# Patient Record
Sex: FEMALE | Race: WHITE | NOT HISPANIC OR LATINO | Employment: OTHER | URBAN - METROPOLITAN AREA
[De-identification: names, ages, dates, MRNs, and addresses within clinical notes are randomized per-mention and may not be internally consistent; named-entity substitution may affect disease eponyms.]

---

## 2017-01-07 ENCOUNTER — HOSPITAL ENCOUNTER (OUTPATIENT)
Dept: PREADMISSION TESTING | Facility: HOSPITAL | Age: 72
Discharge: HOME | End: 2017-01-07
Attending: SURGERY | Admitting: SURGERY

## 2017-01-07 LAB
ALBUMIN SERPL-MCNC: 3.9 G/DL (ref 3.5–4.8)
ALBUMIN/GLOB SERPL: 1.1 {RATIO} (ref 1–1.7)
ALP SERPL-CCNC: 69 IU/L (ref 32–91)
ALT SERPL-CCNC: 54 IU/L (ref 14–54)
ANION GAP SERPL CALC-SCNC: 14.3 MMOL/L (ref 10–20)
AST SERPL-CCNC: 41 IU/L (ref 15–41)
BASOPHILS # BLD AUTO: 0 10*3/UL (ref 0–0.2)
BASOPHILS NFR BLD AUTO: 1 % (ref 0–2)
BILIRUB SERPL-MCNC: 0.6 MG/DL (ref 0.3–1.2)
BUN SERPL-MCNC: 8 MG/DL (ref 8–20)
BUN/CREAT SERPL: 11.4 (ref 5.4–26.2)
CALCIUM SERPL-MCNC: 10.4 MG/DL (ref 8.9–10.3)
CHLORIDE SERPL-SCNC: 102 MMOL/L (ref 101–111)
CONV CO2: 27 MMOL/L (ref 22–32)
CONV TOTAL PROTEIN: 7.4 G/DL (ref 6.1–7.9)
CREAT UR-MCNC: 0.7 MG/DL (ref 0.4–1)
DIFFERENTIAL METHOD BLD: (no result)
EOSINOPHIL # BLD AUTO: 0.1 10*3/UL (ref 0–0.3)
EOSINOPHIL # BLD AUTO: 2 % (ref 0–3)
ERYTHROCYTE [DISTWIDTH] IN BLOOD BY AUTOMATED COUNT: 13.8 % (ref 11.5–14.5)
GLOBULIN UR ELPH-MCNC: 3.5 G/DL (ref 2.5–3.8)
GLUCOSE SERPL-MCNC: 138 MG/DL (ref 65–99)
HCT VFR BLD AUTO: 49.6 % (ref 35–49)
HGB BLD-MCNC: 16.6 G/DL (ref 12–15)
LYMPHOCYTES # BLD AUTO: 1.8 10*3/UL (ref 0.8–4.8)
LYMPHOCYTES NFR BLD AUTO: 25 % (ref 18–42)
MCH RBC QN AUTO: 31.1 PG (ref 26–32)
MCHC RBC AUTO-ENTMCNC: 33.4 G/DL (ref 32–36)
MCV RBC AUTO: 93.1 FL (ref 80–94)
MONOCYTES # BLD AUTO: 0.6 10*3/UL (ref 0.1–1.3)
MONOCYTES NFR BLD AUTO: 9 % (ref 2–11)
NEUTROPHILS # BLD AUTO: 4.5 10*3/UL (ref 2.3–8.6)
NEUTROPHILS NFR BLD AUTO: 63 % (ref 50–75)
NRBC BLD AUTO-RTO: 0 /100{WBCS}
NRBC/RBC NFR BLD MANUAL: 0 10*3/UL
PLATELET # BLD AUTO: 187 10*3/UL (ref 150–450)
PMV BLD AUTO: 9.5 FL (ref 7.4–10.4)
POTASSIUM SERPL-SCNC: 4.3 MMOL/L (ref 3.6–5.1)
RBC # BLD AUTO: 5.33 10*6/UL (ref 4–5.4)
SODIUM SERPL-SCNC: 139 MMOL/L (ref 136–144)
WBC # BLD AUTO: 7 10*3/UL (ref 4.5–11.5)

## 2018-07-09 ENCOUNTER — HOSPITAL ENCOUNTER (OUTPATIENT)
Dept: FAMILY MEDICINE CLINIC | Facility: CLINIC | Age: 73
Setting detail: SPECIMEN
Discharge: HOME | End: 2018-07-09
Attending: FAMILY MEDICINE | Admitting: FAMILY MEDICINE

## 2018-07-09 LAB
ALBUMIN SERPL-MCNC: 4.1 G/DL (ref 3.5–4.8)
ALBUMIN/GLOB SERPL: 1.3 {RATIO} (ref 1–1.7)
ALP SERPL-CCNC: 62 IU/L (ref 32–91)
ALT SERPL-CCNC: 64 IU/L (ref 14–54)
ANION GAP SERPL CALC-SCNC: 12.8 MMOL/L (ref 10–20)
AST SERPL-CCNC: 51 IU/L (ref 15–41)
BASOPHILS # BLD AUTO: 0.1 10*3/UL (ref 0–0.2)
BASOPHILS NFR BLD AUTO: 1 % (ref 0–2)
BILIRUB SERPL-MCNC: 0.6 MG/DL (ref 0.3–1.2)
BILIRUB UR QL STRIP: NEGATIVE MG/DL
BUN SERPL-MCNC: 7 MG/DL (ref 8–20)
BUN/CREAT SERPL: 10 (ref 5.4–26.2)
CALCIUM SERPL-MCNC: 9.9 MG/DL (ref 8.9–10.3)
CASTS URNS QL MICRO: ABNORMAL /[LPF]
CHLORIDE SERPL-SCNC: 105 MMOL/L (ref 101–111)
CHOLEST SERPL-MCNC: 168 MG/DL
CHOLEST/HDLC SERPL: 3.5 {RATIO}
COLOR UR: YELLOW
CONV BACTERIA IN URINE MICRO: NEGATIVE
CONV CLARITY OF URINE: CLEAR
CONV CO2: 25 MMOL/L (ref 22–32)
CONV HYALINE CASTS IN URINE MICRO: 0 /[LPF] (ref 0–5)
CONV LDL CHOLESTEROL DIRECT: 94 MG/DL (ref 0–100)
CONV PROTEIN IN URINE BY AUTOMATED TEST STRIP: NEGATIVE MG/DL
CONV SMALL ROUND CELLS: ABNORMAL /[HPF]
CONV TOTAL PROTEIN: 7.3 G/DL (ref 6.1–7.9)
CONV UROBILINOGEN IN URINE BY AUTOMATED TEST STRIP: 0.2 MG/DL
CREAT UR-MCNC: 0.7 MG/DL (ref 0.4–1)
CULTURE INDICATED?: ABNORMAL
DIFFERENTIAL METHOD BLD: (no result)
EOSINOPHIL # BLD AUTO: 0.2 10*3/UL (ref 0–0.3)
EOSINOPHIL # BLD AUTO: 2 % (ref 0–3)
ERYTHROCYTE [DISTWIDTH] IN BLOOD BY AUTOMATED COUNT: 13.8 % (ref 11.5–14.5)
ERYTHROCYTE [SEDIMENTATION RATE] IN BLOOD BY WESTERGREN METHOD: 27 MM/HR (ref 0–30)
GLOBULIN UR ELPH-MCNC: 3.2 G/DL (ref 2.5–3.8)
GLUCOSE SERPL-MCNC: 85 MG/DL (ref 65–99)
GLUCOSE UR QL: NEGATIVE MG/DL
HCT VFR BLD AUTO: 43.6 % (ref 35–49)
HDLC SERPL-MCNC: 48 MG/DL
HGB BLD-MCNC: 14.9 G/DL (ref 12–15)
HGB UR QL STRIP: NEGATIVE
KETONES UR QL STRIP: NEGATIVE MG/DL
LDLC/HDLC SERPL: 2 {RATIO}
LEUKOCYTE ESTERASE UR QL STRIP: ABNORMAL
LIPID INTERPRETATION: ABNORMAL
LYMPHOCYTES # BLD AUTO: 2.2 10*3/UL (ref 0.8–4.8)
LYMPHOCYTES NFR BLD AUTO: 22 % (ref 18–42)
MCH RBC QN AUTO: 32.2 PG (ref 26–32)
MCHC RBC AUTO-ENTMCNC: 34.2 G/DL (ref 32–36)
MCV RBC AUTO: 94.1 FL (ref 80–94)
MONOCYTES # BLD AUTO: 1.1 10*3/UL (ref 0.1–1.3)
MONOCYTES NFR BLD AUTO: 10 % (ref 2–11)
NEUTROPHILS # BLD AUTO: 6.9 10*3/UL (ref 2.3–8.6)
NEUTROPHILS NFR BLD AUTO: 65 % (ref 50–75)
NITRITE UR QL STRIP: NEGATIVE
NRBC BLD AUTO-RTO: 0 /100{WBCS}
NRBC/RBC NFR BLD MANUAL: 0 10*3/UL
PH UR STRIP.AUTO: 7 [PH] (ref 4.5–8)
PLATELET # BLD AUTO: 223 10*3/UL (ref 150–450)
PMV BLD AUTO: 9.2 FL (ref 7.4–10.4)
POTASSIUM SERPL-SCNC: 3.8 MMOL/L (ref 3.6–5.1)
RBC # BLD AUTO: 4.63 10*6/UL (ref 4–5.4)
RBC #/AREA URNS HPF: 2 /[HPF] (ref 0–3)
SODIUM SERPL-SCNC: 139 MMOL/L (ref 136–144)
SP GR UR: 1 (ref 1–1.03)
SPERM URNS QL MICRO: ABNORMAL /[HPF]
SQUAMOUS SPT QL MICRO: 1 /[HPF] (ref 0–5)
TRIGL SERPL-MCNC: 144 MG/DL
UNIDENT CRYS URNS QL MICRO: ABNORMAL /[HPF]
VLDLC SERPL CALC-MCNC: 25.4 MG/DL
WBC # BLD AUTO: 10.4 10*3/UL (ref 4.5–11.5)
WBC #/AREA URNS HPF: 2 /[HPF] (ref 0–5)
YEAST SPEC QL WET PREP: ABNORMAL /[HPF]

## 2018-07-11 LAB
ANA SER QL IA: ABNORMAL

## 2018-07-15 LAB
ENA RNP AB SER-ACNC: <1 AI
ENA SCL70 AB SER QL IA: <1 AI
ENA SM AB SER-ACNC: <1 AI
ENA SS-B AB SER-ACNC: <1 AI
RIBOSOMAL P AB SER-ACNC: <1 AI
RIBOSOMAL P AB SER-ACNC: <1 AI
SJOGREN'S ANTI-SS-A: <1 AI

## 2018-10-08 ENCOUNTER — HOSPITAL ENCOUNTER (OUTPATIENT)
Dept: GENERAL RADIOLOGY | Facility: HOSPITAL | Age: 73
Discharge: HOME | End: 2018-10-08
Attending: SURGERY | Admitting: SURGERY

## 2019-06-27 ENCOUNTER — TELEPHONE (OUTPATIENT)
Dept: ONCOLOGY | Facility: HOSPITAL | Age: 74
End: 2019-06-27

## 2019-06-27 NOTE — TELEPHONE ENCOUNTER
Case Management/ Note    Patient Name: Esperanza Smith  YOB: 1945  MRN #: [id number]    OSW received a phone message from patient regarding where to obtain bras for her prosthetics. OSW called patient back and gave her information for [facility] and [facility].     Electronically signed by:   Jazmine Lorenzo LCSW, OSW-C  06/27/19, 1:41 PM

## 2019-08-19 ENCOUNTER — TELEPHONE (OUTPATIENT)
Dept: FAMILY MEDICINE CLINIC | Facility: CLINIC | Age: 74
End: 2019-08-19

## 2019-08-19 NOTE — TELEPHONE ENCOUNTER
Gave message to patient at 3:41pm.  She does not want rx called in for Midodrine because she only has low blood pressure when she passes out.  I was told to schedule her and I told her PMJ will see her Thursday at 4:30PM.    Ame - Please put on PMJ's schedule for this Thursday 8/22 at 4:30pm.

## 2019-08-19 NOTE — TELEPHONE ENCOUNTER
Patient is wanting to be seen ASAP.  She had another episode over the weekend where she passed out, her bp dropped to 84/54 and she had diarrhea.  When can you see her?

## 2019-08-19 NOTE — TELEPHONE ENCOUNTER
Push fluids and some salt.  Midodrine 2.5mg one tid    #90  See Esperanza sometime in the next week or so.  I could work in late or early just let me know if I need to come in earlier.

## 2019-08-20 NOTE — TELEPHONE ENCOUNTER
Maria Elena - can you look at PMJ's schedule and tell me where to schedule this patient?  There are no 4pm or 4:30pm slots open this week or next week.  PMJ said he wanted to see her this week or next week.  Please let me know where to put patient so I can call her back and tell her we changed the appointment.

## 2019-08-20 NOTE — TELEPHONE ENCOUNTER
Gave message to patient at 2:13pm.    Christa - can you please put patient on PMJ's schedule for 08/23/2019 at 9:15am?  Thanks!

## 2019-08-23 ENCOUNTER — OFFICE VISIT (OUTPATIENT)
Dept: FAMILY MEDICINE CLINIC | Facility: CLINIC | Age: 74
End: 2019-08-23

## 2019-08-23 VITALS
TEMPERATURE: 98 F | OXYGEN SATURATION: 94 % | BODY MASS INDEX: 32.47 KG/M2 | WEIGHT: 202 LBS | HEART RATE: 85 BPM | DIASTOLIC BLOOD PRESSURE: 94 MMHG | SYSTOLIC BLOOD PRESSURE: 132 MMHG | HEIGHT: 66 IN | RESPIRATION RATE: 16 BRPM

## 2019-08-23 DIAGNOSIS — K63.2 ENTEROCUTANEOUS FISTULA: ICD-10-CM

## 2019-08-23 DIAGNOSIS — R55 VASOVAGAL ATTACK: Primary | ICD-10-CM

## 2019-08-23 DIAGNOSIS — R10.84 GENERALIZED ABDOMINAL PAIN: ICD-10-CM

## 2019-08-23 PROCEDURE — 99213 OFFICE O/P EST LOW 20 MIN: CPT | Performed by: FAMILY MEDICINE

## 2019-08-23 RX ORDER — ASPIRIN 325 MG
1 TABLET ORAL DAILY
COMMUNITY
Start: 2016-02-19

## 2019-08-23 RX ORDER — BLOOD-GLUCOSE METER
KIT MISCELLANEOUS
COMMUNITY
Start: 2014-07-03 | End: 2020-01-30 | Stop reason: SDUPTHER

## 2019-08-23 RX ORDER — L.ACID,FERM,PLA,RHA/B.BIF,LONG 126 MG
TABLET, DELAYED AND EXTENDED RELEASE ORAL
COMMUNITY
Start: 2016-02-19 | End: 2019-11-20

## 2019-08-23 RX ORDER — TRIAMCINOLONE ACETONIDE 0.25 MG/ML
LOTION TOPICAL
COMMUNITY
Start: 2019-03-08 | End: 2019-11-20

## 2019-08-23 RX ORDER — CHOLECALCIFEROL (VITAMIN D3) 125 MCG
1 CAPSULE ORAL DAILY
COMMUNITY
Start: 2016-02-19

## 2019-08-23 RX ORDER — ZINC GLUCONATE 50 MG
TABLET ORAL
COMMUNITY
Start: 2016-02-19 | End: 2019-11-20

## 2019-08-23 NOTE — ASSESSMENT & PLAN NOTE
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  We are hoping that this has finally sealed over but time will tell.

## 2019-08-23 NOTE — ASSESSMENT & PLAN NOTE
Patient has recurrent abdominal pain and constipation at times.  She carries a diagnosis of IBS and its most likely IBS-C that is causing her pain.  She is going to continue her vegetable drink and add in Benefiber to it.  Hopefully with a little extra fiber she will have relief from the recurrent impactions that she tends to get right now.

## 2019-08-23 NOTE — ASSESSMENT & PLAN NOTE
Patient had any episode of vasovagal syncope most likely related to the impacted stool.  Once she obtained relief the and she had diarrhea.  That triggered the vasovagal episode and she had diaphoresis and fainting.  Fortunately was over in a few minutes and she is now back to normal.

## 2019-08-23 NOTE — PROGRESS NOTES
Rooming Tab(CC,VS,Pt Hx,Fall Screen)  Chief Complaint   Patient presents with   • Loss of Consciousness       Subjective    Patient is a 73-year-old female who has had a fistula for many years in her abdominal area.  Recently that fistula finally stopped flowing.  She has had intermittent constipation also for several years and has learned to take certain products to help relieve the constipation including a little wine at night if she is impacted.  That would usually give her relief and she has done well.  The other day she went into the bathroom to have a bowel movement and had a very hard painful bowel movement followed by some diarrhea.  She started sweating and she could tell immediately that she was going to have a syncopal spell.  She took her glasses off and immediately got to the floor so that she would not fall.  She did actually become unconscious for several minutes and when she awoke then her  helped her up to the shower.  She is here today just to discuss this situation and to rule out any other pathology.              I have reviewed and updated her medications, medical history and problem list during today's office visit.     Patient Care Team:  Dom Estrada MD as PCP - General  [facility] - [facility] Attributed    Problem List Tab  Medications Tab  Synopsis Tab  Chart Review Tab  Care Everywhere Tab  Immunizations Tab  Patient History Tab    Social History     Tobacco Use   • Smoking status: Never Smoker   • Smokeless tobacco: Never Used   Substance Use Topics   • Alcohol use: Yes     Alcohol/week: 0.6 oz     Types: 1 Glasses of wine per week     Comment: occ       Review of Systems   Constitutional: Negative.  Negative for appetite change, diaphoresis, fatigue, fever and unexpected weight loss.   HENT: Negative for congestion, sinus pressure and sore throat.    Eyes: Negative for blurred vision, double vision and itching.   Respiratory: Negative for cough and shortness  "of breath.    Cardiovascular: Negative for chest pain and palpitations.   Genitourinary: Negative for difficulty urinating, frequency and urinary incontinence.   Musculoskeletal: Negative for arthralgias, back pain, joint swelling, myalgias and neck pain.   Skin: Negative for dry skin and rash.   Allergic/Immunologic: Negative for environmental allergies.   Neurological: Negative for dizziness, tremors, syncope, headache and memory problem.   Hematological: Does not bruise/bleed easily.   Psychiatric/Behavioral: Negative for depressed mood. The patient is not nervous/anxious.    All other systems reviewed and are negative.      Objective     Rooming Tab(CC,VS,Pt Hx,Fall Screen)  /94 (BP Location: Left arm, Cuff Size: Large Adult)   Pulse 85   Temp 98 °F (36.7 °C) (Oral)   Resp 16   Ht 167.6 cm (66\")   Wt 91.6 kg (202 lb)   SpO2 94%   BMI 32.60 kg/m²     Body mass index is 32.6 kg/m².    Physical Exam   Constitutional: She is oriented to person, place, and time. She appears well-developed. No distress.   HENT:   Head: Normocephalic.   Left Ear: External ear normal.   Nose: Nose normal.   Mouth/Throat: Oropharynx is clear and moist.   Eyes: Conjunctivae and lids are normal. Right pupil is round. Left pupil is round. Pupils are equal.   Neck: Normal range of motion. Neck supple.   Cardiovascular: Normal rate, regular rhythm, normal heart sounds and intact distal pulses.   Pulmonary/Chest: Effort normal and breath sounds normal.   Abdominal: Soft. Bowel sounds are normal.   Musculoskeletal: Normal range of motion.   Neurological: She is alert and oriented to person, place, and time.   Skin: Skin is warm and dry.   Psychiatric: She has a normal mood and affect. Her speech is normal and behavior is normal. Judgment and thought content normal. She is attentive.        Statin Choice Calculator  Data Reviewed:                   Assessment/Plan   Order Review Tab  Health Maintenance Tab  Patient Plan/Order " Tab  Diagnoses and all orders for this visit:    1. Vasovagal attack (Primary)  Assessment & Plan:  Patient had any episode of vasovagal syncope most likely related to the impacted stool.  Once she obtained relief the and she had diarrhea.  That triggered the vasovagal episode and she had diaphoresis and fainting.  Fortunately was over in a few minutes and she is now back to normal.      2. Generalized abdominal pain  Assessment & Plan:  Patient has recurrent abdominal pain and constipation at times.  She carries a diagnosis of IBS and its most likely IBS-C that is causing her pain.  She is going to continue her vegetable drink and add in Benefiber to it.  Hopefully with a little extra fiber she will have relief from the recurrent impactions that she tends to get right now.      3. 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  We are hoping that this has finally sealed over but time will tell.        Wrapup Tab  Return in about 1 year (around 8/23/2020) for Annual physical.

## 2019-11-20 ENCOUNTER — LAB (OUTPATIENT)
Dept: FAMILY MEDICINE CLINIC | Facility: CLINIC | Age: 74
End: 2019-11-20

## 2019-11-20 ENCOUNTER — OFFICE VISIT (OUTPATIENT)
Dept: FAMILY MEDICINE CLINIC | Facility: CLINIC | Age: 74
End: 2019-11-20

## 2019-11-20 DIAGNOSIS — K63.2 ENTEROCUTANEOUS FISTULA: ICD-10-CM

## 2019-11-20 DIAGNOSIS — R79.9 ABNORMAL FINDING OF BLOOD CHEMISTRY, UNSPECIFIED: ICD-10-CM

## 2019-11-20 DIAGNOSIS — E78.2 MIXED HYPERLIPIDEMIA: ICD-10-CM

## 2019-11-20 DIAGNOSIS — E55.9 VITAMIN D DEFICIENCY: ICD-10-CM

## 2019-11-20 DIAGNOSIS — I10 ESSENTIAL HYPERTENSION: ICD-10-CM

## 2019-11-20 DIAGNOSIS — Z00.00 MEDICARE ANNUAL WELLNESS VISIT, SUBSEQUENT: Primary | ICD-10-CM

## 2019-11-20 DIAGNOSIS — E66.3 OVERWEIGHT: ICD-10-CM

## 2019-11-20 DIAGNOSIS — C50.912 MALIGNANT NEOPLASM OF LEFT FEMALE BREAST, UNSPECIFIED ESTROGEN RECEPTOR STATUS, UNSPECIFIED SITE OF BREAST (HCC): ICD-10-CM

## 2019-11-20 DIAGNOSIS — Z12.39 SCREENING FOR BREAST CANCER: ICD-10-CM

## 2019-11-20 LAB
25(OH)D3 SERPL-MCNC: 56.5 NG/ML (ref 30–100)
ALBUMIN SERPL-MCNC: 4.5 G/DL (ref 3.5–5.2)
ALBUMIN/GLOB SERPL: 1.2 G/DL
ALP SERPL-CCNC: 85 U/L (ref 39–117)
ALT SERPL W P-5'-P-CCNC: 48 U/L (ref 1–33)
ANION GAP SERPL CALCULATED.3IONS-SCNC: 14.4 MMOL/L (ref 5–15)
AST SERPL-CCNC: 43 U/L (ref 1–32)
BACTERIA UR QL AUTO: ABNORMAL /HPF
BASOPHILS # BLD AUTO: 0.04 10*3/MM3 (ref 0–0.2)
BASOPHILS NFR BLD AUTO: 0.5 % (ref 0–1.5)
BILIRUB SERPL-MCNC: 0.7 MG/DL (ref 0.2–1.2)
BILIRUB UR QL STRIP: NEGATIVE
BUN BLD-MCNC: 9 MG/DL (ref 8–23)
BUN/CREAT SERPL: 14.8 (ref 7–25)
CALCIUM SPEC-SCNC: 10.5 MG/DL (ref 8.6–10.5)
CHLORIDE SERPL-SCNC: 100 MMOL/L (ref 98–107)
CHOLEST SERPL-MCNC: 199 MG/DL (ref 0–200)
CLARITY UR: CLEAR
CO2 SERPL-SCNC: 24.6 MMOL/L (ref 22–29)
COLOR UR: YELLOW
CREAT BLD-MCNC: 0.61 MG/DL (ref 0.57–1)
DEPRECATED RDW RBC AUTO: 41.1 FL (ref 37–54)
EOSINOPHIL # BLD AUTO: 0.13 10*3/MM3 (ref 0–0.4)
EOSINOPHIL NFR BLD AUTO: 1.5 % (ref 0.3–6.2)
ERYTHROCYTE [DISTWIDTH] IN BLOOD BY AUTOMATED COUNT: 12.1 % (ref 12.3–15.4)
GFR SERPL CREATININE-BSD FRML MDRD: 96 ML/MIN/1.73
GLOBULIN UR ELPH-MCNC: 3.9 GM/DL
GLUCOSE BLD-MCNC: 100 MG/DL (ref 65–99)
GLUCOSE UR STRIP-MCNC: NEGATIVE MG/DL
HBA1C MFR BLD: 5.6 % (ref 3.5–5.6)
HCT VFR BLD AUTO: 47.7 % (ref 34–46.6)
HDLC SERPL-MCNC: 56 MG/DL (ref 40–60)
HGB BLD-MCNC: 16.5 G/DL (ref 12–15.9)
HGB UR QL STRIP.AUTO: NEGATIVE
HYALINE CASTS UR QL AUTO: ABNORMAL /LPF
IMM GRANULOCYTES # BLD AUTO: 0.04 10*3/MM3 (ref 0–0.05)
IMM GRANULOCYTES NFR BLD AUTO: 0.5 % (ref 0–0.5)
KETONES UR QL STRIP: NEGATIVE
LDLC SERPL CALC-MCNC: 122 MG/DL (ref 0–100)
LDLC/HDLC SERPL: 2.18 {RATIO}
LEUKOCYTE ESTERASE UR QL STRIP.AUTO: ABNORMAL
LYMPHOCYTES # BLD AUTO: 1.83 10*3/MM3 (ref 0.7–3.1)
LYMPHOCYTES NFR BLD AUTO: 21.4 % (ref 19.6–45.3)
MCH RBC QN AUTO: 32.2 PG (ref 26.6–33)
MCHC RBC AUTO-ENTMCNC: 34.6 G/DL (ref 31.5–35.7)
MCV RBC AUTO: 93 FL (ref 79–97)
MONOCYTES # BLD AUTO: 0.74 10*3/MM3 (ref 0.1–0.9)
MONOCYTES NFR BLD AUTO: 8.7 % (ref 5–12)
NEUTROPHILS # BLD AUTO: 5.76 10*3/MM3 (ref 1.7–7)
NEUTROPHILS NFR BLD AUTO: 67.4 % (ref 42.7–76)
NITRITE UR QL STRIP: NEGATIVE
NRBC BLD AUTO-RTO: 0 /100 WBC (ref 0–0.2)
PH UR STRIP.AUTO: 6.5 [PH] (ref 5–8)
PLATELET # BLD AUTO: 226 10*3/MM3 (ref 140–450)
PMV BLD AUTO: 11.5 FL (ref 6–12)
POTASSIUM BLD-SCNC: 4.4 MMOL/L (ref 3.5–5.2)
PROT SERPL-MCNC: 8.4 G/DL (ref 6–8.5)
PROT UR QL STRIP: NEGATIVE
RBC # BLD AUTO: 5.13 10*6/MM3 (ref 3.77–5.28)
RBC # UR: ABNORMAL /HPF
REF LAB TEST METHOD: ABNORMAL
SODIUM BLD-SCNC: 139 MMOL/L (ref 136–145)
SP GR UR STRIP: 1.01 (ref 1–1.03)
SQUAMOUS #/AREA URNS HPF: ABNORMAL /HPF
T4 FREE SERPL-MCNC: 0.95 NG/DL (ref 0.93–1.7)
TRIGL SERPL-MCNC: 104 MG/DL (ref 0–150)
TSH SERPL DL<=0.05 MIU/L-ACNC: 2.9 UIU/ML (ref 0.27–4.2)
UROBILINOGEN UR QL STRIP: ABNORMAL
VIT B12 BLD-MCNC: 532 PG/ML (ref 211–946)
VLDLC SERPL-MCNC: 20.8 MG/DL (ref 5–40)
WBC NRBC COR # BLD: 8.54 10*3/MM3 (ref 3.4–10.8)
WBC UR QL AUTO: ABNORMAL /HPF

## 2019-11-20 PROCEDURE — G0439 PPPS, SUBSEQ VISIT: HCPCS | Performed by: FAMILY MEDICINE

## 2019-11-20 PROCEDURE — 82306 VITAMIN D 25 HYDROXY: CPT | Performed by: FAMILY MEDICINE

## 2019-11-20 PROCEDURE — 81001 URINALYSIS AUTO W/SCOPE: CPT

## 2019-11-20 PROCEDURE — 99214 OFFICE O/P EST MOD 30 MIN: CPT | Performed by: FAMILY MEDICINE

## 2019-11-20 PROCEDURE — 85025 COMPLETE CBC W/AUTO DIFF WBC: CPT | Performed by: FAMILY MEDICINE

## 2019-11-20 PROCEDURE — 83036 HEMOGLOBIN GLYCOSYLATED A1C: CPT | Performed by: FAMILY MEDICINE

## 2019-11-20 PROCEDURE — 80053 COMPREHEN METABOLIC PANEL: CPT | Performed by: FAMILY MEDICINE

## 2019-11-20 PROCEDURE — 84443 ASSAY THYROID STIM HORMONE: CPT | Performed by: FAMILY MEDICINE

## 2019-11-20 PROCEDURE — 84439 ASSAY OF FREE THYROXINE: CPT | Performed by: FAMILY MEDICINE

## 2019-11-20 PROCEDURE — 82607 VITAMIN B-12: CPT | Performed by: FAMILY MEDICINE

## 2019-11-20 PROCEDURE — 80061 LIPID PANEL: CPT | Performed by: FAMILY MEDICINE

## 2019-11-20 RX ORDER — NYSTATIN 100000 U/G
CREAM TOPICAL 2 TIMES DAILY
COMMUNITY
End: 2019-11-20 | Stop reason: SDUPTHER

## 2019-11-20 RX ORDER — NYSTATIN 100000 U/G
CREAM TOPICAL 2 TIMES DAILY
Qty: 60 G | Refills: 3 | Status: SHIPPED | OUTPATIENT
Start: 2019-11-20 | End: 2021-06-23

## 2019-12-11 ENCOUNTER — APPOINTMENT (OUTPATIENT)
Dept: MAMMOGRAPHY | Facility: HOSPITAL | Age: 74
End: 2019-12-11

## 2019-12-16 VITALS
SYSTOLIC BLOOD PRESSURE: 144 MMHG | DIASTOLIC BLOOD PRESSURE: 92 MMHG | OXYGEN SATURATION: 94 % | HEIGHT: 66 IN | TEMPERATURE: 98.4 F | WEIGHT: 195 LBS | RESPIRATION RATE: 16 BRPM | HEART RATE: 96 BPM | BODY MASS INDEX: 31.34 KG/M2

## 2019-12-16 NOTE — ASSESSMENT & PLAN NOTE
Lipid abnormalities are improving with lifestyle modifications.  Nutritional counseling was provided.  Lipids will be reassessed in 6 months.    Patient's lipid panel was actually very good.  Her LDL was about 122 but her total cholesterol triglycerides and HDL are all excellent.  Low-carb diet, apple cider vinegar, omega-3 fatty acids, and exercise will all be used to continue current therapy.  Overall she is doing fairly well in this area.

## 2019-12-16 NOTE — ASSESSMENT & PLAN NOTE
Obesity is unchanged.  Discussed the patient's BMI.  The BMI is above average; BMI management plan is completed.  General weight loss/lifestyle modification strategies discussed (elicit support from others; identify saboteurs; non-food rewards, etc).  Behavioral treatment: commercial programs (Weight watchers).  Diet interventions: low calorie (1000 kCal/d) deficit diet.  Informal exercise measures discussed, e.g. taking stairs instead of elevator.  Regular aerobic exercise program discussed.  Pharmacotherapy as ordered.

## 2019-12-16 NOTE — ASSESSMENT & PLAN NOTE
Patient had years and years of recurrent enterocutaneous fistulas with multiple surgeries in the past.  A small tiny fistula has persisted for years and years in her mid abdominal area.  This is been treated conservatively by just trying to improve nutrition and finally it starting to close and stop.  It does have a tendency to recur but the periods of remission seem to be longer.

## 2019-12-16 NOTE — ASSESSMENT & PLAN NOTE
Hypertension is unchanged.  Continue current treatment regimen.  Dietary sodium restriction.  Weight loss.  Regular aerobic exercise.  Blood pressure will be reassessed in 3 months.    Patient appears to have office or whitecoat hypertension.  Her blood pressure was rather elevated when first taken in the office but came down after she sat for a while.  I need blood pressure readings from her home.  She needs to check it several times a week and send those to me.  She is not really on any medication for it.  Apple cider vinegar and fatty acids is all she is really taking that might affect her pressure.

## 2020-01-08 ENCOUNTER — HOSPITAL ENCOUNTER (OUTPATIENT)
Dept: MAMMOGRAPHY | Facility: HOSPITAL | Age: 75
Discharge: HOME | End: 2020-01-08
Admitting: FAMILY MEDICINE

## 2020-01-08 PROCEDURE — 77067 SCR MAMMO BI INCL CAD: CPT

## 2020-01-08 PROCEDURE — 77063 BREAST TOMOSYNTHESIS BI: CPT

## 2020-01-13 DIAGNOSIS — R92.8 ABNORMAL MAMMOGRAM: Primary | ICD-10-CM

## 2020-01-16 ENCOUNTER — HOSPITAL ENCOUNTER (OUTPATIENT)
Dept: MAMMOGRAPHY | Facility: HOSPITAL | Age: 75
Discharge: HOME | End: 2020-01-16
Admitting: FAMILY MEDICINE

## 2020-01-16 DIAGNOSIS — R92.8 ABNORMAL MAMMOGRAM: ICD-10-CM

## 2020-01-16 PROCEDURE — G0279 TOMOSYNTHESIS, MAMMO: HCPCS

## 2020-01-16 PROCEDURE — 77065 DX MAMMO INCL CAD UNI: CPT

## 2020-01-17 DIAGNOSIS — R92.1 BREAST CALCIFICATION, RIGHT: Primary | ICD-10-CM

## 2020-01-22 ENCOUNTER — HOSPITAL ENCOUNTER (OUTPATIENT)
Dept: MAMMOGRAPHY | Facility: HOSPITAL | Age: 75
Discharge: HOME | End: 2020-01-22
Admitting: FAMILY MEDICINE

## 2020-01-22 DIAGNOSIS — R92.1 BREAST CALCIFICATION, RIGHT: ICD-10-CM

## 2020-01-22 PROCEDURE — 88305 TISSUE EXAM BY PATHOLOGIST: CPT | Performed by: FAMILY MEDICINE

## 2020-01-22 PROCEDURE — 88360 TUMOR IMMUNOHISTOCHEM/MANUAL: CPT | Performed by: FAMILY MEDICINE

## 2020-01-22 PROCEDURE — 25010000003 LIDOCAINE 1 % SOLUTION: Performed by: FAMILY MEDICINE

## 2020-01-22 PROCEDURE — A4648 IMPLANTABLE TISSUE MARKER: HCPCS

## 2020-01-22 RX ORDER — LIDOCAINE HYDROCHLORIDE AND EPINEPHRINE 10; 10 MG/ML; UG/ML
8 INJECTION, SOLUTION INFILTRATION; PERINEURAL ONCE
Status: COMPLETED | OUTPATIENT
Start: 2020-01-22 | End: 2020-01-22

## 2020-01-22 RX ORDER — LIDOCAINE HYDROCHLORIDE 10 MG/ML
3 INJECTION, SOLUTION INFILTRATION; PERINEURAL ONCE
Status: COMPLETED | OUTPATIENT
Start: 2020-01-22 | End: 2020-01-22

## 2020-01-22 RX ADMIN — LIDOCAINE HYDROCHLORIDE,EPINEPHRINE BITARTRATE 20 ML: 10; .01 INJECTION, SOLUTION INFILTRATION; PERINEURAL at 10:39

## 2020-01-22 RX ADMIN — LIDOCAINE HYDROCHLORIDE 3 ML: 10 INJECTION, SOLUTION INFILTRATION; PERINEURAL at 10:38

## 2020-01-23 LAB
LAB AP CASE REPORT: NORMAL
LAB AP DIAGNOSIS COMMENT: NORMAL
PATH REPORT.FINAL DX SPEC: NORMAL
PATH REPORT.GROSS SPEC: NORMAL

## 2020-01-24 ENCOUNTER — TELEPHONE (OUTPATIENT)
Dept: FAMILY MEDICINE CLINIC | Facility: CLINIC | Age: 75
End: 2020-01-24

## 2020-01-25 DIAGNOSIS — C50.511 MALIGNANT NEOPLASM OF LOWER-OUTER QUADRANT OF RIGHT FEMALE BREAST, UNSPECIFIED ESTROGEN RECEPTOR STATUS (HCC): Primary | ICD-10-CM

## 2020-01-25 NOTE — PROGRESS NOTES
Make sure Esperanza has been referred to a surgeon for her breast cancer.  The fine-needle aspirate shows intraductal cancer but no invasive disease.  She should do well.   Have her see the oncology group possibly Dr. Arellano and if she needs a surgeon Dr. Mario Quezada.   If she already has a oncologist picked out or a surgeon picked out that's fine go with whoever she wants.

## 2020-01-25 NOTE — TELEPHONE ENCOUNTER
I called patient Saturday.  I left a message on her voicemail explaining that the biopsy came back with cancer and that we were going to set her up to see Dr. Arellano and Dr. Quezada unless she had other preferences.  I left her my phone number to call me back also.

## 2020-01-27 ENCOUNTER — TELEPHONE (OUTPATIENT)
Dept: ONCOLOGY | Facility: CLINIC | Age: 75
End: 2020-01-27

## 2020-01-27 NOTE — TELEPHONE ENCOUNTER
Patient calling to cancel appointment. Said did not want to re-schedule at this time until after a procedure she is having this week to determine whether or not she has cancer.

## 2020-01-28 ENCOUNTER — TELEPHONE (OUTPATIENT)
Dept: FAMILY MEDICINE CLINIC | Facility: CLINIC | Age: 75
End: 2020-01-28

## 2020-01-30 ENCOUNTER — PREP FOR SURGERY (OUTPATIENT)
Dept: OTHER | Facility: HOSPITAL | Age: 75
End: 2020-01-30

## 2020-01-30 ENCOUNTER — OFFICE VISIT (OUTPATIENT)
Dept: SURGERY | Facility: CLINIC | Age: 75
End: 2020-01-30

## 2020-01-30 VITALS
DIASTOLIC BLOOD PRESSURE: 103 MMHG | BODY MASS INDEX: 32.02 KG/M2 | HEART RATE: 77 BPM | HEIGHT: 66 IN | TEMPERATURE: 97.2 F | WEIGHT: 199.2 LBS | SYSTOLIC BLOOD PRESSURE: 171 MMHG | OXYGEN SATURATION: 94 %

## 2020-01-30 DIAGNOSIS — D05.11 BREAST NEOPLASM, TIS (DCIS), RIGHT: Primary | ICD-10-CM

## 2020-01-30 DIAGNOSIS — C50.919 BREAST CANCER (HCC): Primary | ICD-10-CM

## 2020-01-30 PROCEDURE — 99213 OFFICE O/P EST LOW 20 MIN: CPT | Performed by: SURGERY

## 2020-01-30 RX ORDER — CLINDAMYCIN PHOSPHATE 900 MG/50ML
900 INJECTION, SOLUTION INTRAVENOUS ONCE
Status: CANCELLED | OUTPATIENT
Start: 2020-01-30 | End: 2020-01-30

## 2020-01-30 RX ORDER — ASCORBIC ACID 250 MG
1 TABLET,CHEWABLE ORAL DAILY
COMMUNITY
Start: 2019-09-01

## 2020-01-30 RX ORDER — SODIUM CHLORIDE 9 MG/ML
100 INJECTION, SOLUTION INTRAVENOUS CONTINUOUS
Status: CANCELLED | OUTPATIENT
Start: 2020-01-30

## 2020-01-30 NOTE — PROGRESS NOTES
Subjective   Esperanza Smith is a 74 y.o. female.     History of present illness  Esperanza is a pleasant 74-year-old well-known to us from previous mastectomy on the left side 3 years ago for invasive ductal cancer.  On her most recent mammogram she was found to have an area of abnormal microcalcifications and underwent a stereotactic biopsy of the right breast.  Has DCIS of the right breast.    In the office today we have seen and examined her , we have discussed options to include wire localized excisional biopsy with radiation postop, she does not want any radiation and wants a completion mastectomy on the right side to match the left.  Her tumor is ER positive [place] negative.  She has  never had genetic testing.  She did not see oncology before at her own request.    Past Medical History:   Diagnosis Date   • Breast cancer (CMS/HCC)    • Hyperlipidemia    • Hypertension        Past Surgical History:   Procedure Laterality Date   • BREAST BIOPSY     • BREAST SURGERY Left 02/2012    x2    • CHOLECYSTECTOMY  1996   • COLON RESECTION SMALL BOWEL  05/25/2014   • HYSTERECTOMY     • MASTECTOMY     • OOPHORECTOMY  1965       Outpatient Encounter Medications as of 1/30/2020   Medication Sig Dispense Refill   • APPLE CIDER VINEGAR PO Take  by mouth.     • Ascorbic Acid (VITAMIN C) 250 MG chewable tablet      • B Complex-Biotin-FA (SUPER B-COMPLEX) tablet SUPER B-COMPLEX TABS     • Cholecalciferol (VITAMIN D3) 2000 units tablet VITAMIN D3 2000 UNIT TABS     • Inositol 324 MG tablet INOSITOL TABS     • Multiple Vitamins-Minerals (MULTIVITAMIN GUMMIES ADULT) chewable tablet MULTIVITAMIN GUMMIES ADULT CHEW     • Omega-3 Fatty Acids (OMEGA-3 FISH OIL PO) Take  by mouth.     • TURMERIC CURCUMIN PO Take  by mouth.     • nystatin (MYCOSTATIN) 863492 UNIT/GM cream Apply  topically to the appropriate area as directed 2 (Two) Times a Day for 30 days. 60 g 3   • [DISCONTINUED] Blood Glucose Monitoring Suppl (FREESTYLE FREEDOM) kit  "FREESharetivityYLE FREEDOM KIT     • [DISCONTINUED] Syringe/Needle, Disp, (BD ECLIPSE SYRINGE) 25G X 1\" 3 ML misc BD ECLIPSE SYRINGE 25G X 1\" 3 ML       No facility-administered encounter medications on file as of 1/30/2020.        Allergies   Allergen Reactions   • Diphenhydramine Rash   • Morphine Mental Status Change   • Penicillins Other (See Comments)     Told not to take any more   • Prednisone GI Intolerance   • Latex Rash       Family History   Problem Relation Age of Onset   • Breast cancer Sister    • Breast cancer Maternal Grandmother        Social History     Socioeconomic History   • Marital status:      Spouse name: Not on file   • Number of children: Not on file   • Years of education: Not on file   • Highest education level: Not on file   Tobacco Use   • Smoking status: Never Smoker   • Smokeless tobacco: Never Used   Substance and Sexual Activity   • Alcohol use: Yes     Alcohol/week: 1.0 standard drinks     Types: 1 Glasses of wine per week     Comment: occ   • Drug use: No       The following portions of the patient's history were reviewed and updated as appropriate: allergies, current medications, past family history, past medical history, past social history, past surgical history and problem list.    Objective       Assessment/Plan   There are no diagnoses linked to this encounter.    Systems are reviewed and unremarkable with exception of the chief complaint.  Was pregnant 1 time and miscarried.  She was never on hormone replacement therapy.    Physical exam shows a pleasant 4-year-old female.  HEENT is negative.  Heart is regular.  Lungs are clear.  Abdomen is soft nontender without mass.  Extremities show equal range of motion in the upper and lower extremities with symmetrical strength and usage.  Neuro shows no obvious focal deficit.    Breast exam done in the sitting or lying position shows a right breast with some bruising nipple is everted.  There is no axillary supra or infraclavicular " lymphadenopathy.  She does have a palpable mass which I think is likely hematoma from the biopsy.  This is located at 8 o'clock position of the right breast.  Left chest wall is healed nicely.    This point we will proceed with a right simple mastectomy, no sentinel lymph node biopsy as this is a DCIS.  There were no high-grade features on the biopsy.           Mario Quezada, DO  1/30/2020  2:36 PM

## 2020-01-30 NOTE — H&P
Subjective   Esperanza Smith is a 74 y.o. female.     History of present illness  Esperanza is a pleasant 74-year-old well-known to us from previous mastectomy on the left side 3 years ago for invasive ductal cancer.  On her most recent mammogram she was found to have an area of abnormal microcalcifications and underwent a stereotactic biopsy of the right breast.  Has DCIS of the right breast.    In the office today we have seen and examined her , we have discussed options to include wire localized excisional biopsy with radiation postop, she does not want any radiation and wants a completion mastectomy on the right side to match the left.  Her tumor is ER positive [place] negative.  She has  never had genetic testing.  She did not see oncology before at her own request.    Past Medical History:   Diagnosis Date   • Breast cancer (CMS/HCC)    • Hyperlipidemia    • Hypertension        Past Surgical History:   Procedure Laterality Date   • BREAST BIOPSY     • BREAST SURGERY Left 02/2012    x2    • CHOLECYSTECTOMY  1996   • COLON RESECTION SMALL BOWEL  05/25/2014   • HYSTERECTOMY     • MASTECTOMY     • OOPHORECTOMY  1965       Outpatient Encounter Medications as of 1/30/2020   Medication Sig Dispense Refill   • APPLE CIDER VINEGAR PO Take  by mouth.     • Ascorbic Acid (VITAMIN C) 250 MG chewable tablet      • B Complex-Biotin-FA (SUPER B-COMPLEX) tablet SUPER B-COMPLEX TABS     • Cholecalciferol (VITAMIN D3) 2000 units tablet VITAMIN D3 2000 UNIT TABS     • Inositol 324 MG tablet INOSITOL TABS     • Multiple Vitamins-Minerals (MULTIVITAMIN GUMMIES ADULT) chewable tablet MULTIVITAMIN GUMMIES ADULT CHEW     • nystatin (MYCOSTATIN) 177702 UNIT/GM cream Apply  topically to the appropriate area as directed 2 (Two) Times a Day for 30 days. 60 g 3   • Omega-3 Fatty Acids (OMEGA-3 FISH OIL PO) Take  by mouth.     • TURMERIC CURCUMIN PO Take  by mouth.     • [DISCONTINUED] Blood Glucose Monitoring Suppl (FREESTYLE FREEDOM) kit  "FREEWimbaYLE FREEDOM KIT     • [DISCONTINUED] Syringe/Needle, Disp, (BD ECLIPSE SYRINGE) 25G X 1\" 3 ML misc BD ECLIPSE SYRINGE 25G X 1\" 3 ML       No facility-administered encounter medications on file as of 1/30/2020.        Allergies   Allergen Reactions   • Diphenhydramine Rash   • Morphine Mental Status Change   • Penicillins Other (See Comments)     Told not to take any more   • Prednisone GI Intolerance   • Latex Rash       Family History   Problem Relation Age of Onset   • Breast cancer Sister    • Breast cancer Maternal Grandmother        Social History     Socioeconomic History   • Marital status:      Spouse name: Not on file   • Number of children: Not on file   • Years of education: Not on file   • Highest education level: Not on file   Tobacco Use   • Smoking status: Never Smoker   • Smokeless tobacco: Never Used   Substance and Sexual Activity   • Alcohol use: Yes     Alcohol/week: 1.0 standard drinks     Types: 1 Glasses of wine per week     Comment: occ   • Drug use: No       The following portions of the patient's history were reviewed and updated as appropriate: allergies, current medications, past family history, past medical history, past social history, past surgical history and problem list.    Objective       Assessment/Plan   There are no diagnoses linked to this encounter.    Systems are reviewed and unremarkable with exception of the chief complaint.  Was pregnant 1 time and miscarried.  She was never on hormone replacement therapy.    Physical exam shows a pleasant 4-year-old female.  HEENT is negative.  Heart is regular.  Lungs are clear.  Abdomen is soft nontender without mass.  Extremities show equal range of motion in the upper and lower extremities with symmetrical strength and usage.  Neuro shows no obvious focal deficit.    Breast exam done in the sitting or lying position shows a right breast with some bruising nipple is everted.  There is no axillary supra or infraclavicular " lymphadenopathy.  She does have a palpable mass which I think is likely hematoma from the biopsy.  This is located at 8 o'clock position of the right breast.  Left chest wall is healed nicely.    This point we will proceed with a right simple mastectomy, no sentinel lymph node biopsy as this is a DCIS.  There were no high-grade features on the biopsy.           Mario Quezada, DO  1/30/2020  2:45 PM

## 2020-02-18 ENCOUNTER — APPOINTMENT (OUTPATIENT)
Dept: PREADMISSION TESTING | Facility: HOSPITAL | Age: 75
End: 2020-02-18

## 2020-02-18 VITALS
SYSTOLIC BLOOD PRESSURE: 155 MMHG | BODY MASS INDEX: 32.43 KG/M2 | WEIGHT: 201.8 LBS | DIASTOLIC BLOOD PRESSURE: 97 MMHG | HEART RATE: 91 BPM | HEIGHT: 66 IN | RESPIRATION RATE: 14 BRPM | OXYGEN SATURATION: 94 %

## 2020-02-18 DIAGNOSIS — C50.919 BREAST CANCER (HCC): ICD-10-CM

## 2020-02-18 LAB
ALBUMIN SERPL-MCNC: 4.2 G/DL (ref 3.5–5.2)
ALBUMIN/GLOB SERPL: 1.3 G/DL
ALP SERPL-CCNC: 75 U/L (ref 39–117)
ALT SERPL W P-5'-P-CCNC: 55 U/L (ref 1–33)
ANION GAP SERPL CALCULATED.3IONS-SCNC: 11 MMOL/L (ref 5–15)
APTT PPP: 24.9 SECONDS (ref 24–31)
AST SERPL-CCNC: 40 U/L (ref 1–32)
BASOPHILS # BLD AUTO: 0 10*3/MM3 (ref 0–0.2)
BASOPHILS NFR BLD AUTO: 0.6 % (ref 0–1.5)
BILIRUB SERPL-MCNC: 0.6 MG/DL (ref 0.2–1.2)
BUN BLD-MCNC: 9 MG/DL (ref 8–23)
BUN/CREAT SERPL: 13.6 (ref 7–25)
CALCIUM SPEC-SCNC: 10.1 MG/DL (ref 8.6–10.5)
CHLORIDE SERPL-SCNC: 100 MMOL/L (ref 98–107)
CO2 SERPL-SCNC: 27 MMOL/L (ref 22–29)
CREAT BLD-MCNC: 0.66 MG/DL (ref 0.57–1)
DEPRECATED RDW RBC AUTO: 44.6 FL (ref 37–54)
EOSINOPHIL # BLD AUTO: 0.1 10*3/MM3 (ref 0–0.4)
EOSINOPHIL NFR BLD AUTO: 1.4 % (ref 0.3–6.2)
ERYTHROCYTE [DISTWIDTH] IN BLOOD BY AUTOMATED COUNT: 13.6 % (ref 12.3–15.4)
GFR SERPL CREATININE-BSD FRML MDRD: 88 ML/MIN/1.73
GLOBULIN UR ELPH-MCNC: 3.3 GM/DL
GLUCOSE BLD-MCNC: 129 MG/DL (ref 65–99)
HCT VFR BLD AUTO: 48.1 % (ref 34–46.6)
HGB BLD-MCNC: 16 G/DL (ref 12–15.9)
INR PPP: 1.03 (ref 0.9–1.1)
LYMPHOCYTES # BLD AUTO: 1.8 10*3/MM3 (ref 0.7–3.1)
LYMPHOCYTES NFR BLD AUTO: 23.5 % (ref 19.6–45.3)
MCH RBC QN AUTO: 31.2 PG (ref 26.6–33)
MCHC RBC AUTO-ENTMCNC: 33.2 G/DL (ref 31.5–35.7)
MCV RBC AUTO: 93.9 FL (ref 79–97)
MONOCYTES # BLD AUTO: 0.7 10*3/MM3 (ref 0.1–0.9)
MONOCYTES NFR BLD AUTO: 9.2 % (ref 5–12)
NEUTROPHILS # BLD AUTO: 5.1 10*3/MM3 (ref 1.7–7)
NEUTROPHILS NFR BLD AUTO: 65.3 % (ref 42.7–76)
NRBC BLD AUTO-RTO: 0.1 /100 WBC (ref 0–0.2)
PLATELET # BLD AUTO: 209 10*3/MM3 (ref 140–450)
PMV BLD AUTO: 8.1 FL (ref 6–12)
POTASSIUM BLD-SCNC: 4.5 MMOL/L (ref 3.5–5.2)
PROT SERPL-MCNC: 7.5 G/DL (ref 6–8.5)
PROTHROMBIN TIME: 10.7 SECONDS (ref 9.6–11.7)
RBC # BLD AUTO: 5.12 10*6/MM3 (ref 3.77–5.28)
SODIUM BLD-SCNC: 138 MMOL/L (ref 136–145)
WBC NRBC COR # BLD: 7.8 10*3/MM3 (ref 3.4–10.8)

## 2020-02-18 PROCEDURE — 85025 COMPLETE CBC W/AUTO DIFF WBC: CPT | Performed by: SURGERY

## 2020-02-18 PROCEDURE — 85610 PROTHROMBIN TIME: CPT | Performed by: SURGERY

## 2020-02-18 PROCEDURE — 93005 ELECTROCARDIOGRAM TRACING: CPT

## 2020-02-18 PROCEDURE — 80053 COMPREHEN METABOLIC PANEL: CPT | Performed by: SURGERY

## 2020-02-18 PROCEDURE — 36415 COLL VENOUS BLD VENIPUNCTURE: CPT

## 2020-02-18 PROCEDURE — 85730 THROMBOPLASTIN TIME PARTIAL: CPT | Performed by: SURGERY

## 2020-02-18 RX ORDER — FLUTICASONE PROPIONATE 50 MCG
2 SPRAY, SUSPENSION (ML) NASAL DAILY
COMMUNITY

## 2020-02-20 PROCEDURE — 93010 ELECTROCARDIOGRAM REPORT: CPT | Performed by: INTERNAL MEDICINE

## 2020-02-25 ENCOUNTER — ANESTHESIA EVENT (OUTPATIENT)
Dept: PERIOP | Facility: HOSPITAL | Age: 75
End: 2020-02-25

## 2020-02-26 ENCOUNTER — ANESTHESIA (OUTPATIENT)
Dept: PERIOP | Facility: HOSPITAL | Age: 75
End: 2020-02-26

## 2020-02-26 ENCOUNTER — HOSPITAL ENCOUNTER (OUTPATIENT)
Facility: HOSPITAL | Age: 75
Discharge: HOME | End: 2020-02-27
Attending: SURGERY | Admitting: SURGERY

## 2020-02-26 DIAGNOSIS — C50.919 BREAST CANCER (HCC): ICD-10-CM

## 2020-02-26 PROCEDURE — G0378 HOSPITAL OBSERVATION PER HR: HCPCS

## 2020-02-26 PROCEDURE — 88307 TISSUE EXAM BY PATHOLOGIST: CPT | Performed by: SURGERY

## 2020-02-26 PROCEDURE — 25010000002 DEXAMETHASONE PER 1 MG: Performed by: ANESTHESIOLOGIST ASSISTANT

## 2020-02-26 PROCEDURE — 25010000002 HYDROMORPHONE PER 4 MG: Performed by: ANESTHESIOLOGIST ASSISTANT

## 2020-02-26 PROCEDURE — 94799 UNLISTED PULMONARY SVC/PX: CPT

## 2020-02-26 PROCEDURE — 25010000002 FENTANYL CITRATE (PF) 100 MCG/2ML SOLUTION: Performed by: ANESTHESIOLOGIST ASSISTANT

## 2020-02-26 PROCEDURE — 25010000002 ONDANSETRON PER 1 MG: Performed by: ANESTHESIOLOGIST ASSISTANT

## 2020-02-26 PROCEDURE — 25010000002 PROPOFOL 200 MG/20ML EMULSION: Performed by: ANESTHESIOLOGIST ASSISTANT

## 2020-02-26 PROCEDURE — 19303 MAST SIMPLE COMPLETE: CPT | Performed by: SURGERY

## 2020-02-26 PROCEDURE — 63710000001 APREPITANT PER 5 MG: Performed by: ANESTHESIOLOGY

## 2020-02-26 PROCEDURE — 25010000002 ONDANSETRON PER 1 MG: Performed by: SURGERY

## 2020-02-26 PROCEDURE — 88305 TISSUE EXAM BY PATHOLOGIST: CPT | Performed by: SURGERY

## 2020-02-26 RX ORDER — APREPITANT 40 MG/1
CAPSULE ORAL AS NEEDED
Status: DISCONTINUED | OUTPATIENT
Start: 2020-02-26 | End: 2020-02-26 | Stop reason: SURG

## 2020-02-26 RX ORDER — HYDROMORPHONE HCL 110MG/55ML
0.5 PATIENT CONTROLLED ANALGESIA SYRINGE INTRAVENOUS
Status: DISCONTINUED | OUTPATIENT
Start: 2020-02-26 | End: 2020-02-26 | Stop reason: HOSPADM

## 2020-02-26 RX ORDER — PROMETHAZINE HYDROCHLORIDE 25 MG/1
25 TABLET ORAL ONCE AS NEEDED
Status: DISCONTINUED | OUTPATIENT
Start: 2020-02-26 | End: 2020-02-26 | Stop reason: HOSPADM

## 2020-02-26 RX ORDER — CLINDAMYCIN PHOSPHATE 900 MG/50ML
900 INJECTION, SOLUTION INTRAVENOUS ONCE
Status: COMPLETED | OUTPATIENT
Start: 2020-02-26 | End: 2020-02-26

## 2020-02-26 RX ORDER — PHENYLEPHRINE HCL IN 0.9% NACL 0.5 MG/5ML
SYRINGE (ML) INTRAVENOUS AS NEEDED
Status: DISCONTINUED | OUTPATIENT
Start: 2020-02-26 | End: 2020-02-26 | Stop reason: SURG

## 2020-02-26 RX ORDER — ONDANSETRON 2 MG/ML
INJECTION INTRAMUSCULAR; INTRAVENOUS AS NEEDED
Status: DISCONTINUED | OUTPATIENT
Start: 2020-02-26 | End: 2020-02-26 | Stop reason: SURG

## 2020-02-26 RX ORDER — KETOROLAC TROMETHAMINE 15 MG/ML
15 INJECTION, SOLUTION INTRAMUSCULAR; INTRAVENOUS EVERY 6 HOURS PRN
Status: DISCONTINUED | OUTPATIENT
Start: 2020-02-26 | End: 2020-02-27 | Stop reason: HOSPADM

## 2020-02-26 RX ORDER — NALOXONE HCL 0.4 MG/ML
0.1 VIAL (ML) INJECTION
Status: DISCONTINUED | OUTPATIENT
Start: 2020-02-26 | End: 2020-02-27 | Stop reason: HOSPADM

## 2020-02-26 RX ORDER — CLINDAMYCIN PHOSPHATE 600 MG/50ML
600 INJECTION, SOLUTION INTRAVENOUS EVERY 8 HOURS
Status: COMPLETED | OUTPATIENT
Start: 2020-02-26 | End: 2020-02-27

## 2020-02-26 RX ORDER — FENTANYL CITRATE 50 UG/ML
25 INJECTION, SOLUTION INTRAMUSCULAR; INTRAVENOUS
Status: DISCONTINUED | OUTPATIENT
Start: 2020-02-26 | End: 2020-02-26 | Stop reason: HOSPADM

## 2020-02-26 RX ORDER — ONDANSETRON 2 MG/ML
4 INJECTION INTRAMUSCULAR; INTRAVENOUS ONCE AS NEEDED
Status: DISCONTINUED | OUTPATIENT
Start: 2020-02-26 | End: 2020-02-26 | Stop reason: HOSPADM

## 2020-02-26 RX ORDER — HYDROCODONE BITARTRATE AND ACETAMINOPHEN 5; 325 MG/1; MG/1
1 TABLET ORAL EVERY 4 HOURS PRN
Status: DISCONTINUED | OUTPATIENT
Start: 2020-02-26 | End: 2020-02-27 | Stop reason: HOSPADM

## 2020-02-26 RX ORDER — ONDANSETRON 4 MG/1
4 TABLET, FILM COATED ORAL EVERY 6 HOURS PRN
Status: DISCONTINUED | OUTPATIENT
Start: 2020-02-26 | End: 2020-02-27 | Stop reason: HOSPADM

## 2020-02-26 RX ORDER — SULFAMETHOXAZOLE AND TRIMETHOPRIM 800; 160 MG/1; MG/1
1 TABLET ORAL 2 TIMES DAILY
Qty: 14 TABLET | Refills: 0 | Status: SHIPPED | OUTPATIENT
Start: 2020-02-26 | End: 2021-06-23

## 2020-02-26 RX ORDER — SODIUM CHLORIDE 0.9 % (FLUSH) 0.9 %
10 SYRINGE (ML) INJECTION AS NEEDED
Status: DISCONTINUED | OUTPATIENT
Start: 2020-02-26 | End: 2020-02-26 | Stop reason: HOSPADM

## 2020-02-26 RX ORDER — SODIUM CHLORIDE 0.9 % (FLUSH) 0.9 %
10 SYRINGE (ML) INJECTION EVERY 12 HOURS SCHEDULED
Status: DISCONTINUED | OUTPATIENT
Start: 2020-02-26 | End: 2020-02-26 | Stop reason: HOSPADM

## 2020-02-26 RX ORDER — FENTANYL CITRATE 50 UG/ML
INJECTION, SOLUTION INTRAMUSCULAR; INTRAVENOUS AS NEEDED
Status: DISCONTINUED | OUTPATIENT
Start: 2020-02-26 | End: 2020-02-26 | Stop reason: SURG

## 2020-02-26 RX ORDER — PROMETHAZINE HYDROCHLORIDE 25 MG/ML
12.5 INJECTION, SOLUTION INTRAMUSCULAR; INTRAVENOUS EVERY 6 HOURS PRN
Status: DISCONTINUED | OUTPATIENT
Start: 2020-02-26 | End: 2020-02-27 | Stop reason: HOSPADM

## 2020-02-26 RX ORDER — PROPOFOL 10 MG/ML
INJECTION, EMULSION INTRAVENOUS AS NEEDED
Status: DISCONTINUED | OUTPATIENT
Start: 2020-02-26 | End: 2020-02-26 | Stop reason: SURG

## 2020-02-26 RX ORDER — PANTOPRAZOLE SODIUM 40 MG/1
40 TABLET, DELAYED RELEASE ORAL
Status: DISCONTINUED | OUTPATIENT
Start: 2020-02-27 | End: 2020-02-27 | Stop reason: HOSPADM

## 2020-02-26 RX ORDER — HYDROMORPHONE HCL 110MG/55ML
PATIENT CONTROLLED ANALGESIA SYRINGE INTRAVENOUS AS NEEDED
Status: DISCONTINUED | OUTPATIENT
Start: 2020-02-26 | End: 2020-02-26 | Stop reason: SURG

## 2020-02-26 RX ORDER — ACETAMINOPHEN 500 MG
TABLET ORAL AS NEEDED
Status: DISCONTINUED | OUTPATIENT
Start: 2020-02-26 | End: 2020-02-26 | Stop reason: SURG

## 2020-02-26 RX ORDER — OXYCODONE HYDROCHLORIDE 5 MG/1
7.5 TABLET ORAL ONCE AS NEEDED
Status: DISCONTINUED | OUTPATIENT
Start: 2020-02-26 | End: 2020-02-26 | Stop reason: HOSPADM

## 2020-02-26 RX ORDER — PROMETHAZINE HYDROCHLORIDE 25 MG/ML
6.25 INJECTION, SOLUTION INTRAMUSCULAR; INTRAVENOUS ONCE AS NEEDED
Status: DISCONTINUED | OUTPATIENT
Start: 2020-02-26 | End: 2020-02-26 | Stop reason: HOSPADM

## 2020-02-26 RX ORDER — ACETAMINOPHEN 650 MG/1
650 SUPPOSITORY RECTAL EVERY 4 HOURS PRN
Status: DISCONTINUED | OUTPATIENT
Start: 2020-02-26 | End: 2020-02-27 | Stop reason: HOSPADM

## 2020-02-26 RX ORDER — FLUMAZENIL 0.1 MG/ML
0.2 INJECTION INTRAVENOUS AS NEEDED
Status: DISCONTINUED | OUTPATIENT
Start: 2020-02-26 | End: 2020-02-26 | Stop reason: HOSPADM

## 2020-02-26 RX ORDER — ACETAMINOPHEN 325 MG/1
650 TABLET ORAL EVERY 4 HOURS PRN
Status: DISCONTINUED | OUTPATIENT
Start: 2020-02-26 | End: 2020-02-27 | Stop reason: HOSPADM

## 2020-02-26 RX ORDER — ONDANSETRON 2 MG/ML
4 INJECTION INTRAMUSCULAR; INTRAVENOUS EVERY 6 HOURS PRN
Status: DISCONTINUED | OUTPATIENT
Start: 2020-02-26 | End: 2020-02-27 | Stop reason: HOSPADM

## 2020-02-26 RX ORDER — IPRATROPIUM BROMIDE AND ALBUTEROL SULFATE 2.5; .5 MG/3ML; MG/3ML
3 SOLUTION RESPIRATORY (INHALATION) ONCE AS NEEDED
Status: DISCONTINUED | OUTPATIENT
Start: 2020-02-26 | End: 2020-02-26 | Stop reason: HOSPADM

## 2020-02-26 RX ORDER — SODIUM CHLORIDE 9 MG/ML
100 INJECTION, SOLUTION INTRAVENOUS CONTINUOUS
Status: DISCONTINUED | OUTPATIENT
Start: 2020-02-26 | End: 2020-02-26 | Stop reason: ALTCHOICE

## 2020-02-26 RX ORDER — PROMETHAZINE HYDROCHLORIDE 25 MG/1
25 SUPPOSITORY RECTAL ONCE AS NEEDED
Status: DISCONTINUED | OUTPATIENT
Start: 2020-02-26 | End: 2020-02-26 | Stop reason: HOSPADM

## 2020-02-26 RX ORDER — MEPERIDINE HYDROCHLORIDE 25 MG/ML
12.5 INJECTION INTRAMUSCULAR; INTRAVENOUS; SUBCUTANEOUS
Status: DISCONTINUED | OUTPATIENT
Start: 2020-02-26 | End: 2020-02-26 | Stop reason: HOSPADM

## 2020-02-26 RX ORDER — OXYCODONE HCL 10 MG/1
TABLET, FILM COATED, EXTENDED RELEASE ORAL AS NEEDED
Status: DISCONTINUED | OUTPATIENT
Start: 2020-02-26 | End: 2020-02-26 | Stop reason: SURG

## 2020-02-26 RX ORDER — HYDROMORPHONE HCL 110MG/55ML
0.25 PATIENT CONTROLLED ANALGESIA SYRINGE INTRAVENOUS
Status: DISCONTINUED | OUTPATIENT
Start: 2020-02-26 | End: 2020-02-26 | Stop reason: HOSPADM

## 2020-02-26 RX ORDER — HYDROMORPHONE HCL 110MG/55ML
1 PATIENT CONTROLLED ANALGESIA SYRINGE INTRAVENOUS
Status: DISCONTINUED | OUTPATIENT
Start: 2020-02-26 | End: 2020-02-26 | Stop reason: HOSPADM

## 2020-02-26 RX ORDER — OXYCODONE HYDROCHLORIDE 5 MG/1
10 TABLET ORAL EVERY 4 HOURS PRN
Status: DISCONTINUED | OUTPATIENT
Start: 2020-02-26 | End: 2020-02-27 | Stop reason: HOSPADM

## 2020-02-26 RX ORDER — HYDROCODONE BITARTRATE AND ACETAMINOPHEN 10; 325 MG/1; MG/1
1 TABLET ORAL EVERY 6 HOURS PRN
Qty: 30 TABLET | Refills: 0 | Status: SHIPPED | OUTPATIENT
Start: 2020-02-26 | End: 2021-06-23

## 2020-02-26 RX ORDER — SODIUM CHLORIDE, SODIUM LACTATE, POTASSIUM CHLORIDE, CALCIUM CHLORIDE 600; 310; 30; 20 MG/100ML; MG/100ML; MG/100ML; MG/100ML
9 INJECTION, SOLUTION INTRAVENOUS CONTINUOUS PRN
Status: DISCONTINUED | OUTPATIENT
Start: 2020-02-26 | End: 2020-02-27 | Stop reason: HOSPADM

## 2020-02-26 RX ORDER — LABETALOL HYDROCHLORIDE 5 MG/ML
5 INJECTION, SOLUTION INTRAVENOUS
Status: DISCONTINUED | OUTPATIENT
Start: 2020-02-26 | End: 2020-02-26 | Stop reason: HOSPADM

## 2020-02-26 RX ORDER — NALOXONE HCL 0.4 MG/ML
0.4 VIAL (ML) INJECTION AS NEEDED
Status: DISCONTINUED | OUTPATIENT
Start: 2020-02-26 | End: 2020-02-26 | Stop reason: HOSPADM

## 2020-02-26 RX ORDER — DEXAMETHASONE SODIUM PHOSPHATE 4 MG/ML
INJECTION, SOLUTION INTRA-ARTICULAR; INTRALESIONAL; INTRAMUSCULAR; INTRAVENOUS; SOFT TISSUE AS NEEDED
Status: DISCONTINUED | OUTPATIENT
Start: 2020-02-26 | End: 2020-02-26 | Stop reason: SURG

## 2020-02-26 RX ORDER — FENTANYL CITRATE 50 UG/ML
50 INJECTION, SOLUTION INTRAMUSCULAR; INTRAVENOUS
Status: DISCONTINUED | OUTPATIENT
Start: 2020-02-26 | End: 2020-02-26 | Stop reason: HOSPADM

## 2020-02-26 RX ORDER — HYDRALAZINE HYDROCHLORIDE 20 MG/ML
5 INJECTION INTRAMUSCULAR; INTRAVENOUS
Status: DISCONTINUED | OUTPATIENT
Start: 2020-02-26 | End: 2020-02-26 | Stop reason: HOSPADM

## 2020-02-26 RX ORDER — LIDOCAINE HYDROCHLORIDE 10 MG/ML
INJECTION, SOLUTION EPIDURAL; INFILTRATION; INTRACAUDAL; PERINEURAL AS NEEDED
Status: DISCONTINUED | OUTPATIENT
Start: 2020-02-26 | End: 2020-02-26 | Stop reason: SURG

## 2020-02-26 RX ORDER — HYDROMORPHONE HCL 110MG/55ML
0.5 PATIENT CONTROLLED ANALGESIA SYRINGE INTRAVENOUS
Status: DISCONTINUED | OUTPATIENT
Start: 2020-02-26 | End: 2020-02-27 | Stop reason: HOSPADM

## 2020-02-26 RX ADMIN — HYDROMORPHONE HYDROCHLORIDE 1 MG: 2 INJECTION, SOLUTION INTRAMUSCULAR; INTRAVENOUS; SUBCUTANEOUS at 13:10

## 2020-02-26 RX ADMIN — ACETAMINOPHEN 1000 MG: 500 TABLET, FILM COATED ORAL at 11:04

## 2020-02-26 RX ADMIN — CLINDAMYCIN PHOSPHATE 900 MG: 900 INJECTION, SOLUTION INTRAVENOUS at 11:27

## 2020-02-26 RX ADMIN — LIDOCAINE HYDROCHLORIDE 50 MG: 10 INJECTION, SOLUTION EPIDURAL; INFILTRATION; INTRACAUDAL; PERINEURAL at 11:21

## 2020-02-26 RX ADMIN — SODIUM CHLORIDE, SODIUM LACTATE, POTASSIUM CHLORIDE, AND CALCIUM CHLORIDE 9 ML/HR: 600; 310; 30; 20 INJECTION, SOLUTION INTRAVENOUS at 10:03

## 2020-02-26 RX ADMIN — FENTANYL CITRATE 25 MCG: 50 INJECTION, SOLUTION INTRAMUSCULAR; INTRAVENOUS at 11:42

## 2020-02-26 RX ADMIN — APREPITANT 40 MG: 40 CAPSULE ORAL at 11:04

## 2020-02-26 RX ADMIN — PHENYLEPHRINE HYDROCHLORIDE 100 MCG: 10 INJECTION INTRAVENOUS at 11:21

## 2020-02-26 RX ADMIN — PHENYLEPHRINE HYDROCHLORIDE 100 MCG: 10 INJECTION INTRAVENOUS at 11:26

## 2020-02-26 RX ADMIN — OXYCODONE HYDROCHLORIDE 10 MG: 10 TABLET, FILM COATED, EXTENDED RELEASE ORAL at 11:04

## 2020-02-26 RX ADMIN — CLINDAMYCIN PHOSPHATE 600 MG: 600 INJECTION, SOLUTION INTRAVENOUS at 20:31

## 2020-02-26 RX ADMIN — FENTANYL CITRATE 50 MCG: 50 INJECTION, SOLUTION INTRAMUSCULAR; INTRAVENOUS at 11:21

## 2020-02-26 RX ADMIN — HYDROMORPHONE HYDROCHLORIDE 0.5 MG: 2 INJECTION INTRAMUSCULAR; INTRAVENOUS; SUBCUTANEOUS at 12:36

## 2020-02-26 RX ADMIN — ONDANSETRON 4 MG: 2 INJECTION INTRAMUSCULAR; INTRAVENOUS at 14:18

## 2020-02-26 RX ADMIN — PHENYLEPHRINE HYDROCHLORIDE 200 MCG: 10 INJECTION INTRAVENOUS at 12:10

## 2020-02-26 RX ADMIN — PROPOFOL 50 MG: 10 INJECTION, EMULSION INTRAVENOUS at 11:25

## 2020-02-26 RX ADMIN — ONDANSETRON 4 MG: 2 INJECTION INTRAMUSCULAR; INTRAVENOUS at 12:33

## 2020-02-26 RX ADMIN — PHENYLEPHRINE HYDROCHLORIDE 200 MCG: 10 INJECTION INTRAVENOUS at 11:51

## 2020-02-26 RX ADMIN — DEXAMETHASONE SODIUM PHOSPHATE 4 MG: 4 INJECTION, SOLUTION INTRAMUSCULAR; INTRAVENOUS at 12:33

## 2020-02-26 RX ADMIN — PROPOFOL 150 MG: 10 INJECTION, EMULSION INTRAVENOUS at 11:21

## 2020-02-26 RX ADMIN — HYDROMORPHONE HYDROCHLORIDE 0.5 MG: 2 INJECTION INTRAMUSCULAR; INTRAVENOUS; SUBCUTANEOUS at 11:44

## 2020-02-26 NOTE — ANESTHESIA POSTPROCEDURE EVALUATION
Patient: Esperanza Smith    Procedure Summary     Date:  02/26/20 Room / Location:  [facility] OR 08 / [facility] MAIN OR    Anesthesia Start:  1117 Anesthesia Stop:  1251    Procedure:  Right simple mastectomy (Right Breast) Diagnosis:       Breast cancer (CMS/HCC)      (Breast cancer (CMS/[facility]) [C50.919])    Surgeon:  Mario Quezada DO Provider:  Ambrose Emerson MD    Anesthesia Type:  general ASA Status:  3          Anesthesia Type: general    Vitals  Vitals Value Taken Time   /81 2/26/2020  1:01 PM   Temp 97.1 °F (36.2 °C) 2/26/2020 12:48 PM   Pulse 86 2/26/2020  1:04 PM   Resp 11 2/26/2020  1:01 PM   SpO2 96 % 2/26/2020  1:04 PM   Vitals shown include unvalidated device data.        Post Anesthesia Care and Evaluation    Patient location during evaluation: PACU  Patient participation: complete - patient participated  Level of consciousness: awake  Pain scale: See nurse's notes for pain score.  Pain management: adequate  Airway patency: patent  Anesthetic complications: No anesthetic complications  PONV Status: none  Cardiovascular status: acceptable  Respiratory status: acceptable  Hydration status: acceptable    Comments: Patient seen and examined postoperatively; vital signs stable; SpO2 greater than or equal to 90%; cardiopulmonary status stable; nausea/vomiting adequately controlled; pain adequately controlled; no apparent anesthesia complications; patient discharged from anesthesia care when discharge criteria were met

## 2020-02-26 NOTE — ANESTHESIA PROCEDURE NOTES
Airway  Urgency: elective    Date/Time: 2/26/2020 11:23 AM  End Time:2/26/2020 11:23 AM  Airway not difficult    General Information and Staff    Patient location during procedure: OR  Anesthesiologist: Sal Do MD  CRNA: Graciela Herrera AA    Indications and Patient Condition  Indications for airway management: CNS depression    Preoxygenated: yes  MILS maintained throughout  Mask difficulty assessment: 0 - not attempted    Final Airway Details  Final airway type: supraglottic airway      Successful airway: classic and LMA  Size 4    Cormack-Lehane Classification: grade IV - neither glottis nor epiglottis seen  Number of attempts at approach: 1  Assessment: lips, teeth, and gum same as pre-op and atraumatic intubation    Additional Comments  X1 ATRAUMATIC MOCP GAUZE BITE BLOCK

## 2020-02-26 NOTE — ANESTHESIA PREPROCEDURE EVALUATION
Anesthesia Evaluation     Patient summary reviewed and Nursing notes reviewed   history of anesthetic complications: PONV  NPO Solid Status: > 8 hours  NPO Liquid Status: > 8 hours           Airway   Dental      Pulmonary    Cardiovascular     ECG reviewed    (+) hypertension, hyperlipidemia,       Neuro/Psych  GI/Hepatic/Renal/Endo    (+) obesity,       Musculoskeletal     Abdominal    Substance History      OB/GYN          Other   arthritis,    history of cancer    ROS/Med Hx Other: Breast cancer, dermatophytosis, hematuria, intertrigo, low vit D, vasovagal attack, DDD, IBS, allergies    Echo  Comments  Normal LV systolic function EF is around 60%  Diastolic LV dysfunction  RV Function is normal  Left atrium is enlarged  Right atrium and aortic root diameter is normal  Interatrial septum appears to be intact  No intracardiac thrombus  No significant pericardial effusion  Pulmonary leaflets were not seen clearly  Mitral aortic and tricuspid leaflets structurally appears normal  No significant mitral or tricuspid insufficiency estimate RV systolic pressure  25 mm Hg  No aortic stenosis.                  Anesthesia Plan    ASA 3     general   (Patient identified; pre-operative vital signs, all relevant labs/studies, complete medical/surgical/anesthetic history, full medication list, full allergy list, and NPO status obtained/reviewed; physical assessment performed; anesthetic options, side effects, potential complications, risks, and benefits discussed; questions answered; written anesthesia consent obtained; patient cleared for procedure; anesthesia machine and equipment checked and functioning)  intravenous induction     Anesthetic plan, all risks, benefits, and alternatives have been provided, discussed and informed consent has been obtained with: patient.    Plan discussed with CRNA and CAA.

## 2020-02-27 VITALS
HEART RATE: 55 BPM | HEIGHT: 65 IN | WEIGHT: 198.63 LBS | RESPIRATION RATE: 14 BRPM | TEMPERATURE: 97.6 F | SYSTOLIC BLOOD PRESSURE: 113 MMHG | DIASTOLIC BLOOD PRESSURE: 72 MMHG | BODY MASS INDEX: 33.09 KG/M2 | OXYGEN SATURATION: 95 %

## 2020-02-27 PROCEDURE — G0378 HOSPITAL OBSERVATION PER HR: HCPCS

## 2020-02-27 PROCEDURE — A9270 NON-COVERED ITEM OR SERVICE: HCPCS | Performed by: SURGERY

## 2020-02-27 PROCEDURE — 63710000001 PANTOPRAZOLE 40 MG TABLET DELAYED-RELEASE: Performed by: SURGERY

## 2020-02-27 PROCEDURE — 63710000001 ACETAMINOPHEN 325 MG TABLET: Performed by: SURGERY

## 2020-02-27 RX ORDER — HYDROCODONE BITARTRATE AND ACETAMINOPHEN 7.5; 325 MG/1; MG/1
1 TABLET ORAL EVERY 6 HOURS PRN
Qty: 30 TABLET | Refills: 0 | Status: SHIPPED | OUTPATIENT
Start: 2020-02-27 | End: 2021-06-23

## 2020-02-27 RX ORDER — SULFAMETHOXAZOLE AND TRIMETHOPRIM 800; 160 MG/1; MG/1
1 TABLET ORAL 2 TIMES DAILY
Qty: 14 TABLET | Refills: 0 | Status: SHIPPED | OUTPATIENT
Start: 2020-02-27 | End: 2021-06-23

## 2020-02-27 RX ADMIN — PANTOPRAZOLE SODIUM 40 MG: 40 TABLET, DELAYED RELEASE ORAL at 06:00

## 2020-02-27 RX ADMIN — ACETAMINOPHEN 650 MG: 325 TABLET, FILM COATED ORAL at 06:07

## 2020-02-27 RX ADMIN — CLINDAMYCIN PHOSPHATE 600 MG: 600 INJECTION, SOLUTION INTRAVENOUS at 03:12

## 2020-02-28 ENCOUNTER — READMISSION MANAGEMENT (OUTPATIENT)
Dept: CALL CENTER | Facility: HOSPITAL | Age: 75
End: 2020-02-28

## 2020-02-28 LAB
LAB AP CASE REPORT: NORMAL
LAB AP DIAGNOSIS COMMENT: NORMAL
LAB AP SYNOPTIC CHECKLIST: NORMAL
PATH REPORT.FINAL DX SPEC: NORMAL
PATH REPORT.GROSS SPEC: NORMAL

## 2020-02-28 NOTE — OUTREACH NOTE
Prep Survey      Responses   Facility patient discharged from?  [facility]   Is patient eligible?  Yes   Discharge diagnosis  right simple mastectomy   Does the patient have one of the following disease processes/diagnoses(primary or secondary)?  General Surgery   Does the patient have Home health ordered?  No   Is there a DME ordered?  No   Comments regarding appointments  see AVS   Prep survey completed?  Yes          Court Winston RN

## 2020-03-03 ENCOUNTER — READMISSION MANAGEMENT (OUTPATIENT)
Dept: CALL CENTER | Facility: HOSPITAL | Age: 75
End: 2020-03-03

## 2020-03-03 NOTE — OUTREACH NOTE
General Surgery Week 1 Survey      Responses   [facility] patient discharged from?  [facility]   Does the patient have one of the following disease processes/diagnoses(primary or secondary)?  General Surgery   Is there a successful TCM telephone encounter documented?  No   Week 1 attempt successful?  No   Rescheduled  Revoked [Pt to be followed by [facility]]          Karen Bee LPN

## 2020-03-11 ENCOUNTER — OFFICE VISIT (OUTPATIENT)
Dept: SURGERY | Facility: CLINIC | Age: 75
End: 2020-03-11

## 2020-03-11 DIAGNOSIS — C50.911 MALIGNANT NEOPLASM OF RIGHT FEMALE BREAST, UNSPECIFIED ESTROGEN RECEPTOR STATUS, UNSPECIFIED SITE OF BREAST (HCC): Primary | ICD-10-CM

## 2020-03-11 PROCEDURE — 99024 POSTOP FOLLOW-UP VISIT: CPT | Performed by: SURGERY

## 2020-03-12 DIAGNOSIS — C50.911 MALIGNANT NEOPLASM OF RIGHT FEMALE BREAST, UNSPECIFIED ESTROGEN RECEPTOR STATUS, UNSPECIFIED SITE OF BREAST (HCC): Primary | ICD-10-CM

## 2020-03-13 ENCOUNTER — HOSPITAL ENCOUNTER (OUTPATIENT)
Dept: INTERVENTIONAL RADIOLOGY/VASCULAR | Facility: HOSPITAL | Age: 75
Discharge: HOME | End: 2020-03-13
Admitting: SURGERY

## 2020-03-13 DIAGNOSIS — C50.911 MALIGNANT NEOPLASM OF RIGHT FEMALE BREAST, UNSPECIFIED ESTROGEN RECEPTOR STATUS, UNSPECIFIED SITE OF BREAST (HCC): ICD-10-CM

## 2020-03-13 PROCEDURE — C1769 GUIDE WIRE: HCPCS

## 2020-03-13 PROCEDURE — 49405 IMAGE CATH FLUID COLXN VISC: CPT

## 2020-03-13 PROCEDURE — C1729 CATH, DRAINAGE: HCPCS

## 2020-03-13 NOTE — POST-PROCEDURE NOTE
IR POST OP NOTE    Procedure:R chest wall drain placement under US guidance.       Pre Op DX:Post mastectomy seroma.       Post Op DX:same      Anesthesia: Local      Findings: 8fr drain placed. 600 ml serosanguinous fluid aspirated. Attached to KIT bulb suction.       Complications:No immediate.       Provider Signature: Dr. Percy Maynard

## 2020-03-17 ENCOUNTER — TELEPHONE (OUTPATIENT)
Dept: SURGERY | Facility: CLINIC | Age: 75
End: 2020-03-17

## 2020-03-17 NOTE — TELEPHONE ENCOUNTER
Pt had US Percutaneous Insertion Drainage Catheter placed on 3/13/2020 and was told to call on this week w/ update. As of 9am today she states that in the last 24 hours she has had 45mL drain. Per Damien he states drain cannot come out just yet.     Pt will call w/ update tomorrow . 3/18/2020

## 2020-03-24 ENCOUNTER — OFFICE VISIT (OUTPATIENT)
Dept: SURGERY | Facility: CLINIC | Age: 75
End: 2020-03-24

## 2020-03-24 VITALS
DIASTOLIC BLOOD PRESSURE: 112 MMHG | HEIGHT: 65 IN | BODY MASS INDEX: 32.82 KG/M2 | HEART RATE: 115 BPM | WEIGHT: 197 LBS | TEMPERATURE: 97.3 F | OXYGEN SATURATION: 95 % | SYSTOLIC BLOOD PRESSURE: 179 MMHG

## 2020-03-24 DIAGNOSIS — D05.11 BREAST NEOPLASM, TIS (DCIS), RIGHT: Primary | ICD-10-CM

## 2020-03-24 PROCEDURE — 99024 POSTOP FOLLOW-UP VISIT: CPT | Performed by: SURGERY

## 2020-03-24 NOTE — PROGRESS NOTES
SUBJECTIVE:    Mrs. Smith is seen in the office today follow-up from the right mastectomy and then subsequent percutaneous drain placement by IR for postop seroma.  The drainage is down to less than an ounce a day.    OBJECTIVE:    Staples are removed and Mastisol and Steri-Strips applied.  Drain is removed.    ASSESSMENT:    At his factory postop progress    PLAN:    Recheck in the office in 1 week.

## 2020-04-02 ENCOUNTER — TELEPHONE (OUTPATIENT)
Dept: ONCOLOGY | Facility: CLINIC | Age: 75
End: 2020-04-02

## 2020-04-02 NOTE — TELEPHONE ENCOUNTER
Patient returned phone call. I e-mailed her the information she requested per her request. I left all my contact info. She refused my offer of an appt to see Dr. Arellano for A.I. Has decided not to take.

## 2020-04-02 NOTE — TELEPHONE ENCOUNTER
Attempted to call patient at request of Dr. Quezada's office. Patient requesting info on prosthesis. I was also going to offer an appt to see Dr. Arlelano for an A.I..

## 2021-06-23 ENCOUNTER — OFFICE VISIT (OUTPATIENT)
Dept: FAMILY MEDICINE CLINIC | Facility: CLINIC | Age: 76
End: 2021-06-23

## 2021-06-23 VITALS
HEART RATE: 106 BPM | WEIGHT: 206 LBS | DIASTOLIC BLOOD PRESSURE: 110 MMHG | HEIGHT: 65 IN | SYSTOLIC BLOOD PRESSURE: 176 MMHG | BODY MASS INDEX: 34.32 KG/M2 | OXYGEN SATURATION: 93 % | RESPIRATION RATE: 16 BRPM

## 2021-06-23 DIAGNOSIS — R53.83 OTHER FATIGUE: ICD-10-CM

## 2021-06-23 DIAGNOSIS — T78.40XA ALLERGY, INITIAL ENCOUNTER: ICD-10-CM

## 2021-06-23 DIAGNOSIS — E55.9 VITAMIN D DEFICIENCY: ICD-10-CM

## 2021-06-23 DIAGNOSIS — I10 ESSENTIAL HYPERTENSION: ICD-10-CM

## 2021-06-23 DIAGNOSIS — Z23 IMMUNIZATION DUE: ICD-10-CM

## 2021-06-23 DIAGNOSIS — G47.33 OBSTRUCTIVE SLEEP APNEA SYNDROME: Primary | ICD-10-CM

## 2021-06-23 DIAGNOSIS — R79.9 ABNORMAL FINDING OF BLOOD CHEMISTRY, UNSPECIFIED: ICD-10-CM

## 2021-06-23 PROBLEM — J45.909 ASTHMA DUE TO ENVIRONMENTAL ALLERGIES: Status: ACTIVE | Noted: 2021-06-23

## 2021-06-23 PROCEDURE — 99214 OFFICE O/P EST MOD 30 MIN: CPT | Performed by: FAMILY MEDICINE

## 2021-06-23 RX ORDER — AMLODIPINE BESYLATE 5 MG/1
5 TABLET ORAL DAILY
Qty: 30 TABLET | Refills: 6 | Status: SHIPPED | OUTPATIENT
Start: 2021-06-23 | End: 2021-07-23

## 2021-06-23 NOTE — PROGRESS NOTES
"Chief Complaint  Earache    Subjective          Esperanza Smith presents to [facility]  Patti, 75 year old white female, here today for neck and ear pain.  The ears started hurting in February or march ago and the neck started a couple of months ago.  She uses a ice pack at night and uses it on her neck.  She reports that when she lays on one side, the side she is laying on hurts.  She states that she will then turn over to her other side, hear a \"whoosh\" and then she will need to spit or blow her nose.  She also reports that she has had a headache almost everyday and stated that she has had them \"a long time\".  She states that she is loosing hair on her head and is fatigues.  She reports not having fevers, night sweat, or chills.  She states that she has gained weight.  She reports that she tried steroids, but that it did not help.    Earache   There is pain in both ears. This is a recurrent problem. The current episode started more than 1 month ago. The problem occurs every few hours. The problem has been gradually worsening. There has been no fever. The pain is at a severity of 6/10. Associated symptoms include headaches, neck pain and rhinorrhea. Pertinent negatives include no abdominal pain, coughing, diarrhea, ear discharge, hearing loss, rash, sore throat or vomiting.     Review of Systems   Constitutional: Positive for fatigue and unexpected weight change. Negative for chills, diaphoresis and fever.   HENT: Positive for ear pain and rhinorrhea. Negative for ear discharge, facial swelling, hearing loss, sore throat, tinnitus, trouble swallowing and voice change.    Eyes: Negative for discharge and itching.   Respiratory: Negative for cough.    Cardiovascular: Negative for chest pain and palpitations.   Gastrointestinal: Negative for abdominal pain, diarrhea and vomiting.   Genitourinary: Negative for hematuria and urgency.   Musculoskeletal: Positive for neck pain.   Skin: " "Negative for rash.   Neurological: Positive for headaches. Negative for light-headedness and numbness.       Objective   Vital Signs:   BP (!) 176/110 (BP Location: Left arm)   Pulse 106   Resp 16   Ht 165.1 cm (65\")   Wt 93.4 kg (206 lb)   SpO2 93%   BMI 34.28 kg/m²     Physical Exam  Constitutional:       Appearance: Normal appearance. She is obese.   HENT:      Head: Normocephalic and atraumatic.      Right Ear: Tympanic membrane normal.      Left Ear: Tympanic membrane normal.      Ears:      Comments: Tm dull bilaterally     Nose: Nose normal.      Mouth/Throat:      Mouth: Mucous membranes are dry.      Pharynx: Oropharynx is clear.   Eyes:      Conjunctiva/sclera: Conjunctivae normal.      Pupils: Pupils are equal, round, and reactive to light.   Cardiovascular:      Rate and Rhythm: Normal rate and regular rhythm.      Pulses: Normal pulses.      Heart sounds: Normal heart sounds.   Pulmonary:      Effort: Pulmonary effort is normal.      Breath sounds: Normal breath sounds.   Abdominal:      General: Bowel sounds are normal.      Palpations: Abdomen is soft.   Skin:     General: Skin is warm.      Capillary Refill: Capillary refill takes less than 2 seconds.   Neurological:      General: No focal deficit present.      Mental Status: She is alert and oriented to person, place, and time. Mental status is at baseline.      Deep Tendon Reflexes: Reflexes normal.   Psychiatric:         Mood and Affect: Mood normal.         Behavior: Behavior normal.         Thought Content: Thought content normal.         Judgment: Judgment normal.        Result Review :                 Assessment and Plan    Diagnoses and all orders for this visit:    1. Obstructive sleep apnea syndrome (Primary)  Assessment & Plan:  Suspicion obstructive sleep apnea.  She reports that she is fatigued and wakes herself up snoring and choking.  Referral sent for sleep study.  She is refusing to do sleep study at this time and states that " she will not wear CPAP.  She was encouraged.      Orders:  -     Comprehensive metabolic panel; Future  -     CBC w AUTO Differential; Future  -     TSH; Future  -     T4, free; Future  -     Vitamin B12; Future  -     Vitamin D 25 hydroxy; Future  -     Urinalysis With Culture If Indicated - Urine, Clean Catch; Future  -     Lipid panel; Future  -     Hemoglobin A1c; Future  -     Ambulatory Referral to Sleep Medicine  -     ACTH; Future  -     Testosterone (Free & Total), LC / MS; Future    2. Essential hypertension  Assessment & Plan:  She reports that he BP is running 150s/90s at home with no acute changes at this time.  Will start blood pressure medication- amlodipine.      Orders:  -     Comprehensive metabolic panel; Future  -     CBC w AUTO Differential; Future  -     TSH; Future  -     T4, free; Future  -     Vitamin B12; Future  -     Vitamin D 25 hydroxy; Future  -     Urinalysis With Culture If Indicated - Urine, Clean Catch; Future  -     Lipid panel; Future  -     Hemoglobin A1c; Future  -     Ambulatory Referral to Sleep Medicine  -     ACTH; Future  -     Testosterone (Free & Total), LC / MS; Future    3. Other fatigue  Assessment & Plan:  Fatigue of unknown cause at this time.  Will draw labs today.  Suspicion for obstructive sleep apnea, hyperglycemia, and hypothyroidism.     Orders:  -     Comprehensive metabolic panel; Future  -     CBC w AUTO Differential; Future  -     TSH; Future  -     T4, free; Future  -     Vitamin B12; Future  -     Vitamin D 25 hydroxy; Future  -     Urinalysis With Culture If Indicated - Urine, Clean Catch; Future  -     Lipid panel; Future  -     Hemoglobin A1c; Future  -     Ambulatory Referral to Sleep Medicine  -     ACTH; Future  -     Testosterone (Free & Total), LC / MS; Future    4. Allergy, initial encounter  Assessment & Plan:  Currently taking Flonase and doing well.  She reports that she cannot take antihistamines and said that it makes her jumpy, shaky,  dizzy, and like she is going to pass out.      Orders:  -     Comprehensive metabolic panel; Future  -     CBC w AUTO Differential; Future  -     TSH; Future  -     T4, free; Future  -     Vitamin B12; Future  -     Vitamin D 25 hydroxy; Future  -     Urinalysis With Culture If Indicated - Urine, Clean Catch; Future  -     Lipid panel; Future  -     Hemoglobin A1c; Future  -     Ambulatory Referral to Sleep Medicine  -     ACTH; Future  -     Testosterone (Free & Total), LC / MS; Future    5. Vitamin D deficiency  Assessment & Plan:  Will check levels today.    Orders:  -     Comprehensive metabolic panel; Future  -     CBC w AUTO Differential; Future  -     TSH; Future  -     T4, free; Future  -     Vitamin B12; Future  -     Vitamin D 25 hydroxy; Future  -     Urinalysis With Culture If Indicated - Urine, Clean Catch; Future  -     Lipid panel; Future  -     Hemoglobin A1c; Future  -     Ambulatory Referral to Sleep Medicine  -     ACTH; Future  -     Testosterone (Free & Total), LC / MS; Future    6. Immunization due  -     Pneumococcal Polysaccharide Vaccine 23-Valent (PPSV23) Greater Than or Equal To 1yo Subcutaneous / IM  -     Comprehensive metabolic panel; Future  -     CBC w AUTO Differential; Future  -     TSH; Future  -     T4, free; Future  -     Vitamin B12; Future  -     Vitamin D 25 hydroxy; Future  -     Urinalysis With Culture If Indicated - Urine, Clean Catch; Future  -     Lipid panel; Future  -     Hemoglobin A1c; Future  -     Ambulatory Referral to Sleep Medicine  -     ACTH; Future  -     Testosterone (Free & Total), LC / MS; Future    7. Abnormal finding of blood chemistry, unspecified   -     Hemoglobin A1c; Future    Other orders  -     amLODIPine (NORVASC) 5 MG tablet; Take 1 tablet by mouth Daily for 30 doses.  Dispense: 30 tablet; Refill: 6      Follow Up   Return in about 4 months (around 10/23/2021) for Medicare Wellness.  Patient was given instructions and counseling regarding her  condition or for health maintenance advice. Please see specific information pulled into the AVS if appropriate.

## 2021-06-23 NOTE — ASSESSMENT & PLAN NOTE
Suspicion obstructive sleep apnea.  She reports that she is fatigued and wakes herself up snoring and choking.  Referral sent for sleep study.  She is refusing to do sleep study at this time and states that she will not wear CPAP.  She was encouraged.

## 2021-06-23 NOTE — ASSESSMENT & PLAN NOTE
She reports that he BP is running 150s/90s at home with no acute changes at this time.  Will start blood pressure medication- amlodipine.

## 2021-06-23 NOTE — PATIENT INSTRUCTIONS
Sleep Apnea  Sleep apnea affects breathing during sleep. It causes breathing to stop for a short time or to become shallow. It can also increase the risk of:  · Heart attack.  · Stroke.  · Being very overweight (obese).  · Diabetes.  · Heart failure.  · Irregular heartbeat.  The goal of treatment is to help you breathe normally again.  What are the causes?  There are three kinds of sleep apnea:  · Obstructive sleep apnea. This is caused by a blocked or collapsed airway.  · Central sleep apnea. This happens when the brain does not send the right signals to the muscles that control breathing.  · Mixed sleep apnea. This is a combination of obstructive and central sleep apnea.  The most common cause of this condition is a collapsed or blocked airway. This can happen if:  · Your throat muscles are too relaxed.  · Your tongue and tonsils are too large.  · You are overweight.  · Your airway is too small.  What increases the risk?  · Being overweight.  · Smoking.  · Having a small airway.  · Being older.  · Being male.  · Drinking alcohol.  · Taking medicines to calm yourself (sedatives or tranquilizers).  · Having family members with the condition.  What are the signs or symptoms?  · Trouble staying asleep.  · Being sleepy or tired during the day.  · Getting angry a lot.  · Loud snoring.  · Headaches in the morning.  · Not being able to focus your mind (concentrate).  · Forgetting things.  · Less interest in sex.  · Mood swings.  · Personality changes.  · Feelings of sadness (depression).  · Waking up a lot during the night to pee (urinate).  · Dry mouth.  · Sore throat.  How is this diagnosed?  · Your medical history.  · A physical exam.  · A test that is done when you are sleeping (sleep study). The test is most often done in a sleep lab but may also be done at home.  How is this treated?    · Sleeping on your side.  · Using a medicine to get rid of mucus in your nose (decongestant).  · Avoiding the use of alcohol,  medicines to help you relax, or certain pain medicines (narcotics).  · Losing weight, if needed.  · Changing your diet.  · Not smoking.  · Using a machine to open your airway while you sleep, such as:  ? An oral appliance. This is a mouthpiece that shifts your lower jaw forward.  ? A CPAP device. This device blows air through a mask when you breathe out (exhale).  ? An EPAP device. This has valves that you put in each nostril.  ? A BPAP device. This device blows air through a mask when you breathe in (inhale) and breathe out.  · Having surgery if other treatments do not work.  It is important to get treatment for sleep apnea. Without treatment, it can lead to:  · High blood pressure.  · Coronary artery disease.  · In men, not being able to have an erection (impotence).  · Reduced thinking ability.  Follow these instructions at home:  Lifestyle  · Make changes that your doctor recommends.  · Eat a healthy diet.  · Lose weight if needed.  · Avoid alcohol, medicines to help you relax, and some pain medicines.  · Do not use any products that contain nicotine or tobacco, such as cigarettes, e-cigarettes, and chewing tobacco. If you need help quitting, ask your doctor.  General instructions  · Take over-the-counter and prescription medicines only as told by your doctor.  · If you were given a machine to use while you sleep, use it only as told by your doctor.  · If you are having surgery, make sure to tell your doctor you have sleep apnea. You may need to bring your device with you.  · Keep all follow-up visits as told by your doctor. This is important.  Contact a doctor if:  · The machine that you were given to use during sleep bothers you or does not seem to be working.  · You do not get better.  · You get worse.  Get help right away if:  · Your chest hurts.  · You have trouble breathing in enough air.  · You have an uncomfortable feeling in your back, arms, or stomach.  · You have trouble talking.  · One side of your  body feels weak.  · A part of your face is hanging down.  These symptoms may be an emergency. Do not wait to see if the symptoms will go away. Get medical help right away. Call your local emergency services (911 in the [place] Do not drive yourself to the hospital.  Summary  · This condition affects breathing during sleep.  · The most common cause is a collapsed or blocked airway.  · The goal of treatment is to help you breathe normally while you sleep.  This information is not intended to replace advice given to you by your health care provider. Make sure you discuss any questions you have with your health care provider.  Document Revised: 10/04/2019 Document Reviewed: 08/13/2019  [employer] Patient Education © 2021 [employer]

## 2021-06-23 NOTE — ASSESSMENT & PLAN NOTE
Currently taking Flonase and doing well.  She reports that she cannot take antihistamines and said that it makes her jumpy, shaky, dizzy, and like she is going to pass out.

## 2021-06-23 NOTE — ASSESSMENT & PLAN NOTE
Fatigue of unknown cause at this time.  Will draw labs today.  Suspicion for obstructive sleep apnea, hyperglycemia, and hypothyroidism.

## 2021-06-24 ENCOUNTER — LAB (OUTPATIENT)
Dept: FAMILY MEDICINE CLINIC | Facility: CLINIC | Age: 76
End: 2021-06-24

## 2021-06-24 DIAGNOSIS — Z23 IMMUNIZATION DUE: ICD-10-CM

## 2021-06-24 DIAGNOSIS — R53.83 OTHER FATIGUE: ICD-10-CM

## 2021-06-24 DIAGNOSIS — G47.33 OBSTRUCTIVE SLEEP APNEA SYNDROME: ICD-10-CM

## 2021-06-24 DIAGNOSIS — E55.9 VITAMIN D DEFICIENCY: ICD-10-CM

## 2021-06-24 DIAGNOSIS — T78.40XA ALLERGY, INITIAL ENCOUNTER: ICD-10-CM

## 2021-06-24 DIAGNOSIS — R79.9 ABNORMAL FINDING OF BLOOD CHEMISTRY, UNSPECIFIED: ICD-10-CM

## 2021-06-24 DIAGNOSIS — I10 ESSENTIAL HYPERTENSION: ICD-10-CM

## 2021-06-24 PROCEDURE — 36415 COLL VENOUS BLD VENIPUNCTURE: CPT

## 2021-06-26 ENCOUNTER — LAB (OUTPATIENT)
Dept: LAB | Facility: HOSPITAL | Age: 76
End: 2021-06-26

## 2021-06-26 DIAGNOSIS — C50.911 MALIGNANT NEOPLASM OF RIGHT FEMALE BREAST, UNSPECIFIED ESTROGEN RECEPTOR STATUS, UNSPECIFIED SITE OF BREAST (HCC): ICD-10-CM

## 2021-06-26 LAB
25(OH)D3 SERPL-MCNC: 68.1 NG/ML (ref 30–100)
ALBUMIN SERPL-MCNC: 4.3 G/DL (ref 3.5–5.2)
ALBUMIN/GLOB SERPL: 1.2 G/DL
ALP SERPL-CCNC: 93 U/L (ref 39–117)
ALT SERPL W P-5'-P-CCNC: 71 U/L (ref 1–33)
ANION GAP SERPL CALCULATED.3IONS-SCNC: 10.3 MMOL/L (ref 5–15)
AST SERPL-CCNC: 50 U/L (ref 1–32)
BACTERIA UR QL AUTO: ABNORMAL /HPF
BASOPHILS # BLD AUTO: 0 10*3/MM3 (ref 0–0.2)
BASOPHILS NFR BLD AUTO: 0.5 % (ref 0–1.5)
BILIRUB SERPL-MCNC: 0.6 MG/DL (ref 0–1.2)
BILIRUB UR QL STRIP: NEGATIVE
BUN SERPL-MCNC: 8 MG/DL (ref 8–23)
BUN/CREAT SERPL: 12.7 (ref 7–25)
CALCIUM SPEC-SCNC: 10.1 MG/DL (ref 8.6–10.5)
CHLORIDE SERPL-SCNC: 100 MMOL/L (ref 98–107)
CHOLEST SERPL-MCNC: 151 MG/DL (ref 0–200)
CLARITY UR: ABNORMAL
CO2 SERPL-SCNC: 26.7 MMOL/L (ref 22–29)
COLOR UR: YELLOW
CREAT SERPL-MCNC: 0.63 MG/DL (ref 0.57–1)
DEPRECATED RDW RBC AUTO: 43.3 FL (ref 37–54)
EOSINOPHIL # BLD AUTO: 0.1 10*3/MM3 (ref 0–0.4)
EOSINOPHIL NFR BLD AUTO: 1.7 % (ref 0.3–6.2)
ERYTHROCYTE [DISTWIDTH] IN BLOOD BY AUTOMATED COUNT: 13.2 % (ref 12.3–15.4)
GFR SERPL CREATININE-BSD FRML MDRD: 93 ML/MIN/1.73
GLOBULIN UR ELPH-MCNC: 3.5 GM/DL
GLUCOSE SERPL-MCNC: 145 MG/DL (ref 65–99)
GLUCOSE UR STRIP-MCNC: NEGATIVE MG/DL
HCT VFR BLD AUTO: 48.6 % (ref 34–46.6)
HDLC SERPL-MCNC: 51 MG/DL (ref 40–60)
HGB BLD-MCNC: 16.5 G/DL (ref 12–15.9)
HGB UR QL STRIP.AUTO: NEGATIVE
HYALINE CASTS UR QL AUTO: ABNORMAL /LPF
KETONES UR QL STRIP: NEGATIVE
LDLC SERPL CALC-MCNC: 83 MG/DL (ref 0–100)
LDLC/HDLC SERPL: 1.62 {RATIO}
LEUKOCYTE ESTERASE UR QL STRIP.AUTO: ABNORMAL
LYMPHOCYTES # BLD AUTO: 1.8 10*3/MM3 (ref 0.7–3.1)
LYMPHOCYTES NFR BLD AUTO: 23.1 % (ref 19.6–45.3)
MCH RBC QN AUTO: 31.7 PG (ref 26.6–33)
MCHC RBC AUTO-ENTMCNC: 33.9 G/DL (ref 31.5–35.7)
MCV RBC AUTO: 93.5 FL (ref 79–97)
MONOCYTES # BLD AUTO: 0.7 10*3/MM3 (ref 0.1–0.9)
MONOCYTES NFR BLD AUTO: 8.8 % (ref 5–12)
NEUTROPHILS NFR BLD AUTO: 5.2 10*3/MM3 (ref 1.7–7)
NEUTROPHILS NFR BLD AUTO: 65.9 % (ref 42.7–76)
NITRITE UR QL STRIP: NEGATIVE
NRBC BLD AUTO-RTO: 0.1 /100 WBC (ref 0–0.2)
PH UR STRIP.AUTO: 7.5 [PH] (ref 5–8)
PLATELET # BLD AUTO: 220 10*3/MM3 (ref 140–450)
PMV BLD AUTO: 7.9 FL (ref 6–12)
POTASSIUM SERPL-SCNC: 4.5 MMOL/L (ref 3.5–5.2)
PROT SERPL-MCNC: 7.8 G/DL (ref 6–8.5)
PROT UR QL STRIP: NEGATIVE
RBC # BLD AUTO: 5.2 10*6/MM3 (ref 3.77–5.28)
RBC # UR: ABNORMAL /HPF
REF LAB TEST METHOD: ABNORMAL
SODIUM SERPL-SCNC: 137 MMOL/L (ref 136–145)
SP GR UR STRIP: 1.02 (ref 1–1.03)
SQUAMOUS #/AREA URNS HPF: ABNORMAL /HPF
T4 FREE SERPL-MCNC: 0.79 NG/DL (ref 0.93–1.7)
TRIGL SERPL-MCNC: 88 MG/DL (ref 0–150)
TSH SERPL DL<=0.05 MIU/L-ACNC: 7.11 UIU/ML (ref 0.27–4.2)
UROBILINOGEN UR QL STRIP: ABNORMAL
VIT B12 BLD-MCNC: 658 PG/ML (ref 211–946)
VLDLC SERPL-MCNC: 17 MG/DL (ref 5–40)
WBC # BLD AUTO: 7.9 10*3/MM3 (ref 3.4–10.8)
WBC UR QL AUTO: ABNORMAL /HPF

## 2021-06-26 PROCEDURE — 82306 VITAMIN D 25 HYDROXY: CPT | Performed by: FAMILY MEDICINE

## 2021-06-26 PROCEDURE — 82024 ASSAY OF ACTH: CPT | Performed by: FAMILY MEDICINE

## 2021-06-26 PROCEDURE — 80053 COMPREHEN METABOLIC PANEL: CPT | Performed by: FAMILY MEDICINE

## 2021-06-26 PROCEDURE — 80061 LIPID PANEL: CPT | Performed by: FAMILY MEDICINE

## 2021-06-26 PROCEDURE — 84439 ASSAY OF FREE THYROXINE: CPT | Performed by: FAMILY MEDICINE

## 2021-06-26 PROCEDURE — 84403 ASSAY OF TOTAL TESTOSTERONE: CPT | Performed by: FAMILY MEDICINE

## 2021-06-26 PROCEDURE — 84402 ASSAY OF FREE TESTOSTERONE: CPT | Performed by: FAMILY MEDICINE

## 2021-06-26 PROCEDURE — 36415 COLL VENOUS BLD VENIPUNCTURE: CPT

## 2021-06-26 PROCEDURE — 83036 HEMOGLOBIN GLYCOSYLATED A1C: CPT | Performed by: FAMILY MEDICINE

## 2021-06-26 PROCEDURE — 84443 ASSAY THYROID STIM HORMONE: CPT | Performed by: FAMILY MEDICINE

## 2021-06-26 PROCEDURE — 85025 COMPLETE CBC W/AUTO DIFF WBC: CPT | Performed by: FAMILY MEDICINE

## 2021-06-26 PROCEDURE — 82607 VITAMIN B-12: CPT | Performed by: FAMILY MEDICINE

## 2021-06-26 RX ORDER — LEVOTHYROXINE SODIUM 0.05 MG/1
50 TABLET ORAL DAILY
Qty: 30 TABLET | Refills: 11 | Status: SHIPPED | OUTPATIENT
Start: 2021-06-26 | End: 2022-05-19

## 2021-06-26 NOTE — PROGRESS NOTES
Maria Elena tell Esperanza that her thyroid is weak.  Were going to call in thyroid replacement for her.  Take it every day and in 3 months lets repeat her level

## 2021-06-28 ENCOUNTER — TELEPHONE (OUTPATIENT)
Dept: FAMILY MEDICINE CLINIC | Facility: CLINIC | Age: 76
End: 2021-06-28

## 2021-06-28 LAB
ACTH PLAS-MCNC: 61.6 PG/ML (ref 7.2–63.3)
HBA1C MFR BLD: 6.8 % (ref 3.5–5.6)

## 2021-06-28 NOTE — TELEPHONE ENCOUNTER
----- Message from Dom Estrada MD sent at 6/26/2021  1:10 PM EDT -----  Maria Elena tell Esperanza that her thyroid is weak.  Were going to call in thyroid replacement for her.  Take it every day and in 3 months lets repeat her level

## 2021-07-03 LAB
TESTOST FREE SERPL-MCNC: 2.1 PG/ML (ref 0–4.2)
TESTOST SERPL-MCNC: 23.3 NG/DL (ref 7–40)

## 2021-07-06 ENCOUNTER — TELEPHONE (OUTPATIENT)
Dept: FAMILY MEDICINE CLINIC | Facility: CLINIC | Age: 76
End: 2021-07-06

## 2021-07-06 NOTE — TELEPHONE ENCOUNTER
----- Message from Dom Estrada MD sent at 7/4/2021 10:57 AM EDT -----  Maria Elena tell Esperanza that her blood sugars are elevated and she is in the diabetic range.  Her A1c is 6.8%.  Her liver enzymes are elevated slightly indicating fatty liver from too many carbohydrates and too much insulin in her body.  We need to start her on Metformin 500 mg twice daily and she needs to follow a low-carb diet more carefully and she needs to try to lose 10 to 15 pounds over the next 6 months.  Lets recheck levels in 4 to 6 months.

## 2021-07-13 NOTE — PROGRESS NOTES
"Chief Complaint  Sleeping Problem    Subjective          Esperanza Smith presents to [facility]  History of Present Illness   Patient was referred by Dr. Estrada for LANA/Fatigue.     Pt recently diagnosed with HTN and diabetes recently.     The patient c/o daytime sleepiness issues:  yes.      There is no history of hypnagogic hallucinations, sleep paralysis or cataplexy.    The patient complains of snoring. Snoring and gasping for air per her .     No restless leg symptoms     The patient complains of problems with insomnia:  frequent awakenings yes.    The patient reports history of these childhood sleep problems: There is no h/O sleepwalking or bedwetting or nightmares or sleep eating or acting out dreams    Sleep schedule: Bedtime:1130-12 , gets out of bed at 8am, sleep latency: 2 mins, Gets about 5-6 hours of sleep.    EPWORTH SLEEPINESS SCALE  Sitting and reading 3 WatchingTV 3  Sitting, inactive, in a public place 0  As a passenger in a car for 1 hour w/o a break  2  Lying down to rest in the afternoon  2  Sitting and talking to someone  0  Sitting quietly after a lunch  2  In a car, while stopped for traffic or a light  0  Total 12    Review of Systems   Constitutional: Positive for fatigue. Negative for fever.   HENT: Positive for ear pain and sinus pressure.    Respiratory: Positive for shortness of breath.    Cardiovascular: Negative for chest pain.   Endocrine: Positive for heat intolerance. Negative for cold intolerance.   Genitourinary: Positive for frequency.   Musculoskeletal: Positive for myalgias.   Neurological: Positive for dizziness and light-headedness.   Psychiatric/Behavioral: Negative for agitation and confusion.         Objective   Vital Signs:   /87   Pulse 89   Temp 98.6 °F (37 °C) (Temporal)   Ht 165.1 cm (65\")   Wt 91.2 kg (201 lb)   BMI 33.45 kg/m²     Physical Exam  Vitals reviewed.   Constitutional:       Appearance: She is obese.   HENT: "      Head: Normocephalic.      Nose: Congestion present.      Mouth/Throat:      Mouth: Mucous membranes are moist.   Eyes:      Extraocular Movements: Extraocular movements intact.      Conjunctiva/sclera: Conjunctivae normal.   Cardiovascular:      Rate and Rhythm: Normal rate.      Pulses: Normal pulses.      Heart sounds: No murmur heard.     Pulmonary:      Effort: Pulmonary effort is normal. No respiratory distress.   Musculoskeletal:      Right lower leg: No edema.      Left lower leg: No edema.   Neurological:      General: No focal deficit present.      Mental Status: She is alert. She is disoriented.   Psychiatric:         Mood and Affect: Mood normal.         Behavior: Behavior normal.        Result Review :                 Assessment and Plan    Diagnoses and all orders for this visit:    1. Obstructive sleep apnea syndrome (Primary)    2. Class 1 obesity due to excess calories with body mass index (BMI) of 33.0 to 33.9 in adult, unspecified whether serious comorbidity present      Probable davian with gasping, snoring, frequent awakenings.     Will obtain sleep test and treat with pap if indicated.       Follow Up   Return in about 3 months (around 10/23/2021).  Patient was given instructions and counseling regarding her condition or for health maintenance advice. Please see specific information pulled into the AVS if appropriate.

## 2021-07-23 ENCOUNTER — OFFICE VISIT (OUTPATIENT)
Dept: NEUROLOGY | Facility: CLINIC | Age: 76
End: 2021-07-23

## 2021-07-23 VITALS
WEIGHT: 201 LBS | SYSTOLIC BLOOD PRESSURE: 147 MMHG | DIASTOLIC BLOOD PRESSURE: 87 MMHG | BODY MASS INDEX: 33.49 KG/M2 | HEART RATE: 89 BPM | TEMPERATURE: 98.6 F | HEIGHT: 65 IN

## 2021-07-23 DIAGNOSIS — E66.09 CLASS 1 OBESITY DUE TO EXCESS CALORIES WITH BODY MASS INDEX (BMI) OF 33.0 TO 33.9 IN ADULT, UNSPECIFIED WHETHER SERIOUS COMORBIDITY PRESENT: ICD-10-CM

## 2021-07-23 DIAGNOSIS — G47.33 OBSTRUCTIVE SLEEP APNEA SYNDROME: Primary | ICD-10-CM

## 2021-07-23 PROCEDURE — 99204 OFFICE O/P NEW MOD 45 MIN: CPT | Performed by: PSYCHIATRY & NEUROLOGY

## 2021-08-11 ENCOUNTER — HOSPITAL ENCOUNTER (OUTPATIENT)
Dept: SLEEP MEDICINE | Facility: HOSPITAL | Age: 76
Discharge: HOME | End: 2021-08-11
Admitting: PSYCHIATRY & NEUROLOGY

## 2021-08-11 DIAGNOSIS — G47.33 OBSTRUCTIVE SLEEP APNEA SYNDROME: ICD-10-CM

## 2021-08-11 PROCEDURE — 95810 POLYSOM 6/> YRS 4/> PARAM: CPT | Performed by: PSYCHIATRY & NEUROLOGY

## 2021-08-11 PROCEDURE — 95810 POLYSOM 6/> YRS 4/> PARAM: CPT

## 2021-08-24 ENCOUNTER — TELEPHONE (OUTPATIENT)
Dept: NEUROLOGY | Facility: CLINIC | Age: 76
End: 2021-08-24

## 2021-08-24 DIAGNOSIS — G47.33 OBSTRUCTIVE SLEEP APNEA: Primary | ICD-10-CM

## 2021-10-02 DIAGNOSIS — R79.9 ABNORMAL FINDING OF BLOOD CHEMISTRY, UNSPECIFIED: ICD-10-CM

## 2021-10-02 DIAGNOSIS — K63.2 ENTEROCUTANEOUS FISTULA: ICD-10-CM

## 2021-10-02 DIAGNOSIS — E78.2 MIXED HYPERLIPIDEMIA: ICD-10-CM

## 2021-10-02 DIAGNOSIS — I10 PRIMARY HYPERTENSION: ICD-10-CM

## 2021-10-02 DIAGNOSIS — E55.9 VITAMIN D DEFICIENCY: ICD-10-CM

## 2021-10-02 DIAGNOSIS — C50.911 MALIGNANT NEOPLASM OF RIGHT FEMALE BREAST, UNSPECIFIED ESTROGEN RECEPTOR STATUS, UNSPECIFIED SITE OF BREAST (HCC): ICD-10-CM

## 2021-10-02 DIAGNOSIS — Z00.00 MEDICARE ANNUAL WELLNESS VISIT, SUBSEQUENT: Primary | ICD-10-CM

## 2021-10-07 ENCOUNTER — TELEPHONE (OUTPATIENT)
Dept: FAMILY MEDICINE CLINIC | Facility: CLINIC | Age: 76
End: 2021-10-07

## 2021-10-07 NOTE — TELEPHONE ENCOUNTER
Spoke with patient and scheduled a [facility] [place] [place] appt for tomorrow at 8:40am.

## 2021-10-08 ENCOUNTER — LAB (OUTPATIENT)
Dept: FAMILY MEDICINE CLINIC | Facility: CLINIC | Age: 76
End: 2021-10-08

## 2021-10-08 LAB
25(OH)D3 SERPL-MCNC: 84.7 NG/ML
ALBUMIN SERPL-MCNC: 4.3 G/DL (ref 3.5–5.2)
ALBUMIN/GLOB SERPL: 1.3 G/DL
ALP SERPL-CCNC: 85 U/L (ref 39–117)
ALT SERPL W P-5'-P-CCNC: 71 U/L (ref 1–33)
ANION GAP SERPL CALCULATED.3IONS-SCNC: 11.2 MMOL/L (ref 5–15)
AST SERPL-CCNC: 48 U/L (ref 1–32)
BACTERIA UR QL AUTO: ABNORMAL /HPF
BASOPHILS # BLD AUTO: 0.05 10*3/MM3 (ref 0–0.2)
BASOPHILS NFR BLD AUTO: 0.6 % (ref 0–1.5)
BILIRUB SERPL-MCNC: 0.5 MG/DL (ref 0–1.2)
BILIRUB UR QL STRIP: NEGATIVE
BUN SERPL-MCNC: 9 MG/DL (ref 8–23)
BUN/CREAT SERPL: 14.1 (ref 7–25)
CALCIUM SPEC-SCNC: 10 MG/DL (ref 8.6–10.5)
CHLORIDE SERPL-SCNC: 101 MMOL/L (ref 98–107)
CHOLEST SERPL-MCNC: 158 MG/DL (ref 0–200)
CLARITY UR: CLEAR
CO2 SERPL-SCNC: 26.8 MMOL/L (ref 22–29)
COLOR UR: YELLOW
CREAT SERPL-MCNC: 0.64 MG/DL (ref 0.57–1)
DEPRECATED RDW RBC AUTO: 41.5 FL (ref 37–54)
EOSINOPHIL # BLD AUTO: 0.22 10*3/MM3 (ref 0–0.4)
EOSINOPHIL NFR BLD AUTO: 2.6 % (ref 0.3–6.2)
ERYTHROCYTE [DISTWIDTH] IN BLOOD BY AUTOMATED COUNT: 12.3 % (ref 12.3–15.4)
GFR SERPL CREATININE-BSD FRML MDRD: 91 ML/MIN/1.73
GLOBULIN UR ELPH-MCNC: 3.2 GM/DL
GLUCOSE SERPL-MCNC: 117 MG/DL (ref 65–99)
GLUCOSE UR STRIP-MCNC: NEGATIVE MG/DL
HBA1C MFR BLD: 5.9 % (ref 3.5–5.6)
HCT VFR BLD AUTO: 45.6 % (ref 34–46.6)
HDLC SERPL-MCNC: 48 MG/DL (ref 40–60)
HGB BLD-MCNC: 15.4 G/DL (ref 12–15.9)
HGB UR QL STRIP.AUTO: NEGATIVE
HYALINE CASTS UR QL AUTO: ABNORMAL /LPF
IMM GRANULOCYTES # BLD AUTO: 0.04 10*3/MM3 (ref 0–0.05)
IMM GRANULOCYTES NFR BLD AUTO: 0.5 % (ref 0–0.5)
KETONES UR QL STRIP: NEGATIVE
LDLC SERPL CALC-MCNC: 87 MG/DL (ref 0–100)
LDLC/HDLC SERPL: 1.74 {RATIO}
LEUKOCYTE ESTERASE UR QL STRIP.AUTO: ABNORMAL
LYMPHOCYTES # BLD AUTO: 2.11 10*3/MM3 (ref 0.7–3.1)
LYMPHOCYTES NFR BLD AUTO: 24.6 % (ref 19.6–45.3)
MCH RBC QN AUTO: 31 PG (ref 26.6–33)
MCHC RBC AUTO-ENTMCNC: 33.8 G/DL (ref 31.5–35.7)
MCV RBC AUTO: 91.9 FL (ref 79–97)
MONOCYTES # BLD AUTO: 0.77 10*3/MM3 (ref 0.1–0.9)
MONOCYTES NFR BLD AUTO: 9 % (ref 5–12)
NEUTROPHILS NFR BLD AUTO: 5.38 10*3/MM3 (ref 1.7–7)
NEUTROPHILS NFR BLD AUTO: 62.7 % (ref 42.7–76)
NITRITE UR QL STRIP: NEGATIVE
NRBC BLD AUTO-RTO: 0 /100 WBC (ref 0–0.2)
PH UR STRIP.AUTO: 6.5 [PH] (ref 5–8)
PLATELET # BLD AUTO: 222 10*3/MM3 (ref 140–450)
PMV BLD AUTO: 11.4 FL (ref 6–12)
POTASSIUM SERPL-SCNC: 4.2 MMOL/L (ref 3.5–5.2)
PROT SERPL-MCNC: 7.5 G/DL (ref 6–8.5)
PROT UR QL STRIP: NEGATIVE
RBC # BLD AUTO: 4.96 10*6/MM3 (ref 3.77–5.28)
RBC # UR: ABNORMAL /HPF
REF LAB TEST METHOD: ABNORMAL
SODIUM SERPL-SCNC: 139 MMOL/L (ref 136–145)
SP GR UR STRIP: 1.01 (ref 1–1.03)
SQUAMOUS #/AREA URNS HPF: ABNORMAL /HPF
T4 FREE SERPL-MCNC: 1.17 NG/DL (ref 0.93–1.7)
TRIGL SERPL-MCNC: 132 MG/DL (ref 0–150)
TSH SERPL DL<=0.05 MIU/L-ACNC: 3.19 UIU/ML (ref 0.27–4.2)
UROBILINOGEN UR QL STRIP: ABNORMAL
VIT B12 BLD-MCNC: 452 PG/ML (ref 211–946)
VLDLC SERPL-MCNC: 23 MG/DL (ref 5–40)
WBC # BLD AUTO: 8.57 10*3/MM3 (ref 3.4–10.8)
WBC UR QL AUTO: ABNORMAL /HPF

## 2021-10-08 PROCEDURE — 84443 ASSAY THYROID STIM HORMONE: CPT | Performed by: FAMILY MEDICINE

## 2021-10-08 PROCEDURE — 84439 ASSAY OF FREE THYROXINE: CPT | Performed by: FAMILY MEDICINE

## 2021-10-08 PROCEDURE — 80053 COMPREHEN METABOLIC PANEL: CPT | Performed by: FAMILY MEDICINE

## 2021-10-08 PROCEDURE — 36415 COLL VENOUS BLD VENIPUNCTURE: CPT | Performed by: FAMILY MEDICINE

## 2021-10-08 PROCEDURE — 82306 VITAMIN D 25 HYDROXY: CPT | Performed by: FAMILY MEDICINE

## 2021-10-08 PROCEDURE — 87086 URINE CULTURE/COLONY COUNT: CPT | Performed by: FAMILY MEDICINE

## 2021-10-08 PROCEDURE — 80061 LIPID PANEL: CPT | Performed by: FAMILY MEDICINE

## 2021-10-08 PROCEDURE — 81001 URINALYSIS AUTO W/SCOPE: CPT | Performed by: FAMILY MEDICINE

## 2021-10-08 PROCEDURE — 83036 HEMOGLOBIN GLYCOSYLATED A1C: CPT | Performed by: FAMILY MEDICINE

## 2021-10-08 PROCEDURE — 85025 COMPLETE CBC W/AUTO DIFF WBC: CPT | Performed by: FAMILY MEDICINE

## 2021-10-08 PROCEDURE — 82607 VITAMIN B-12: CPT | Performed by: FAMILY MEDICINE

## 2021-10-09 LAB — BACTERIA SPEC AEROBE CULT: NORMAL

## 2021-10-15 ENCOUNTER — OFFICE VISIT (OUTPATIENT)
Dept: FAMILY MEDICINE CLINIC | Facility: CLINIC | Age: 76
End: 2021-10-15

## 2021-10-15 VITALS
BODY MASS INDEX: 32.65 KG/M2 | OXYGEN SATURATION: 96 % | WEIGHT: 196 LBS | TEMPERATURE: 96.9 F | HEART RATE: 90 BPM | HEIGHT: 65 IN | SYSTOLIC BLOOD PRESSURE: 132 MMHG | RESPIRATION RATE: 16 BRPM | DIASTOLIC BLOOD PRESSURE: 90 MMHG

## 2021-10-15 DIAGNOSIS — K63.2 ENTEROCUTANEOUS FISTULA: ICD-10-CM

## 2021-10-15 DIAGNOSIS — I10 PRIMARY HYPERTENSION: ICD-10-CM

## 2021-10-15 DIAGNOSIS — E66.3 OVERWEIGHT: ICD-10-CM

## 2021-10-15 DIAGNOSIS — Z00.00 MEDICARE ANNUAL WELLNESS VISIT, SUBSEQUENT: Primary | ICD-10-CM

## 2021-10-15 DIAGNOSIS — G47.33 OBSTRUCTIVE SLEEP APNEA SYNDROME: ICD-10-CM

## 2021-10-15 DIAGNOSIS — H60.313 CHRONIC DIFFUSE OTITIS EXTERNA OF BOTH EARS: ICD-10-CM

## 2021-10-15 DIAGNOSIS — Z23 NEED FOR IMMUNIZATION AGAINST INFLUENZA: ICD-10-CM

## 2021-10-15 DIAGNOSIS — Z23 IMMUNIZATION DUE: ICD-10-CM

## 2021-10-15 DIAGNOSIS — M81.0 AGE-RELATED OSTEOPOROSIS WITHOUT CURRENT PATHOLOGICAL FRACTURE: ICD-10-CM

## 2021-10-15 DIAGNOSIS — C50.911 MALIGNANT NEOPLASM OF RIGHT FEMALE BREAST, UNSPECIFIED ESTROGEN RECEPTOR STATUS, UNSPECIFIED SITE OF BREAST (HCC): ICD-10-CM

## 2021-10-15 DIAGNOSIS — E78.2 MIXED HYPERLIPIDEMIA: ICD-10-CM

## 2021-10-15 PROCEDURE — G0439 PPPS, SUBSEQ VISIT: HCPCS | Performed by: FAMILY MEDICINE

## 2021-10-15 PROCEDURE — G0008 ADMIN INFLUENZA VIRUS VAC: HCPCS | Performed by: FAMILY MEDICINE

## 2021-10-15 PROCEDURE — 1170F FXNL STATUS ASSESSED: CPT | Performed by: FAMILY MEDICINE

## 2021-10-15 PROCEDURE — 1160F RVW MEDS BY RX/DR IN RCRD: CPT | Performed by: FAMILY MEDICINE

## 2021-10-15 PROCEDURE — 90662 IIV NO PRSV INCREASED AG IM: CPT | Performed by: FAMILY MEDICINE

## 2021-10-15 PROCEDURE — 90670 PCV13 VACCINE IM: CPT | Performed by: FAMILY MEDICINE

## 2021-10-15 PROCEDURE — 99213 OFFICE O/P EST LOW 20 MIN: CPT | Performed by: FAMILY MEDICINE

## 2021-10-15 PROCEDURE — G0009 ADMIN PNEUMOCOCCAL VACCINE: HCPCS | Performed by: FAMILY MEDICINE

## 2021-10-15 RX ORDER — TRIAMCINOLONE ACETONIDE 1 MG/ML
LOTION TOPICAL 3 TIMES DAILY
Qty: 60 ML | Refills: 2 | Status: SHIPPED | OUTPATIENT
Start: 2021-10-15

## 2021-10-15 RX ORDER — AMLODIPINE BESYLATE 5 MG/1
5 TABLET ORAL DAILY
COMMUNITY
Start: 2021-08-18 | End: 2021-12-16

## 2021-10-15 NOTE — PROGRESS NOTES
The ABCs of the Annual Wellness Visit  Subsequent Medicare Wellness Visit    Chief Complaint   Patient presents with   • Medicare Wellness-subsequent      Subjective    History of Present Illness:  Esperanza Smith is a 76 y.o. female who presents for a Subsequent Medicare Wellness Visit.  Upon arrival to the room the patient underwent the Medicare health risk assessment.  Neither the questions themselves or the answers that were given prompted any major concern on the part of the patient or by the medical staff that gave the assessment.  As far as the preventative care examinations and the preventative care immunizations that this patient requires they are as listed below.   Screening tests recommended:    Colonoscopy-- Never had and will not do it.  Could not prep.  Mammogram--bilateral mastectomy  DEXA- schedule study  PAP/ Pelvic--MEHUL removed  Diabetic eye exam--She will be scheduling soon  Diabetic foot exam--not needed --recent diagnosis      Immunization:  Influenza--she will get soon  Prevnar--will give today  Pneumovax--Next year  Tetanus--Probably had in 2012.  Shingles vaccine--Does not think she had CP--does not want  Hepatitis   Covid--vaccines up to date.                     Patient is also here today for a physical exam and review of medication and labs.  She is followed in the office for environmental allergies, hyperlipidemia, hypertension, she is status post breast cancer, and possible history of obstructive sleep apnea with significant symptoms but because she gets up so frequently to urinate it would be difficult to manage her machine and mask during the night.  She is really not interested in trying to go through this maneuvering right now but we will have to visit this on a yearly basis.          The following portions of the patient's history were reviewed and   updated as appropriate: allergies, current medications, past family history, past medical history, past social history, past surgical  history and problem list.    Compared to one year ago, the patient feels her physical   health is worse.    Compared to one year ago, the patient feels her mental   health is the same.    Recent Hospitalizations:  She was not admitted to the hospital during the last year.       Current Medical Providers:  Patient Care Team:  Dom Estrada MD as PCP - General    Outpatient Medications Prior to Visit   Medication Sig Dispense Refill   • amLODIPine (NORVASC) 5 MG tablet Take 5 mg by mouth Daily.     • APPLE CIDER VINEGAR PO Take 1 tablet by mouth Daily.     • Ascorbic Acid (VITAMIN C) 250 MG chewable tablet Chew 1 tablet Daily.     • B Complex-Biotin-FA (SUPER B-COMPLEX) tablet Take 1 tablet by mouth Daily.     • Cholecalciferol (VITAMIN D3) 2000 units tablet Take 1 tablet by mouth Daily.     • fluticasone (FLONASE) 50 MCG/ACT nasal spray 2 sprays into the nostril(s) as directed by provider Daily.     • Inositol 324 MG tablet Take 2 tablets by mouth Every Morning Before Breakfast.     • Multiple Vitamins-Minerals (MULTIVITAMIN GUMMIES ADULT) chewable tablet Chew 1 tablet Daily.     • Omega-3 Fatty Acids (OMEGA-3 FISH OIL PO) Take 1 tablet by mouth Daily.     • TURMERIC CURCUMIN PO Take 2 tablets by mouth Daily.     • levothyroxine (Synthroid) 50 MCG tablet Take 1 tablet by mouth Daily for 30 days. 30 tablet 11   • metFORMIN (Glucophage) 500 MG tablet Take 1 tablet by mouth 2 (Two) Times a Day With Meals for 90 days. 180 tablet 2     No facility-administered medications prior to visit.       No opioid medication identified on active medication list. I have reviewed chart for other potential  high risk medication/s and harmful drug interactions in the elderly.          Aspirin is not on active medication list.  Aspirin use is not indicated based on review of current medical condition/s. Risk of harm outweighs potential benefits.  .    Patient Active Problem List   Diagnosis   • Abdominal pain   • Breast neoplasm,  Tis (DCIS), right   • Dermatophytosis   • Screening for breast cancer   • Enterocutaneous fistula   • Hematuria   • Hyperlipidemia   • Hypertension   • Intertrigo   • Otitis externa   • Overweight   • Vasovagal attack   • Vitamin D deficiency   • Medicare annual wellness visit, subsequent   • Breast cancer (HCC)   • Obstructive sleep apnea syndrome   • Other fatigue   • Allergies   • Immunization due   • Age-related osteoporosis without current pathological fracture      Advance Care Planning  Advance Directive is on file.  ACP discussion was held with the patient during this visit. Patient has an advance directive in EMR which is still valid.     Review of Systems   Constitutional: Negative.  Negative for fatigue and fever.   HENT: Negative.  Negative for congestion, sore throat, trouble swallowing and voice change.    Eyes: Negative.  Negative for redness and visual disturbance.   Respiratory: Negative.  Negative for cough, chest tightness, shortness of breath and wheezing.    Cardiovascular: Negative.  Negative for chest pain, palpitations and leg swelling.   Gastrointestinal: Negative.  Negative for abdominal distention, abdominal pain, anal bleeding, diarrhea and nausea.   Endocrine: Negative.  Negative for cold intolerance and heat intolerance.   Genitourinary: Negative.  Negative for difficulty urinating, dysuria, pelvic pain and vaginal bleeding.   Musculoskeletal: Negative.  Negative for arthralgias.   Skin: Negative.  Negative for rash.   Allergic/Immunologic: Negative.  Negative for environmental allergies.   Neurological: Negative.  Negative for weakness and light-headedness.   Hematological: Negative.  Does not bruise/bleed easily.   Psychiatric/Behavioral: Negative.  Negative for agitation, decreased concentration and sleep disturbance. The patient is not hyperactive.    All other systems reviewed and are negative.       Objective    Vitals:    10/15/21 1428   BP: 132/90   BP Location: Right arm  "  Pulse: 90   Resp: 16   Temp: 96.9 °F (36.1 °C)   TempSrc: Infrared   SpO2: 96%   Weight: 88.9 kg (196 lb)   Height: 165.1 cm (65\")     BMI Readings from Last 1 Encounters:   10/15/21 32.62 kg/m²   BMI is above normal parameters. Recommendations include: exercise counseling and nutrition counseling    Does the patient have evidence of cognitive impairment? No    Physical Exam  Constitutional:       Appearance: Normal appearance. She is well-developed and normal weight.   HENT:      Head: Normocephalic and atraumatic.      Right Ear: Tympanic membrane, ear canal and external ear normal.      Left Ear: Tympanic membrane, ear canal and external ear normal.      Nose: Nose normal.      Mouth/Throat:      Mouth: Mucous membranes are moist.      Pharynx: Oropharynx is clear. No oropharyngeal exudate.   Eyes:      Extraocular Movements: Extraocular movements intact.      Conjunctiva/sclera: Conjunctivae normal.      Pupils: Pupils are equal, round, and reactive to light.   Cardiovascular:      Rate and Rhythm: Normal rate and regular rhythm.      Pulses: Normal pulses.      Heart sounds: Normal heart sounds.   Pulmonary:      Effort: Pulmonary effort is normal.      Breath sounds: Normal breath sounds.   Abdominal:      General: Bowel sounds are normal.      Palpations: Abdomen is soft.      Comments: Large scar in the midline near the umbilicus.  This was the site of her fistula.  It took a very long time to heal.  Area soft and no infection.   Musculoskeletal:         General: Normal range of motion.      Cervical back: Normal range of motion and neck supple.   Skin:     General: Skin is warm and dry.   Neurological:      General: No focal deficit present.      Mental Status: She is alert and oriented to person, place, and time. Mental status is at baseline.   Psychiatric:         Mood and Affect: Mood normal.         Behavior: Behavior normal.         Thought Content: Thought content normal.         Judgment: Judgment " normal.       Lab Results   Component Value Date    TRIG 132 10/08/2021    HDL 48 10/08/2021    LDL 87 10/08/2021    VLDL 23 10/08/2021    HGBA1C 5.9 (H) 10/08/2021            HEALTH RISK ASSESSMENT    Smoking Status:  Social History     Tobacco Use   Smoking Status Never Smoker   Smokeless Tobacco Never Used     Alcohol Consumption:  Social History     Substance and Sexual Activity   Alcohol Use Not Currently   • Alcohol/week: 1.0 standard drink   • Types: 1 Glasses of wine per week    Comment: occ     Fall Risk Screen:    STEADI Fall Risk Assessment was completed, and patient is at LOW risk for falls.Assessment completed on:10/15/2021    Depression Screening:  PHQ-2/PHQ-9 Depression Screening 10/15/2021   Little interest or pleasure in doing things 0   Feeling down, depressed, or hopeless 0   Total Score 0       Health Habits and Functional and Cognitive Screening:  Functional & Cognitive Status 10/15/2021   Do you have difficulty preparing food and eating? No   Do you have difficulty bathing yourself, getting dressed or grooming yourself? No   Do you have difficulty using the toilet? No   Do you have difficulty moving around from place to place? No   Do you have trouble with steps or getting out of a bed or a chair? No   Current Diet Well Balanced Diet   Dental Exam Up to date   Eye Exam Not up to date   Exercise (times per week) 3 times per week   Current Exercises Include Walking   Current Exercise Activities Include -   Do you need help using the phone?  No   Are you deaf or do you have serious difficulty hearing?  No   Do you need help with transportation? No   Do you need help shopping? No   Do you need help preparing meals?  No   Do you need help with housework?  No   Do you need help with laundry? No   Do you need help taking your medications? No   Do you need help managing money? No   Do you ever drive or ride in a car without wearing a seat belt? No   Have you felt unusual stress, anger or loneliness in  the last month? No   Who do you live with? Spouse   If you need help, do you have trouble finding someone available to you? No   Have you been bothered in the last four weeks by sexual problems? -   Do you have difficulty concentrating, remembering or making decisions? No       Age-appropriate Screening Schedule:  Refer to the list below for future screening recommendations based on patient's age, sex and/or medical conditions. Orders for these recommended tests are listed in the plan section. The patient has been provided with a written plan.    Health Maintenance   Topic Date Due   • DXA SCAN  Never done   • TDAP/TD VACCINES (1 - Tdap) Never done   • ZOSTER VACCINE (1 of 2) Never done   • LIPID PANEL  10/08/2022   • INFLUENZA VACCINE  Completed              Assessment/Plan   CMS Preventative Services Quick Reference  Risk Factors Identified During Encounter  Obesity/Overweight   The above risks/problems have been discussed with the patient.  Follow up actions/plans if indicated are seen below in the Assessment/Plan Section.  Pertinent information has been shared with the patient in the After Visit Summary.    Diagnoses and all orders for this visit:    1. Medicare annual wellness visit, subsequent (Primary)  Assessment & Plan:  Esperanza Smith is a 76 y.o. female who presents for a Subsequent Medicare Wellness Visit.  Upon arrival to the room the patient underwent the Medicare health risk assessment.  Neither the questions themselves or the answers that were given prompted any major concern on the part of the patient or by the medical staff that gave the assessment.  As far as the preventative care examinations and the preventative care immunizations that this patient requires they are as listed below.   Screening tests recommended:    Colonoscopy-- Never had and will not do it.  Could not prep.  Mammogram--bilateral mastectomy  DEXA- schedule study  PAP/ Pelvic--MEHUL removed  Diabetic eye exam--She will be  scheduling soon  Diabetic foot exam--not needed --recent diagnosis      Immunization:  Influenza--she will get soon  Prevnar--will give today  Pneumovax--Next year  Tetanus--Probably had in 2012.  Shingles vaccine--Does not think she had CP--does not want  Hepatitis   Covid--vaccines up to date.                      Orders:  -     DEXA Bone Density Axial; Future    2. Mixed hyperlipidemia  Assessment & Plan:  Last lipid panel excellent.  Nonetheless she has evidence of fatty liver.  We may need to check viral hepatitis studies sometime to be sure something else is not causing it.    Orders:  -     DEXA Bone Density Axial; Future    3. Primary hypertension  Assessment & Plan:  Amlodipine is working  Blood pressure acceptable today  I need readings outside the office    Orders:  -     DEXA Bone Density Axial; Future    4. Obstructive sleep apnea syndrome  Assessment & Plan:  Not currently interested in trying to sleep apnea work on machine.  We will revisit this yearly and may come a time that the technology improves and we can treat this likely disorder.    Orders:  -     DEXA Bone Density Axial; Future    5. Malignant neoplasm of right female breast, unspecified estrogen receptor status, unspecified site of breast (HCC)  Assessment & Plan:  Bilateral mastectomies.  No evidence of disease    Orders:  -     DEXA Bone Density Axial; Future    6. Chronic diffuse otitis externa of both ears  Assessment & Plan:  Triamcinolone solution for ear canals  Some sort of contact eczema in the ear canals.  May be from hairspray or soaps or shampoos getting down in the ear canal    Orders:  -     DEXA Bone Density Axial; Future    7. Overweight  Assessment & Plan:  Low-carb diet and exercise.  This will be very important for several reasons but in particular she needs to weight loss to try and pull things that come up.    Orders:  -     DEXA Bone Density Axial; Future    8. Enterocutaneous fistula  Assessment & Plan:  No drainage  for several years.  No drainage from the fistula.    Orders:  -     DEXA Bone Density Axial; Future    9. Need for immunization against influenza  Assessment & Plan:  We suggest that she get flu vaccine this year.  I also suggest that she get the Covid vaccine updated    Pneumococcal vaccine today--13 valent    Orders:  -     DEXA Bone Density Axial; Future    10. Age-related osteoporosis without current pathological fracture   Assessment & Plan:  DEXA scan will be ordered.  Calcium plus vitamin D plus medications may be needed to increase bone density.  We will check the DEXA first before deciding    Orders:  -     DEXA Bone Density Axial; Future    11. Immunization due  Assessment & Plan:  We suggest that she get flu vaccine this year.  I also suggest that she get the Covid vaccine updated    Pneumococcal vaccine today--13 valent    Orders:  -     Pneumococcal Conjugate Vaccine 13-Valent All (PCV13)    Other orders  -     Fluzone High-Dose 65+yrs (8574-0855)  -     triamcinolone (KENALOG) 0.1 % lotion; Apply  topically to the appropriate area as directed 3 (Three) Times a Day.  Dispense: 60 mL; Refill: 2      Follow Up:   Return in about 6 months (around 4/15/2022) for Recheck.     An After Visit Summary and PPPS were made available to the patient.

## 2021-10-15 NOTE — ASSESSMENT & PLAN NOTE
Low-carb diet and exercise.  This will be very important for several reasons but in particular she needs to weight loss to try and pull things that come up.

## 2021-10-15 NOTE — ASSESSMENT & PLAN NOTE
Triamcinolone solution for ear canals  Some sort of contact eczema in the ear canals.  May be from hairspray or soaps or shampoos getting down in the ear canal

## 2021-10-15 NOTE — ASSESSMENT & PLAN NOTE
Last lipid panel excellent.  Nonetheless she has evidence of fatty liver.  We may need to check viral hepatitis studies sometime to be sure something else is not causing it.

## 2021-10-15 NOTE — ASSESSMENT & PLAN NOTE
Esperanza Smith is a 76 y.o. female who presents for a Subsequent Medicare Wellness Visit.  Upon arrival to the room the patient underwent the Medicare health risk assessment.  Neither the questions themselves or the answers that were given prompted any major concern on the part of the patient or by the medical staff that gave the assessment.  As far as the preventative care examinations and the preventative care immunizations that this patient requires they are as listed below.   Screening tests recommended:    Colonoscopy-- Never had and will not do it.  Could not prep.  Mammogram--bilateral mastectomy  DEXA- schedule study  PAP/ Pelvic--MEHUL removed  Diabetic eye exam--She will be scheduling soon  Diabetic foot exam--not needed --recent diagnosis      Immunization:  Influenza--she will get soon  Prevnar--will give today  Pneumovax--Next year  Tetanus--Probably had in 2012.  Shingles vaccine--Does not think she had CP--does not want  Hepatitis   Covid--vaccines up to date.

## 2021-10-15 NOTE — ASSESSMENT & PLAN NOTE
Not currently interested in trying to sleep apnea work on machine.  We will revisit this yearly and may come a time that the technology improves and we can treat this likely disorder.

## 2021-10-17 PROBLEM — M81.0 AGE-RELATED OSTEOPOROSIS WITHOUT CURRENT PATHOLOGICAL FRACTURE: Status: ACTIVE | Noted: 2021-10-17

## 2021-10-17 NOTE — ASSESSMENT & PLAN NOTE
DEXA scan will be ordered.  Calcium plus vitamin D plus medications may be needed to increase bone density.  We will check the DEXA first before deciding

## 2021-10-17 NOTE — ASSESSMENT & PLAN NOTE
We suggest that she get flu vaccine this year.  I also suggest that she get the Covid vaccine updated    Pneumococcal vaccine today--13 valent

## 2021-10-17 NOTE — PATIENT INSTRUCTIONS
Medicare Wellness  Personal Prevention Plan of Service     Date of Office Visit:  10/15/2021  Encounter Provider:  Dom Estrada MD  Place of Service:  [facility]  Patient Name: Esperanza Smith  :  1945    As part of the Medicare Wellness portion of your visit today, we are providing you with this personalized preventive plan of services (PPPS). This plan is based upon recommendations of the [place] Preventive Services Task Force (USPSTF) and the Advisory Committee on Immunization Practices (ACIP).    This lists the preventive care services that should be considered, and provides dates of when you are due. Items listed as completed are up-to-date and do not require any further intervention.    Health Maintenance   Topic Date Due   • DXA SCAN  Never done   • TDAP/TD VACCINES (1 - Tdap) Never done   • ZOSTER VACCINE (1 of 2) Never done   • HEPATITIS C SCREENING  Never done   • COLORECTAL CANCER SCREENING  2020   • LIPID PANEL  10/08/2022   • ANNUAL WELLNESS VISIT  10/15/2022   • Pneumococcal Vaccine 65+ (2 of 2 - PPSV23) 10/15/2022   • COVID-19 Vaccine  Completed   • INFLUENZA VACCINE  Completed       Orders Placed This Encounter   Procedures   • DEXA Bone Density Axial     Standing Status:   Future     Standing Expiration Date:   10/15/2022     Order Specific Question:   Reason for Exam:     Answer:   rule out osteoporosis     Order Specific Question:   Release to patient     Answer:   Immediate   • Fluzone High-Dose 65+yrs ([id number])   • Pneumococcal Conjugate Vaccine 13-Valent All (PCV13)       No follow-ups on file.

## 2021-10-19 ENCOUNTER — TELEPHONE (OUTPATIENT)
Dept: FAMILY MEDICINE CLINIC | Facility: CLINIC | Age: 76
End: 2021-10-19

## 2021-10-19 NOTE — TELEPHONE ENCOUNTER
----- Message from Esperanza Smith sent at 10/19/2021  3:11 PM EDT -----  Regarding: Bone Density Scqn   Dr. Estrada,   Can you have the bone density scan request faxed  to [facility] instead of [facility]?  It is more convenient for me to make an appointment there and not have to go to the hospital.   Thanks!

## 2021-12-16 RX ORDER — AMLODIPINE BESYLATE 5 MG/1
TABLET ORAL
Qty: 90 TABLET | Refills: 2 | Status: SHIPPED | OUTPATIENT
Start: 2021-12-16 | End: 2022-09-06

## 2021-12-30 NOTE — ADDENDUM NOTE
Addended by: JOHN DICKSON on: 3/13/2020 09:39 AM     Modules accepted: Franky     Subjective:          Chief Complaint: Casper Osborne is a 68 y.o. male who had concerns including Pain of the Right Knee.    Casper Osborne is a 68 y.o. male presents for evaluation of his right knee.  On 12/20/2021 he was on the 2nd rung of a ladder when the ladder fell backwards and he went to land on his right knee.  He had a twisting sensation to and felt a pop.  He was unable to get upper ambulate afterwards.  He stated he tried, however his knee was very unstable and wobbly.  He crawled to his car and drove himself home and then to the emergency room.  X-rays were interpreted as normal and he was placed in a knee immobilizer and referred for follow-up.  He saw Myriam Ortiz PA-C where an MRI was ordered, see my detailed interpretation below.  He has been in the knee immobilizer since.  He has been bearing weight with the brace while using a cane.  He states without the immobilizer he cannot bear weight as his knee is too unstable.  He has not range his knee.  Denies any numbness or paresthesias either currently nor at the time of injury.    Past Medical History:   Diagnosis Date    GERD (gastroesophageal reflux disease)     Hyperlipidemia        Current Outpatient Medications on File Prior to Visit   Medication Sig Dispense Refill    alfuzosin (UROXATRAL) 10 mg Tb24 TAKE ONE TABLET BY MOUTH ONCE DAILY WITH BREAKFAST 90 tablet 3    aspirin (ECOTRIN) 81 MG EC tablet Take 81 mg by mouth once daily.      fluticasone propionate (FLONASE) 50 mcg/actuation nasal spray 1 spray (50 mcg total) by Each Nostril route once daily. 16 g 3    ibuprofen (ADVIL,MOTRIN) 600 MG tablet Take 1 tablet (600 mg total) by mouth every 6 (six) hours as needed. 30 tablet 0    ipratropium (ATROVENT) 0.03 % nasal spray PLACE TWO SPRAYS IN EACH NOSTRIL THREE TIMES DAILY  30 mL 0    lovastatin (MEVACOR) 40 MG tablet TAKE ONE TABLET BY MOUTH AT BEDTIME 90 tablet 0    lovastatin (MEVACOR) 40 MG tablet Take 1 tablet (40 mg total) by  mouth nightly. 90 tablet 0    omeprazole (PRILOSEC) 40 MG capsule Take 1 capsule (40 mg total) by mouth once daily. 90 capsule 3    solifenacin (VESICARE) 5 MG tablet TAKE ONE TABLET BY MOUTH ONCE DAILY. 30 tablet 11    gabapentin (NEURONTIN) 300 MG capsule Take 1 capsule (300 mg total) by mouth 3 (three) times daily. 90 capsule 1     Current Facility-Administered Medications on File Prior to Visit   Medication Dose Route Frequency Provider Last Rate Last Admin    alprazolam ODT dissolvable tablet 0.5 mg  0.5 mg Oral Once PRN Ori Cali Jr., MD           Past Surgical History:   Procedure Laterality Date    ARTHROSCOPY OF ANKLE WITH DEBRIDEMENT Left 12/19/2019    Procedure: ARTHROSCOPY, ANKLE, WITH DEBRIDEMENT;  Surgeon: Bay Melendez MD;  Location: [facility] OR;  Service: Orthopedics;  Laterality: Left;    EPIDURAL STEROID INJECTION N/A 1/29/2021    Procedure: INJECTION, STEROID, EPIDURAL L4/5;  Surgeon: Alvarado Reaves MD;  Location: [facility] PAIN [facility]GT;  Service: Pain Management;  Laterality: N/A;    EPIDURAL STEROID INJECTION INTO LUMBAR SPINE N/A 12/24/2020    Procedure: Injection-steroid-epidural-lumbar--L4-5;  Surgeon: Ori Cali Jr., MD;  Location: [facility]IN [facility];  Service: Pain Management;  Laterality: N/A;    FRACTURE SURGERY      left heel at 41yo    HEEL SPUR SURGERY      left heel - fell off a ladder and had to have this reconstructed    SPINE SURGERY      c7 fx - prior to this, he was getting dizzy spells - none since    TONSILLECTOMY      VASECTOMY         Family History   Problem Relation Age of Onset    Diabetes Mother     Cancer Father         melanoma cancer with mets    Heart disease Daughter         enlarged heart    Hypothyroidism Daughter     Colon polyps Neg Hx     Prostate cancer Neg Hx     Colon cancer Neg Hx     Stroke Neg Hx     Hypertension Neg Hx     Hyperlipidemia Neg Hx        Social History     Socioeconomic History    Marital status:       Spouse name: Nithya    Number of children: 1   Occupational History    Occupation: Retried   Tobacco Use    Smoking status: Never Smoker    Smokeless tobacco: Never Used   Substance and Sexual Activity    Alcohol use: Yes     Comment: less than once a month    Drug use: No    Sexual activity: Yes     Partners: Female     Comment:        Review of Systems   Constitutional: Negative.   HENT: Negative.    Eyes: Negative.    Cardiovascular: Negative.    Respiratory: Negative.    Endocrine: Negative.    Hematologic/Lymphatic: Negative.    Skin: Negative.    Musculoskeletal: Positive for falls, joint pain (Right knee), joint swelling ( right knee) and muscle weakness ( right leg). Negative for myalgias and stiffness.   Neurological: Negative.    Psychiatric/Behavioral: Negative.    Allergic/Immunologic: Negative.        Pain Related Questions  Over the past 3 days, what was your average pain during activity? (I.e. running, jogging, walking, climbing stairs, getting dressed, ect.): 2  Over the past 3 days, what was your highest pain level?: 2  Over the past 3 days, what was your lowest pain level? : 2    Other  How many nights a week are you awakened by your affected body part?: 7  Was the patient's HEIGHT measured or patient reported?: Patient Reported  Was the patient's WEIGHT measured or patient reported?: Measured      Objective:        General: Casper is well-developed, well-nourished, appears stated age, in no acute distress, alert and oriented to time, place and person.     General    Nursing note and vitals reviewed.  Constitutional: He is oriented to person, place, and time. He appears well-developed and well-nourished. No distress.   HENT:   Head: Normocephalic and atraumatic.   Nose: Nose normal.   Eyes: EOM are normal.   Cardiovascular: Normal rate and intact distal pulses.    Pulmonary/Chest: Effort normal. No respiratory distress.   Neurological: He is alert and oriented to person, place, and time.    Psychiatric: He has a normal mood and affect. His behavior is normal. Judgment and thought content normal.     General Musculoskeletal Exam   Gait: abnormal and antalgic       Right Knee Exam     Inspection   Erythema: absent  Scars: absent  Swelling: present  Effusion: present  Deformity: absent  Bruising: absent    Tenderness   The patient is tender to palpation of the medial joint line, lateral joint line and condyle.    Range of Motion   Extension: 0   Flexion: 80     Tests   Ligament Examination   Lachman: abnormal - grade II  PCL-Posterior Drawer: abnormal   - grade II  MCL - Valgus: abnormal - grade II  LCL - Varus: normal  Patella   Patellar apprehension: negative  Passive Patellar Tilt: neutral  Patellar Tracking: normal  Patellar Glide (quadrants): Lateral - 2   Medial - 2    Other   Sensation: normal    Left Knee Exam   Left knee exam is normal.    Inspection   Erythema: absent  Scars: absent  Swelling: absent  Effusion: absent  Deformity: absent  Bruising: absent    Tenderness   The patient is experiencing no tenderness.     Range of Motion   Extension: -5   Flexion: 140     Tests   Meniscus   Eddie:  Medial - negative Lateral - negative  Stability Lachman: normal (-1 to 2mm) PCL-Posterior Drawer: normal (0 to 2mm)  MCL - Valgus: normal (0 to 2mm)  LCL - Varus: normal (0 to 2mm)  Patella   Patellar apprehension: negative  Passive Patellar Tilt: neutral  Patellar Tracking: normal    Other   Sensation: normal    Muscle Strength   Right Lower Extremity   Quadriceps:  4/5   Hamstrin/5   Left Lower Extremity   Quadriceps:  5/5   Hamstrin/5     Vascular Exam     Right Pulses  Dorsalis Pedis:      2+  Posterior Tibial:      2+        Left Pulses  Dorsalis Pedis:      2+  Posterior Tibial:      2+        Edema  Right Lower Leg: absent  Left Lower Leg: absent        Imaging:  X-rays of the bilateral knee from 2021 personally reviewed by me on that day.  These include weight-bearing AP, PA  flexion, lateral, and Merchant views.  There is marginal osteophytes along the notch, however these are very small.  There is no subchondral sclerosis.  There is no joint space loss in all 3 compartments bilaterally.     MRI of the right knee from 12/22/2021 personally reviewed by me on 12/30/2021.  There is a complete ACL tear.  Grade 2 PCL tear in the proximal half near the midportion.  There is a high grade 2, possible grade 3, MCL tear off the femoral origin.  There are medial and lateral meniscus tears, the medial is a the posterior horn with a radial and horizontal component.  The medial root is intact.  Laterally, the meniscus has a radial tear in the posterior horn.  The root is not well visualized.  There is no extrusion of either the medial or lateral meniscus.  Overall, the cartilage looks very good in all 3 compartments with no significant or full-thickness loss.      Assessment:     Casper Osborne is a 68 y.o. male with right multi ligamentous knee injury including the ACL, PCL, and MCL as detailed above.  Encounter Diagnoses   Name Primary?    Acute knee pain, unspecified laterality     Rupture of anterior cruciate ligament of right knee, initial encounter Yes    Rupture of posterior cruciate ligament of right knee, initial encounter     Tear of medial collateral ligament of right knee, initial encounter           Plan:         The diagnosis and treatment options were explained at length to the patient all his questions were answered.  I showed him the MRI and explained the findings to him.  Given his effusion and lack of motion I would like to begin with physical therapy to work on strengthening and motion of the knee.  I explained that if we cannot achieve a sufficient quadriceps and leg strength, we may be able to pursue non operative treatment.  However, he has persistent feelings of instability we would need to consider operative intervention with ligament reconstruction.  I will see him back  in clinic in several weeks for re-evaluation.

## 2022-04-06 DIAGNOSIS — E03.9 ACQUIRED HYPOTHYROIDISM: Primary | ICD-10-CM

## 2022-04-06 DIAGNOSIS — R73.09 ELEVATED HEMOGLOBIN A1C MEASUREMENT: ICD-10-CM

## 2022-04-26 ENCOUNTER — LAB (OUTPATIENT)
Dept: FAMILY MEDICINE CLINIC | Facility: CLINIC | Age: 77
End: 2022-04-26

## 2022-04-26 DIAGNOSIS — E03.9 ACQUIRED HYPOTHYROIDISM: ICD-10-CM

## 2022-04-26 DIAGNOSIS — R73.09 ELEVATED HEMOGLOBIN A1C MEASUREMENT: ICD-10-CM

## 2022-04-26 LAB
HBA1C MFR BLD: 5.8 % (ref 3.5–5.6)
T4 FREE SERPL-MCNC: 1.28 NG/DL (ref 0.93–1.7)
TSH SERPL DL<=0.05 MIU/L-ACNC: 1.6 UIU/ML (ref 0.27–4.2)

## 2022-04-26 PROCEDURE — 36415 COLL VENOUS BLD VENIPUNCTURE: CPT

## 2022-04-26 PROCEDURE — 84439 ASSAY OF FREE THYROXINE: CPT | Performed by: FAMILY MEDICINE

## 2022-04-26 PROCEDURE — 83036 HEMOGLOBIN GLYCOSYLATED A1C: CPT | Performed by: FAMILY MEDICINE

## 2022-04-26 PROCEDURE — 84443 ASSAY THYROID STIM HORMONE: CPT | Performed by: FAMILY MEDICINE

## 2022-05-19 RX ORDER — LEVOTHYROXINE SODIUM 0.05 MG/1
TABLET ORAL
Qty: 90 TABLET | Refills: 1 | Status: SHIPPED | OUTPATIENT
Start: 2022-05-19 | End: 2022-11-08

## 2022-09-06 RX ORDER — AMLODIPINE BESYLATE 5 MG/1
TABLET ORAL
Qty: 90 TABLET | Refills: 2 | Status: SHIPPED | OUTPATIENT
Start: 2022-09-06 | End: 2023-03-13 | Stop reason: SDUPTHER

## 2022-10-14 ENCOUNTER — LAB (OUTPATIENT)
Dept: FAMILY MEDICINE CLINIC | Facility: CLINIC | Age: 77
End: 2022-10-14

## 2022-10-14 DIAGNOSIS — E55.9 VITAMIN D DEFICIENCY: ICD-10-CM

## 2022-10-14 DIAGNOSIS — Z00.00 MEDICARE ANNUAL WELLNESS VISIT, SUBSEQUENT: ICD-10-CM

## 2022-10-14 DIAGNOSIS — R73.09 ELEVATED HEMOGLOBIN A1C MEASUREMENT: Primary | ICD-10-CM

## 2022-10-14 DIAGNOSIS — I10 ESSENTIAL HYPERTENSION: ICD-10-CM

## 2022-10-14 DIAGNOSIS — R73.09 ELEVATED HEMOGLOBIN A1C MEASUREMENT: ICD-10-CM

## 2022-10-14 LAB
25(OH)D3 SERPL-MCNC: 63.3 NG/ML (ref 30–100)
ALBUMIN SERPL-MCNC: 4.6 G/DL (ref 3.5–5.2)
ALBUMIN/GLOB SERPL: 1.5 G/DL
ALP SERPL-CCNC: 93 U/L (ref 39–117)
ALT SERPL W P-5'-P-CCNC: 105 U/L (ref 1–33)
ANION GAP SERPL CALCULATED.3IONS-SCNC: 12.9 MMOL/L (ref 5–15)
AST SERPL-CCNC: 78 U/L (ref 1–32)
BASOPHILS # BLD AUTO: 0.04 10*3/MM3 (ref 0–0.2)
BASOPHILS NFR BLD AUTO: 0.5 % (ref 0–1.5)
BILIRUB SERPL-MCNC: 0.6 MG/DL (ref 0–1.2)
BUN SERPL-MCNC: 7 MG/DL (ref 8–23)
BUN/CREAT SERPL: 11.1 (ref 7–25)
CALCIUM SPEC-SCNC: 10.2 MG/DL (ref 8.6–10.5)
CHLORIDE SERPL-SCNC: 98 MMOL/L (ref 98–107)
CHOLEST SERPL-MCNC: 162 MG/DL (ref 0–200)
CO2 SERPL-SCNC: 25.1 MMOL/L (ref 22–29)
CREAT SERPL-MCNC: 0.63 MG/DL (ref 0.57–1)
DEPRECATED RDW RBC AUTO: 41.9 FL (ref 37–54)
EGFRCR SERPLBLD CKD-EPI 2021: 92.6 ML/MIN/1.73
EOSINOPHIL # BLD AUTO: 0.15 10*3/MM3 (ref 0–0.4)
EOSINOPHIL NFR BLD AUTO: 2 % (ref 0.3–6.2)
ERYTHROCYTE [DISTWIDTH] IN BLOOD BY AUTOMATED COUNT: 12.4 % (ref 12.3–15.4)
GLOBULIN UR ELPH-MCNC: 3.1 GM/DL
GLUCOSE SERPL-MCNC: 130 MG/DL (ref 65–99)
HBA1C MFR BLD: 5.8 % (ref 3.5–5.6)
HCT VFR BLD AUTO: 46.9 % (ref 34–46.6)
HDLC SERPL-MCNC: 56 MG/DL (ref 40–60)
HGB BLD-MCNC: 16.2 G/DL (ref 12–15.9)
IMM GRANULOCYTES # BLD AUTO: 0.04 10*3/MM3 (ref 0–0.05)
IMM GRANULOCYTES NFR BLD AUTO: 0.5 % (ref 0–0.5)
LDLC SERPL CALC-MCNC: 88 MG/DL (ref 0–100)
LDLC/HDLC SERPL: 1.54 {RATIO}
LYMPHOCYTES # BLD AUTO: 1.95 10*3/MM3 (ref 0.7–3.1)
LYMPHOCYTES NFR BLD AUTO: 26.6 % (ref 19.6–45.3)
MCH RBC QN AUTO: 31.9 PG (ref 26.6–33)
MCHC RBC AUTO-ENTMCNC: 34.5 G/DL (ref 31.5–35.7)
MCV RBC AUTO: 92.3 FL (ref 79–97)
MONOCYTES # BLD AUTO: 0.66 10*3/MM3 (ref 0.1–0.9)
MONOCYTES NFR BLD AUTO: 9 % (ref 5–12)
NEUTROPHILS NFR BLD AUTO: 4.5 10*3/MM3 (ref 1.7–7)
NEUTROPHILS NFR BLD AUTO: 61.4 % (ref 42.7–76)
NRBC BLD AUTO-RTO: 0 /100 WBC (ref 0–0.2)
PLATELET # BLD AUTO: 217 10*3/MM3 (ref 140–450)
PMV BLD AUTO: 10.9 FL (ref 6–12)
POTASSIUM SERPL-SCNC: 4.2 MMOL/L (ref 3.5–5.2)
PROT SERPL-MCNC: 7.7 G/DL (ref 6–8.5)
RBC # BLD AUTO: 5.08 10*6/MM3 (ref 3.77–5.28)
SODIUM SERPL-SCNC: 136 MMOL/L (ref 136–145)
TRIGL SERPL-MCNC: 99 MG/DL (ref 0–150)
TSH SERPL DL<=0.05 MIU/L-ACNC: 2.87 UIU/ML (ref 0.27–4.2)
VIT B12 BLD-MCNC: 497 PG/ML (ref 211–946)
VLDLC SERPL-MCNC: 18 MG/DL (ref 5–40)
WBC NRBC COR # BLD: 7.34 10*3/MM3 (ref 3.4–10.8)

## 2022-10-14 PROCEDURE — 80053 COMPREHEN METABOLIC PANEL: CPT | Performed by: FAMILY MEDICINE

## 2022-10-14 PROCEDURE — 84443 ASSAY THYROID STIM HORMONE: CPT | Performed by: FAMILY MEDICINE

## 2022-10-14 PROCEDURE — 80061 LIPID PANEL: CPT | Performed by: FAMILY MEDICINE

## 2022-10-14 PROCEDURE — 85025 COMPLETE CBC W/AUTO DIFF WBC: CPT | Performed by: FAMILY MEDICINE

## 2022-10-14 PROCEDURE — 83036 HEMOGLOBIN GLYCOSYLATED A1C: CPT | Performed by: FAMILY MEDICINE

## 2022-10-14 PROCEDURE — 82306 VITAMIN D 25 HYDROXY: CPT | Performed by: FAMILY MEDICINE

## 2022-10-14 PROCEDURE — 36415 COLL VENOUS BLD VENIPUNCTURE: CPT

## 2022-10-14 PROCEDURE — 82607 VITAMIN B-12: CPT | Performed by: FAMILY MEDICINE

## 2022-10-21 ENCOUNTER — OFFICE VISIT (OUTPATIENT)
Dept: FAMILY MEDICINE CLINIC | Facility: CLINIC | Age: 77
End: 2022-10-21

## 2022-10-21 VITALS
HEIGHT: 65 IN | DIASTOLIC BLOOD PRESSURE: 82 MMHG | WEIGHT: 195 LBS | HEART RATE: 89 BPM | RESPIRATION RATE: 16 BRPM | SYSTOLIC BLOOD PRESSURE: 146 MMHG | OXYGEN SATURATION: 96 % | BODY MASS INDEX: 32.49 KG/M2

## 2022-10-21 DIAGNOSIS — E78.2 MIXED HYPERLIPIDEMIA: ICD-10-CM

## 2022-10-21 DIAGNOSIS — C50.911 MALIGNANT NEOPLASM OF RIGHT FEMALE BREAST, UNSPECIFIED ESTROGEN RECEPTOR STATUS, UNSPECIFIED SITE OF BREAST: ICD-10-CM

## 2022-10-21 DIAGNOSIS — L30.4 INTERTRIGO: ICD-10-CM

## 2022-10-21 DIAGNOSIS — I10 PRIMARY HYPERTENSION: ICD-10-CM

## 2022-10-21 DIAGNOSIS — K63.2 ENTEROCUTANEOUS FISTULA: ICD-10-CM

## 2022-10-21 DIAGNOSIS — Z23 IMMUNIZATION DUE: ICD-10-CM

## 2022-10-21 DIAGNOSIS — E66.3 OVERWEIGHT: ICD-10-CM

## 2022-10-21 DIAGNOSIS — G47.33 OBSTRUCTIVE SLEEP APNEA SYNDROME: ICD-10-CM

## 2022-10-21 DIAGNOSIS — Z00.00 MEDICARE ANNUAL WELLNESS VISIT, SUBSEQUENT: Primary | ICD-10-CM

## 2022-10-21 PROCEDURE — 90677 PCV20 VACCINE IM: CPT | Performed by: FAMILY MEDICINE

## 2022-10-21 PROCEDURE — G0009 ADMIN PNEUMOCOCCAL VACCINE: HCPCS | Performed by: FAMILY MEDICINE

## 2022-10-21 PROCEDURE — G0439 PPPS, SUBSEQ VISIT: HCPCS | Performed by: FAMILY MEDICINE

## 2022-10-21 PROCEDURE — 1170F FXNL STATUS ASSESSED: CPT | Performed by: FAMILY MEDICINE

## 2022-10-21 PROCEDURE — 99213 OFFICE O/P EST LOW 20 MIN: CPT | Performed by: FAMILY MEDICINE

## 2022-10-21 PROCEDURE — 1160F RVW MEDS BY RX/DR IN RCRD: CPT | Performed by: FAMILY MEDICINE

## 2022-10-21 NOTE — PROGRESS NOTES
The ABCs of the Annual Wellness Visit  Subsequent Medicare Wellness Visit    Chief Complaint   Patient presents with   • Medicare Wellness-subsequent      Subjective    History of Present Illness:  Esperanza Smith is a 77 y.o. female who presents for a Subsequent Medicare Wellness Visit.  Upon arrival to the room the patient underwent the Medicare health risk assessment.  Neither the questions themselves or the answers that were given prompted any major concern on the part of the patient or by the medical staff that gave the assessment.  As far as the preventative care examinations and the preventative care immunizations that this patient requires they are as listed below.     The patient presents today for a Medicare wellness visit.    The patient states that she is doing well overall. She notes that she continues to have intestinal problems. She reports that she experiences explosive diarrhea approximately 30 to 45 minutes after eating. She denies taking Lomotil with her meals. She notes that she has been taking fiber, which has been helpful.     The patient states that she has had a sigmoid endoscopy in the past. She notes that she does not wish to have any more colonoscopies. She states that she has had bilateral mastectomy due to cancer. She notes that she was supposed to have a dual x-ray absorptiometry scan last year, but she did not. The patient states that she had a hysterectomy. She notes that she had 52 inches of her small intestine removed. She agrees that her colon has not been shortened.    The patient reports that she is diabetic. She states that she has annual eye exams. She notes that she recently had an eye exam. She reports that she has a floater in her right eye. She states that she was told that the floater will not go away. The patient reports that she has numbness and tingling in her feet due to her diabetes mellitus. She denies seeing a podiatrist. She states that she does check her feet. 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 She states that she has insoles that are helpful. The patient reports that she sees a dentist annually. She states that she has not seen a dermatologist this year. She notes that she saw her dermatologist in 2021.    The patient reports that she has received her influenza vaccine in the past. She states that she is unsure if she wants to receive the influenza vaccine at this time. She notes that she does not want the COVID-19 variant booster. She states that she has received 2 COVID-19 vaccines. She notes that she is due for her second pneumonia vaccine. She states that she believes she had a tetanus vaccine in the past.    The patient states that she has ongoing sinus problems. She notes that she uses a Neti pot. She states that her sinuses are occasionally sore.    The patient states that she would like a refill of nystatin cream for her arms and folds.    The patient states that she had blood work completed prior to this visit. She notes that her blood glucose has been elevated. She states that she did not take her diabetic medication prior to this visit.    The patient reports that she lost some weight, but she has gained it all back. She states that she does not overeat, but she does snack. She notes that she eats a balanced diet. She states that she does not get enough exercise.   The patient states she does have sleep apnea but does not use a mask. The patient states she takes zinc gummies.  Screening tests recommended:    Colonoscopy -- refuses any more exam  Mammogram--bilat mastectomy  DEXA-- does not want  PAP/ Pelvic-- Hysterectomy  Diabetic eye exam--up to date  Diabetic foot exam--some neuropathy--no podiatrist wanted.  Dentist- 2x/year  Derm- yearly        Immunization:  Influenza--uncertain this year.   Prevnar -13 UTD  Prevnar 20  Tetanus-- pharmacy  Shingles vaccine--pharmacy  Hepatitis -up to date  Covid-  Refuses boosters.                    The  following portions of the patient's history were reviewed and   updated as appropriate: allergies, current medications, past family history, past medical history, past social history, past surgical history and problem list.    Compared to one year ago, the patient feels her physical   health is worse.    Compared to one year ago, the patient feels her mental   health is the same.    Recent Hospitalizations:  She was not admitted to the hospital during the last year.       Current Medical Providers:  Patient Care Team:  Dom Estrada MD as PCP - General    Outpatient Medications Prior to Visit   Medication Sig Dispense Refill   • amLODIPine (NORVASC) 5 MG tablet TAKE 1 TABLET BY MOUTH DAILY FOR 30 DOSES 90 tablet 2   • APPLE CIDER VINEGAR PO Take 1 tablet by mouth Daily.     • Ascorbic Acid (VITAMIN C) 250 MG chewable tablet Chew 1 tablet Daily.     • B Complex-Biotin-FA (SUPER B-COMPLEX) tablet Take 1 tablet by mouth Daily.     • Cholecalciferol (VITAMIN D3) 2000 units tablet Take 1 tablet by mouth Daily.     • fluticasone (FLONASE) 50 MCG/ACT nasal spray 2 sprays into the nostril(s) as directed by provider Daily.     • Inositol 324 MG tablet Take 2 tablets by mouth Every Morning Before Breakfast.     • levothyroxine (SYNTHROID, LEVOTHROID) 50 MCG tablet TAKE 1 TABLET BY MOUTH DAILY 90 tablet 1   • metFORMIN (GLUCOPHAGE) 500 MG tablet TAKE 1 TABLET BY MOUTH TWICE DAILY WITH MEALS 180 tablet 2   • Multiple Vitamins-Minerals (MULTIVITAMIN GUMMIES ADULT) chewable tablet Chew 1 tablet Daily.     • Multiple Vitamins-Minerals (ZINC PO)      • triamcinolone (KENALOG) 0.1 % lotion Apply  topically to the appropriate area as directed 3 (Three) Times a Day. 60 mL 2   • TURMERIC CURCUMIN PO Take 2 tablets by mouth Daily.     • Omega-3 Fatty Acids (OMEGA-3 FISH OIL PO) Take 1 tablet by mouth Daily.       No facility-administered medications prior to visit.       No opioid medication identified on active medication list. I  have reviewed chart for other potential  high risk medication/s and harmful drug interactions in the elderly.          Aspirin is not on active medication list.  Aspirin use is not indicated based on review of current medical condition/s. Risk of harm outweighs potential benefits.  .    Patient Active Problem List   Diagnosis   • Breast neoplasm, Tis (DCIS), right   • Dermatophytosis   • Screening for breast cancer   • Enterocutaneous fistula   • Hematuria   • Hyperlipidemia   • Hypertension   • Intertrigo   • Overweight   • Vasovagal attack   • Vitamin D deficiency   • Medicare annual wellness visit, subsequent   • Breast cancer (HCC)   • Obstructive sleep apnea syndrome   • Other fatigue   • Allergies   • Immunization due   • Age-related osteoporosis without current pathological fracture      Advance Care Planning  Advance Directive is on file.  ACP discussion was held with the patient during this visit. Patient has an advance directive in EMR which is still valid.     Review of Systems   Constitutional: Negative.  Negative for fatigue and fever.   HENT: Negative.  Negative for congestion, sore throat, trouble swallowing and voice change.    Eyes: Negative.  Negative for redness and visual disturbance.   Respiratory: Negative.  Negative for cough, chest tightness, shortness of breath and wheezing.    Cardiovascular: Negative.  Negative for chest pain, palpitations and leg swelling.   Gastrointestinal: Negative.  Negative for abdominal distention, abdominal pain, anal bleeding, diarrhea and nausea.   Endocrine: Negative.  Negative for cold intolerance and heat intolerance.   Genitourinary: Negative.  Negative for difficulty urinating, dysuria, pelvic pain and vaginal bleeding.   Musculoskeletal: Negative.  Negative for arthralgias.   Skin: Negative.  Negative for rash.   Allergic/Immunologic: Negative.  Negative for environmental allergies.   Neurological: Negative.  Negative for weakness and light-headedness.  "  Hematological: Negative.  Does not bruise/bleed easily.   Psychiatric/Behavioral: Negative.  Negative for agitation, decreased concentration and sleep disturbance. The patient is not hyperactive.    All other systems reviewed and are negative.       Objective    Vitals:    10/21/22 1032   BP: 146/82   BP Location: Right arm   Pulse: 89   Resp: 16   SpO2: 96%   Weight: 88.5 kg (195 lb)   Height: 165.1 cm (65\")     Estimated body mass index is 32.45 kg/m² as calculated from the following:    Height as of this encounter: 165.1 cm (65\").    Weight as of this encounter: 88.5 kg (195 lb).    BMI is >= 30 and <35. (Class 1 Obesity). The following options were offered after discussion;: exercise counseling/recommendations      Does the patient have evidence of cognitive impairment? No    Physical Exam  Constitutional:       Appearance: Normal appearance. She is well-developed and normal weight.   HENT:      Head: Normocephalic and atraumatic.      Right Ear: Tympanic membrane, ear canal and external ear normal.      Left Ear: Tympanic membrane, ear canal and external ear normal.      Nose: Nose normal.      Mouth/Throat:      Mouth: Mucous membranes are moist.      Pharynx: Oropharynx is clear. No oropharyngeal exudate.   Eyes:      Extraocular Movements: Extraocular movements intact.      Conjunctiva/sclera: Conjunctivae normal.      Pupils: Pupils are equal, round, and reactive to light.   Cardiovascular:      Rate and Rhythm: Normal rate and regular rhythm.      Pulses: Normal pulses.      Heart sounds: Normal heart sounds.   Pulmonary:      Effort: Pulmonary effort is normal.      Breath sounds: Normal breath sounds.   Abdominal:      General: Bowel sounds are normal.      Palpations: Abdomen is soft.   Musculoskeletal:         General: Normal range of motion.      Cervical back: Normal range of motion and neck supple.   Skin:     General: Skin is warm and dry.   Neurological:      General: No focal deficit present. "      Mental Status: She is alert and oriented to person, place, and time. Mental status is at baseline.   Psychiatric:         Mood and Affect: Mood normal.         Behavior: Behavior normal.         Thought Content: Thought content normal.         Judgment: Judgment normal.       Lab Results   Component Value Date    TRIG 99 10/14/2022    HDL 56 10/14/2022    LDL 88 10/14/2022    VLDL 18 10/14/2022    HGBA1C 5.8 (H) 10/14/2022            HEALTH RISK ASSESSMENT    Smoking Status:  Social History     Tobacco Use   Smoking Status Never   Smokeless Tobacco Never     Alcohol Consumption:  Social History     Substance and Sexual Activity   Alcohol Use Not Currently   • Alcohol/week: 1.0 standard drink   • Types: 1 Glasses of wine per week    Comment: occ     Fall Risk Screen:    ARELI Fall Risk Assessment was completed, and patient is at LOW risk for falls.Assessment completed on:10/21/2022    Depression Screening:  PHQ-2/PHQ-9 Depression Screening 10/21/2022   Retired PHQ-9 Total Score -   Retired Total Score -   Little Interest or Pleasure in Doing Things 0-->not at all   Feeling Down, Depressed or Hopeless 0-->not at all   PHQ-9: Brief Depression Severity Measure Score 0       Health Habits and Functional and Cognitive Screening:  Functional & Cognitive Status 10/21/2022   Do you have difficulty preparing food and eating? No   Do you have difficulty bathing yourself, getting dressed or grooming yourself? No   Do you have difficulty using the toilet? No   Do you have difficulty moving around from place to place? No   Do you have trouble with steps or getting out of a bed or a chair? No   Current Diet Other   Dental Exam Up to date   Eye Exam Up to date   Exercise (times per week) 7 times per week   Current Exercises Include Walking   Current Exercise Activities Include -   Do you need help using the phone?  No   Are you deaf or do you have serious difficulty hearing?  No   Do you need help with transportation? No   Do  you need help shopping? No   Do you need help preparing meals?  No   Do you need help with housework?  No   Do you need help with laundry? No   Do you need help taking your medications? No   Do you need help managing money? No   Do you ever drive or ride in a car without wearing a seat belt? No   Have you felt unusual stress, anger or loneliness in the last month? No   Who do you live with? Spouse   If you need help, do you have trouble finding someone available to you? No   Have you been bothered in the last four weeks by sexual problems? -   Do you have difficulty concentrating, remembering or making decisions? No       Age-appropriate Screening Schedule:  Refer to the list below for future screening recommendations based on patient's age, sex and/or medical conditions. Orders for these recommended tests are listed in the plan section. The patient has been provided with a written plan.    Health Maintenance   Topic Date Due   • DXA SCAN  Never done   • TDAP/TD VACCINES (1 - Tdap) Never done   • ZOSTER VACCINE (1 of 2) Never done   • INFLUENZA VACCINE  08/01/2022   • LIPID PANEL  10/14/2023              Assessment & Plan   CMS Preventative Services Quick Reference  Risk Factors Identified During Encounter  Immunizations Discussed/Encouraged (specific Immunizations; Td, Influenza, Prevnar 20 (Pneumococcal 20-valent conjugate), Shingrix and COVID19  Inactivity/Sedentary  Obesity/Overweight   The above risks/problems have been discussed with the patient.  Follow up actions/plans if indicated are seen below in the Assessment/Plan Section.  Pertinent information has been shared with the patient in the After Visit Summary.    Medicare annual wellness visit  Upon arrival to the room the patient underwent the Medicare health risk assessment.  Neither the questions themselves or the answers that were given prompted any major concern on the part of the patient or by the medical staff that gave the assessment.  As far as the  preventative care examinations and the preventative care immunizations that this patient requires they are as listed below.   Screening tests recommended:    Colonoscopy -- refuses any more exam  Mammogram--bilat mastectomy  DEXA-- does not want  PAP/ Pelvic-- Hysterectomy  Diabetic eye exam--up to date  Diabetic foot exam--some neuropathy--no podiatrist wanted.  Dentist- 2x/year  Derm- yearly  Immunization:  Influenza--uncertain this year.   Prevnar -13 UTD  Prevnar 20  Tetanus-- pharmacy  Shingles vaccine--pharmacy  Hepatitis -up to date  Covid-  Refuses boosters.                      Hyperlipidemia  Esperanza is under treatment for hyperlipidemia. Her most recent lipid panel done just a week or so ago is excellent with total cholesterol, LDL, triglycerides, and HDL cholesterol all being in very good order. No changes needed.    Primary hypertension  Patient is taking amlodipine for her blood pressure and she is showing excellent numbers at this time, so no changes are needed for blood pressure control at least right now.    Overweight  The patient's body mass index was 32. She tries to follow a low carb diet. She is being careful. She is going to exercise a little bit more in the form of walking and as the next year goes on, we could consider trying a glucagon-like peptide-1 agonist for her to help get the weight down even more. She will consider this, but is going to stay active with walking and follow her low carb diet. We will see how she does.    Enterocutaneous fistula  The patient has had large segment of her small bowel removed and she developed a fistula between the bowel and her skin. It has taken years and years to get that to finally heal and she continues to have IBS symptoms with rapid transit and explosive diarrhea after meals. She is managing well, maintaining her weight and nutrition and overall has come through this remarkably well.    Malignant neoplasia of the breast bilaterally  The patient has  had bilateral breast cancer and has bilateral mastectomies. She is currently no evidence of disease.    Intertrigo  The patient does get intertrigo in the skin folds of her abdomen and arm area. She uses nystatin with a steroid that clears the rashes very quickly. We will continue this treatment.    Obstructive sleep apnea  The patient has a history of obstructive sleep apnea, but she does not wear a mask. She prefers just to leave this problem alone and she is doing the best she can without the continuous positive airway pressure .    Follow Up:   Return in about 1 year (around 10/21/2023) for Recheck.     An After Visit Summary and PPPS were made available to the patient.    Transcribed from ambient dictation for Dom Estrada MD by Magdalena Samuel.   10/21/22   12:35 EDT    Patient or patient representative verbalized consent to the visit recording.  I have personally performed the services described in this document as transcribed by the above individual, and it is both accurate and complete.  Dom Estrada MD  10/31/2022  21:36 EDT  Magdalena Samuel

## 2022-11-08 RX ORDER — LEVOTHYROXINE SODIUM 0.05 MG/1
TABLET ORAL
Qty: 90 TABLET | Refills: 1 | Status: SHIPPED | OUTPATIENT
Start: 2022-11-08 | End: 2023-03-13 | Stop reason: SDUPTHER

## 2023-03-13 ENCOUNTER — TELEPHONE (OUTPATIENT)
Dept: FAMILY MEDICINE CLINIC | Facility: CLINIC | Age: 78
End: 2023-03-13
Payer: MEDICARE

## 2023-03-13 RX ORDER — AMLODIPINE BESYLATE 5 MG/1
5 TABLET ORAL DAILY
Qty: 90 TABLET | Refills: 2 | Status: SHIPPED | OUTPATIENT
Start: 2023-03-13

## 2023-03-13 RX ORDER — LEVOTHYROXINE SODIUM 0.05 MG/1
50 TABLET ORAL DAILY
Qty: 90 TABLET | Refills: 1 | Status: SHIPPED | OUTPATIENT
Start: 2023-03-13

## 2023-03-13 NOTE — TELEPHONE ENCOUNTER
Caller: Luis Esperanza A    Relationship: Self    Best call back number: [phone]    Requested Prescriptions:   Requested Prescriptions     Pending Prescriptions Disp Refills   • metFORMIN (GLUCOPHAGE) 500 MG tablet 180 tablet 2     Sig: Take 1 tablet by mouth 2 (Two) Times a Day With Meals.   • amLODIPine (NORVASC) 5 MG tablet 90 tablet 2     Sig: Take 1 tablet by mouth Daily.   • levothyroxine (SYNTHROID, LEVOTHROID) 50 MCG tablet 90 tablet 1     Sig: Take 1 tablet by mouth Daily.        Pharmacy where request should be sent: [id number] - [address] [phone] [facility] [phone] FX     Additional details provided by patient: PLEASE SEND TO NEW PHARMACY    Does the patient have less than a 3 day supply:  [x] Yes  [] No    Would you like a call back once the refill request has been completed: [] Yes [x] No    If the office needs to give you a call back, can they leave a voicemail: [] Yes [x] No    Anusha Gautam Rep   03/13/23 13:19 EDT

## 2023-06-16 DIAGNOSIS — R73.09 ELEVATED HEMOGLOBIN A1C MEASUREMENT: Primary | ICD-10-CM

## 2023-10-16 ENCOUNTER — LAB (OUTPATIENT)
Dept: FAMILY MEDICINE CLINIC | Facility: CLINIC | Age: 78
End: 2023-10-16
Payer: MEDICARE

## 2023-10-16 DIAGNOSIS — R73.09 ELEVATED HEMOGLOBIN A1C MEASUREMENT: ICD-10-CM

## 2023-10-16 LAB
ALBUMIN UR-MCNC: <1.2 MG/DL
HBA1C MFR BLD: 6 % (ref 4.8–5.6)

## 2023-10-16 PROCEDURE — 82043 UR ALBUMIN QUANTITATIVE: CPT | Performed by: FAMILY MEDICINE

## 2023-10-16 PROCEDURE — 36415 COLL VENOUS BLD VENIPUNCTURE: CPT

## 2023-10-16 PROCEDURE — 83036 HEMOGLOBIN GLYCOSYLATED A1C: CPT | Performed by: FAMILY MEDICINE

## 2023-11-15 RX ORDER — LEVOTHYROXINE SODIUM 0.05 MG/1
50 TABLET ORAL DAILY
Qty: 90 TABLET | Refills: 1 | Status: SHIPPED | OUTPATIENT
Start: 2023-11-15

## 2023-12-03 RX ORDER — BROMPHENIRAMINE MALEATE, PSEUDOEPHEDRINE HYDROCHLORIDE, AND DEXTROMETHORPHAN HYDROBROMIDE 2; 30; 10 MG/5ML; MG/5ML; MG/5ML
5-10 SYRUP ORAL 4 TIMES DAILY PRN
Qty: 240 ML | Refills: 1 | Status: SHIPPED | OUTPATIENT
Start: 2023-12-03 | End: 2023-12-13

## 2023-12-03 RX ORDER — AZITHROMYCIN 500 MG/1
500 TABLET, FILM COATED ORAL DAILY
Qty: 5 TABLET | Refills: 0 | Status: SHIPPED | OUTPATIENT
Start: 2023-12-03 | End: 2023-12-08

## 2023-12-03 RX ORDER — PREDNISONE 20 MG/1
TABLET ORAL
Qty: 25 TABLET | Refills: 1 | Status: SHIPPED | OUTPATIENT
Start: 2023-12-03

## 2023-12-05 ENCOUNTER — TELEPHONE (OUTPATIENT)
Dept: FAMILY MEDICINE CLINIC | Facility: CLINIC | Age: 78
End: 2023-12-05
Payer: MEDICARE

## 2023-12-05 NOTE — TELEPHONE ENCOUNTER
Caller: Esperanza Smith    Relationship: Self    Best call back number: [phone]     What was the call regarding: PATIENT HAS BEEN HAVING COUGH, FEVER, HEADACHES, CONGESTION FOR ABOUT TWO WEEKS. SHOULD SHE RESCHEDULE HER APPT ON THURSDAY OR STILL COME IN?     PLEASE ADVISE.

## 2023-12-05 NOTE — TELEPHONE ENCOUNTER
She should still come in since going on for 2 weeks. If she feels poorly day of, we may need to push out the Medicare Wellness portion of her visit. Also please let her know Dr. Estrada will order all preventative testing / screenings at her appt, including her dexa scan.

## 2023-12-05 NOTE — TELEPHONE ENCOUNTER
Caller: Esperanza Smith    Relationship: Self    Best call back number: [phone]     What orders are you requesting (i.e. lab or imaging): BONE DENSITY SCAN    In what timeframe would the patient need to come in: ASAP    Where will you receive your lab/imaging services: [place]    Additional notes: PLEASE ADVISE WHEN ORDERS ARE IN

## 2023-12-07 ENCOUNTER — LAB (OUTPATIENT)
Dept: FAMILY MEDICINE CLINIC | Facility: CLINIC | Age: 78
End: 2023-12-07
Payer: MEDICARE

## 2023-12-07 ENCOUNTER — OFFICE VISIT (OUTPATIENT)
Dept: FAMILY MEDICINE CLINIC | Facility: CLINIC | Age: 78
End: 2023-12-07
Payer: MEDICARE

## 2023-12-07 VITALS
BODY MASS INDEX: 31.49 KG/M2 | HEART RATE: 89 BPM | WEIGHT: 189 LBS | HEIGHT: 65 IN | OXYGEN SATURATION: 97 % | SYSTOLIC BLOOD PRESSURE: 134 MMHG | RESPIRATION RATE: 18 BRPM | DIASTOLIC BLOOD PRESSURE: 82 MMHG

## 2023-12-07 DIAGNOSIS — E55.9 VITAMIN D DEFICIENCY: ICD-10-CM

## 2023-12-07 DIAGNOSIS — Z11.59 NEED FOR HEPATITIS C SCREENING TEST: ICD-10-CM

## 2023-12-07 DIAGNOSIS — C50.911 MALIGNANT NEOPLASM OF RIGHT FEMALE BREAST, UNSPECIFIED ESTROGEN RECEPTOR STATUS, UNSPECIFIED SITE OF BREAST: ICD-10-CM

## 2023-12-07 DIAGNOSIS — Z78.0 POSTMENOPAUSE: ICD-10-CM

## 2023-12-07 DIAGNOSIS — Z00.00 MEDICARE ANNUAL WELLNESS VISIT, SUBSEQUENT: Primary | ICD-10-CM

## 2023-12-07 DIAGNOSIS — E78.2 MIXED HYPERLIPIDEMIA: ICD-10-CM

## 2023-12-07 DIAGNOSIS — R79.9 ABNORMAL FINDING OF BLOOD CHEMISTRY, UNSPECIFIED: ICD-10-CM

## 2023-12-07 DIAGNOSIS — E66.3 OVERWEIGHT: ICD-10-CM

## 2023-12-07 DIAGNOSIS — Z23 NEED FOR VACCINATION: ICD-10-CM

## 2023-12-07 DIAGNOSIS — I10 PRIMARY HYPERTENSION: ICD-10-CM

## 2023-12-07 LAB
25(OH)D3 SERPL-MCNC: 69.7 NG/ML (ref 30–100)
ALBUMIN SERPL-MCNC: 4.5 G/DL (ref 3.5–5.2)
ALBUMIN/GLOB SERPL: 1.4 G/DL
ALP SERPL-CCNC: 77 U/L (ref 39–117)
ALT SERPL W P-5'-P-CCNC: 112 U/L (ref 1–33)
ANION GAP SERPL CALCULATED.3IONS-SCNC: 13.6 MMOL/L (ref 5–15)
AST SERPL-CCNC: 83 U/L (ref 1–32)
BACTERIA UR QL AUTO: ABNORMAL /HPF
BASOPHILS # BLD AUTO: 0.05 10*3/MM3 (ref 0–0.2)
BASOPHILS NFR BLD AUTO: 0.5 % (ref 0–1.5)
BILIRUB SERPL-MCNC: 0.6 MG/DL (ref 0–1.2)
BILIRUB UR QL STRIP: NEGATIVE
BUN SERPL-MCNC: 13 MG/DL (ref 8–23)
BUN/CREAT SERPL: 22 (ref 7–25)
CALCIUM SPEC-SCNC: 10.6 MG/DL (ref 8.6–10.5)
CHLORIDE SERPL-SCNC: 100 MMOL/L (ref 98–107)
CHOLEST SERPL-MCNC: 151 MG/DL (ref 0–200)
CLARITY UR: ABNORMAL
CO2 SERPL-SCNC: 23.4 MMOL/L (ref 22–29)
COD CRY URNS QL: ABNORMAL /HPF
COLOR UR: ABNORMAL
CREAT SERPL-MCNC: 0.59 MG/DL (ref 0.57–1)
DEPRECATED RDW RBC AUTO: 41.2 FL (ref 37–54)
EGFRCR SERPLBLD CKD-EPI 2021: 93.5 ML/MIN/1.73
EOSINOPHIL # BLD AUTO: 0.01 10*3/MM3 (ref 0–0.4)
EOSINOPHIL NFR BLD AUTO: 0.1 % (ref 0.3–6.2)
ERYTHROCYTE [DISTWIDTH] IN BLOOD BY AUTOMATED COUNT: 12.4 % (ref 12.3–15.4)
GLOBULIN UR ELPH-MCNC: 3.2 GM/DL
GLUCOSE SERPL-MCNC: 136 MG/DL (ref 65–99)
GLUCOSE UR STRIP-MCNC: NEGATIVE MG/DL
HBA1C MFR BLD: 6.1 % (ref 4.8–5.6)
HCT VFR BLD AUTO: 45.6 % (ref 34–46.6)
HCV AB SER DONR QL: NORMAL
HDLC SERPL-MCNC: 57 MG/DL (ref 40–60)
HGB BLD-MCNC: 15.7 G/DL (ref 12–15.9)
HGB UR QL STRIP.AUTO: NEGATIVE
HOLD SPECIMEN: NORMAL
HYALINE CASTS UR QL AUTO: ABNORMAL /LPF
IMM GRANULOCYTES # BLD AUTO: 0.06 10*3/MM3 (ref 0–0.05)
IMM GRANULOCYTES NFR BLD AUTO: 0.6 % (ref 0–0.5)
KETONES UR QL STRIP: NEGATIVE
LDLC SERPL CALC-MCNC: 69 MG/DL (ref 0–100)
LDLC/HDLC SERPL: 1.14 {RATIO}
LEUKOCYTE ESTERASE UR QL STRIP.AUTO: ABNORMAL
LYMPHOCYTES # BLD AUTO: 1.51 10*3/MM3 (ref 0.7–3.1)
LYMPHOCYTES NFR BLD AUTO: 15.2 % (ref 19.6–45.3)
MCH RBC QN AUTO: 31.5 PG (ref 26.6–33)
MCHC RBC AUTO-ENTMCNC: 34.4 G/DL (ref 31.5–35.7)
MCV RBC AUTO: 91.6 FL (ref 79–97)
MONOCYTES # BLD AUTO: 0.69 10*3/MM3 (ref 0.1–0.9)
MONOCYTES NFR BLD AUTO: 7 % (ref 5–12)
MUCOUS THREADS URNS QL MICRO: ABNORMAL /HPF
NEUTROPHILS NFR BLD AUTO: 7.6 10*3/MM3 (ref 1.7–7)
NEUTROPHILS NFR BLD AUTO: 76.6 % (ref 42.7–76)
NITRITE UR QL STRIP: NEGATIVE
NRBC BLD AUTO-RTO: 0 /100 WBC (ref 0–0.2)
PH UR STRIP.AUTO: 6.5 [PH] (ref 5–8)
PLATELET # BLD AUTO: 459 10*3/MM3 (ref 140–450)
PMV BLD AUTO: 10.6 FL (ref 6–12)
POTASSIUM SERPL-SCNC: 3.9 MMOL/L (ref 3.5–5.2)
PROT SERPL-MCNC: 7.7 G/DL (ref 6–8.5)
PROT UR QL STRIP: NEGATIVE
RBC # BLD AUTO: 4.98 10*6/MM3 (ref 3.77–5.28)
RBC # UR STRIP: ABNORMAL /HPF
REF LAB TEST METHOD: ABNORMAL
SODIUM SERPL-SCNC: 137 MMOL/L (ref 136–145)
SP GR UR STRIP: 1.02 (ref 1–1.03)
SQUAMOUS #/AREA URNS HPF: ABNORMAL /HPF
T4 FREE SERPL-MCNC: 1.47 NG/DL (ref 0.93–1.7)
TRIGL SERPL-MCNC: 144 MG/DL (ref 0–150)
TSH SERPL DL<=0.05 MIU/L-ACNC: 1.38 UIU/ML (ref 0.27–4.2)
UROBILINOGEN UR QL STRIP: ABNORMAL
VIT B12 BLD-MCNC: 578 PG/ML (ref 211–946)
VLDLC SERPL-MCNC: 25 MG/DL (ref 5–40)
WBC # UR STRIP: ABNORMAL /HPF
WBC NRBC COR # BLD AUTO: 9.92 10*3/MM3 (ref 3.4–10.8)

## 2023-12-07 PROCEDURE — 82306 VITAMIN D 25 HYDROXY: CPT | Performed by: FAMILY MEDICINE

## 2023-12-07 PROCEDURE — 83036 HEMOGLOBIN GLYCOSYLATED A1C: CPT | Performed by: FAMILY MEDICINE

## 2023-12-07 PROCEDURE — 82607 VITAMIN B-12: CPT | Performed by: FAMILY MEDICINE

## 2023-12-07 PROCEDURE — 36415 COLL VENOUS BLD VENIPUNCTURE: CPT | Performed by: FAMILY MEDICINE

## 2023-12-07 PROCEDURE — 80061 LIPID PANEL: CPT | Performed by: FAMILY MEDICINE

## 2023-12-07 PROCEDURE — 84439 ASSAY OF FREE THYROXINE: CPT | Performed by: FAMILY MEDICINE

## 2023-12-07 PROCEDURE — 85025 COMPLETE CBC W/AUTO DIFF WBC: CPT | Performed by: FAMILY MEDICINE

## 2023-12-07 PROCEDURE — 80053 COMPREHEN METABOLIC PANEL: CPT | Performed by: FAMILY MEDICINE

## 2023-12-07 PROCEDURE — 81001 URINALYSIS AUTO W/SCOPE: CPT | Performed by: FAMILY MEDICINE

## 2023-12-07 PROCEDURE — 84443 ASSAY THYROID STIM HORMONE: CPT | Performed by: FAMILY MEDICINE

## 2023-12-07 PROCEDURE — 86803 HEPATITIS C AB TEST: CPT | Performed by: FAMILY MEDICINE

## 2023-12-07 NOTE — PROGRESS NOTES
The ABCs of the Annual Wellness Visit  Subsequent Medicare Wellness Visit    Subjective    Esperanza Smith is a 78 y.o. female who presents for a Subsequent Medicare Wellness Visit.    The following portions of the patient's history were reviewed and   updated as appropriate: allergies, current medications, past family history, past medical history, past social history, past surgical history, and problem list.    Compared to one year ago, the patient feels her physical   health is the same.    Compared to one year ago, the patient feels her mental   health is the same.    Recent Hospitalizations:  She was not admitted to the hospital during the last year.       Current Medical Providers:  Patient Care Team:  Dom Estrada MD as PCP - General    Outpatient Medications Prior to Visit   Medication Sig Dispense Refill    amLODIPine (NORVASC) 5 MG tablet Take 1 tablet by mouth Daily. 90 tablet 2    APPLE CIDER VINEGAR PO Take 1 tablet by mouth Daily.      Ascorbic Acid (VITAMIN C) 250 MG chewable tablet Chew 1 tablet Daily.      B Complex-Biotin-FA (SUPER B-COMPLEX) tablet Take 1 tablet by mouth Daily.      brompheniramine-pseudoephedrine-DM 30-2-10 MG/5ML syrup Take 5-10 mL by mouth 4 (Four) Times a Day As Needed for Congestion or Cough for up to 10 days. 240 mL 1    Cholecalciferol (VITAMIN D3) 2000 units tablet Take 1 tablet by mouth Daily.      Diclofenac Sodium (VOLTAREN) 1 % gel gel Apply 4 g topically to the appropriate area as directed 4 (Four) Times a Day As Needed (For pain and inflammation). 50 g 0    fluticasone (FLONASE) 50 MCG/ACT nasal spray 2 sprays into the nostril(s) as directed by provider Daily.      Inositol 324 MG tablet Take 2 tablets by mouth Every Morning Before Breakfast.      levothyroxine (SYNTHROID, LEVOTHROID) 50 MCG tablet TAKE 1 TABLET BY MOUTH DAILY 90 tablet 1    metFORMIN (GLUCOPHAGE) 500 MG tablet Take 1 tablet by mouth 2 (Two) Times a Day With Meals. 180 tablet 2    Multiple  Vitamins-Minerals (ZINC PO)       nystatin-triamcinolone (MYCOLOG II) 021563-7.1 UNIT/GM-% cream APPLY TOPICALLY TO THE AFFECTED AREA TWICE DAILY FOR 15 DAYS AS DIRECTED      predniSONE (DELTASONE) 20 MG tablet Take 3 po qd for 4d  then 2 qd for 4d then take 1 qd for 4d  then 1/2 qd for 2d 25 tablet 1    triamcinolone (KENALOG) 0.1 % lotion Apply  topically to the appropriate area as directed 3 (Three) Times a Day. 60 mL 2    TURMERIC CURCUMIN PO Take 2 tablets by mouth Daily.      Multiple Vitamins-Minerals (MULTIVITAMIN GUMMIES ADULT) chewable tablet Chew 1 tablet Daily. (Patient not taking: Reported on 12/7/2023)      azithromycin (Zithromax) 500 MG tablet Take 1 tablet by mouth Daily for 5 days. (Patient not taking: Reported on 12/7/2023) 5 tablet 0     No facility-administered medications prior to visit.       No opioid medication identified on active medication list. I have reviewed chart for other potential  high risk medication/s and harmful drug interactions in the elderly.        Aspirin is not on active medication list.  Aspirin use is not indicated based on review of current medical condition/s. Risk of harm outweighs potential benefits.  .    Patient Active Problem List   Diagnosis    Breast neoplasm, Tis (DCIS), right    Dermatophytosis    Screening for breast cancer    Enterocutaneous fistula    Hematuria    Hyperlipidemia    Hypertension    Intertrigo    Overweight    Vasovagal attack    Vitamin D deficiency    Medicare annual wellness visit, subsequent    Breast cancer    Obstructive sleep apnea syndrome    Other fatigue    Allergies    Need for vaccination    Age-related osteoporosis without current pathological fracture     Postmenopause    Need for hepatitis C screening test     Advance Care Planning   Advance Care Planning     Advance Directive is on file.  ACP discussion was held with the patient during this visit. Patient has an advance directive in EMR which is still valid.      Objective   "  Vitals:    23 0954   BP: 134/82   Pulse: 89   Resp: 18   SpO2: 97%   Weight: 85.7 kg (189 lb)   Height: 165.1 cm (65\")     Estimated body mass index is 31.45 kg/m² as calculated from the following:    Height as of this encounter: 165.1 cm (65\").    Weight as of this encounter: 85.7 kg (189 lb).    BMI is >= 30 and <35. (Class 1 Obesity). The following options were offered after discussion;: weight loss educational material (shared in after visit summary)      Does the patient have evidence of cognitive impairment? No    Lab Results   Component Value Date    TRIG 144 2023    HDL 57 2023    LDL 69 2023    VLDL 25 2023    HGBA1C 6.10 (H) 2023        HEALTH RISK ASSESSMENT    Smoking Status:  Social History     Tobacco Use   Smoking Status Never    Passive exposure: Never   Smokeless Tobacco Never     Alcohol Consumption:  Social History     Substance and Sexual Activity   Alcohol Use Not Currently    Alcohol/week: 1.0 standard drink of alcohol    Types: 1 Glasses of wine per week    Comment: occ     Fall Risk Screen:    [facility]    Depression Screenin/3/2023    10:21 AM   PHQ-2/PHQ-9 Depression Screening   Little Interest or Pleasure in Doing Things 2-->more than half the days   Feeling Down, Depressed or Hopeless 0-->not at all   Trouble Falling or Staying Asleep, or Sleeping Too Much 0-->not at all   Feeling Tired or Having Little Energy 1-->several days   Poor Appetite or Overeating 0-->not at all   Feeling Bad about Yourself - or that You are a Failure or Have Let Yourself or Your Family Down 0-->not at all   Trouble Concentrating on Things, Such as Reading the Newspaper or Watching Television 0-->not at all   Moving or Speaking So Slowly that Other People Could Have Noticed? Or the Opposite - Being So Fidgety 0-->not at all   Thoughts that You Would be Better Off Dead or of Hurting Yourself in Some Way 0-->not at all "   PHQ-9: Brief Depression Severity Measure Score 3   If You Checked Off Any Problems, How Difficult Have These Problems Made It For You to Do Your Work, Take Care of Things at Home, or Get Along with Other People? somewhat difficult   Little Interest or Pleasure in Doing Things 2-->more than half the days   Feeling Down, Depressed or Hopeless 0-->not at all   Trouble Falling or Staying Asleep, or Sleeping Too Much 0-->not at all   Feeling Tired or Having Little Energy 1-->several days   Poor Appetite or Overeating 0-->not at all   Feeling Bad about Yourself - or that You are a Failure or Have Let Yourself or Your Family Down 0-->not at all   Trouble Concentrating on Things, Such as Reading the Newspaper or Watching Television 0-->not at all   Moving or Speaking So Slowly that Other People Could Have Noticed? Or the Opposite - Being So Fidgety 0-->not at all   Thoughts that You Would be Better Off Dead or of Hurting Yourself in Some Way 0-->not at all   If You Checked Off Any Problems, How Difficult Have These Problems Made It For You to Do Your Work, Take Care of Things at Home, or Get Along with Other People? somewhat difficult       Health Habits and Functional and Cognitive Screenin/3/2023    10:24 AM   Functional & Cognitive Status   Do you have difficulty preparing food and eating? No   Do you have difficulty bathing yourself, getting dressed or grooming yourself? No   Do you have difficulty using the toilet? No   Do you have difficulty moving around from place to place? No   Do you have trouble with steps or getting out of a bed or a chair? No   Current Diet Unhealthy Diet   Dental Exam Up to date   Eye Exam Up to date   Exercise (times per week) 3 times per week   Current Exercises Include Walking   Do you need help using the phone?  No   Are you deaf or do you have serious difficulty hearing?  No   Do you need help to go to places out of walking distance? No   Do you need help shopping? No   Do you  need help preparing meals?  No   Do you need help with housework?  Yes   Do you need help with laundry? No   Do you need help taking your medications? No   Do you need help managing money? No   Do you ever drive or ride in a car without wearing a seat belt? No   Have you felt unusual stress, anger or loneliness in the last month? No   Who do you live with? Spouse   If you need help, do you have trouble finding someone available to you? No   Have you been bothered in the last four weeks by sexual problems? No   Do you have difficulty concentrating, remembering or making decisions? No       Age-appropriate Screening Schedule:  Refer to the list below for future screening recommendations based on patient's age, sex and/or medical conditions. Orders for these recommended tests are listed in the plan section. The patient has been provided with a written plan.    Health Maintenance   Topic Date Due    DXA SCAN  Never done    ZOSTER VACCINE (1 of 2) Never done    MAMMOGRAM  01/16/2021    INFLUENZA VACCINE  08/01/2023    DIABETIC EYE EXAM  01/25/2024 (Originally 9/23/2023)    COVID-19 Vaccine (3 - 2023-24 season) 02/26/2024 (Originally 9/1/2023)    TDAP/TD VACCINES (1 - Tdap) 12/19/2024 (Originally 9/18/1964)    HEMOGLOBIN A1C  06/07/2024    URINE MICROALBUMIN  10/16/2024    ANNUAL WELLNESS VISIT  12/07/2024    LIPID PANEL  12/07/2024    BMI FOLLOWUP  12/07/2024    HEPATITIS C SCREENING  Completed    Pneumococcal Vaccine 65+  Completed    COLORECTAL CANCER SCREENING  Discontinued                  CMS Preventative Services Quick Reference  Risk Factors Identified During Encounter    The above risks/problems have been discussed with the patient.  Pertinent information has been shared with the patient in the After Visit Summary.  An After Visit Summary and PPPS were made available to the patient.    Follow Up:   Next Medicare Wellness visit to be scheduled in 1 year.       Additional E&M Note during same encounter  "follows:  Patient has multiple medical problems which are significant and separately identifiable that require additional work above and beyond the Medicare Wellness Visit.      Chief Complaint  Medicare Wellness-subsequent    Subjective        HPI  Esperanza Smith presents today for a Medicare wellness visit.    She has been sick for 2.5 weeks with congestion. She began taking steroids 2 days ago. She did not start the antibiotics because she has a problem with her stomach because of her short intestine. She is gradually improving daily. She has been trying to take care of her  for 2.5 weeks. She has issues with her sinuses.    She is not up to date with her colon studies as she is not going to do Cologuard or any kind of screening. She does not have any more mammograms. She would like to do a [facility] at [facility]. She does not get her Pap smears done anymore. She is diabetic and has her eyes examined regularly. She does not see a podiatrist. She experiences neuropathy in her feet. She sees a dentist regularly. She has not been to dermatology this year in 2023. She did go last year in 2022 and had a few lesions burned off. She denies seeing a cardiologist or pulmonologist.    She has not received her influenza vaccine this year in 2023. She has received both pneumonia vaccines. She had a tetanus vaccine in the hospital for her major surgeries. She has not had the shingles vaccine. She does not believe she has had chickenpox in the past.    She has had diarrhea 2 to 3 times a day for several days. She has passed out a couple of times. Her heart rate declined while in the bathroom. She takes her glasses off, lays down on the floor and passes out when she wakes up. She does not hit her head. She states that her watch told her that her diastolic went to 52 right before she passed out.                          Objective   Vital Signs:  /82   Pulse 89   Resp 18   Ht 165.1 cm (65\")   Wt " 85.7 kg (189 lb)   SpO2 97%   BMI 31.45 kg/m²     Physical Exam  Constitutional:       Appearance: Normal appearance. She is well-developed and normal weight.   HENT:      Head: Normocephalic and atraumatic.      Right Ear: Tympanic membrane, ear canal and external ear normal.      Left Ear: Tympanic membrane, ear canal and external ear normal.      Nose: Nose normal.      Mouth/Throat:      Mouth: Mucous membranes are moist.      Pharynx: Oropharynx is clear. No oropharyngeal exudate.   Eyes:      Extraocular Movements: Extraocular movements intact.      Conjunctiva/sclera: Conjunctivae normal.      Pupils: Pupils are equal, round, and reactive to light.   Cardiovascular:      Rate and Rhythm: Normal rate and regular rhythm.      Pulses: Normal pulses.      Heart sounds: Normal heart sounds.   Pulmonary:      Effort: Pulmonary effort is normal.      Breath sounds: Normal breath sounds.   Abdominal:      General: Bowel sounds are normal.      Palpations: Abdomen is soft.   Musculoskeletal:         General: Normal range of motion.      Cervical back: Normal range of motion and neck supple.   Skin:     General: Skin is warm and dry.   Neurological:      General: No focal deficit present.      Mental Status: She is alert and oriented to person, place, and time. Mental status is at baseline.   Psychiatric:         Mood and Affect: Mood normal.         Behavior: Behavior normal.         Thought Content: Thought content normal.         Judgment: Judgment normal.           Assessment and Plan   1. Medicare annual wellness visit, subsequent year  Upon arrival to the room the patient underwent the Medicare health risk assessment.  Neither the questions themselves or the answers that were given prompted any major concern on the part of the patient or by the medical staff that gave the assessment.  As far as the preventative care examinations and the preventative care immunizations that this patient requires they are as  listed below.   Screening tests recommended:    Colonoscopy- does not want to screen   Mammogram- bilat mastectomy  DEXA- will get at [facility]  PAP/ Pelvic-does not need  Diabetic eye exam- utd  Diabetic foot exam-will exam in office  Dentist-utd  Derm- utd  Immunization:  Influenza- does not want right now  Prevnar-utd  Pneumovax-utd  Tetanus- utd  Shingles vaccine- needs to get  Hepatitis -utd  Covid  -utd                      2. Need for vaccination  - Esperanza could use an influenza vaccine today, but she would like to wait and get it when she is feeling better. She will probably get it at the pharmacy when she is feeling better from her current bronchitis.    3. Need for hepatitis C screening  - We will get this done with the blood work today. This is the once in a lifetime screen that she needs to get yet.    4. Postmenopausal  - The patient has not had any cycles for probably the last 20 years. She is not on any medication for estrogen right now, particularly since she has had breast cancer.    5. Mixed hyperlipidemia  - The patient's lipid panel will be rechecked today. Adjustments to her medicine will be made depending on what the level is.    6. Hypertension  - The patient's blood pressure today was acceptable, and we will continue her current medications. She was 134/80 mmHg.    7. Vitamin D deficiency  - The patient's vitamin D level will be rechecked today, and we will continue supplementation if needed.    8. Overweight  - The patient's weight is a BMI of 31. She is following a calorie reduced carb restricted diet, but her activity has not been the greatest because she has been sick, so she has not been able to really lose any weight. Hopefully this winter, she will feel a little bit better and be able to do more.    9. Malignant neoplasia of the right breast  - The patient had bilateral mastectomies performed and is now cancer free with no evidence of disease.    10. Abnormal finding on blood  chemistry  - The patient had some random blood sugars that were elevated. We are going to do an A1c to make sure she is not prediabetic or diabetic.           Follow Up   Return in about 6 months (around 6/7/2024), or if symptoms worsen or fail to improve, for Recheck-6mos     [facility] one year.  Patient was given instructions and counseling regarding her condition or for health maintenance advice. Please see specific information pulled into the AVS if appropriate.       Transcribed from ambient dictation for Dom Estrada MD by Magdalena Rae  12/07/23   15:23 EST    Patient or patient representative verbalized consent to the visit recording.  I have personally performed the services described in this document as transcribed by the above individual, and it is both accurate and complete.  Dom Estrada MD  12/10/2023  08:27 EST

## 2024-01-05 ENCOUNTER — APPOINTMENT (OUTPATIENT)
Dept: GENERAL RADIOLOGY | Facility: HOSPITAL | Age: 79
End: 2024-01-05
Payer: MEDICARE

## 2024-01-05 ENCOUNTER — HOSPITAL ENCOUNTER (EMERGENCY)
Facility: HOSPITAL | Age: 79
Discharge: HOME | End: 2024-01-05
Payer: MEDICARE

## 2024-01-05 ENCOUNTER — APPOINTMENT (OUTPATIENT)
Dept: CT IMAGING | Facility: HOSPITAL | Age: 79
End: 2024-01-05
Payer: MEDICARE

## 2024-01-05 VITALS
BODY MASS INDEX: 31.49 KG/M2 | TEMPERATURE: 97.5 F | SYSTOLIC BLOOD PRESSURE: 132 MMHG | OXYGEN SATURATION: 94 % | RESPIRATION RATE: 20 BRPM | DIASTOLIC BLOOD PRESSURE: 91 MMHG | HEART RATE: 97 BPM | HEIGHT: 65 IN | WEIGHT: 189 LBS

## 2024-01-05 DIAGNOSIS — R42 LIGHTHEADEDNESS: ICD-10-CM

## 2024-01-05 DIAGNOSIS — R11.2 NAUSEA AND VOMITING, UNSPECIFIED VOMITING TYPE: Primary | ICD-10-CM

## 2024-01-05 LAB
ALBUMIN SERPL-MCNC: 4.8 G/DL (ref 3.5–5.2)
ALBUMIN/GLOB SERPL: 1.4 G/DL
ALP SERPL-CCNC: 92 U/L (ref 39–117)
ALT SERPL W P-5'-P-CCNC: 108 U/L (ref 1–33)
ANION GAP SERPL CALCULATED.3IONS-SCNC: 14 MMOL/L (ref 5–15)
AST SERPL-CCNC: 84 U/L (ref 1–32)
BASOPHILS # BLD AUTO: 0 10*3/MM3 (ref 0–0.2)
BASOPHILS NFR BLD AUTO: 0.3 % (ref 0–1.5)
BILIRUB SERPL-MCNC: 0.6 MG/DL (ref 0–1.2)
BILIRUB UR QL STRIP: NEGATIVE
BUN SERPL-MCNC: 11 MG/DL (ref 8–23)
BUN/CREAT SERPL: 23.9 (ref 7–25)
CALCIUM SPEC-SCNC: 10.7 MG/DL (ref 8.6–10.5)
CHLORIDE SERPL-SCNC: 99 MMOL/L (ref 98–107)
CLARITY UR: CLEAR
CO2 SERPL-SCNC: 25 MMOL/L (ref 22–29)
COLOR UR: YELLOW
CREAT SERPL-MCNC: 0.46 MG/DL (ref 0.57–1)
DEPRECATED RDW RBC AUTO: 47.3 FL (ref 37–54)
EGFRCR SERPLBLD CKD-EPI 2021: 99.3 ML/MIN/1.73
EOSINOPHIL # BLD AUTO: 0 10*3/MM3 (ref 0–0.4)
EOSINOPHIL NFR BLD AUTO: 0.2 % (ref 0.3–6.2)
ERYTHROCYTE [DISTWIDTH] IN BLOOD BY AUTOMATED COUNT: 13.9 % (ref 12.3–15.4)
FLUAV SUBTYP SPEC NAA+PROBE: NOT DETECTED
FLUBV RNA ISLT QL NAA+PROBE: NOT DETECTED
GLOBULIN UR ELPH-MCNC: 3.5 GM/DL
GLUCOSE SERPL-MCNC: 136 MG/DL (ref 65–99)
GLUCOSE UR STRIP-MCNC: NEGATIVE MG/DL
HCT VFR BLD AUTO: 48.5 % (ref 34–46.6)
HGB BLD-MCNC: 16.6 G/DL (ref 12–15.9)
HGB UR QL STRIP.AUTO: NEGATIVE
HOLD SPECIMEN: NORMAL
KETONES UR QL STRIP: ABNORMAL
LEUKOCYTE ESTERASE UR QL STRIP.AUTO: NEGATIVE
LIPASE SERPL-CCNC: 38 U/L (ref 13–60)
LYMPHOCYTES # BLD AUTO: 0.9 10*3/MM3 (ref 0.7–3.1)
LYMPHOCYTES NFR BLD AUTO: 6.4 % (ref 19.6–45.3)
MCH RBC QN AUTO: 31.6 PG (ref 26.6–33)
MCHC RBC AUTO-ENTMCNC: 34.2 G/DL (ref 31.5–35.7)
MCV RBC AUTO: 92.7 FL (ref 79–97)
MONOCYTES # BLD AUTO: 1 10*3/MM3 (ref 0.1–0.9)
MONOCYTES NFR BLD AUTO: 6.7 % (ref 5–12)
NEUTROPHILS NFR BLD AUTO: 12.3 10*3/MM3 (ref 1.7–7)
NEUTROPHILS NFR BLD AUTO: 86.4 % (ref 42.7–76)
NITRITE UR QL STRIP: NEGATIVE
NRBC BLD AUTO-RTO: 0.2 /100 WBC (ref 0–0.2)
PH UR STRIP.AUTO: 8 [PH] (ref 5–8)
PLATELET # BLD AUTO: 260 10*3/MM3 (ref 140–450)
PMV BLD AUTO: 8.9 FL (ref 6–12)
POTASSIUM SERPL-SCNC: 3.9 MMOL/L (ref 3.5–5.2)
PROT SERPL-MCNC: 8.3 G/DL (ref 6–8.5)
PROT UR QL STRIP: NEGATIVE
RBC # BLD AUTO: 5.24 10*6/MM3 (ref 3.77–5.28)
SARS-COV-2 RNA RESP QL NAA+PROBE: NOT DETECTED
SODIUM SERPL-SCNC: 138 MMOL/L (ref 136–145)
SP GR UR STRIP: 1.03 (ref 1–1.03)
UROBILINOGEN UR QL STRIP: ABNORMAL
WBC NRBC COR # BLD AUTO: 14.3 10*3/MM3 (ref 3.4–10.8)
WHOLE BLOOD HOLD COAG: NORMAL
WHOLE BLOOD HOLD SPECIMEN: NORMAL

## 2024-01-05 PROCEDURE — 83690 ASSAY OF LIPASE: CPT | Performed by: PHYSICIAN ASSISTANT

## 2024-01-05 PROCEDURE — 25810000003 SODIUM CHLORIDE 0.9 % SOLUTION: Performed by: PHYSICIAN ASSISTANT

## 2024-01-05 PROCEDURE — 87636 SARSCOV2 & INF A&B AMP PRB: CPT | Performed by: PHYSICIAN ASSISTANT

## 2024-01-05 PROCEDURE — C1751 CATH, INF, PER/CENT/MIDLINE: HCPCS

## 2024-01-05 PROCEDURE — 81003 URINALYSIS AUTO W/O SCOPE: CPT | Performed by: PHYSICIAN ASSISTANT

## 2024-01-05 PROCEDURE — 80053 COMPREHEN METABOLIC PANEL: CPT | Performed by: PHYSICIAN ASSISTANT

## 2024-01-05 PROCEDURE — 74177 CT ABD & PELVIS W/CONTRAST: CPT

## 2024-01-05 PROCEDURE — 71046 X-RAY EXAM CHEST 2 VIEWS: CPT

## 2024-01-05 PROCEDURE — 36410 VNPNXR 3YR/> PHY/QHP DX/THER: CPT

## 2024-01-05 PROCEDURE — 25510000001 IOPAMIDOL PER 1 ML: Performed by: PHYSICIAN ASSISTANT

## 2024-01-05 PROCEDURE — 63710000001 ONDANSETRON PER 8 MG: Performed by: PHYSICIAN ASSISTANT

## 2024-01-05 PROCEDURE — 85025 COMPLETE CBC W/AUTO DIFF WBC: CPT | Performed by: PHYSICIAN ASSISTANT

## 2024-01-05 PROCEDURE — 99285 EMERGENCY DEPT VISIT HI MDM: CPT

## 2024-01-05 RX ORDER — ONDANSETRON 4 MG/1
4 TABLET, FILM COATED ORAL ONCE
Status: COMPLETED | OUTPATIENT
Start: 2024-01-05 | End: 2024-01-05

## 2024-01-05 RX ORDER — ONDANSETRON 4 MG/1
4 TABLET, ORALLY DISINTEGRATING ORAL EVERY 8 HOURS PRN
Qty: 9 TABLET | Refills: 0 | Status: SHIPPED | OUTPATIENT
Start: 2024-01-05 | End: 2024-01-08

## 2024-01-05 RX ORDER — ONDANSETRON 2 MG/ML
4 INJECTION INTRAMUSCULAR; INTRAVENOUS ONCE
Status: DISCONTINUED | OUTPATIENT
Start: 2024-01-05 | End: 2024-01-06 | Stop reason: HOSPADM

## 2024-01-05 RX ORDER — SODIUM CHLORIDE 0.9 % (FLUSH) 0.9 %
10 SYRINGE (ML) INJECTION AS NEEDED
Status: DISCONTINUED | OUTPATIENT
Start: 2024-01-05 | End: 2024-01-06 | Stop reason: HOSPADM

## 2024-01-05 RX ADMIN — IOPAMIDOL 100 ML: 755 INJECTION, SOLUTION INTRAVENOUS at 20:57

## 2024-01-05 RX ADMIN — SODIUM CHLORIDE 1000 ML: 9 INJECTION, SOLUTION INTRAVENOUS at 19:03

## 2024-01-05 RX ADMIN — SODIUM CHLORIDE 1000 ML: 9 INJECTION, SOLUTION INTRAVENOUS at 20:25

## 2024-01-05 RX ADMIN — ONDANSETRON HYDROCHLORIDE 4 MG: 4 TABLET, FILM COATED ORAL at 18:55

## 2024-01-05 NOTE — ED PROVIDER NOTES
Subjective Due to significant overcrowding in the emergency department patient was initially seen and evaluated in triage.  Provider in triage recommended patient placement in the treatment area to initiate therapy and movement to an ER bed as soon as possible.    Provider in Triage Note  Patient is a 78-year-old female who presents with 1 day of nausea vomiting.  Feeling like she should have diarrhea with lower abdominal cramping.  She reports she has a previous history of bowel removal secondary to complications from a hysterectomy where they nicked her bowel.  She has been doing well since that time.  But reports diarrhea is fairly chronic for her.  No nausea or vomiting.  She does report that in the last months she thought she had RSV she had cough congestion but she has been feeling better since that episode over the last several days.  She was not diagnosed with it.  The nausea vomiting started around 2 AM.      History of Present Illness  I evaluated the patient in [place] and agree with the assessment.        Review of Systems   Constitutional:  Negative for activity change, appetite change, chills, diaphoresis, fatigue and fever.   Respiratory:  Positive for cough.    Gastrointestinal:  Positive for abdominal pain, nausea and vomiting. Negative for abdominal distention.   Genitourinary:  Negative for dysuria, flank pain, frequency and urgency.   Neurological:  Positive for dizziness.       Past Medical History:   Diagnosis Date    Allergic Penicillian    Asthma congestion    Breast cancer     Cholelithiasis surgery 1996 removal    DDD (degenerative disc disease), lumbosacral     Diabetes mellitus     Diverticulitis     Diverticulosis     Headache     History of medical problems 54 inches left of small intesting    Hyperlipidemia     Hypertension     Hypothyroidism     IBS (irritable bowel syndrome)     Inflammatory bowel disease     Low back pain Sciatic    Neuromuscular disorder neuropathy    Obesity     PONV  (postoperative nausea and vomiting)     Seasonal allergies        Allergies   Allergen Reactions    Diphenhydramine Rash    Morphine Mental Status Change    Penicillins Other (See Comments)     Told not to take any more    Prednisone GI Intolerance    Latex Rash       Past Surgical History:   Procedure Laterality Date    BREAST BIOPSY      BREAST SURGERY Left 02/2012    x2     CHOLECYSTECTOMY  1996    COLON RESECTION SMALL BOWEL  05/25/2014    COLON SURGERY  small intestine repair    HYSTERECTOMY      LYMPH NODE BIOPSY      MASTECTOMY      OOPHORECTOMY  1965    SIMPLE MASTECTOMY Right 2/26/2020    Procedure: Right simple mastectomy;  Surgeon: Mario Quezada DO;  Location: [facility] MAIN OR;  Service: General;  Laterality: Right;    SMALL INTESTINE SURGERY      SUBTOTAL HYSTERECTOMY  full hysterectomy       Family History   Problem Relation Age of Onset    Breast cancer Sister     Breast cancer Maternal Grandmother        Social History     Socioeconomic History    Marital status:    Tobacco Use    Smoking status: Never     Passive exposure: Never    Smokeless tobacco: Never   Vaping Use    Vaping Use: Never used   Substance and Sexual Activity    Alcohol use: Not Currently     Alcohol/week: 1.0 standard drink of alcohol     Types: 1 Glasses of wine per week     Comment: occ    Drug use: No    Sexual activity: Not Currently     Partners: Male     Birth control/protection: None           Objective   Physical Exam  Vitals and nursing note reviewed.   Constitutional:       General: She is not in acute distress.     Appearance: Normal appearance. She is well-developed. She is not ill-appearing, toxic-appearing or diaphoretic.   HENT:      Head: Normocephalic and atraumatic.      Nose: Nose normal.   Eyes:      Conjunctiva/sclera: Conjunctivae normal.   Cardiovascular:      Rate and Rhythm: Normal rate and regular rhythm.   Pulmonary:      Effort: Pulmonary effort is normal. No respiratory distress.      Breath  sounds: Normal breath sounds. No stridor. No wheezing, rhonchi or rales.   Abdominal:      Palpations: Abdomen is soft.      Tenderness: There is abdominal tenderness.   Musculoskeletal:         General: Normal range of motion.      Cervical back: Normal range of motion.   Skin:     General: Skin is warm and dry.   Neurological:      General: No focal deficit present.      Mental Status: She is alert and oriented to person, place, and time. Mental status is at baseline.   Psychiatric:         Mood and Affect: Mood normal.         Behavior: Behavior normal.         Thought Content: Thought content normal.         Judgment: Judgment normal.         Procedures           ED Course  ED Course as of 01/05/24 2005 Fri Jan 05, 2024   1733 Due to significant overcrowding in the emergency department patient was evaluated by myself in a hallway bed. This exam may be limited by privacy, noise level and the patient not wearing a hospital gown.  Explained to the patient our limitations and our overcrowding.  They were in agreement to continue the exam and treatment at this time.    [MG]   1851 Delay in care secondary to lack of IV. PICC team has been called x2 [MG]   1938 Able to straight stick to obtain blood. PICC team called again to obtain line   [MG]      ED Course User Index  [MG] Margarita Gamble, JT                                             Medical Decision Making  Amount and/or Complexity of Data Reviewed  Labs: ordered.  Radiology: ordered.    Risk  Prescription drug management.        Final diagnoses:   None       ED Disposition  ED Disposition       None            No follow-up provider specified.       Medication List      No changes were made to your prescriptions during this visit.          intesting), Hyperlipidemia, Hypertension, Hypothyroidism, IBS (irritable bowel syndrome), Inflammatory bowel disease, Low back pain (Sciatic), Neuromuscular disorder (neuropathy), Obesity, PONV (postoperative nausea and vomiting), and Seasonal allergies.  Patient is a pleasant 78-year-old female presents with intractable nausea vomiting some dizziness secondary to vomiting so much.  She was given IV fluids.  Care was delayed secondary to obtain an IV line.  Her CT scan was pending at time that care was transferred over to [place] nurse practitioner.  Dispo will be decided once patient is reassessed after imaging.  She was stable in agreement with plan.  I discussed the findings and recommendations with the patient who voices understanding. Stable while in the ER.     Note Disclaimer: At [facility], we believe that sharing information builds trust and better relationships. You are receiving this note because you are receiving care at [facility] or recently visited. It is possible you will see health information before a provider has talked with you about it. This kind of information can be easy to misunderstand. To help you fully understand what it means for your health, we urge you to discuss this note with your provider.        Problems Addressed:  Lightheadedness: complicated acute illness or injury  Nausea and vomiting, unspecified vomiting type: complicated acute illness or injury    Amount and/or Complexity of Data Reviewed  Labs: ordered.  Radiology: ordered.    Risk  Prescription drug management.        Final diagnoses:   Nausea and vomiting, unspecified vomiting type   Lightheadedness       ED Disposition  ED Disposition       ED Disposition   Discharge    Condition   Stable    Comment   --               Dom Estrada MD  [address]  [place] [address]  [phone]    Schedule an appointment as soon as possible for a visit in 2 days  As needed, If symptoms worsen          Medication List        ASK your doctor about these medications      ondansetron ODT 4 MG disintegrating tablet  Commonly known as: ZOFRAN-ODT  Place 1 tablet on the tongue Every 8 (Eight) Hours As Needed for Nausea or Vomiting for up to 3 days.  Ask about: Should I take this medication?               Where to Get Your Medications        These medications were sent to "[id number] - [facility] [place] [address] AT SEC OF [place] [address] - [phone] [facility] [phone] [address] [place] [address]      Phone: [phone]   ondansetron ODT 4 MG disintegrating tablet            Margarita Gamble PA-C  01/09/24 0929

## 2024-01-06 NOTE — CONSULTS
Picc team consult:    Procedure explained to patient and agrees to proceed.  Time out performed.  Power glide midline catheter placed RUE basilic vessel utilizing US guidance and sterile technique with easily compressible vessel without difficulty.  Dark venous blood return noted and line flushes without difficulty.

## 2024-01-06 NOTE — DISCHARGE INSTRUCTIONS
Rest and advance diet as tolerated.  Start out with clear liquid diet for next 12 hours, then may advance to bland diet.  See attached information about clear and bland diets.

## 2024-03-06 RX ORDER — AMLODIPINE BESYLATE 5 MG/1
5 TABLET ORAL DAILY
Qty: 90 TABLET | Refills: 2 | Status: SHIPPED | OUTPATIENT
Start: 2024-03-06

## 2024-04-02 RX ORDER — DEXAMETHASONE 4 MG/1
TABLET ORAL
Qty: 9 TABLET | Refills: 0 | Status: SHIPPED | OUTPATIENT
Start: 2024-04-02 | End: 2024-04-12

## 2024-04-02 RX ORDER — PANTOPRAZOLE SODIUM 40 MG/1
40 TABLET, DELAYED RELEASE ORAL 2 TIMES DAILY
Qty: 30 TABLET | Refills: 1 | Status: SHIPPED | OUTPATIENT
Start: 2024-04-02

## 2024-04-11 DIAGNOSIS — M79.10 MYALGIA: ICD-10-CM

## 2024-04-11 DIAGNOSIS — M71.9 BURSITIS, UNSPECIFIED SITE: Primary | ICD-10-CM

## 2024-04-11 DIAGNOSIS — M25.50 ARTHRALGIA, UNSPECIFIED JOINT: ICD-10-CM

## 2024-04-15 ENCOUNTER — LAB (OUTPATIENT)
Dept: FAMILY MEDICINE CLINIC | Facility: CLINIC | Age: 79
End: 2024-04-15
Payer: MEDICARE

## 2024-04-15 LAB
ALBUMIN SERPL-MCNC: 3.8 G/DL (ref 3.5–5.2)
ALBUMIN/GLOB SERPL: 1.2 G/DL
ALP SERPL-CCNC: 76 U/L (ref 39–117)
ALT SERPL W P-5'-P-CCNC: 77 U/L (ref 1–33)
ANION GAP SERPL CALCULATED.3IONS-SCNC: 10 MMOL/L (ref 5–15)
AST SERPL-CCNC: 34 U/L (ref 1–32)
BASOPHILS # BLD AUTO: 0.06 10*3/MM3 (ref 0–0.2)
BASOPHILS NFR BLD AUTO: 0.7 % (ref 0–1.5)
BILIRUB SERPL-MCNC: 0.4 MG/DL (ref 0–1.2)
BUN SERPL-MCNC: 8 MG/DL (ref 8–23)
BUN/CREAT SERPL: 11.4 (ref 7–25)
CALCIUM SPEC-SCNC: 10.2 MG/DL (ref 8.6–10.5)
CHLORIDE SERPL-SCNC: 100 MMOL/L (ref 98–107)
CHROMATIN AB SERPL-ACNC: <10 IU/ML (ref 0–14)
CO2 SERPL-SCNC: 28 MMOL/L (ref 22–29)
CREAT SERPL-MCNC: 0.7 MG/DL (ref 0.57–1)
CRP SERPL-MCNC: 1.37 MG/DL (ref 0–0.5)
DEPRECATED RDW RBC AUTO: 42.3 FL (ref 37–54)
EGFRCR SERPLBLD CKD-EPI 2021: 89.8 ML/MIN/1.73
EOSINOPHIL # BLD AUTO: 0.25 10*3/MM3 (ref 0–0.4)
EOSINOPHIL NFR BLD AUTO: 2.8 % (ref 0.3–6.2)
ERYTHROCYTE [DISTWIDTH] IN BLOOD BY AUTOMATED COUNT: 12.4 % (ref 12.3–15.4)
ERYTHROCYTE [SEDIMENTATION RATE] IN BLOOD: 16 MM/HR (ref 0–30)
GLOBULIN UR ELPH-MCNC: 3.1 GM/DL
GLUCOSE SERPL-MCNC: 116 MG/DL (ref 65–99)
HCT VFR BLD AUTO: 46.3 % (ref 34–46.6)
HGB BLD-MCNC: 15.7 G/DL (ref 12–15.9)
IMM GRANULOCYTES # BLD AUTO: 0.05 10*3/MM3 (ref 0–0.05)
IMM GRANULOCYTES NFR BLD AUTO: 0.6 % (ref 0–0.5)
LYMPHOCYTES # BLD AUTO: 1.7 10*3/MM3 (ref 0.7–3.1)
LYMPHOCYTES NFR BLD AUTO: 19.3 % (ref 19.6–45.3)
MCH RBC QN AUTO: 31.7 PG (ref 26.6–33)
MCHC RBC AUTO-ENTMCNC: 33.9 G/DL (ref 31.5–35.7)
MCV RBC AUTO: 93.3 FL (ref 79–97)
MONOCYTES # BLD AUTO: 0.82 10*3/MM3 (ref 0.1–0.9)
MONOCYTES NFR BLD AUTO: 9.3 % (ref 5–12)
NEUTROPHILS NFR BLD AUTO: 5.92 10*3/MM3 (ref 1.7–7)
NEUTROPHILS NFR BLD AUTO: 67.3 % (ref 42.7–76)
NRBC BLD AUTO-RTO: 0 /100 WBC (ref 0–0.2)
PLATELET # BLD AUTO: 239 10*3/MM3 (ref 140–450)
PMV BLD AUTO: 10.7 FL (ref 6–12)
POTASSIUM SERPL-SCNC: 4.2 MMOL/L (ref 3.5–5.2)
PROT SERPL-MCNC: 6.9 G/DL (ref 6–8.5)
RBC # BLD AUTO: 4.96 10*6/MM3 (ref 3.77–5.28)
SODIUM SERPL-SCNC: 138 MMOL/L (ref 136–145)
URATE SERPL-MCNC: 4 MG/DL (ref 2.4–5.7)
WBC NRBC COR # BLD AUTO: 8.8 10*3/MM3 (ref 3.4–10.8)

## 2024-04-15 PROCEDURE — 85652 RBC SED RATE AUTOMATED: CPT | Performed by: FAMILY MEDICINE

## 2024-04-15 PROCEDURE — 86140 C-REACTIVE PROTEIN: CPT | Performed by: FAMILY MEDICINE

## 2024-04-15 PROCEDURE — 85025 COMPLETE CBC W/AUTO DIFF WBC: CPT | Performed by: FAMILY MEDICINE

## 2024-04-15 PROCEDURE — 80053 COMPREHEN METABOLIC PANEL: CPT | Performed by: FAMILY MEDICINE

## 2024-04-15 PROCEDURE — 86431 RHEUMATOID FACTOR QUANT: CPT | Performed by: FAMILY MEDICINE

## 2024-04-15 PROCEDURE — 84550 ASSAY OF BLOOD/URIC ACID: CPT | Performed by: FAMILY MEDICINE

## 2024-04-15 PROCEDURE — 86038 ANTINUCLEAR ANTIBODIES: CPT | Performed by: FAMILY MEDICINE

## 2024-04-17 ENCOUNTER — HOSPITAL ENCOUNTER (OUTPATIENT)
Dept: BONE DENSITY | Facility: HOSPITAL | Age: 79
Discharge: HOME | End: 2024-04-17
Admitting: FAMILY MEDICINE
Payer: MEDICARE

## 2024-04-17 LAB — ANA SER QL: NEGATIVE

## 2024-04-17 PROCEDURE — 77080 DXA BONE DENSITY AXIAL: CPT

## 2024-04-19 ENCOUNTER — OFFICE VISIT (OUTPATIENT)
Dept: FAMILY MEDICINE CLINIC | Facility: CLINIC | Age: 79
End: 2024-04-19
Payer: MEDICARE

## 2024-04-19 VITALS
SYSTOLIC BLOOD PRESSURE: 134 MMHG | DIASTOLIC BLOOD PRESSURE: 86 MMHG | HEIGHT: 65 IN | HEART RATE: 107 BPM | WEIGHT: 193.6 LBS | OXYGEN SATURATION: 93 % | RESPIRATION RATE: 16 BRPM | BODY MASS INDEX: 32.26 KG/M2

## 2024-04-19 DIAGNOSIS — M79.622 LEFT UPPER ARM PAIN: Primary | ICD-10-CM

## 2024-04-19 DIAGNOSIS — J06.9 VIRAL URI: ICD-10-CM

## 2024-04-19 PROCEDURE — 3075F SYST BP GE 130 - 139MM HG: CPT | Performed by: INTERNAL MEDICINE

## 2024-04-19 PROCEDURE — 99214 OFFICE O/P EST MOD 30 MIN: CPT | Performed by: INTERNAL MEDICINE

## 2024-04-19 PROCEDURE — 3079F DIAST BP 80-89 MM HG: CPT | Performed by: INTERNAL MEDICINE

## 2024-04-19 NOTE — PROGRESS NOTES
Chief Complaint  Arm Pain, Nasal Congestion, and Cough    HPI:    Esperanza Smith presents to [facility]    Patient is a 78-year-old female with a history of hypertension, hyperlipidemia, hypothyroidism, vitamin D deficiency, prediabetes, breast cancer, seasonal allergies, LANA presenting for evaluation of upper respiratory symptoms.    Patient has been having left arm pain that has been predominantly of the left upper arm. The pain extends from her neck/back, shoulder, and to the upper arm. Patient reports having back pain which started about two weeks ago. Pain described as an intermittent, radiating, sharp pain when present. Pain worse with certain movements and laying on it and improves with conservative treatments including heat, activity modification. Patient has been trying OTC medications, activity modifications, heat/ice, massage. Denies stretching, PT. Denies fever, chills, nausea, vomiting.  Denies numbness, tingling, weakness, saddle anesthesia, urinary/fecal incontinence, focal sensory/motor deficit. Denies recent trauma, bending/twisting, injury, or overuse. Denies  Prior or recent imaging. Denies prior physical therapy, back corticosteroid injections, surgeries, or procedure on neck and back.     Patient recently has been having congestion that started about 3 days ago.  Endorses cough, runny nose, congestion, sore throat, sinus pain/pressure, ear pain pressure. Denies lymphadenopathy, myalgias, headache, and fatigue. Denies fever, chills, nausea, vomiting. Denies recent sick contact or travel. Patient has been trying over the counter mucinex. And nasal lavage. Previously had RSV last year in 2023. Denies history of asthma, bronchitis, recurrent pneumonia, or tobacco use.  Review of Systems:  ROS negative unless otherwise noted in HPI above.    Past Medical History:   Diagnosis Date    Allergic Penicillian    Asthma congestion    Breast cancer     Cholelithiasis  surgery 1996 removal    DDD (degenerative disc disease), lumbosacral     Diabetes mellitus     Diverticulitis     Diverticulosis     Headache     History of medical problems 54 inches left of small intesting    Hyperlipidemia     Hypertension     Hypothyroidism     IBS (irritable bowel syndrome)     Inflammatory bowel disease     Low back pain Sciatic    Neuromuscular disorder neuropathy    Obesity     PONV (postoperative nausea and vomiting)     Seasonal allergies          Current Outpatient Medications:     amLODIPine (NORVASC) 5 MG tablet, TAKE 1 TABLET BY MOUTH DAILY, Disp: 90 tablet, Rfl: 2    APPLE CIDER VINEGAR PO, Take 1 tablet by mouth Daily., Disp: , Rfl:     Ascorbic Acid (VITAMIN C) 250 MG chewable tablet, Chew 1 tablet Daily., Disp: , Rfl:     B Complex-Biotin-FA (SUPER B-COMPLEX) tablet, Take 1 tablet by mouth Daily., Disp: , Rfl:     Cholecalciferol (VITAMIN D3) 2000 units tablet, Take 1 tablet by mouth Daily., Disp: , Rfl:     fluticasone (FLONASE) 50 MCG/ACT nasal spray, 2 sprays into the nostril(s) as directed by provider Daily., Disp: , Rfl:     Inositol 324 MG tablet, Take 2 tablets by mouth Every Morning Before Breakfast., Disp: , Rfl:     levothyroxine (SYNTHROID, LEVOTHROID) 50 MCG tablet, TAKE 1 TABLET BY MOUTH DAILY, Disp: 90 tablet, Rfl: 1    metFORMIN (GLUCOPHAGE) 500 MG tablet, Take 1 tablet by mouth 2 (Two) Times a Day With Meals., Disp: 180 tablet, Rfl: 2    nystatin-triamcinolone (MYCOLOG II) 240972-0.1 UNIT/GM-% cream, APPLY TOPICALLY TO THE AFFECTED AREA TWICE DAILY FOR 15 DAYS AS DIRECTED, Disp: , Rfl:     pantoprazole (Protonix) 40 MG EC tablet, Take 1 tablet by mouth 2 (Two) Times a Day., Disp: 30 tablet, Rfl: 1    triamcinolone (KENALOG) 0.1 % lotion, Apply  topically to the appropriate area as directed 3 (Three) Times a Day., Disp: 60 mL, Rfl: 2    TURMERIC CURCUMIN PO, Take 2 tablets by mouth Daily., Disp: , Rfl:     Diclofenac Sodium (VOLTAREN) 1 % gel gel, Apply 4 g topically  "to the appropriate area as directed 4 (Four) Times a Day As Needed (For pain and inflammation). (Patient not taking: Reported on 4/19/2024), Disp: 50 g, Rfl: 0    Multiple Vitamins-Minerals (MULTIVITAMIN GUMMIES ADULT) chewable tablet, Chew 1 tablet Daily. (Patient not taking: Reported on 12/7/2023), Disp: , Rfl:     Multiple Vitamins-Minerals (ZINC PO), , Disp: , Rfl:     Social History     Socioeconomic History    Marital status:    Tobacco Use    Smoking status: Never     Passive exposure: Never    Smokeless tobacco: Never   Vaping Use    Vaping status: Never Used   Substance and Sexual Activity    Alcohol use: Not Currently     Alcohol/week: 1.0 standard drink of alcohol     Types: 1 Glasses of wine per week     Comment: occ    Drug use: No    Sexual activity: Not Currently     Partners: Male     Birth control/protection: None        Objective   Vital Signs:  There were no vitals taken for this visit.  Estimated body mass index is 31.45 kg/m² as calculated from the following:    Height as of 1/5/24: 165.1 cm (65\").    Weight as of 1/5/24: 85.7 kg (189 lb).    Physical Exam:  General: Well-appearing patient, no apparent distress  HEENT: No posterior pharynx erythema, no tonsillar erythema or exudates, normal external auditory canals, TM normal without bulging or erythema, clear postnasal drip present  Neck: No cervical lymphadenopathy  Cardiac: Regular rate and rhythm, normal S1/S2, no murmur, rubs or gallops, no lower extremity edema  Lungs: Clear to auscultation bilaterally, no crackles or wheezes  Abdomen: Soft, non-tender, no guarding or rebound tenderness, no hepatosplenomegaly  Skin: No significant rashes or lesions  MSK: Grossly normal tone and strength, 5 out of 5 strength in upper extremities bilaterally, normal active and passive range of motion of the upper extremities bilaterally, mild tenderness to palpation of left latissimus dorsi muscle, no point tenderness to palpation over cervical, " thoracic, or lumbar spinous processes or paraspinal muscles.  Neuro: Alert and oriented x3, CN II-XII grossly intact, normal sensation of upper extremities bilaterally  Psych: Appropriate mood and affect    Assessment and Plan:    (M79.622) Left upper arm pain  Assessment: Patient with recent left upper arm pain that has improved with recent steroids, heat, and massage.  Most likely musculoskeletal in nature and suspect some radicular symptoms from neck/back.  No red flag symptoms.  Recent laboratory workup overall unremarkable other than minimally elevated CRP.  After discussion of symptoms, patient prefers to continue with conservative treatments and defer physical therapy and imaging for now.  She is aware of signs and symptoms which should prompt reevaluation.  Plan:  - Over-the-counter medications including acetaminophen, ibuprofen as needed  - Activity modification  - Heat/ice as needed  - Massage  - Hold on physical therapy for now  - Hold on imaging for now  - Follow up if not improving    (J06.9) Viral URI  Assessment: Patient present with symptoms consistent with URI. Most likely viral in nature based on timing, exposure, and symptoms. No current evidence of superimposed bacterial infection requiring antibiotics. Discussed symptomatic treatment, typical clinical course of illness, as well as signs and symptoms which would prompt reevaluation   Plan:  - Stay well hydrated, get plenty of rest  - Symptomatic treatment including OTC nasal decongestant, cough suppressant, Tylenol as needed  - Hold on antibiotics and imaging for now  - Follow up if new/worsening symptoms     Patient was given instructions and counseling regarding her condition or for health maintenance advice. Please see specific information pulled into the AVS if appropriate.       Dr Jose Ramon Saab   Internal Medicine Physician  [facility]  [address], [address]  [place] [address]

## 2024-04-19 NOTE — PATIENT INSTRUCTIONS
Arm pain  Plan:  - Over-the-counter medications including acetaminophen, ibuprofen as needed  - Activity modification  - Heat/ice as needed  - Massage  - Hold on physical therapy for now  - Hold on imaging for now  - Follow up if not improving    Upper respiratory infection: viral  - Stay well hydrated, get plenty of rest  - Symptomatic treatment including over the counter nasal decongestant, cough suppressant, Tylenol as needed  - Hold on antibiotics and imaging for now  - Follow up if new/worsening symptoms

## 2024-05-14 DIAGNOSIS — M54.12 CERVICAL RADICULOPATHY: Primary | ICD-10-CM

## 2024-05-14 RX ORDER — LEVOTHYROXINE SODIUM 0.05 MG/1
50 TABLET ORAL DAILY
Qty: 90 TABLET | Refills: 1 | Status: SHIPPED | OUTPATIENT
Start: 2024-05-14

## 2024-05-16 ENCOUNTER — HOSPITAL ENCOUNTER (OUTPATIENT)
Dept: GENERAL RADIOLOGY | Facility: HOSPITAL | Age: 79
Discharge: HOME | End: 2024-05-16
Admitting: FAMILY MEDICINE
Payer: MEDICARE

## 2024-05-16 DIAGNOSIS — M54.12 CERVICAL RADICULOPATHY: ICD-10-CM

## 2024-05-16 PROCEDURE — 72050 X-RAY EXAM NECK SPINE 4/5VWS: CPT

## 2024-05-20 DIAGNOSIS — M47.812 CERVICAL SPONDYLOSIS: Primary | ICD-10-CM

## 2024-05-20 RX ORDER — TRAMADOL HYDROCHLORIDE 50 MG/1
50 TABLET ORAL EVERY 6 HOURS PRN
Qty: 40 TABLET | Refills: 0 | Status: SHIPPED | OUTPATIENT
Start: 2024-05-20

## 2024-05-20 RX ORDER — PREDNISONE 20 MG/1
TABLET ORAL
Qty: 25 TABLET | Refills: 1 | Status: SHIPPED | OUTPATIENT
Start: 2024-05-20

## 2024-05-21 ENCOUNTER — TREATMENT (OUTPATIENT)
Dept: PHYSICAL THERAPY | Facility: CLINIC | Age: 79
End: 2024-05-21
Payer: MEDICARE

## 2024-05-21 DIAGNOSIS — M54.12 RADICULOPATHY, CERVICAL: Primary | ICD-10-CM

## 2024-05-21 PROCEDURE — 97140 MANUAL THERAPY 1/> REGIONS: CPT | Performed by: PHYSICAL THERAPIST

## 2024-05-21 PROCEDURE — 97110 THERAPEUTIC EXERCISES: CPT | Performed by: PHYSICAL THERAPIST

## 2024-05-21 PROCEDURE — 97162 PT EVAL MOD COMPLEX 30 MIN: CPT | Performed by: PHYSICAL THERAPIST

## 2024-05-21 NOTE — PROGRESS NOTES
Physical Therapy Initial Evaluation and Plan of Care    Patient: Esperanza Smith   : 1945  Diagnosis/ICD-10 Code:  Radiculopathy, cervical [M54.12]  Referring practitioner: Dom Estrada MD  Date of Initial Visit: 2024  Today's Date: 2024  Patient seen for 1 sessions           Subjective Questionnaire: NDI: 26%      Subjective Evaluation    History of Present Illness  Mechanism of injury: Patient presents to physical therapy with cc of pain in LUE.  Reports that symptoms have been present for about six weeks and have stayed about the same intensity.  Reports pain in lateral left shoulder and forearm.  Patient had x-ray that showed spondylosis in lower cervical spine.  Patient reports feeling increased pain when turning her head to the right.  Also reports pain with her arms straight while sitting in [place].  Patient also reports pain in left arm when scrubbing the floors at home.  Wishes to have less pain in left UE with ADL's and household chore activities.     Pain  Current pain ratin  Location: lateral epicondyle of left elbow  Quality: sharp and knife-like  Relieving factors: heat and support  Aggravating factors: overhead activity, prolonged positioning, lifting and outstretched reach  Progression: no change    Hand dominance: right    Diagnostic Tests  X-ray: abnormal    Patient Goals  Patient goals for therapy: decreased pain, increased strength and increased motion           Objective          Postural Observations    Additional Postural Observation Details  Increased thoracic kyphosis noted with rounded shoulders     Tenderness     Additional Tenderness Details  TTP C6-C7 on left facet   TTP bilateral facet from T1-T4    Active Range of Motion   Cervical/Thoracic Spine   Cervical    Flexion: 40 degrees   Extension: 30 degrees   Left lateral flexion: 10 degrees   Right lateral flexion: 20 degrees   Left rotation: 62 degrees   Right rotation: 65 degrees     Strength/Myotome  Testing     Left Shoulder     Planes of Motion   Flexion: 5   Abduction: 4+   External rotation at 0°: 4+   Internal rotation at 0°: 5     Right Shoulder     Planes of Motion   Flexion: 5   Abduction: 4+   External rotation at 0°: 4+   Internal rotation at 0°: 5     Left Elbow   Flexion: 5  Extension: 5    Right Elbow   Flexion: 5  Extension: 5    Left Wrist/Hand   Wrist extension: 4+  Wrist flexion: 5    Right Wrist/Hand   Wrist extension: 5  Wrist flexion: 5    Tests   Cervical     Left   Positive active compression (Bollinger) and Spurling's sign.           Assessment & Plan       Assessment  Impairments: abnormal or restricted ROM, impaired physical strength, lacks appropriate home exercise program and pain with function   Functional limitations: lifting, pulling, pushing, uncomfortable because of pain and reaching overhead   Assessment details: Patient presents to physical therapy with s/s congruent with MD diagnosis of cervical radiculopathy.  Patient demonstrates limited AROM in cervical spine, weakness in BUE's, and reported pain in LUE with functional movement patterns.  Patient also demonstrates increased neural tension in median nerve of LUE.  Patient is appropriate for PT intervention in order to address these deficits so that she may tolerate ADL's and household chores with less pain/limitation.  Prognosis: good    Goals  Plan Goals: In two weeks, patient will report at least 25% reduction in pain level.    In two weeks, patient will demonstrate at least 25% improvement in AROM in cervical spine.     In four weeks, patient will demonstrate elbow extension to at least 10 degrees in median nerve tension test without pain in LUE.    In four weeks, patient will demonstrate cervical AROM WFL in order to demonstrate decreased perceived disability with looking over her shoulders when driving.   In four weeks, patient will demonstrate decreased perceived disability by decreasing score on NDI by at least  12%.    Plan  Therapy options: will be seen for skilled therapy services  Planned modality interventions: cryotherapy, thermotherapy (hydrocollator packs) and traction  Planned therapy interventions: manual therapy, neuromuscular re-education, soft tissue mobilization, spinal/joint mobilization, strengthening, stretching, therapeutic activities, joint mobilization, home exercise program, functional ROM exercises and flexibility  Frequency: 2x week  Duration in weeks: 6  Treatment plan discussed with: patient        Manual Therapy:    8     mins  65720;  Therapeutic Exercise:    15     mins  42842;     Neuromuscular Aundrea:        mins  40288;    Therapeutic Activity:          mins  21037;     Gait Training:           mins  81506;     Ultrasound:          mins  35347;    Electrical Stimulation:         mins  85173 ( );  Dry Needling          mins self-pay    Timed Treatment:   23   mins   Total Treatment:     55   mins    PT SIGNATURE: Yuri Khan PT   DATE TREATMENT INITIATED: 5/21/2024    Initial Certification  Certification Period: 8/19/2024  I certify that the therapy services are furnished while this patient is under my care.  The services outlined above are required by this patient, and will be reviewed every 90 days.     PHYSICIAN: Dom Estrada MD      DATE:     Please sign and return via fax to [phone].. Thank you, [facility].

## 2024-05-29 ENCOUNTER — TREATMENT (OUTPATIENT)
Dept: PHYSICAL THERAPY | Facility: CLINIC | Age: 79
End: 2024-05-29
Payer: MEDICARE

## 2024-05-29 DIAGNOSIS — M54.12 RADICULOPATHY, CERVICAL: Primary | ICD-10-CM

## 2024-05-29 PROCEDURE — 97112 NEUROMUSCULAR REEDUCATION: CPT | Performed by: PHYSICAL THERAPIST

## 2024-05-29 PROCEDURE — 97110 THERAPEUTIC EXERCISES: CPT | Performed by: PHYSICAL THERAPIST

## 2024-05-29 PROCEDURE — 97140 MANUAL THERAPY 1/> REGIONS: CPT | Performed by: PHYSICAL THERAPIST

## 2024-05-29 NOTE — PROGRESS NOTES
Physical Therapy Daily Progress Note    Patient: Esperanza Smith   : 1945  Diagnosis/ICD-10 Code:  Radiculopathy, cervical [M54.12]  Referring practitioner: Dom Estrada MD  Date of Initial Visit: Type: THERAPY  Noted: 2024  Today's Date: 2024  Patient seen for 2 sessions         Esperanza Smith reports:  Stiffness in cervical spine still present.  Reports slight decrease in tingling in left UE after last visit.    Objective   See Exercise, Manual, and Modality Logs for complete treatment.       Assessment/Plan    Tolerates manual therapy and postural correction exercises well this visit.  Nerve tension in LUE noted this visit, however improved after nerve glides.     Progress per Plan of Care           Manual Therapy:    15     mins  49177;  Therapeutic Exercise:    13     mins  91637;     Neuromuscular Aundrea:    11    mins  01964;    Therapeutic Activity:          mins  85989;     Gait Training:           mins  87138;     Ultrasound:          mins  36193;    Electrical Stimulation:         mins  47965 ( );  Dry Needling          mins self-pay    Timed Treatment:   39   mins   Total Treatment:     39   mins    Yuri Khan PT  Physical Therapist

## 2024-05-31 ENCOUNTER — TREATMENT (OUTPATIENT)
Dept: PHYSICAL THERAPY | Facility: CLINIC | Age: 79
End: 2024-05-31
Payer: MEDICARE

## 2024-05-31 DIAGNOSIS — M54.12 RADICULOPATHY, CERVICAL: Primary | ICD-10-CM

## 2024-05-31 PROCEDURE — 97110 THERAPEUTIC EXERCISES: CPT | Performed by: PHYSICAL THERAPIST

## 2024-05-31 PROCEDURE — 97112 NEUROMUSCULAR REEDUCATION: CPT | Performed by: PHYSICAL THERAPIST

## 2024-05-31 PROCEDURE — 97140 MANUAL THERAPY 1/> REGIONS: CPT | Performed by: PHYSICAL THERAPIST

## 2024-05-31 NOTE — PROGRESS NOTES
Physical Therapy Daily Progress Note    Patient: Esperanza Smith   : 1945  Diagnosis/ICD-10 Code:  Radiculopathy, cervical [M54.12]  Referring practitioner: Dom Estrada MD  Date of Initial Visit: Type: THERAPY  Noted: 2024  Today's Date: 2024  Patient seen for 3 sessions         Esperanza Smith reports:  Feeling slightly better.  Still having some tingling in left shoulder.    Objective   See Exercise, Manual, and Modality Logs for complete treatment.       Assessment/Plan    Tolerates exercise and manual therapy well this visit.  Patient demonstrates proper technique with newly added home stretches and nerve glides.  Feeling well after manual therapy.    Progress per Plan of Care           Manual Therapy:    15     mins  44096;  Therapeutic Exercise:    13     mins  16980;     Neuromuscular Aundrea:    11    mins  13366;    Therapeutic Activity:          mins  86612;     Gait Training:           mins  11841;     Ultrasound:          mins  81975;    Electrical Stimulation:         mins  43923 (MC );  Dry Needling          mins self-pay    Timed Treatment:   39   mins   Total Treatment:     39   mins    Yuri Khan PT  Physical Therapist

## 2024-06-04 ENCOUNTER — TREATMENT (OUTPATIENT)
Dept: PHYSICAL THERAPY | Facility: CLINIC | Age: 79
End: 2024-06-04
Payer: MEDICARE

## 2024-06-04 DIAGNOSIS — M54.12 RADICULOPATHY, CERVICAL: Primary | ICD-10-CM

## 2024-06-05 NOTE — PROGRESS NOTES
Physical Therapy Daily Progress Note    Patient: Esperanza Smith   : 1945  Diagnosis/ICD-10 Code:  Radiculopathy, cervical [M54.12]  Referring practitioner: Dom Estrada MD  Date of Initial Visit: Type: THERAPY  Noted: 2024  Today's Date: 2024  Patient seen for 4 sessions         Esperanza Smith reports: Felt well for about a day after last visit, however tingling/pain in LUE returned and is present today.    Objective   See Exercise, Manual, and Modality Logs for complete treatment.       Assessment/Plan    Discontinued nerve glides on HEP due to potential aggravation of symptoms.  Tolerates manual therapy and postural correction activities well.  Will continue passive nerve glides.     Progress per Plan of Care           Manual Therapy:    20     mins  59015;  Therapeutic Exercise:    10     mins  36577;     Neuromuscular Aundrea:    8    mins  62288;    Therapeutic Activity:          mins  96686;     Gait Training:           mins  18773;     Ultrasound:          mins  06386;    Electrical Stimulation:         mins  23881 ( );  Dry Needling          mins self-pay    Timed Treatment:   38   mins   Total Treatment:     38   mins    Yuri Khan PT  Physical Therapist

## 2024-06-06 ENCOUNTER — TREATMENT (OUTPATIENT)
Dept: PHYSICAL THERAPY | Facility: CLINIC | Age: 79
End: 2024-06-06
Payer: MEDICARE

## 2024-06-06 DIAGNOSIS — M54.12 RADICULOPATHY, CERVICAL: Primary | ICD-10-CM

## 2024-06-06 NOTE — PROGRESS NOTES
Physical Therapy Daily Progress Note      Patient: Esperanza Smith   : 1945  Diagnosis/ICD-10 Code:  Radiculopathy, cervical [M54.12]  Referring practitioner: Dom Estrada MD  Date of Initial Visit: Type: THERAPY  Noted: 2024  Today's Date: 2024  Patient seen for 5 sessions         Esperanza Smith reports: Continued tightness/pain in left shoulder, which extends into lateral left forearm.  Compliant with HEP modification from last visit.     Objective   See Exercise, Manual, and Modality Logs for complete treatment.       Assessment/Plan    Symptoms reported to get worse with right sidebending of cervical spine.  Able to maintain pain level under 3/10 with manual nerve glides this visit.  Patient educated to avoid neural tension positions with ADL's when possible.  Feeling well after manual therapy.    Progress per Plan of Care           Manual Therapy:    20     mins  09267;  Therapeutic Exercise:    5     mins  07162;     Neuromuscular Aundrea:    8    mins  84035;    Therapeutic Activity:          mins  82108;     Gait Training:           mins  99885;     Ultrasound:          mins  06509;    Electrical Stimulation:         mins  58776 ( );  Dry Needling          mins self-pay    Timed Treatment:   33   mins   Total Treatment:     33   mins    Yuri Khan PT  Physical Therapist

## 2024-06-10 ENCOUNTER — TELEPHONE (OUTPATIENT)
Dept: FAMILY MEDICINE CLINIC | Facility: CLINIC | Age: 79
End: 2024-06-10
Payer: MEDICARE

## 2024-06-10 DIAGNOSIS — M47.812 CERVICAL SPONDYLOSIS: Primary | ICD-10-CM

## 2024-06-10 DIAGNOSIS — M47.812 CERVICAL SPONDYLOSIS: ICD-10-CM

## 2024-06-10 DIAGNOSIS — M54.12 CERVICAL RADICULOPATHY: ICD-10-CM

## 2024-06-10 RX ORDER — TRAMADOL HYDROCHLORIDE 50 MG/1
50 TABLET ORAL EVERY 6 HOURS PRN
Qty: 40 TABLET | OUTPATIENT
Start: 2024-06-10

## 2024-06-10 RX ORDER — PREDNISONE 20 MG/1
TABLET ORAL
Qty: 25 TABLET | Refills: 1 | Status: SHIPPED | OUTPATIENT
Start: 2024-06-10

## 2024-06-10 RX ORDER — TRAMADOL HYDROCHLORIDE 50 MG/1
50 TABLET ORAL EVERY 6 HOURS PRN
Qty: 40 TABLET | Refills: 0 | Status: SHIPPED | OUTPATIENT
Start: 2024-06-10

## 2024-06-10 NOTE — TELEPHONE ENCOUNTER
----- Message from Dom Estrada sent at 6/10/2024  5:59 AM EDT -----  Ame see if we can find Esperanza a spot in my schedule and if not then may be in one of the other doctors schedules.  I am scheduling her for an MRI and to see a neurosurgeon.  I think she has ruptured a disc in her neck.  She is having intractable pain.  She is already in physical therapy.  1 of this needs to see her to verify reflexes and strength and make sure we are on the right track.

## 2024-06-12 ENCOUNTER — TREATMENT (OUTPATIENT)
Dept: PHYSICAL THERAPY | Facility: CLINIC | Age: 79
End: 2024-06-12
Payer: MEDICARE

## 2024-06-12 DIAGNOSIS — M54.12 RADICULOPATHY, CERVICAL: Primary | ICD-10-CM

## 2024-06-12 NOTE — PROGRESS NOTES
Physical Therapy Daily Progress Note      Patient: Esperanza Smith   : 1945  Diagnosis/ICD-10 Code:  Radiculopathy, cervical [M54.12]  Referring practitioner: Dom Estrada MD  Date of Initial Visit: Type: THERAPY  Noted: 2024  Today's Date: 2024  Patient seen for 6 sessions             Subjective Pt tripped on concrete steps and fell to the ground at home just before coming to therapy today.  She does not think that she made her neck issues worse    Objective   See Exercise, Manual, and Modality Logs for complete treatment.       Assessment/Plan  Pt responded fairly well to manual techniques today.    Progress per Plan of Care           Timed:         Manual Therapy:    25     mins  52907;         Timed Treatment:   25   mins   Total Treatment:     25   mins        Carlos Salas PTA  Physical Therapist Assistant

## 2024-06-14 ENCOUNTER — HOSPITAL ENCOUNTER (OUTPATIENT)
Dept: MRI IMAGING | Facility: HOSPITAL | Age: 79
Discharge: HOME | End: 2024-06-14
Payer: MEDICARE

## 2024-06-14 ENCOUNTER — OFFICE VISIT (OUTPATIENT)
Dept: FAMILY MEDICINE CLINIC | Facility: CLINIC | Age: 79
End: 2024-06-14
Payer: MEDICARE

## 2024-06-14 VITALS
OXYGEN SATURATION: 95 % | HEIGHT: 65 IN | SYSTOLIC BLOOD PRESSURE: 142 MMHG | RESPIRATION RATE: 16 BRPM | HEART RATE: 91 BPM | BODY MASS INDEX: 31.16 KG/M2 | WEIGHT: 187 LBS | DIASTOLIC BLOOD PRESSURE: 102 MMHG

## 2024-06-14 DIAGNOSIS — M47.22 CERVICAL SPONDYLOSIS WITH RADICULOPATHY: Primary | ICD-10-CM

## 2024-06-14 DIAGNOSIS — M54.12 CERVICAL RADICULOPATHY: Primary | ICD-10-CM

## 2024-06-14 DIAGNOSIS — M47.22 CERVICAL SPONDYLOSIS WITH RADICULOPATHY: ICD-10-CM

## 2024-06-14 PROCEDURE — 3077F SYST BP >= 140 MM HG: CPT | Performed by: FAMILY MEDICINE

## 2024-06-14 PROCEDURE — 3080F DIAST BP >= 90 MM HG: CPT | Performed by: FAMILY MEDICINE

## 2024-06-14 PROCEDURE — 1160F RVW MEDS BY RX/DR IN RCRD: CPT | Performed by: FAMILY MEDICINE

## 2024-06-14 PROCEDURE — G2211 COMPLEX E/M VISIT ADD ON: HCPCS | Performed by: FAMILY MEDICINE

## 2024-06-14 PROCEDURE — 72141 MRI NECK SPINE W/O DYE: CPT

## 2024-06-14 PROCEDURE — 1159F MED LIST DOCD IN RCRD: CPT | Performed by: FAMILY MEDICINE

## 2024-06-14 PROCEDURE — 99213 OFFICE O/P EST LOW 20 MIN: CPT | Performed by: FAMILY MEDICINE

## 2024-06-14 PROCEDURE — 1125F AMNT PAIN NOTED PAIN PRSNT: CPT | Performed by: FAMILY MEDICINE

## 2024-06-14 NOTE — PROGRESS NOTES
"Chief Complaint  Neck Pain and Back Pain    Subjective        Esperanza Smith presents to [facility]  History of Present Illness  The patient is a 78-year-old white female who presents for evaluation of neck and back pain.    The patient reports experiencing severe neck and back pain, which originates from the back of her shoulder blade and radiates down her arm to her fingers, with the middle finger being more affected recently. She describes a constant pain in her elbow, akin to slamming it against a shower door. Despite undergoing physical therapy, there has been no significant improvement. On Wednesday, she experienced a fall on cement steps, landing on her chest, nose, and knee, resulting in soreness in her arm. She also experiences occasional sharp pain in her neck, which radiates to the site of her breast removal. She denies any loss of strength or  in her hand. However, she has been experiencing dizziness, alternating between hot and cold sensations, and weakness, necessitating rest. This morning, she experienced dizziness while showering, necessitating a brief period of rest. She also reports constant weakness, elevated pulse rate, and lightheadedness, which have been intermittent for at least a month. The arm pain started in 04/2023. She has attempted to alleviate the pain with heat packs, which provide some relief, and tramadol, which provides minimal relief. She is scheduled for an MRI on 07/01/2023 and is scheduled to see a neurologist next Friday.       Objective   Vital Signs:  BP (!) 142/102   Pulse 91   Resp 16   Ht 165.1 cm (65\")   Wt 84.8 kg (187 lb)   SpO2 95%   BMI 31.12 kg/m²   Estimated body mass index is 31.12 kg/m² as calculated from the following:    Height as of this encounter: 165.1 cm (65\").    Weight as of this encounter: 84.8 kg (187 lb).               Physical Exam  Vitals reviewed.   Constitutional:       Appearance: Normal appearance. She " is obese.   HENT:      Head: Normocephalic and atraumatic.      Nose: Nose normal.      Mouth/Throat:      Mouth: Mucous membranes are moist.   Eyes:      Extraocular Movements: Extraocular movements intact.      Pupils: Pupils are equal, round, and reactive to light.   Neck:      Comments: ROM reasonable.   Left arm reflexes weak  Cardiovascular:      Rate and Rhythm: Normal rate and regular rhythm.   Pulmonary:      Effort: Pulmonary effort is normal.      Breath sounds: Normal breath sounds.   Musculoskeletal:      Cervical back: Normal range of motion.   Neurological:      Mental Status: She is alert.      Comments: Left arm pain at rest.   Goes into hand and scapula at same time.  Cervical radiculopathy        Result Review :          Results  Imaging  X-ray of the cervical spine showed significant cervical spondylosis with facet arthropathy.                Assessment & Plan  The patient is a 80-year-old white female. Date of her visit is 06/14/2024.    1. Cervical spondylosis with radiculopathy.  The patient, a 78-year-old, began experiencing neck pain with radiation into her left scapula approximately 6 weeks ago. Over the next few weeks, the pain began to radiate into her shoulder, then down the arm on the outside of the arm, and eventually all the way down into her fingers. A few weeks ago, an x-ray on her cervical spine was performed, revealing significant cervical spondylosis with facet arthropathy. An MRI without contrast was scheduled, but the pain has intensified over the past week, preventing her from sitting or lying down without experiencing intense, throbbing pain in her left arm. This pain disrupts her sleep, occasionally causing nausea. Despite not having lost her , she reports a deep, aching pain on the outside of her upper arm, elbow, and down into her hand. This discomfort causes her to need to change positions. Despite undergoing physical therapy for the past 2 to 3 weeks, there has been  minimal relief. The decision has been made to change her MRI to a STAT, as we need to assess her spine condition. It is suspected that she has a ruptured disc, and the sooner we can expedite the nerve root from entrapping, the better it will be.            Follow Up     Return in about 1 week (around 6/21/2024), or if symptoms worsen or fail to improve, for Recheck.  Patient was given instructions and counseling regarding her condition or for health maintenance advice. Please see specific information pulled into the AVS if appropriate.     Patient or patient representative verbalized consent for the use of Ambient Listening during the visit with  Dom Estrada MD for chart documentation. 6/14/2024  11:35 EDT  Answers submitted by the patient for this visit:  Primary Reason for Visit (Submitted on 6/11/2024)  What is the primary reason for your visit?: Back Pain  Back Pain Questionnaire (Submitted on 6/11/2024)  Chief Complaint: Back pain  Chronicity: recurrent  Onset: more than 1 month ago  Frequency: daily  Progression since onset: coming and going  Pain location: thoracic spine  Pain quality: stabbing  Pain - numeric: 8/10  Pain is: worse during the night  Aggravated by: position  Stiffness is present: all day  chest pain: Yes  weakness: Yes  Risk factors: history of cancer, menopause, obesity, sedentary lifestyle  Additional Information: Shoulder, arm, elbow left breast area and back under shoulder  blade

## 2024-06-14 NOTE — PROGRESS NOTES
Ame I have spoke with Esperanza so she is aware of what is going on.  I put an order in for the neurosurgeons to see this as quickly as possible.  She is having a lot of pain so I realize this is probably not a true emergency for them but since she is having so much pain I would like them to see her as quickly as possible.  Asked Nanci to call or you call Dr. Urrutia's  office and see if they could at least look at the MRI and decide when they could see her.  She is having a hard time resting.

## 2024-06-17 ENCOUNTER — TREATMENT (OUTPATIENT)
Dept: PHYSICAL THERAPY | Facility: CLINIC | Age: 79
End: 2024-06-17
Payer: MEDICARE

## 2024-06-17 ENCOUNTER — TELEPHONE (OUTPATIENT)
Dept: FAMILY MEDICINE CLINIC | Facility: CLINIC | Age: 79
End: 2024-06-17
Payer: MEDICARE

## 2024-06-17 DIAGNOSIS — M54.12 RADICULOPATHY, CERVICAL: Primary | ICD-10-CM

## 2024-06-17 NOTE — TELEPHONE ENCOUNTER
Daisha, would you be able to call Dr. Urrutia's office to see about an urgent appointment?      ----- Message from Dom Estrada sent at 6/14/2024  7:12 PM EDT -----  Ame I have spoke with Esperanza so she is aware of what is going on.  I put an order in for the neurosurgeons to see this as quickly as possible.  She is having a lot of pain so I realize this is probably not a true emergency for them but since she is having so much pain I would like them to see her as quickly as possible.  Asked Nanci to call or you call Dr. Urrutia's  office and see if they could at least look at the MRI and decide when they could see her.  She is having a hard time resting.

## 2024-06-17 NOTE — PROGRESS NOTES
Ame if she is not in too much pain I suppose she can wait until Friday.  If her pain is tremendous though and she is unable to rest we may have to try for an earlier date.  Call her or her  and see what they think

## 2024-06-18 NOTE — PROGRESS NOTES
Physical Therapy Daily Progress Note      Patient: Esperanza Smith   : 1945  Diagnosis/ICD-10 Code:  Radiculopathy, cervical [M54.12]  Referring practitioner: Dom Estrada MD  Date of Initial Visit: Type: THERAPY  Noted: 2024  Today's Date: 2024  Patient seen for 7 sessions         Esperanza Smith reports:  Still having pain in cervical spine.  Reports that she is scheduled to have appointment with neurosurgeon in the coming weeks.     Objective   See Exercise, Manual, and Modality Logs for complete treatment.       Assessment/Plan    Tolerates manual therapy well this visit.  Mild relief of symptoms after visit.  Patient reports feeling well after exercise progression this visit.  Compliant with HEP.    Progress per Plan of Care           Manual Therapy:    20     mins  89589;  Therapeutic Exercise:    10     mins  58897;     Neuromuscular Aundrea:    8    mins  46316;    Therapeutic Activity:          mins  39042;     Gait Training:           mins  30890;     Ultrasound:          mins  59006;    Electrical Stimulation:         mins  23597 ( );  Dry Needling          mins self-pay    Timed Treatment:   38   mins   Total Treatment:     38   mins    Yuri Khan PT  Physical Therapist

## 2024-06-19 ENCOUNTER — TREATMENT (OUTPATIENT)
Dept: PHYSICAL THERAPY | Facility: CLINIC | Age: 79
End: 2024-06-19
Payer: MEDICARE

## 2024-06-19 DIAGNOSIS — M54.12 RADICULOPATHY, CERVICAL: Primary | ICD-10-CM

## 2024-06-19 NOTE — H&P (VIEW-ONLY)
"Subjective   History of Present Illness: Esperanza Smith is a 78 y.o. female is being seen for consultation today at the request of Dom Estrada MD for cervical radiculopathy. Today patient reports she's having a lot of her pain in her left shoulder.  She says that she has been having pain in her neck primarily on the left side and into her shoulder and sometimes down her arm for the last 2 to 3 months.  Patient denies any difficulty using her hands or dropping things.  No weakness of the upper or lower extremities.  No significant balance issues.  Notably patient has a history of breast cancer and apparently uterine cancer which were both treated with surgeries.  She has no history of metastatic cancer and apparently was told that she was cured of both cancers which were treated over 6 years ago.          Previous treatment: Ultram, Prednisone    Previous neurosurgery:      Previous injections:     The following portions of the patient's history were reviewed and updated as appropriate: allergies, current medications, past family history, past medical history, past social history, past surgical history, and problem list.    Review of Systems   Constitutional:  Positive for activity change.   HENT: Negative.     Eyes: Negative.    Respiratory: Negative.     Cardiovascular: Negative.    Gastrointestinal:  Positive for nausea.   Endocrine: Negative.    Genitourinary: Negative.    Musculoskeletal:  Positive for arthralgias, myalgias, neck pain and neck stiffness.   Skin: Negative.    Neurological:  Positive for dizziness and headaches.        Sharp pain on left side of neck that goes down arm   Hematological: Negative.    Psychiatric/Behavioral:  Positive for sleep disturbance.        Objective      BP (!) 169/109   Pulse 77   Resp 18   Ht 165.1 cm (65\")   Wt 86.6 kg (191 lb)   SpO2 96%   BMI 31.78 kg/m²    Body mass index is 31.78 kg/m².  Vitals:    06/21/24 0817   PainSc:   7   PainLoc: Neck       "     Neurologic Exam     Mental Status   Oriented to person, place, and time.     Motor Exam     Strength   Strength 5/5 throughout.     Sensory Exam   Light touch normal.     Gait, Coordination, and Reflexes     Reflexes   Right Abraham: absent  Left Abraham: absent      Assessment & Plan   Independent Review of Radiographic Studies:      I personally reviewed and interpreted the images from the following studies.    MRI cervical spine: There is a lesion spanning from C2-C4 posteriorly that appears to be intradural extra medullary causing significant crowding of the spinal canal and displacing the cord to the right.  There is at least some degree of spinal cord compression but no cord signal change.  The lesion is consistent with a meningioma however with the patient's history of cancer it is possible this could be a metastasis.  No contrast imaging available    Medical Decision Making:      Esperanza Smith is a 78 y.o. 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  We will need MRI with contrast to further evaluate the lesion, however the patient will require surgical resection.  We will plan for C2-4 laminectomy and resection of lesion.  Patient understands that lesion could be metastatic and would require further workup and treatment if that were the case.      Diagnoses and all orders for this visit:    1. Intradural extramedullary spinal tumor (Primary)      No follow-ups on file.    This patient was examined wearing appropriate personal protective equipment.                      Dr. Mario Urrutia IV    06/21/24  08:45 EDT

## 2024-06-20 NOTE — PROGRESS NOTES
Physical Therapy Daily Progress Note      Patient: Esperanza Smith   : 1945  Diagnosis/ICD-10 Code:  Radiculopathy, cervical [M54.12]  Referring practitioner: Dom Estrada MD  Date of Initial Visit: Type: THERAPY  Noted: 2024  Today's Date: 2024  Patient seen for 8 sessions         Esperanza Smith reports:  Felt better after treatment earlier this week, however yesterday evening had radiating pain in left arm.     Objective   See Exercise, Manual, and Modality Logs for complete treatment.       Assessment/Plan    Tolerates manual therapy and exercise well this visit.  Has appointment with neurosurgeon later this week.    Progress per Plan of Care           Manual Therapy:    20     mins  23006;  Therapeutic Exercise:    10     mins  85412;     Neuromuscular Aundrea:    8    mins  91871;    Therapeutic Activity:          mins  86947;     Gait Training:           mins  20208;     Ultrasound:          mins  24876;    Electrical Stimulation:         mins  07886 ( );  Dry Needling          mins self-pay    Timed Treatment:   38   mins   Total Treatment:     38   mins    Yuri Khan PT  Physical Therapist

## 2024-06-21 ENCOUNTER — PATIENT ROUNDING (BHMG ONLY) (OUTPATIENT)
Dept: NEUROSURGERY | Facility: CLINIC | Age: 79
End: 2024-06-21
Payer: MEDICARE

## 2024-06-21 ENCOUNTER — PREP FOR SURGERY (OUTPATIENT)
Dept: OTHER | Facility: HOSPITAL | Age: 79
End: 2024-06-21
Payer: MEDICARE

## 2024-06-21 ENCOUNTER — OFFICE VISIT (OUTPATIENT)
Dept: NEUROSURGERY | Facility: CLINIC | Age: 79
End: 2024-06-21
Payer: MEDICARE

## 2024-06-21 VITALS
HEART RATE: 77 BPM | WEIGHT: 191 LBS | OXYGEN SATURATION: 96 % | RESPIRATION RATE: 18 BRPM | DIASTOLIC BLOOD PRESSURE: 109 MMHG | SYSTOLIC BLOOD PRESSURE: 169 MMHG | BODY MASS INDEX: 31.82 KG/M2 | HEIGHT: 65 IN

## 2024-06-21 DIAGNOSIS — R79.9 ABNORMAL FINDING OF BLOOD CHEMISTRY, UNSPECIFIED: ICD-10-CM

## 2024-06-21 DIAGNOSIS — R79.1 ABNORMAL COAGULATION PROFILE: ICD-10-CM

## 2024-06-21 DIAGNOSIS — D49.7 INTRADURAL EXTRAMEDULLARY SPINAL TUMOR: Primary | ICD-10-CM

## 2024-06-27 DIAGNOSIS — M47.812 CERVICAL SPONDYLOSIS: ICD-10-CM

## 2024-06-27 RX ORDER — TRAMADOL HYDROCHLORIDE 50 MG/1
50 TABLET ORAL EVERY 6 HOURS PRN
Qty: 40 TABLET | Refills: 0 | Status: SHIPPED | OUTPATIENT
Start: 2024-06-27

## 2024-06-29 ENCOUNTER — HOSPITAL ENCOUNTER (EMERGENCY)
Facility: MEDICAL CENTER | Age: 79
End: 2024-06-29

## 2024-07-02 ENCOUNTER — APPOINTMENT (OUTPATIENT)
Dept: CT IMAGING | Facility: HOSPITAL | Age: 79
End: 2024-07-02
Payer: MEDICARE

## 2024-07-02 ENCOUNTER — TELEPHONE (OUTPATIENT)
Dept: NEUROSURGERY | Facility: CLINIC | Age: 79
End: 2024-07-02
Payer: MEDICARE

## 2024-07-02 ENCOUNTER — HOSPITAL ENCOUNTER (EMERGENCY)
Facility: HOSPITAL | Age: 79
Discharge: HOME | End: 2024-07-03
Attending: EMERGENCY MEDICINE
Payer: MEDICARE

## 2024-07-02 DIAGNOSIS — D49.7 INTRADURAL EXTRAMEDULLARY SPINAL TUMOR: Primary | ICD-10-CM

## 2024-07-02 DIAGNOSIS — R10.84 GENERALIZED ABDOMINAL PAIN: ICD-10-CM

## 2024-07-02 DIAGNOSIS — K57.92 ACUTE DIVERTICULITIS: Primary | ICD-10-CM

## 2024-07-02 LAB
ALBUMIN SERPL-MCNC: 4.3 G/DL (ref 3.5–5.2)
ALBUMIN/GLOB SERPL: 1.4 G/DL
ALP SERPL-CCNC: 73 U/L (ref 39–117)
ALT SERPL W P-5'-P-CCNC: 36 U/L (ref 1–33)
ANION GAP SERPL CALCULATED.3IONS-SCNC: 13 MMOL/L (ref 5–15)
AST SERPL-CCNC: 21 U/L (ref 1–32)
BASOPHILS # BLD AUTO: 0.04 10*3/MM3 (ref 0–0.2)
BASOPHILS NFR BLD AUTO: 0.3 % (ref 0–1.5)
BILIRUB SERPL-MCNC: 0.9 MG/DL (ref 0–1.2)
BILIRUB UR QL STRIP: NEGATIVE
BUN SERPL-MCNC: 6 MG/DL (ref 8–23)
BUN/CREAT SERPL: 11.5 (ref 7–25)
CALCIUM SPEC-SCNC: 10.5 MG/DL (ref 8.6–10.5)
CHLORIDE SERPL-SCNC: 100 MMOL/L (ref 98–107)
CLARITY UR: CLEAR
CO2 SERPL-SCNC: 26 MMOL/L (ref 22–29)
COLOR UR: YELLOW
CREAT SERPL-MCNC: 0.52 MG/DL (ref 0.57–1)
D-LACTATE SERPL-SCNC: 0.7 MMOL/L (ref 0.3–2)
DEPRECATED RDW RBC AUTO: 44.3 FL (ref 37–54)
EGFRCR SERPLBLD CKD-EPI 2021: 96.4 ML/MIN/1.73
EOSINOPHIL # BLD AUTO: 0.07 10*3/MM3 (ref 0–0.4)
EOSINOPHIL NFR BLD AUTO: 0.5 % (ref 0.3–6.2)
ERYTHROCYTE [DISTWIDTH] IN BLOOD BY AUTOMATED COUNT: 12.9 % (ref 12.3–15.4)
GLOBULIN UR ELPH-MCNC: 3 GM/DL
GLUCOSE SERPL-MCNC: 102 MG/DL (ref 65–99)
GLUCOSE UR STRIP-MCNC: NEGATIVE MG/DL
HCT VFR BLD AUTO: 45.9 % (ref 34–46.6)
HGB BLD-MCNC: 15.7 G/DL (ref 12–15.9)
HGB UR QL STRIP.AUTO: NEGATIVE
IMM GRANULOCYTES # BLD AUTO: 0.06 10*3/MM3 (ref 0–0.05)
IMM GRANULOCYTES NFR BLD AUTO: 0.4 % (ref 0–0.5)
KETONES UR QL STRIP: NEGATIVE
LEUKOCYTE ESTERASE UR QL STRIP.AUTO: NEGATIVE
LIPASE SERPL-CCNC: 21 U/L (ref 13–60)
LYMPHOCYTES # BLD AUTO: 2.59 10*3/MM3 (ref 0.7–3.1)
LYMPHOCYTES NFR BLD AUTO: 18.8 % (ref 19.6–45.3)
MCH RBC QN AUTO: 32 PG (ref 26.6–33)
MCHC RBC AUTO-ENTMCNC: 34.2 G/DL (ref 31.5–35.7)
MCV RBC AUTO: 93.5 FL (ref 79–97)
MONOCYTES # BLD AUTO: 1.32 10*3/MM3 (ref 0.1–0.9)
MONOCYTES NFR BLD AUTO: 9.6 % (ref 5–12)
NEUTROPHILS NFR BLD AUTO: 70.4 % (ref 42.7–76)
NEUTROPHILS NFR BLD AUTO: 9.69 10*3/MM3 (ref 1.7–7)
NITRITE UR QL STRIP: NEGATIVE
NRBC BLD AUTO-RTO: 0 /100 WBC (ref 0–0.2)
PH UR STRIP.AUTO: 7.5 [PH] (ref 5–8)
PLATELET # BLD AUTO: 230 10*3/MM3 (ref 140–450)
PMV BLD AUTO: 9.8 FL (ref 6–12)
POTASSIUM SERPL-SCNC: 3.6 MMOL/L (ref 3.5–5.2)
PROT SERPL-MCNC: 7.3 G/DL (ref 6–8.5)
PROT UR QL STRIP: NEGATIVE
RBC # BLD AUTO: 4.91 10*6/MM3 (ref 3.77–5.28)
SODIUM SERPL-SCNC: 139 MMOL/L (ref 136–145)
SP GR UR STRIP: 1.01 (ref 1–1.03)
UROBILINOGEN UR QL STRIP: NORMAL
WBC NRBC COR # BLD AUTO: 13.77 10*3/MM3 (ref 3.4–10.8)

## 2024-07-02 PROCEDURE — 80053 COMPREHEN METABOLIC PANEL: CPT | Performed by: EMERGENCY MEDICINE

## 2024-07-02 PROCEDURE — 99284 EMERGENCY DEPT VISIT MOD MDM: CPT

## 2024-07-02 PROCEDURE — 81003 URINALYSIS AUTO W/O SCOPE: CPT | Performed by: EMERGENCY MEDICINE

## 2024-07-02 PROCEDURE — 83605 ASSAY OF LACTIC ACID: CPT

## 2024-07-02 PROCEDURE — 85025 COMPLETE CBC W/AUTO DIFF WBC: CPT | Performed by: EMERGENCY MEDICINE

## 2024-07-02 PROCEDURE — 83690 ASSAY OF LIPASE: CPT | Performed by: EMERGENCY MEDICINE

## 2024-07-02 PROCEDURE — 36415 COLL VENOUS BLD VENIPUNCTURE: CPT

## 2024-07-02 PROCEDURE — 74176 CT ABD & PELVIS W/O CONTRAST: CPT

## 2024-07-02 RX ORDER — METRONIDAZOLE 500 MG/1
500 TABLET ORAL 3 TIMES DAILY
Qty: 21 TABLET | Refills: 0 | Status: SHIPPED | OUTPATIENT
Start: 2024-07-02 | End: 2024-07-09

## 2024-07-02 RX ORDER — CIPROFLOXACIN 500 MG/1
500 TABLET, FILM COATED ORAL 2 TIMES DAILY
Qty: 10 TABLET | Refills: 0 | Status: SHIPPED | OUTPATIENT
Start: 2024-07-02 | End: 2024-07-07

## 2024-07-02 RX ORDER — SODIUM CHLORIDE 0.9 % (FLUSH) 0.9 %
10 SYRINGE (ML) INJECTION AS NEEDED
Status: DISCONTINUED | OUTPATIENT
Start: 2024-07-02 | End: 2024-07-03 | Stop reason: HOSPADM

## 2024-07-02 NOTE — TELEPHONE ENCOUNTER
New Cervical MRI order placed per Radiologist protocol. Patient just had Cervical MRI without on 6/14/2024.

## 2024-07-02 NOTE — TELEPHONE ENCOUNTER
Caller: RASHMI MOORE    Relationship: SELF    Best call back number: [phone]    What orders are you requesting (i.e. lab or imaging): MRI ORDERS        Additional notes: PATIENT STATES SHE CALLED [facility] SCHEDULING AND THEY SAID THE ORDERS FOR THE MRI WERE NOT RIGHT AND THEY SENT THE ORDERS BACK. I SEE THE ORDERS BUT I AM NOT SURE IF THEY ARE CORRECT.  PLEASE REVIEW AND LET PATIENT KNOW, SHE'S TRYING TO GET THE MRI ASAP.    PLEASE LEAVE A VM IF PATIENT DOESN'T ANSWER.

## 2024-07-02 NOTE — TELEPHONE ENCOUNTER
FELY FOR PATIENT LETTING HER KNOW THAT CORRECT MRI ORDER COMPLETE AND THAT I WILL CALL TO SCHEDULE MRI AND ALSO I HAVE HER SURGERY SCHEDULED WITH DR CASTAÑEDA ON 7/16

## 2024-07-03 VITALS
WEIGHT: 187 LBS | RESPIRATION RATE: 17 BRPM | HEIGHT: 65 IN | SYSTOLIC BLOOD PRESSURE: 144 MMHG | BODY MASS INDEX: 31.16 KG/M2 | DIASTOLIC BLOOD PRESSURE: 84 MMHG | TEMPERATURE: 98.5 F | HEART RATE: 91 BPM | OXYGEN SATURATION: 96 %

## 2024-07-03 NOTE — ED PROVIDER NOTES
Subjective   History of Present Illness  78-year-old female with history of diverticulitis, diabetes presents for lower abdominal pain.  Been going on couple of days.  Been nauseous.  No blood in her stool having some intermittent loose stools but only in small amounts.  No fevers.  Review of Systems  See HPI.  Past Medical History:   Diagnosis Date    Allergic Penicillian    Asthma congestion    Breast cancer     Cataract     Cervical disc disorder     Cholelithiasis surgery 1996 removal    DDD (degenerative disc disease), lumbosacral     Diabetes mellitus     Diverticulitis     Diverticulosis     Headache     History of medical problems 54 inches left of small intesting    Hyperlipidemia     Hypertension     Hypothyroidism     IBS (irritable bowel syndrome)     Inflammatory bowel disease     Liver disease fatty liver    Low back pain Sciatic    Lumbosacral disc disease     Neuromuscular disorder neuropathy    Obesity     PONV (postoperative nausea and vomiting)     Seasonal allergies     Sleep apnea        Allergies   Allergen Reactions    Diphenhydramine Rash    Morphine Mental Status Change    Penicillins Other (See Comments)     Told not to take any more    Latex Rash       Past Surgical History:   Procedure Laterality Date    BREAST BIOPSY      BREAST SURGERY Left 02/2012    x2     CHOLECYSTECTOMY  1996    COLON RESECTION SMALL BOWEL  05/25/2014    COLON SURGERY  small intestine repair    HYSTERECTOMY      LYMPH NODE BIOPSY      MASTECTOMY      OOPHORECTOMY  1965    SIMPLE MASTECTOMY Right 02/26/2020    Procedure: Right simple mastectomy;  Surgeon: Mario Quezada DO;  Location: [facility] MAIN OR;  Service: General;  Laterality: Right;    SMALL INTESTINE SURGERY      SUBTOTAL HYSTERECTOMY  full hysterectomy       Family History   Problem Relation Age of Onset    Breast cancer Sister     Breast cancer Maternal Grandmother        Social History     Socioeconomic History    Marital status:    Tobacco Use     Smoking status: Never     Passive exposure: Never    Smokeless tobacco: Never   Vaping Use    Vaping status: Never Used   Substance and Sexual Activity    Alcohol use: Not Currently     Alcohol/week: 1.0 standard drink of alcohol     Types: 1 Glasses of wine per week     Comment: occ    Drug use: No    Sexual activity: Not Currently     Partners: Male     Birth control/protection: None           Objective   Physical Exam  No acute distress, tachycardic rate regular rhythm, mild suprapubic and bilateral lower quadrant tenderness to palpation without rebound or guarding, no tachypnea or increased work of breathing, alert and oriented, moving all extremities, elevated BMI, no scleral icterus, moist oral mucosa, normal conjunctiva, wearing corrective lenses.  Procedures           ED Course  ED Course as of 07/03/24 0103   Tue Jul 02, 2024 2317 Assumed care from Dr. Johnson pending CMP and disposition. [SJ]   2351 CMP unremarkable with essentially normal kidney function and LFTs.  No electrolyte imbalance.  Acute pancreatitis ruled out with normal lipase.  Upon my assessment, patient is anxious for discharge.  Results were discussed with the patient and she will be discharged with Cipro and Flagyl.  She was encouraged to increase her fluids and probiotic intake.  She is agreeable to plan of care and will follow-up to gastroenterology.  No acute distress noted.  She is ambulatory upright, steadily without assistance upon discharge. [SJ]   2352 .  She is agreeable to plan of care and will follow-up to gastroenterology.  No acute distress noted.  She is ambulatory upright, steadily without assistance upon discharge. [SJ]      ED Course User Index  [SJ] Solange Jean, DANNA                                             Medical Decision Making  Problems Addressed:  Acute diverticulitis: complicated acute illness or injury  Generalized abdominal pain: complicated acute illness or injury    Amount and/or Complexity of Data  Reviewed  Labs: ordered.  Radiology: ordered.    Risk  Prescription drug management.      My interpretation of CT concerning for diverticulitis.  See system for radiology interpretation.    Pending labs.  Patient nontoxic-appearing.  Suspect discharge home.  Patient nontoxic-appearing at this time.  Final diagnoses:   Acute diverticulitis   Generalized abdominal pain       ED Disposition  ED Disposition       ED Disposition   Discharge    Condition   Stable    Comment   --               Dom Estrada MD  [address] [facility] [address]  [facility] [place] [address] [phone]          GASTROENTEROLOGY [address] [address] [phone]             Medication List        New Prescriptions      ciprofloxacin 500 MG tablet  Commonly known as: CIPRO  Take 1 tablet by mouth 2 (Two) Times a Day for 5 days.     metroNIDAZOLE 500 MG tablet  Commonly known as: FLAGYL  Take 1 tablet by mouth 3 (Three) Times a Day for 7 days.               Where to Get Your Medications        These medications were sent to [id number] - [facility] [place] [address] AT SEC OF [facility] [address] [address] - [phone]  - [phone] [address] [place] [address]      Phone: [phone]   ciprofloxacin 500 MG tablet  metroNIDAZOLE 500 MG tablet            Pa Johnson MD  07/03/24 0103

## 2024-07-08 ENCOUNTER — HOSPITAL ENCOUNTER (OUTPATIENT)
Dept: CARDIOLOGY | Facility: HOSPITAL | Age: 79
Discharge: HOME | End: 2024-07-08
Payer: MEDICARE

## 2024-07-08 ENCOUNTER — LAB (OUTPATIENT)
Dept: LAB | Facility: HOSPITAL | Age: 79
End: 2024-07-08
Payer: MEDICARE

## 2024-07-08 DIAGNOSIS — D49.7 INTRADURAL EXTRAMEDULLARY SPINAL TUMOR: ICD-10-CM

## 2024-07-08 DIAGNOSIS — R79.1 ABNORMAL COAGULATION PROFILE: ICD-10-CM

## 2024-07-08 DIAGNOSIS — R79.9 ABNORMAL FINDING OF BLOOD CHEMISTRY, UNSPECIFIED: ICD-10-CM

## 2024-07-08 LAB
ABO GROUP BLD: NORMAL
ANION GAP SERPL CALCULATED.3IONS-SCNC: 14 MMOL/L (ref 5–15)
BASOPHILS # BLD AUTO: 0.05 10*3/MM3 (ref 0–0.2)
BASOPHILS NFR BLD AUTO: 0.6 % (ref 0–1.5)
BILIRUB UR QL STRIP: NEGATIVE
BLD GP AB SCN SERPL QL: NEGATIVE
BUN SERPL-MCNC: 5 MG/DL (ref 8–23)
BUN/CREAT SERPL: 7.8 (ref 7–25)
CALCIUM SPEC-SCNC: 10 MG/DL (ref 8.6–10.5)
CHLORIDE SERPL-SCNC: 98 MMOL/L (ref 98–107)
CLARITY UR: CLEAR
CO2 SERPL-SCNC: 24 MMOL/L (ref 22–29)
COLOR UR: YELLOW
CREAT SERPL-MCNC: 0.64 MG/DL (ref 0.57–1)
DEPRECATED RDW RBC AUTO: 44.2 FL (ref 37–54)
EGFRCR SERPLBLD CKD-EPI 2021: 91.7 ML/MIN/1.73
EOSINOPHIL # BLD AUTO: 0.12 10*3/MM3 (ref 0–0.4)
EOSINOPHIL NFR BLD AUTO: 1.4 % (ref 0.3–6.2)
ERYTHROCYTE [DISTWIDTH] IN BLOOD BY AUTOMATED COUNT: 13 % (ref 12.3–15.4)
GLUCOSE SERPL-MCNC: 145 MG/DL (ref 65–99)
GLUCOSE UR STRIP-MCNC: NEGATIVE MG/DL
HBA1C MFR BLD: 6.1 % (ref 4.8–5.6)
HCT VFR BLD AUTO: 44.4 % (ref 34–46.6)
HGB BLD-MCNC: 15.1 G/DL (ref 12–15.9)
HGB UR QL STRIP.AUTO: NEGATIVE
IMM GRANULOCYTES # BLD AUTO: 0.04 10*3/MM3 (ref 0–0.05)
IMM GRANULOCYTES NFR BLD AUTO: 0.5 % (ref 0–0.5)
INR PPP: 1.08 (ref 0.93–1.1)
KETONES UR QL STRIP: NEGATIVE
LEUKOCYTE ESTERASE UR QL STRIP.AUTO: ABNORMAL
LYMPHOCYTES # BLD AUTO: 1.74 10*3/MM3 (ref 0.7–3.1)
LYMPHOCYTES NFR BLD AUTO: 20.3 % (ref 19.6–45.3)
MCH RBC QN AUTO: 32.1 PG (ref 26.6–33)
MCHC RBC AUTO-ENTMCNC: 34 G/DL (ref 31.5–35.7)
MCV RBC AUTO: 94.3 FL (ref 79–97)
MONOCYTES # BLD AUTO: 0.83 10*3/MM3 (ref 0.1–0.9)
MONOCYTES NFR BLD AUTO: 9.7 % (ref 5–12)
MRSA DNA SPEC QL NAA+PROBE: NORMAL
NEUTROPHILS NFR BLD AUTO: 5.8 10*3/MM3 (ref 1.7–7)
NEUTROPHILS NFR BLD AUTO: 67.5 % (ref 42.7–76)
NITRITE UR QL STRIP: NEGATIVE
NRBC BLD AUTO-RTO: 0 /100 WBC (ref 0–0.2)
PH UR STRIP.AUTO: 6.5 [PH] (ref 5–8)
PLATELET # BLD AUTO: 245 10*3/MM3 (ref 140–450)
PMV BLD AUTO: 11.2 FL (ref 6–12)
POTASSIUM SERPL-SCNC: 3.4 MMOL/L (ref 3.5–5.2)
PROT UR QL STRIP: NEGATIVE
PROTHROMBIN TIME: 11.7 SECONDS (ref 9.6–11.7)
QT INTERVAL: 340 MS
QTC INTERVAL: 442 MS
RBC # BLD AUTO: 4.71 10*6/MM3 (ref 3.77–5.28)
RH BLD: POSITIVE
SODIUM SERPL-SCNC: 136 MMOL/L (ref 136–145)
SP GR UR STRIP: 1.01 (ref 1–1.03)
T&S EXPIRATION DATE: NORMAL
UROBILINOGEN UR QL STRIP: ABNORMAL
WBC NRBC COR # BLD AUTO: 8.58 10*3/MM3 (ref 3.4–10.8)

## 2024-07-08 PROCEDURE — 86901 BLOOD TYPING SEROLOGIC RH(D): CPT

## 2024-07-08 PROCEDURE — 85025 COMPLETE CBC W/AUTO DIFF WBC: CPT

## 2024-07-08 PROCEDURE — 80048 BASIC METABOLIC PNL TOTAL CA: CPT

## 2024-07-08 PROCEDURE — 83036 HEMOGLOBIN GLYCOSYLATED A1C: CPT

## 2024-07-08 PROCEDURE — 36415 COLL VENOUS BLD VENIPUNCTURE: CPT

## 2024-07-08 PROCEDURE — 93005 ELECTROCARDIOGRAM TRACING: CPT | Performed by: NEUROLOGICAL SURGERY

## 2024-07-08 PROCEDURE — 87641 MR-STAPH DNA AMP PROBE: CPT

## 2024-07-08 PROCEDURE — 86850 RBC ANTIBODY SCREEN: CPT

## 2024-07-08 PROCEDURE — 86900 BLOOD TYPING SEROLOGIC ABO: CPT

## 2024-07-08 PROCEDURE — 85610 PROTHROMBIN TIME: CPT

## 2024-07-08 PROCEDURE — 81003 URINALYSIS AUTO W/O SCOPE: CPT

## 2024-07-09 NOTE — PAT
Patient notified of low K+, instructed to increase K+ in diet, verbalized understanding, will repeat K+ Day of surgery.  Patient also states they have a difficult time getting IV's in her, had to call IV team last time she had an IV

## 2024-07-10 ENCOUNTER — TELEPHONE (OUTPATIENT)
Dept: NEUROSURGERY | Facility: CLINIC | Age: 79
End: 2024-07-10
Payer: MEDICARE

## 2024-07-10 ENCOUNTER — PATIENT OUTREACH (OUTPATIENT)
Dept: CASE MANAGEMENT | Facility: CLINIC | Age: 79
End: 2024-07-10
Payer: MEDICARE

## 2024-07-10 ENCOUNTER — TELEPHONE (OUTPATIENT)
Dept: CASE MANAGEMENT | Facility: CLINIC | Age: 79
End: 2024-07-10
Payer: MEDICARE

## 2024-07-10 DIAGNOSIS — I10 PRIMARY HYPERTENSION: Primary | ICD-10-CM

## 2024-07-10 DIAGNOSIS — M47.22 CERVICAL SPONDYLOSIS WITH RADICULOPATHY: ICD-10-CM

## 2024-07-10 NOTE — OUTREACH NOTE
AMBULATORY CASE MANAGEMENT NOTE    Names and Relationships of Patient/Support Persons:  -     [facility] Interim Update    Spoke with patient at this time regarding recent ER visit, identified self and role.  Patient states she is doing better since her ER visit but has had some issues with diarrhea from taking the 2 antibiotics prescribed by the ER for diverticulitis.  She states she has only one more day of antibiotics to take and then she will be done with the course (today).  Discussed possibly trying probiotics, advise that she discuss with neurosurgeon if it is ok for her to take probiotics since she is scheduled for surgery on her spinal tumor on 7/16.  Patient will contact Dr. Urrutia's office to discuss further.  Patient states that she is running low on her tramadol (but has enough to last until a few days after surgery) and was told to ask Dr. Estrada for a refill for after surgery.  Explained that surgeon should prescribe pain medication for after her surgery but to reach out after surgery if she needs a refill.  She also is worried about how she will get PT ordered after surgery.  Again advised that surgeon should order this if he wants her to have PT, but if not to reach out to PCP office.    Offered ER f/u appt with PCP, patient declines at this time, states that she will need to make an appointment after her surgery anyway so she will call then to make an appt.  Patient does mention that she was told that her potassium 2 days ago was pretty low, so she has increased her intake of potassium-containing foods.  She was unsure if she should be taking a potassium supplement.  Discussed that her last Potassium was 3.4 and normal is 3.5-5.2.  Patient will continue to eat more potassium-containing foods prior to surgery. She states she has another MRI on 7/12 for her surgery.  Offered assistance with chronic disease management through [facility], patient declines.  Advised that she reach out to ACM/PCP office for any needs  or if she needs help with chronic disease management after her surgery.        Jeannette KAUR  Ambulatory Case Management    7/10/2024, 10:35 EDT

## 2024-07-11 DIAGNOSIS — Z98.1 S/P SPINAL FUSION: Primary | ICD-10-CM

## 2024-07-12 ENCOUNTER — HOSPITAL ENCOUNTER (OUTPATIENT)
Dept: MRI IMAGING | Facility: HOSPITAL | Age: 79
Discharge: HOME | End: 2024-07-12
Payer: MEDICARE

## 2024-07-12 DIAGNOSIS — D49.7 INTRADURAL EXTRAMEDULLARY SPINAL TUMOR: ICD-10-CM

## 2024-07-12 PROCEDURE — 25010000002 GADOTERIDOL PER 1 ML: Performed by: NEUROLOGICAL SURGERY

## 2024-07-12 PROCEDURE — A9579 GAD-BASE MR CONTRAST NOS,1ML: HCPCS | Performed by: NEUROLOGICAL SURGERY

## 2024-07-12 PROCEDURE — 72142 MRI NECK SPINE W/DYE: CPT

## 2024-07-12 RX ADMIN — GADOTERIDOL 15 ML: 279.3 INJECTION, SOLUTION INTRAVENOUS at 12:06

## 2024-07-15 ENCOUNTER — ANESTHESIA EVENT (OUTPATIENT)
Dept: PERIOP | Facility: HOSPITAL | Age: 79
End: 2024-07-15
Payer: MEDICARE

## 2024-07-16 ENCOUNTER — ANESTHESIA (OUTPATIENT)
Dept: PERIOP | Facility: HOSPITAL | Age: 79
End: 2024-07-16
Payer: MEDICARE

## 2024-07-16 ENCOUNTER — HOSPITAL ENCOUNTER (INPATIENT)
Facility: HOSPITAL | Age: 79
LOS: 3 days | Discharge: HOME | End: 2024-07-19
Attending: NEUROLOGICAL SURGERY | Admitting: NEUROLOGICAL SURGERY
Payer: MEDICARE

## 2024-07-16 DIAGNOSIS — D49.7 INTRADURAL EXTRAMEDULLARY SPINAL TUMOR: ICD-10-CM

## 2024-07-16 PROBLEM — E11.9 TYPE 2 DIABETES MELLITUS: Status: ACTIVE | Noted: 2024-07-16

## 2024-07-16 PROBLEM — E03.9 ACQUIRED HYPOTHYROIDISM: Status: ACTIVE | Noted: 2024-07-16

## 2024-07-16 LAB
GLUCOSE BLDC GLUCOMTR-MCNC: 121 MG/DL (ref 70–105)
GLUCOSE BLDC GLUCOMTR-MCNC: 187 MG/DL (ref 70–105)
GLUCOSE BLDC GLUCOMTR-MCNC: 196 MG/DL (ref 70–105)
GLUCOSE BLDC GLUCOMTR-MCNC: 275 MG/DL (ref 70–105)
POTASSIUM SERPL-SCNC: 4.1 MMOL/L (ref 3.5–5.2)

## 2024-07-16 PROCEDURE — 82948 REAGENT STRIP/BLOOD GLUCOSE: CPT | Performed by: NEUROLOGICAL SURGERY

## 2024-07-16 PROCEDURE — 25010000002 GLYCOPYRROLATE 0.2 MG/ML SOLUTION: Performed by: NURSE ANESTHETIST, CERTIFIED REGISTERED

## 2024-07-16 PROCEDURE — 25010000002 PHENYLEPHRINE 10 MG/ML SOLUTION: Performed by: NURSE ANESTHETIST, CERTIFIED REGISTERED

## 2024-07-16 PROCEDURE — 88331 PATH CONSLTJ SURG 1 BLK 1SPC: CPT | Performed by: PATHOLOGY

## 2024-07-16 PROCEDURE — 25010000002 DEXAMETHASONE PER 1 MG: Performed by: NURSE ANESTHETIST, CERTIFIED REGISTERED

## 2024-07-16 PROCEDURE — C1889 IMPLANT/INSERT DEVICE, NOC: HCPCS | Performed by: NEUROLOGICAL SURGERY

## 2024-07-16 PROCEDURE — 82948 REAGENT STRIP/BLOOD GLUCOSE: CPT

## 2024-07-16 PROCEDURE — 25010000002 FENTANYL CITRATE (PF) 100 MCG/2ML SOLUTION: Performed by: NURSE ANESTHETIST, CERTIFIED REGISTERED

## 2024-07-16 PROCEDURE — 25010000002 DEXAMETHASONE PER 1 MG

## 2024-07-16 PROCEDURE — 25810000003 SODIUM CHLORIDE 0.9 % SOLUTION 250 ML FLEX CONT: Performed by: NURSE ANESTHETIST, CERTIFIED REGISTERED

## 2024-07-16 PROCEDURE — 63280 BX/EXC IDRL SPINE LESN CRVL: CPT

## 2024-07-16 PROCEDURE — 25010000002 HYDROMORPHONE 1 MG/ML SOLUTION: Performed by: NURSE PRACTITIONER

## 2024-07-16 PROCEDURE — 25010000002 PHENYLEPHRINE 10 MG/ML SOLUTION 5 ML VIAL: Performed by: NURSE ANESTHETIST, CERTIFIED REGISTERED

## 2024-07-16 PROCEDURE — 25010000002 PROPOFOL 200 MG/20ML EMULSION: Performed by: NURSE ANESTHETIST, CERTIFIED REGISTERED

## 2024-07-16 PROCEDURE — 25010000002 LIDOCAINE 1 % SOLUTION: Performed by: NEUROLOGICAL SURGERY

## 2024-07-16 PROCEDURE — 25010000002 HYDROMORPHONE 1 MG/ML SOLUTION

## 2024-07-16 PROCEDURE — 25810000003 SODIUM CHLORIDE 0.9 % SOLUTION: Performed by: NURSE ANESTHETIST, CERTIFIED REGISTERED

## 2024-07-16 PROCEDURE — 25810000003 LACTATED RINGERS PER 1000 ML: Performed by: NURSE ANESTHETIST, CERTIFIED REGISTERED

## 2024-07-16 PROCEDURE — 25810000003 LACTATED RINGERS PER 1000 ML: Performed by: NEUROLOGICAL SURGERY

## 2024-07-16 PROCEDURE — 88307 TISSUE EXAM BY PATHOLOGIST: CPT | Performed by: NEUROLOGICAL SURGERY

## 2024-07-16 PROCEDURE — 25010000002 CEFAZOLIN PER 500 MG: Performed by: NEUROLOGICAL SURGERY

## 2024-07-16 PROCEDURE — 63280 BX/EXC IDRL SPINE LESN CRVL: CPT | Performed by: NEUROLOGICAL SURGERY

## 2024-07-16 PROCEDURE — 25010000002 CEFAZOLIN PER 500 MG

## 2024-07-16 PROCEDURE — 84132 ASSAY OF SERUM POTASSIUM: CPT | Performed by: NEUROLOGICAL SURGERY

## 2024-07-16 PROCEDURE — 25010000002 LABETALOL 5 MG/ML SOLUTION: Performed by: NURSE ANESTHETIST, CERTIFIED REGISTERED

## 2024-07-16 PROCEDURE — 63710000001 INSULIN LISPRO (HUMAN) PER 5 UNITS

## 2024-07-16 PROCEDURE — 69990 MICROSURGERY ADD-ON: CPT | Performed by: NEUROLOGICAL SURGERY

## 2024-07-16 PROCEDURE — 25010000002 SUCCINYLCHOLINE PER 20 MG: Performed by: NURSE ANESTHETIST, CERTIFIED REGISTERED

## 2024-07-16 PROCEDURE — 25010000002 HYDROMORPHONE 1 MG/ML SOLUTION: Performed by: NURSE ANESTHETIST, CERTIFIED REGISTERED

## 2024-07-16 PROCEDURE — 25010000002 ONDANSETRON PER 1 MG: Performed by: NURSE ANESTHETIST, CERTIFIED REGISTERED

## 2024-07-16 PROCEDURE — 00BW0ZZ EXCISION OF CERVICAL SPINAL CORD, OPEN APPROACH: ICD-10-PCS | Performed by: NEUROLOGICAL SURGERY

## 2024-07-16 PROCEDURE — 25010000002 MAGNESIUM SULFATE PER 500 MG OF MAGNESIUM: Performed by: NURSE ANESTHETIST, CERTIFIED REGISTERED

## 2024-07-16 DEVICE — FLOSEAL HEMOSTATIC MATRIX, 10ML
Type: IMPLANTABLE DEVICE | Site: SPINE CERVICAL | Status: FUNCTIONAL
Brand: FLOSEAL HEMOSTATIC MATRIX

## 2024-07-16 DEVICE — SEAL DURL ADHERUS/AUTOSPRAY HYDROGEL DS: Type: IMPLANTABLE DEVICE | Site: SPINE CERVICAL | Status: FUNCTIONAL

## 2024-07-16 DEVICE — DEV WND/CLS CONTRL TISS STRATAFIX SYMM PDS PLS CTX 60CM VIL: Type: IMPLANTABLE DEVICE | Site: SPINE CERVICAL | Status: FUNCTIONAL

## 2024-07-16 DEVICE — DURAGEN® PLUS DURAL REGENERATION MATRIX , 4 IN X 5 IN
Type: IMPLANTABLE DEVICE | Site: SPINE CERVICAL | Status: FUNCTIONAL
Brand: DURAGEN® PLUS

## 2024-07-16 DEVICE — DEV CONTRL TISS STRATAFIX SPIRAL MNCRYL UD 3/0 PLS 30CM: Type: IMPLANTABLE DEVICE | Site: SPINE CERVICAL | Status: FUNCTIONAL

## 2024-07-16 RX ORDER — LIDOCAINE HYDROCHLORIDE AND EPINEPHRINE 10; 10 MG/ML; UG/ML
INJECTION, SOLUTION INFILTRATION; PERINEURAL AS NEEDED
Status: DISCONTINUED | OUTPATIENT
Start: 2024-07-16 | End: 2024-07-16 | Stop reason: HOSPADM

## 2024-07-16 RX ORDER — ACETAMINOPHEN 325 MG/1
650 TABLET ORAL EVERY 4 HOURS PRN
Status: DISCONTINUED | OUTPATIENT
Start: 2024-07-16 | End: 2024-07-19 | Stop reason: HOSPADM

## 2024-07-16 RX ORDER — METHOCARBAMOL 750 MG/1
750 TABLET, FILM COATED ORAL 4 TIMES DAILY PRN
Status: DISCONTINUED | OUTPATIENT
Start: 2024-07-16 | End: 2024-07-19 | Stop reason: HOSPADM

## 2024-07-16 RX ORDER — LEVOTHYROXINE SODIUM 0.05 MG/1
50 TABLET ORAL
Status: DISCONTINUED | OUTPATIENT
Start: 2024-07-16 | End: 2024-07-19 | Stop reason: HOSPADM

## 2024-07-16 RX ORDER — SODIUM CHLORIDE 0.9 % (FLUSH) 0.9 %
10 SYRINGE (ML) INJECTION AS NEEDED
Status: DISCONTINUED | OUTPATIENT
Start: 2024-07-16 | End: 2024-07-16 | Stop reason: HOSPADM

## 2024-07-16 RX ORDER — PHENYLEPHRINE HYDROCHLORIDE 10 MG/ML
INJECTION INTRAVENOUS AS NEEDED
Status: DISCONTINUED | OUTPATIENT
Start: 2024-07-16 | End: 2024-07-16 | Stop reason: SURG

## 2024-07-16 RX ORDER — NALOXONE HCL 0.4 MG/ML
0.4 VIAL (ML) INJECTION
Status: DISCONTINUED | OUTPATIENT
Start: 2024-07-16 | End: 2024-07-16

## 2024-07-16 RX ORDER — NICOTINE POLACRILEX 4 MG
15 LOZENGE BUCCAL
Status: DISCONTINUED | OUTPATIENT
Start: 2024-07-16 | End: 2024-07-19 | Stop reason: HOSPADM

## 2024-07-16 RX ORDER — ONDANSETRON 4 MG/1
4 TABLET, ORALLY DISINTEGRATING ORAL EVERY 6 HOURS PRN
Status: DISCONTINUED | OUTPATIENT
Start: 2024-07-16 | End: 2024-07-19 | Stop reason: HOSPADM

## 2024-07-16 RX ORDER — ACETAMINOPHEN 650 MG/1
650 SUPPOSITORY RECTAL EVERY 4 HOURS PRN
Status: DISCONTINUED | OUTPATIENT
Start: 2024-07-16 | End: 2024-07-19 | Stop reason: HOSPADM

## 2024-07-16 RX ORDER — SODIUM CHLORIDE, SODIUM LACTATE, POTASSIUM CHLORIDE, CALCIUM CHLORIDE 600; 310; 30; 20 MG/100ML; MG/100ML; MG/100ML; MG/100ML
INJECTION, SOLUTION INTRAVENOUS CONTINUOUS PRN
Status: DISCONTINUED | OUTPATIENT
Start: 2024-07-16 | End: 2024-07-16 | Stop reason: SURG

## 2024-07-16 RX ORDER — DEXAMETHASONE SODIUM PHOSPHATE 4 MG/ML
INJECTION, SOLUTION INTRA-ARTICULAR; INTRALESIONAL; INTRAMUSCULAR; INTRAVENOUS; SOFT TISSUE AS NEEDED
Status: DISCONTINUED | OUTPATIENT
Start: 2024-07-16 | End: 2024-07-16 | Stop reason: SURG

## 2024-07-16 RX ORDER — AMLODIPINE BESYLATE 5 MG/1
5 TABLET ORAL DAILY
Status: DISCONTINUED | OUTPATIENT
Start: 2024-07-16 | End: 2024-07-17

## 2024-07-16 RX ORDER — DEXAMETHASONE SODIUM PHOSPHATE 4 MG/ML
10 INJECTION, SOLUTION INTRA-ARTICULAR; INTRALESIONAL; INTRAMUSCULAR; INTRAVENOUS; SOFT TISSUE ONCE
Status: COMPLETED | OUTPATIENT
Start: 2024-07-16 | End: 2024-07-16

## 2024-07-16 RX ORDER — IBUPROFEN 600 MG/1
1 TABLET ORAL
Status: DISCONTINUED | OUTPATIENT
Start: 2024-07-16 | End: 2024-07-19 | Stop reason: HOSPADM

## 2024-07-16 RX ORDER — ONDANSETRON 2 MG/ML
4 INJECTION INTRAMUSCULAR; INTRAVENOUS EVERY 6 HOURS PRN
Status: DISCONTINUED | OUTPATIENT
Start: 2024-07-16 | End: 2024-07-19 | Stop reason: HOSPADM

## 2024-07-16 RX ORDER — SODIUM CHLORIDE, SODIUM LACTATE, POTASSIUM CHLORIDE, CALCIUM CHLORIDE 600; 310; 30; 20 MG/100ML; MG/100ML; MG/100ML; MG/100ML
20 INJECTION, SOLUTION INTRAVENOUS ONCE
Status: COMPLETED | OUTPATIENT
Start: 2024-07-16 | End: 2024-07-16

## 2024-07-16 RX ORDER — DEXTROSE MONOHYDRATE 25 G/50ML
25 INJECTION, SOLUTION INTRAVENOUS
Status: DISCONTINUED | OUTPATIENT
Start: 2024-07-16 | End: 2024-07-19 | Stop reason: HOSPADM

## 2024-07-16 RX ORDER — OXYCODONE HCL 10 MG/1
10 TABLET, FILM COATED, EXTENDED RELEASE ORAL ONCE
Status: COMPLETED | OUTPATIENT
Start: 2024-07-16 | End: 2024-07-16

## 2024-07-16 RX ORDER — GLYCOPYRROLATE 0.2 MG/ML
INJECTION INTRAMUSCULAR; INTRAVENOUS AS NEEDED
Status: DISCONTINUED | OUTPATIENT
Start: 2024-07-16 | End: 2024-07-16 | Stop reason: SURG

## 2024-07-16 RX ORDER — LIDOCAINE HYDROCHLORIDE 10 MG/ML
0.5 INJECTION, SOLUTION INFILTRATION; PERINEURAL ONCE AS NEEDED
Status: COMPLETED | OUTPATIENT
Start: 2024-07-16 | End: 2024-07-16

## 2024-07-16 RX ORDER — BISACODYL 10 MG
10 SUPPOSITORY, RECTAL RECTAL DAILY PRN
Status: DISCONTINUED | OUTPATIENT
Start: 2024-07-16 | End: 2024-07-19 | Stop reason: HOSPADM

## 2024-07-16 RX ORDER — SODIUM CHLORIDE 0.9 % (FLUSH) 0.9 %
10 SYRINGE (ML) INJECTION EVERY 12 HOURS SCHEDULED
Status: DISCONTINUED | OUTPATIENT
Start: 2024-07-16 | End: 2024-07-16 | Stop reason: HOSPADM

## 2024-07-16 RX ORDER — AMOXICILLIN 250 MG
2 CAPSULE ORAL 2 TIMES DAILY PRN
Status: DISCONTINUED | OUTPATIENT
Start: 2024-07-16 | End: 2024-07-19 | Stop reason: HOSPADM

## 2024-07-16 RX ORDER — HYDROCODONE BITARTRATE AND ACETAMINOPHEN 5; 325 MG/1; MG/1
1 TABLET ORAL EVERY 4 HOURS PRN
Status: DISCONTINUED | OUTPATIENT
Start: 2024-07-16 | End: 2024-07-19 | Stop reason: HOSPADM

## 2024-07-16 RX ORDER — SODIUM CHLORIDE 0.9 % (FLUSH) 0.9 %
10 SYRINGE (ML) INJECTION AS NEEDED
Status: DISCONTINUED | OUTPATIENT
Start: 2024-07-16 | End: 2024-07-19 | Stop reason: HOSPADM

## 2024-07-16 RX ORDER — SODIUM CHLORIDE 9 MG/ML
INJECTION, SOLUTION INTRAVENOUS CONTINUOUS PRN
Status: DISCONTINUED | OUTPATIENT
Start: 2024-07-16 | End: 2024-07-16 | Stop reason: SURG

## 2024-07-16 RX ORDER — ACETAMINOPHEN 160 MG/5ML
650 SOLUTION ORAL EVERY 4 HOURS PRN
Status: DISCONTINUED | OUTPATIENT
Start: 2024-07-16 | End: 2024-07-19 | Stop reason: HOSPADM

## 2024-07-16 RX ORDER — LIDOCAINE HYDROCHLORIDE 20 MG/ML
INJECTION, SOLUTION EPIDURAL; INFILTRATION; INTRACAUDAL; PERINEURAL AS NEEDED
Status: DISCONTINUED | OUTPATIENT
Start: 2024-07-16 | End: 2024-07-16 | Stop reason: SURG

## 2024-07-16 RX ORDER — POLYETHYLENE GLYCOL 3350 17 G/17G
17 POWDER, FOR SOLUTION ORAL DAILY PRN
Status: DISCONTINUED | OUTPATIENT
Start: 2024-07-16 | End: 2024-07-19 | Stop reason: HOSPADM

## 2024-07-16 RX ORDER — SODIUM CHLORIDE 9 MG/ML
40 INJECTION, SOLUTION INTRAVENOUS AS NEEDED
Status: DISCONTINUED | OUTPATIENT
Start: 2024-07-16 | End: 2024-07-19 | Stop reason: HOSPADM

## 2024-07-16 RX ORDER — INSULIN LISPRO 100 [IU]/ML
2-7 INJECTION, SOLUTION INTRAVENOUS; SUBCUTANEOUS
Status: DISCONTINUED | OUTPATIENT
Start: 2024-07-16 | End: 2024-07-19 | Stop reason: HOSPADM

## 2024-07-16 RX ORDER — PHENYLEPHRINE HCL IN 0.9% NACL 1 MG/10 ML
SYRINGE (ML) INTRAVENOUS AS NEEDED
Status: DISCONTINUED | OUTPATIENT
Start: 2024-07-16 | End: 2024-07-16 | Stop reason: SURG

## 2024-07-16 RX ORDER — SODIUM CHLORIDE 0.9 % (FLUSH) 0.9 %
10 SYRINGE (ML) INJECTION EVERY 12 HOURS SCHEDULED
Status: DISCONTINUED | OUTPATIENT
Start: 2024-07-16 | End: 2024-07-19 | Stop reason: HOSPADM

## 2024-07-16 RX ORDER — FENTANYL CITRATE 50 UG/ML
INJECTION, SOLUTION INTRAMUSCULAR; INTRAVENOUS AS NEEDED
Status: DISCONTINUED | OUTPATIENT
Start: 2024-07-16 | End: 2024-07-16 | Stop reason: SURG

## 2024-07-16 RX ORDER — ACETAMINOPHEN 500 MG
1000 TABLET ORAL ONCE
Status: COMPLETED | OUTPATIENT
Start: 2024-07-16 | End: 2024-07-16

## 2024-07-16 RX ORDER — GINSENG 100 MG
CAPSULE ORAL AS NEEDED
Status: DISCONTINUED | OUTPATIENT
Start: 2024-07-16 | End: 2024-07-16 | Stop reason: HOSPADM

## 2024-07-16 RX ORDER — ENOXAPARIN SODIUM 100 MG/ML
40 INJECTION SUBCUTANEOUS DAILY
Status: DISCONTINUED | OUTPATIENT
Start: 2024-07-17 | End: 2024-07-19 | Stop reason: HOSPADM

## 2024-07-16 RX ORDER — OXYCODONE HYDROCHLORIDE 5 MG/1
5 TABLET ORAL ONCE AS NEEDED
Status: DISCONTINUED | OUTPATIENT
Start: 2024-07-16 | End: 2024-07-16 | Stop reason: HOSPADM

## 2024-07-16 RX ORDER — ONDANSETRON 2 MG/ML
INJECTION INTRAMUSCULAR; INTRAVENOUS AS NEEDED
Status: DISCONTINUED | OUTPATIENT
Start: 2024-07-16 | End: 2024-07-16 | Stop reason: SURG

## 2024-07-16 RX ORDER — CELECOXIB 200 MG/1
200 CAPSULE ORAL ONCE
Status: COMPLETED | OUTPATIENT
Start: 2024-07-16 | End: 2024-07-16

## 2024-07-16 RX ORDER — EPHEDRINE SULFATE 5 MG/ML
5 INJECTION INTRAVENOUS ONCE AS NEEDED
Status: DISCONTINUED | OUTPATIENT
Start: 2024-07-16 | End: 2024-07-16 | Stop reason: HOSPADM

## 2024-07-16 RX ORDER — HYDRALAZINE HYDROCHLORIDE 20 MG/ML
5 INJECTION INTRAMUSCULAR; INTRAVENOUS
Status: DISCONTINUED | OUTPATIENT
Start: 2024-07-16 | End: 2024-07-16 | Stop reason: HOSPADM

## 2024-07-16 RX ORDER — IPRATROPIUM BROMIDE AND ALBUTEROL SULFATE 2.5; .5 MG/3ML; MG/3ML
3 SOLUTION RESPIRATORY (INHALATION) ONCE AS NEEDED
Status: DISCONTINUED | OUTPATIENT
Start: 2024-07-16 | End: 2024-07-16 | Stop reason: HOSPADM

## 2024-07-16 RX ORDER — SODIUM CHLORIDE 9 MG/ML
40 INJECTION, SOLUTION INTRAVENOUS AS NEEDED
Status: DISCONTINUED | OUTPATIENT
Start: 2024-07-16 | End: 2024-07-16 | Stop reason: HOSPADM

## 2024-07-16 RX ORDER — OXYCODONE HYDROCHLORIDE 5 MG/1
10 TABLET ORAL EVERY 4 HOURS PRN
Status: DISCONTINUED | OUTPATIENT
Start: 2024-07-16 | End: 2024-07-16 | Stop reason: HOSPADM

## 2024-07-16 RX ORDER — BISACODYL 5 MG/1
5 TABLET, DELAYED RELEASE ORAL DAILY PRN
Status: DISCONTINUED | OUTPATIENT
Start: 2024-07-16 | End: 2024-07-19 | Stop reason: HOSPADM

## 2024-07-16 RX ORDER — LABETALOL HYDROCHLORIDE 5 MG/ML
5 INJECTION, SOLUTION INTRAVENOUS
Status: DISCONTINUED | OUTPATIENT
Start: 2024-07-16 | End: 2024-07-16 | Stop reason: HOSPADM

## 2024-07-16 RX ORDER — MAGNESIUM SULFATE HEPTAHYDRATE 500 MG/ML
INJECTION, SOLUTION INTRAMUSCULAR; INTRAVENOUS AS NEEDED
Status: DISCONTINUED | OUTPATIENT
Start: 2024-07-16 | End: 2024-07-16 | Stop reason: SURG

## 2024-07-16 RX ORDER — DEXMEDETOMIDINE HYDROCHLORIDE 100 UG/ML
INJECTION, SOLUTION INTRAVENOUS AS NEEDED
Status: DISCONTINUED | OUTPATIENT
Start: 2024-07-16 | End: 2024-07-16 | Stop reason: SURG

## 2024-07-16 RX ORDER — NALOXONE HCL 0.4 MG/ML
0.4 VIAL (ML) INJECTION
Status: DISCONTINUED | OUTPATIENT
Start: 2024-07-16 | End: 2024-07-19 | Stop reason: HOSPADM

## 2024-07-16 RX ORDER — MEPERIDINE HYDROCHLORIDE 25 MG/ML
12.5 INJECTION INTRAMUSCULAR; INTRAVENOUS; SUBCUTANEOUS
Status: DISCONTINUED | OUTPATIENT
Start: 2024-07-16 | End: 2024-07-16 | Stop reason: HOSPADM

## 2024-07-16 RX ORDER — NALOXONE HCL 0.4 MG/ML
0.4 VIAL (ML) INJECTION AS NEEDED
Status: DISCONTINUED | OUTPATIENT
Start: 2024-07-16 | End: 2024-07-16 | Stop reason: HOSPADM

## 2024-07-16 RX ORDER — REMIFENTANIL HYDROCHLORIDE 1 MG/ML
INJECTION, POWDER, LYOPHILIZED, FOR SOLUTION INTRAVENOUS CONTINUOUS PRN
Status: DISCONTINUED | OUTPATIENT
Start: 2024-07-16 | End: 2024-07-16 | Stop reason: SURG

## 2024-07-16 RX ORDER — EPHEDRINE SULFATE 5 MG/ML
INJECTION INTRAVENOUS AS NEEDED
Status: DISCONTINUED | OUTPATIENT
Start: 2024-07-16 | End: 2024-07-16 | Stop reason: SURG

## 2024-07-16 RX ORDER — SODIUM CHLORIDE, SODIUM LACTATE, POTASSIUM CHLORIDE, CALCIUM CHLORIDE 600; 310; 30; 20 MG/100ML; MG/100ML; MG/100ML; MG/100ML
9 INJECTION, SOLUTION INTRAVENOUS CONTINUOUS PRN
Status: DISCONTINUED | OUTPATIENT
Start: 2024-07-16 | End: 2024-07-16 | Stop reason: HOSPADM

## 2024-07-16 RX ORDER — SUCCINYLCHOLINE CHLORIDE 20 MG/ML
INJECTION INTRAMUSCULAR; INTRAVENOUS AS NEEDED
Status: DISCONTINUED | OUTPATIENT
Start: 2024-07-16 | End: 2024-07-16 | Stop reason: SURG

## 2024-07-16 RX ORDER — GABAPENTIN 300 MG/1
300 CAPSULE ORAL ONCE
Status: COMPLETED | OUTPATIENT
Start: 2024-07-16 | End: 2024-07-16

## 2024-07-16 RX ORDER — PROPOFOL 10 MG/ML
INJECTION, EMULSION INTRAVENOUS AS NEEDED
Status: DISCONTINUED | OUTPATIENT
Start: 2024-07-16 | End: 2024-07-16 | Stop reason: SURG

## 2024-07-16 RX ORDER — ONDANSETRON 2 MG/ML
4 INJECTION INTRAMUSCULAR; INTRAVENOUS ONCE AS NEEDED
Status: DISCONTINUED | OUTPATIENT
Start: 2024-07-16 | End: 2024-07-16 | Stop reason: HOSPADM

## 2024-07-16 RX ORDER — ENOXAPARIN SODIUM 100 MG/ML
40 INJECTION SUBCUTANEOUS DAILY
Status: DISCONTINUED | OUTPATIENT
Start: 2024-07-16 | End: 2024-07-16

## 2024-07-16 RX ORDER — DEXAMETHASONE SODIUM PHOSPHATE 4 MG/ML
4 INJECTION, SOLUTION INTRA-ARTICULAR; INTRALESIONAL; INTRAMUSCULAR; INTRAVENOUS; SOFT TISSUE EVERY 6 HOURS
Status: DISCONTINUED | OUTPATIENT
Start: 2024-07-16 | End: 2024-07-19 | Stop reason: HOSPADM

## 2024-07-16 RX ADMIN — PHENYLEPHRINE HYDROCHLORIDE 200 MCG: 10 INJECTION INTRAVENOUS at 07:58

## 2024-07-16 RX ADMIN — SUCCINYLCHOLINE CHLORIDE 140 MG: 20 INJECTION, SOLUTION INTRAMUSCULAR; INTRAVENOUS at 07:46

## 2024-07-16 RX ADMIN — DEXMEDETOMIDINE 14 MCG: 100 INJECTION, SOLUTION INTRAVENOUS at 10:46

## 2024-07-16 RX ADMIN — HYDROMORPHONE HYDROCHLORIDE 0.25 MG: 1 INJECTION, SOLUTION INTRAMUSCULAR; INTRAVENOUS; SUBCUTANEOUS at 17:30

## 2024-07-16 RX ADMIN — DEXAMETHASONE SODIUM PHOSPHATE 4 MG: 4 INJECTION, SOLUTION INTRAMUSCULAR; INTRAVENOUS at 20:04

## 2024-07-16 RX ADMIN — LIDOCAINE HYDROCHLORIDE 100 MG: 20 INJECTION, SOLUTION EPIDURAL; INFILTRATION; INTRACAUDAL; PERINEURAL at 07:46

## 2024-07-16 RX ADMIN — CEFAZOLIN 2 G: 2 INJECTION, POWDER, FOR SOLUTION INTRAMUSCULAR; INTRAVENOUS at 08:00

## 2024-07-16 RX ADMIN — MAGNESIUM SULFATE HEPTAHYDRATE 1 G: 500 INJECTION, SOLUTION INTRAMUSCULAR; INTRAVENOUS at 08:12

## 2024-07-16 RX ADMIN — SODIUM CHLORIDE: 9 INJECTION, SOLUTION INTRAVENOUS at 07:57

## 2024-07-16 RX ADMIN — REMIFENTANIL HYDROCHLORIDE 0.1 MCG/KG/MIN: 1 INJECTION, POWDER, LYOPHILIZED, FOR SOLUTION INTRAVENOUS at 07:49

## 2024-07-16 RX ADMIN — ACETAMINOPHEN 1000 MG: 500 TABLET, FILM COATED ORAL at 07:04

## 2024-07-16 RX ADMIN — PROPOFOL 140 MG: 10 INJECTION, EMULSION INTRAVENOUS at 07:46

## 2024-07-16 RX ADMIN — DEXMEDETOMIDINE 2 MCG: 100 INJECTION, SOLUTION INTRAVENOUS at 08:40

## 2024-07-16 RX ADMIN — GLYCOPYRROLATE 0.1 MG: 0.2 INJECTION, SOLUTION INTRAMUSCULAR; INTRAVENOUS at 08:13

## 2024-07-16 RX ADMIN — Medication 100 MCG: at 08:00

## 2024-07-16 RX ADMIN — SODIUM CHLORIDE 2000 MG: 900 INJECTION INTRAVENOUS at 23:25

## 2024-07-16 RX ADMIN — HYDROMORPHONE HYDROCHLORIDE 0.5 MG: 1 INJECTION, SOLUTION INTRAMUSCULAR; INTRAVENOUS; SUBCUTANEOUS at 23:22

## 2024-07-16 RX ADMIN — PROPOFOL 200 MCG/KG/MIN: 10 INJECTION, EMULSION INTRAVENOUS at 07:49

## 2024-07-16 RX ADMIN — Medication 10 ML: at 20:04

## 2024-07-16 RX ADMIN — SODIUM CHLORIDE, SODIUM LACTATE, POTASSIUM CHLORIDE, AND CALCIUM CHLORIDE: .6; .31; .03; .02 INJECTION, SOLUTION INTRAVENOUS at 07:42

## 2024-07-16 RX ADMIN — PHENYLEPHRINE HYDROCHLORIDE 0.5 MCG/KG/MIN: 10 INJECTION INTRAVENOUS at 08:00

## 2024-07-16 RX ADMIN — HYDROCODONE BITARTRATE AND ACETAMINOPHEN 1 TABLET: 5; 325 TABLET ORAL at 15:45

## 2024-07-16 RX ADMIN — ONDANSETRON 4 MG: 2 INJECTION INTRAMUSCULAR; INTRAVENOUS at 10:47

## 2024-07-16 RX ADMIN — INSULIN LISPRO 4 UNITS: 100 INJECTION, SOLUTION INTRAVENOUS; SUBCUTANEOUS at 21:20

## 2024-07-16 RX ADMIN — EPHEDRINE SULFATE 5 MG: 5 INJECTION INTRAVENOUS at 08:19

## 2024-07-16 RX ADMIN — SODIUM CHLORIDE 2000 MG: 900 INJECTION INTRAVENOUS at 17:10

## 2024-07-16 RX ADMIN — HYDROCODONE BITARTRATE AND ACETAMINOPHEN 1 TABLET: 5; 325 TABLET ORAL at 19:50

## 2024-07-16 RX ADMIN — GABAPENTIN 300 MG: 300 CAPSULE ORAL at 07:04

## 2024-07-16 RX ADMIN — OXYCODONE HYDROCHLORIDE 10 MG: 10 TABLET, FILM COATED, EXTENDED RELEASE ORAL at 07:04

## 2024-07-16 RX ADMIN — HYDROMORPHONE HYDROCHLORIDE 0.5 MG: 1 INJECTION, SOLUTION INTRAMUSCULAR; INTRAVENOUS; SUBCUTANEOUS at 09:59

## 2024-07-16 RX ADMIN — DEXAMETHASONE SODIUM PHOSPHATE 8 MG: 4 INJECTION, SOLUTION INTRAMUSCULAR; INTRAVENOUS at 08:13

## 2024-07-16 RX ADMIN — LEVOTHYROXINE SODIUM 50 MCG: 0.05 TABLET ORAL at 17:07

## 2024-07-16 RX ADMIN — CELECOXIB 200 MG: 200 CAPSULE ORAL at 07:04

## 2024-07-16 RX ADMIN — DEXAMETHASONE SODIUM PHOSPHATE 10 MG: 4 INJECTION, SOLUTION INTRAMUSCULAR; INTRAVENOUS at 17:09

## 2024-07-16 RX ADMIN — EPHEDRINE SULFATE 5 MG: 5 INJECTION INTRAVENOUS at 08:10

## 2024-07-16 RX ADMIN — AMLODIPINE BESYLATE 5 MG: 5 TABLET ORAL at 17:07

## 2024-07-16 RX ADMIN — INSULIN LISPRO 2 UNITS: 100 INJECTION, SOLUTION INTRAVENOUS; SUBCUTANEOUS at 17:29

## 2024-07-16 RX ADMIN — FENTANYL CITRATE 100 MCG: 50 INJECTION, SOLUTION INTRAMUSCULAR; INTRAVENOUS at 07:46

## 2024-07-16 RX ADMIN — SODIUM CHLORIDE, POTASSIUM CHLORIDE, SODIUM LACTATE AND CALCIUM CHLORIDE 20 ML/HR: 600; 310; 30; 20 INJECTION, SOLUTION INTRAVENOUS at 06:58

## 2024-07-16 RX ADMIN — DEXMEDETOMIDINE 4 MCG: 100 INJECTION, SOLUTION INTRAVENOUS at 08:36

## 2024-07-16 RX ADMIN — HYDROMORPHONE HYDROCHLORIDE 0.5 MG: 1 INJECTION, SOLUTION INTRAMUSCULAR; INTRAVENOUS; SUBCUTANEOUS at 08:53

## 2024-07-16 RX ADMIN — LABETALOL HYDROCHLORIDE 5 MG: 5 INJECTION, SOLUTION INTRAVENOUS at 11:50

## 2024-07-16 RX ADMIN — LIDOCAINE HYDROCHLORIDE 0.5 ML: 10 INJECTION, SOLUTION INFILTRATION; PERINEURAL at 06:58

## 2024-07-16 NOTE — ANESTHESIA PROCEDURE NOTES
Airway  Urgency: elective    Date/Time: 7/16/2024 7:48 AM  Airway not difficult    General Information and Staff    Patient location during procedure: OR  CRNA/CAA: Dawn Oakley CRNA    Indications and Patient Condition  Indications for airway management: airway protection    Preoxygenated: yes  MILS maintained throughout  Mask difficulty assessment: 2 - vent by mask + OA or adjuvant +/- NMBA    Final Airway Details  Final airway type: endotracheal airway      Successful airway: ETT  Cuffed: yes   Successful intubation technique: video laryngoscopy  Facilitating devices/methods: intubating stylet  Endotracheal tube insertion site: oral  Blade: Tiwari  Blade size: 3  ETT size (mm): 7.0  Cormack-Lehane Classification: grade I - full view of glottis  Placement verified by: chest auscultation and capnometry   Measured from: lips  Number of attempts at approach: 1  Assessment: lips, teeth, and gum same as pre-op and atraumatic intubation    Additional Comments  C-spine neutral throughout induction

## 2024-07-16 NOTE — PLAN OF CARE
Goal Outcome Evaluation:      Patient admitted to floor, family at bedside, VSS, no concerns at this time.     Progress: improving

## 2024-07-16 NOTE — ANESTHESIA PROCEDURE NOTES
Arterial Line    Pre-sedation assessment completed: 7/16/2024 8:47 AM    Patient reassessed immediately prior to procedure    Patient location during procedure: OR  Start time: 7/16/2024 8:50 AM   Performed By   CRNA/CAA: Dawn Oakley CRNA   Preanesthetic Checklist  Completed: patient identified, IV checked, site marked, risks and benefits discussed, surgical consent, monitors and equipment checked, pre-op evaluation and timeout performed  Arterial Line Prep    Sterile Tech: mask and gloves  Prep: ChloraPrep  Patient monitoring: blood pressure monitoring, continuous pulse oximetry and EKG  Arterial Line Procedure   Laterality:right  Location:  radial artery  Catheter size: 20 G   Guidance: landmark technique and palpation technique  Number of attempts: 1  Successful placement: yes   Post Assessment   Dressing Type: occlusive dressing applied, secured with tape and wrist guard applied.   Complications no  Circ/Move/Sens Assessment: normal and unchanged.   Patient Tolerance: patient tolerated the procedure well with no apparent complications

## 2024-07-16 NOTE — ANESTHESIA PREPROCEDURE EVALUATION
Anesthesia Evaluation     Patient summary reviewed and Nursing notes reviewed   history of anesthetic complications:  PONV  NPO Solid Status: > 8 hours  NPO Liquid Status: > 2 hours           Airway   Mallampati: II  TM distance: >3 FB  Neck ROM: full  No difficulty expected  Dental    (+) upper dentures    Pulmonary    (+) asthma,sleep apnea  Cardiovascular     (+) hypertension, hyperlipidemia      Neuro/Psych  (+) headaches, syncope, numbness  GI/Hepatic/Renal/Endo    (+) liver disease, diabetes mellitus, thyroid problem     Musculoskeletal     Abdominal    Substance History      OB/GYN          Other   arthritis,   history of cancer    ROS/Med Hx Other: Preserved LV systolic function EF around 60%  Diastolic LV dysfunction  No significant Doppler abnormalities                Anesthesia Plan    ASA 3     general, ERAS Protocol and Betty   total IV anesthesia  intravenous induction     Anesthetic plan, risks, benefits, and alternatives have been provided, discussed and informed consent has been obtained with: patient.    Plan discussed with CRNA.    CODE STATUS:

## 2024-07-16 NOTE — CONSULTS
[facility] Medicine Services  Consult Note    Patient Name: Esperanza Smith  : 1945  MRN: [id number]  Primary Care Physician:  Dom Estrada MD  Referring Physician: Mario Urrutia IV, MD  Date of admission: 2024  Date and Time of Care: 24 at 2000    Inpatient Hospitalist Consult  Consult performed by: Magaly Campuzano APRN  Consult ordered by: Angel Ojeda PA            Reason for Consult/ Chief Complaint: medical management     Consult Requested By: Dr Mario Urrutia    Subjective:     History of Present Illness:    Very pleasant 78-year-old female with a past medical history of diabetes mellitus type 2, hypertension, hyperlipidemia, hypothyroidism, obesity, breast cancer, uterine cancer, who underwent today a cervical 2-4 laminectomy for resection of an intra dural tumor.  She is awake and alert and answering appropriately.  She tolerated her soft diet.  She reports she recently had diverticulitis and was on several antibiotics.  She feels she is improving.  She does report that her neck pain is not being controlled.  She is currently on 0.25 mg Dilaudid every 4 hours.  This was increased to 0.50 milligrams every 4 hours.  She will continue to be evaluated and treated.  Neurosurgery following.    Review of Systems:   Review of Systems   Constitutional: Negative.    HENT: Negative.     Eyes: Negative.    Respiratory: Negative.     Cardiovascular: Negative.    Gastrointestinal: Negative.    Endocrine: Negative.    Genitourinary: Negative.    Musculoskeletal:  Positive for neck pain.   Skin: Negative.    Allergic/Immunologic: Negative.    Neurological: Negative.    Hematological: Negative.    Psychiatric/Behavioral: Negative.     All other systems reviewed and are negative.      Personal History:     Past Medical History:   Diagnosis Date    Allergic Penicillian    Asthma congestion    Breast cancer     Cataract     Cervical disc disorder     Cholelithiasis surgery  removal     DDD (degenerative disc disease), lumbosacral     Diabetes mellitus     Diverticulitis     Diverticulosis     Headache     History of medical problems 54 inches left of small intesting    Hyperlipidemia     Hypertension     Hypothyroidism     IBS (irritable bowel syndrome)     Inflammatory bowel disease     Liver disease fatty liver    Low back pain Sciatic    Lumbosacral disc disease     Neuromuscular disorder neuropathy    Obesity     PONV (postoperative nausea and vomiting)     Seasonal allergies     Sleep apnea        Past Surgical History:   Procedure Laterality Date    BREAST BIOPSY      BREAST SURGERY Left 02/2012    x2     CHOLECYSTECTOMY  1996    COLON RESECTION SMALL BOWEL  05/25/2014    COLON SURGERY  small intestine repair    HYSTERECTOMY      LYMPH NODE BIOPSY      MASTECTOMY      OOPHORECTOMY  1965    SIMPLE MASTECTOMY Right 02/26/2020    Procedure: Right simple mastectomy;  Surgeon: Mario Quezada DO;  Location: [facility] MAIN OR;  Service: General;  Laterality: Right;    SMALL INTESTINE SURGERY      SUBTOTAL HYSTERECTOMY  full hysterectomy       Family History: family history includes Breast cancer in her maternal grandmother and sister. Otherwise pertinent FHx was reviewed and not pertinent to current issue.    Social History:  reports that she has never smoked. She has never been exposed to tobacco smoke. She has never used smokeless tobacco. She reports that she does not currently use alcohol after a past usage of about 1.0 standard drink of alcohol per week. She reports that she does not use drugs.    Home Medications:   Apple Cider Vinegar, Inositol, Super B-Complex, Turmeric, Vitamin C, Vitamin D3, amLODIPine, fluticasone, levothyroxine, metFORMIN, nystatin-triamcinolone, and traMADol    Allergies:  Allergies   Allergen Reactions    Diphenhydramine Rash    Morphine Other (See Comments)     nightmares    Penicillins Other (See Comments)     told not to take any more    Latex Rash          Objective:     Vital Signs  Temp:  [97.5 °F (36.4 °C)-98.1 °F (36.7 °C)] 97.6 °F (36.4 °C)  Heart Rate:  [] 107  Resp:  [15-27] 20  BP: (131-166)/() 162/112  Flow (L/min):  [3-6] 3   Body mass index is 31.22 kg/m².    Physical Exam  Physical Exam  Vitals reviewed.   Constitutional:       Appearance: Normal appearance. She is obese.   HENT:      Head: Normocephalic and atraumatic.      Right Ear: External ear normal.      Left Ear: External ear normal.      Nose: Nose normal.      Mouth/Throat:      Mouth: Mucous membranes are moist.   Eyes:      Extraocular Movements: Extraocular movements intact.   Cardiovascular:      Rate and Rhythm: Normal rate and regular rhythm.   Pulmonary:      Effort: Pulmonary effort is normal.      Breath sounds: Normal breath sounds.   Abdominal:      Palpations: Abdomen is soft.   Genitourinary:     Comments: deferred  Musculoskeletal:         General: Normal range of motion.      Cervical back: Tenderness present.   Skin:     General: Skin is warm and dry.   Neurological:      General: No focal deficit present.      Mental Status: She is alert and oriented to person, place, and time.   Psychiatric:         Mood and Affect: Mood normal.         Behavior: Behavior normal.         Thought Content: Thought content normal.         Judgment: Judgment normal.         Scheduled Meds   amLODIPine, 5 mg, Oral, Daily  ceFAZolin, 2,000 mg, Intravenous, Q8H  dexAMETHasone, 4 mg, Intravenous, Q6H  [START ON 7/17/2024] enoxaparin, 40 mg, Subcutaneous, Daily  insulin lispro, 2-7 Units, Subcutaneous, 4x Daily AC & at Bedtime  levothyroxine, 50 mcg, Oral, Q AM  sodium chloride, 10 mL, Intravenous, Q12H       PRN Meds     acetaminophen **OR** acetaminophen **OR** acetaminophen    senna-docusate sodium **AND** polyethylene glycol **AND** bisacodyl **AND** bisacodyl    dextrose    dextrose    glucagon (human recombinant)    HYDROcodone-acetaminophen    HYDROmorphone    HYDROmorphone  **AND** naloxone    melatonin    methocarbamol    ondansetron    ondansetron ODT    sodium chloride    sodium chloride   Infusions         Diagnostic Data    Results from last 7 days   Lab Units 07/16/24  0650   POTASSIUM mmol/L 4.1       No radiology results for the last day      I reviewed the patient's new clinical results.    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, being followed by neurosurgery on cefazolin per surgery, increased IV pain medication from 0.25 mg every 4 hours to 0.5 mg every 4 hours for comfort    Type 2 diabetes mellitus, hold home metformin while inpatient, consistent carb heart healthy diet, and SSI as needed with Accu-Cheks ACHS    Hyperlipidemia, on statin    Hypertension, on home Norvasc monitor BP Will add as needed medications if needed    Acquired hypothyroidism, home levothyroxine reordered    Breast cancer/uterine cancer, noted    Overweight BMI 31.22 lifestyle management education      VTE Prophylaxis:  Pharmacologic & mechanical VTE prophylaxis orders are present.         Code status is   There are no questions and answers to display.       Plan for disposition: Pending clinical course  Time: 30 minutes        Signature: Electronically signed by DANNA Pierre, 07/16/24, 20:11 EDT.  [facility] Hospitalist Team

## 2024-07-16 NOTE — OP NOTE
CERVICAL SPINAL CORD TUMOR REMOVAL  Procedure Report    Patient Name:  Esperanza Smith  YOB: 1945    Date of Surgery:  7/16/2024     Indications: 78-year-old female with history of worsening neck pain upper extremity radicular symptoms and was found to have a large intradural extramedullary lesion consistent with a meningioma at the level of C2-3.  This causes severe stenosis and spinal cord compression.  Given this patient was taken to the OR for posterior cervical laminectomy and resection of tumor.  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 Item Serial No.  Lot No. LRB No. Used Action   DEV CONTRL TISS STRATAFIX SPIRAL MNCRYL UD 3/0 PLS 30CM - [id number] Implant DEV CONTRL TISS STRATAFIX SPIRAL MNCRYL UD 3/0 PLS 30CM  [employer]  DIV OF J AND J [id number] N/A 1 Implanted   KT SEAL HEMOS ABS FLOSEAL MATRX FAST/PREP 10ML - [id number] Implant KT SEAL HEMOS ABS FLOSEAL MATRX FAST/PREP 10ML  [facility] [id number] N/A 2 Implanted   SEAL DURL ADHERUS/AUTOSPRAY HYDROGEL DS - [id number] Implant SEAL DURL ADHERUS/AUTOSPRAY HYDROGEL DS  KAROLINA GABO [id number] N/A 1 Implanted   DURALMATRIX DURAGEN PLS 4X5IN - [id number] Implant DURALMATRIX DURAGEN PLS 4X5IN  [employer] [id number] N/A 1 Implanted   DEV WND/CLS CONTRL TISS STRATAFIX SYMM PDS PLS CTX 60CM SVETLANA - [id number] Implant DEV WND/CLS CONTRL TISS STRATAFIX SYMM  PDS PLS CTX 60CM SVETLANA  [employer]  DIV OF J AND J [facility] N/A 1 Implanted       Specimen:          Specimens       ID Source Type Tests Collected By Collected At Frozen?    A Neck Tissue TISSUE PATHOLOGY EXAM   Mario Urrutia IV, MD 7/16/24 0930 Yes    Description: Cervical Tumor    Comment: Call [phone]    B Neck Tissue TISSUE PATHOLOGY EXAM   Mario Urrutia IV, MD 7/16/24 0933 Yes    Description: Cervical Tumor    Comment: Call [phone]    This specimen was not marked as sent.                Findings: Intradural extramedullary tumor    Complications: None    Description of Procedure: Patient was brought to the OR and placed under general endotracheal anesthesia.  Vega head felipe was placed and the patient was flipped into the prone position on a Michael table with her neck secured in place in a neutral position.  Patient was prepped and draped in usual fashion.  Incision was made from the lower occipital region down to approximately C5 and carried down to the deep dermal and fat layers with monopolar cautery.  Fascia was opened with monopolar cautery and subperiosteal dissection was undertaken from C1 to 4.  High-speed bur was used to make a cut through the lamina at the laminar facet junction at C2-C3.  Superior portion of C4 was removed with a high-speed bur and the ligament was opened and removed.  Ligament between C1 and C2 was also opened and removed with a 2 Kerrison.  Lamina and spinous processes were then removed en bloc from C2 and C3.  High-speed bur was used to remove a portion of the inferior part of C1 arch.  Microscope was brought in the field.  The dura was then opened from C1-C4 paracentral to the left.  There was very thick and carried down until tumor was reached with a 11 blade.  Incisions were carried superiorly and inferiorly until the dural opening extended above and below the tumor.  Tumor was entered into bulked with Cusa.  Plan was found along the tumor wall in the dura on the left and right sides of  the opening carried down to the edge of the tumor.  Bipolar cautery and microscissors were then used to detach the tumor laterally.  The anterior portion of the tumor adjacent to the spinal cord and nerves was also carefully dissected away from the spinal cord and nerves cutting all arachnoid adhesions with microscissors.  The tumor was then removed piecemeal starting inferiorly and moving superiorly.  The dura and thickened dura surrounding where the tumor was was coagulated with bipolar cautery.  Once all gross tumor had been removed, the area was gently irrigated and hemostasis was achieved with bipolar cautery and Gelfoam with thrombin.  Frozen section of the tumor was consistent with meningioma.  Attention was turned to closure.  The dura was closed with running Nurolon suture.  Meticulous hemostasis was achieved and DuraGen was placed over the dura as well as a dural sealant.  Hemovac drain was placed.  Muscle was reapproximated using 0 Vicryl sutures, the fascia was closed with a running strata fix 1, the deep dermal layer was closed with 2-0 Vicryl sutures and the skin was closed with a running subcuticular Monocryl.  Dermabond was placed over the wound.  There were no changes in MEPs or SSEPs throughout the case.                Assistant: Angel Ojeda PA; Ely Carrera PA-C  was responsible for performing the following activities: Retraction, Suction, Irrigation, Suturing, Closing, and Placing Dressing and their skilled assistance was necessary for the success of this case.    Mario Urrutia IV, MD     Date: 7/16/2024  Time: 11:14 EDT

## 2024-07-16 NOTE — ANESTHESIA POSTPROCEDURE EVALUATION
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        Post Anesthesia Care and Evaluation    Patient location during evaluation: PACU  Patient participation: complete - patient participated  Level of consciousness: awake  Pain scale: See nurse's notes for pain score.  Pain management: adequate    Airway patency: patent  Anesthetic complications: No anesthetic complications  PONV Status: none  Cardiovascular status: acceptable  Respiratory status: acceptable and spontaneous ventilation  Hydration status: acceptable    Comments: Patient seen and examined postoperatively; vital signs stable; SpO2 greater than or equal to 90%; cardiopulmonary status stable; nausea/vomiting adequately controlled; pain adequately controlled; no apparent anesthesia complications; patient discharged from anesthesia care when discharge criteria were met

## 2024-07-16 NOTE — INTERVAL H&P NOTE
H&P reviewed. The patient was examined and there are no changes to the H&P.  Patient understands the risks of surgery as well as increased risk due to medical comorbidities including diverticulitis history of breast cancer and uterine cancer hypertension type 2 diabetes among other medical comorbidities and she is agreed to undergo the procedure

## 2024-07-16 NOTE — ANESTHESIA PROCEDURE NOTES
Peripheral IV    Patient location during procedure: OR  Start time: 7/16/2024 8:50 AM  Performed By   CRNA/MARQUEZ: Dawn Oakley CRNA  Peripheral IV Prep   Patient position: supine   Prep: alcohol swabs  Patient monitoring: heart rate, cardiac monitor and continuous pulse ox  Peripheral IV Procedure   Laterality:left  Location:  Hand  Catheter size: 20 G          Post Assessment   Dressing Type: tape and transparent.    IV Dressing/Site: clean, dry and intact

## 2024-07-17 ENCOUNTER — APPOINTMENT (OUTPATIENT)
Dept: MRI IMAGING | Facility: HOSPITAL | Age: 79
End: 2024-07-17
Payer: MEDICARE

## 2024-07-17 LAB
ANION GAP SERPL CALCULATED.3IONS-SCNC: 11.5 MMOL/L (ref 5–15)
BASOPHILS # BLD AUTO: 0.01 10*3/MM3 (ref 0–0.2)
BASOPHILS NFR BLD AUTO: 0.1 % (ref 0–1.5)
BUN SERPL-MCNC: 5 MG/DL (ref 8–23)
BUN/CREAT SERPL: 9.4 (ref 7–25)
CALCIUM SPEC-SCNC: 9.3 MG/DL (ref 8.6–10.5)
CHLORIDE SERPL-SCNC: 101 MMOL/L (ref 98–107)
CO2 SERPL-SCNC: 25.5 MMOL/L (ref 22–29)
CREAT SERPL-MCNC: 0.53 MG/DL (ref 0.57–1)
DEPRECATED RDW RBC AUTO: 43.1 FL (ref 37–54)
EGFRCR SERPLBLD CKD-EPI 2021: 96 ML/MIN/1.73
EOSINOPHIL # BLD AUTO: 0 10*3/MM3 (ref 0–0.4)
EOSINOPHIL NFR BLD AUTO: 0 % (ref 0.3–6.2)
ERYTHROCYTE [DISTWIDTH] IN BLOOD BY AUTOMATED COUNT: 12.8 % (ref 12.3–15.4)
GLUCOSE BLDC GLUCOMTR-MCNC: 173 MG/DL (ref 70–105)
GLUCOSE BLDC GLUCOMTR-MCNC: 192 MG/DL (ref 70–105)
GLUCOSE BLDC GLUCOMTR-MCNC: 200 MG/DL (ref 70–105)
GLUCOSE BLDC GLUCOMTR-MCNC: 218 MG/DL (ref 70–105)
GLUCOSE SERPL-MCNC: 167 MG/DL (ref 65–99)
HCT VFR BLD AUTO: 46.3 % (ref 34–46.6)
HGB BLD-MCNC: 15.9 G/DL (ref 12–15.9)
IMM GRANULOCYTES # BLD AUTO: 0.1 10*3/MM3 (ref 0–0.05)
IMM GRANULOCYTES NFR BLD AUTO: 0.8 % (ref 0–0.5)
LAB AP CASE REPORT: NORMAL
LAB AP CLINICAL INFORMATION: NORMAL
LAB AP DIAGNOSIS COMMENT: NORMAL
LYMPHOCYTES # BLD AUTO: 1.11 10*3/MM3 (ref 0.7–3.1)
LYMPHOCYTES NFR BLD AUTO: 8.9 % (ref 19.6–45.3)
Lab: NORMAL
MCH RBC QN AUTO: 31.9 PG (ref 26.6–33)
MCHC RBC AUTO-ENTMCNC: 34.3 G/DL (ref 31.5–35.7)
MCV RBC AUTO: 93 FL (ref 79–97)
MONOCYTES # BLD AUTO: 0.67 10*3/MM3 (ref 0.1–0.9)
MONOCYTES NFR BLD AUTO: 5.4 % (ref 5–12)
NEUTROPHILS NFR BLD AUTO: 10.54 10*3/MM3 (ref 1.7–7)
NEUTROPHILS NFR BLD AUTO: 84.8 % (ref 42.7–76)
NRBC BLD AUTO-RTO: 0 /100 WBC (ref 0–0.2)
PATH REPORT.FINAL DX SPEC: NORMAL
PATH REPORT.GROSS SPEC: NORMAL
PLATELET # BLD AUTO: 293 10*3/MM3 (ref 140–450)
PMV BLD AUTO: 9.6 FL (ref 6–12)
POTASSIUM SERPL-SCNC: 4.9 MMOL/L (ref 3.5–5.2)
RBC # BLD AUTO: 4.98 10*6/MM3 (ref 3.77–5.28)
SODIUM SERPL-SCNC: 138 MMOL/L (ref 136–145)
WBC NRBC COR # BLD AUTO: 12.43 10*3/MM3 (ref 3.4–10.8)

## 2024-07-17 PROCEDURE — 82948 REAGENT STRIP/BLOOD GLUCOSE: CPT

## 2024-07-17 PROCEDURE — 25010000002 DEXAMETHASONE PER 1 MG

## 2024-07-17 PROCEDURE — 25010000002 HYDROMORPHONE 1 MG/ML SOLUTION: Performed by: NURSE PRACTITIONER

## 2024-07-17 PROCEDURE — 99024 POSTOP FOLLOW-UP VISIT: CPT

## 2024-07-17 PROCEDURE — 63710000001 INSULIN LISPRO (HUMAN) PER 5 UNITS

## 2024-07-17 PROCEDURE — 25010000002 CEFAZOLIN PER 500 MG

## 2024-07-17 PROCEDURE — 80048 BASIC METABOLIC PNL TOTAL CA: CPT

## 2024-07-17 PROCEDURE — 85025 COMPLETE CBC W/AUTO DIFF WBC: CPT

## 2024-07-17 PROCEDURE — 25010000002 ENOXAPARIN PER 10 MG

## 2024-07-17 RX ORDER — METOPROLOL SUCCINATE 25 MG/1
25 TABLET, EXTENDED RELEASE ORAL
Status: DISCONTINUED | OUTPATIENT
Start: 2024-07-17 | End: 2024-07-18

## 2024-07-17 RX ORDER — AMLODIPINE BESYLATE 5 MG/1
10 TABLET ORAL DAILY
Status: DISCONTINUED | OUTPATIENT
Start: 2024-07-18 | End: 2024-07-19 | Stop reason: HOSPADM

## 2024-07-17 RX ADMIN — DEXAMETHASONE SODIUM PHOSPHATE 4 MG: 4 INJECTION, SOLUTION INTRAMUSCULAR; INTRAVENOUS at 03:07

## 2024-07-17 RX ADMIN — HYDROCODONE BITARTRATE AND ACETAMINOPHEN 1 TABLET: 5; 325 TABLET ORAL at 21:01

## 2024-07-17 RX ADMIN — HYDROCODONE BITARTRATE AND ACETAMINOPHEN 1 TABLET: 5; 325 TABLET ORAL at 08:35

## 2024-07-17 RX ADMIN — DEXAMETHASONE SODIUM PHOSPHATE 4 MG: 4 INJECTION, SOLUTION INTRAMUSCULAR; INTRAVENOUS at 16:54

## 2024-07-17 RX ADMIN — HYDROCODONE BITARTRATE AND ACETAMINOPHEN 1 TABLET: 5; 325 TABLET ORAL at 16:56

## 2024-07-17 RX ADMIN — METOPROLOL SUCCINATE 25 MG: 25 TABLET, EXTENDED RELEASE ORAL at 16:54

## 2024-07-17 RX ADMIN — AMLODIPINE BESYLATE 5 MG: 5 TABLET ORAL at 08:04

## 2024-07-17 RX ADMIN — HYDROMORPHONE HYDROCHLORIDE 0.5 MG: 1 INJECTION, SOLUTION INTRAMUSCULAR; INTRAVENOUS; SUBCUTANEOUS at 05:48

## 2024-07-17 RX ADMIN — HYDROMORPHONE HYDROCHLORIDE 0.5 MG: 1 INJECTION, SOLUTION INTRAMUSCULAR; INTRAVENOUS; SUBCUTANEOUS at 11:57

## 2024-07-17 RX ADMIN — INSULIN LISPRO 2 UNITS: 100 INJECTION, SOLUTION INTRAVENOUS; SUBCUTANEOUS at 08:05

## 2024-07-17 RX ADMIN — Medication 10 ML: at 21:09

## 2024-07-17 RX ADMIN — DEXAMETHASONE SODIUM PHOSPHATE 4 MG: 4 INJECTION, SOLUTION INTRAMUSCULAR; INTRAVENOUS at 21:01

## 2024-07-17 RX ADMIN — INSULIN LISPRO 2 UNITS: 100 INJECTION, SOLUTION INTRAVENOUS; SUBCUTANEOUS at 12:52

## 2024-07-17 RX ADMIN — Medication 10 ML: at 08:05

## 2024-07-17 RX ADMIN — DEXAMETHASONE SODIUM PHOSPHATE 4 MG: 4 INJECTION, SOLUTION INTRAMUSCULAR; INTRAVENOUS at 08:04

## 2024-07-17 RX ADMIN — HYDROCODONE BITARTRATE AND ACETAMINOPHEN 1 TABLET: 5; 325 TABLET ORAL at 03:07

## 2024-07-17 RX ADMIN — ACETAMINOPHEN 650 MG: 325 TABLET, FILM COATED ORAL at 04:35

## 2024-07-17 RX ADMIN — ENOXAPARIN SODIUM 40 MG: 100 INJECTION SUBCUTANEOUS at 08:04

## 2024-07-17 RX ADMIN — LEVOTHYROXINE SODIUM 50 MCG: 0.05 TABLET ORAL at 05:34

## 2024-07-17 RX ADMIN — INSULIN LISPRO 3 UNITS: 100 INJECTION, SOLUTION INTRAVENOUS; SUBCUTANEOUS at 16:54

## 2024-07-17 RX ADMIN — INSULIN LISPRO 2 UNITS: 100 INJECTION, SOLUTION INTRAVENOUS; SUBCUTANEOUS at 21:01

## 2024-07-17 RX ADMIN — SODIUM CHLORIDE 2000 MG: 900 INJECTION INTRAVENOUS at 08:09

## 2024-07-17 NOTE — CASE MANAGEMENT/SOCIAL WORK
Discharge Planning Assessment  [facility]     Patient Name: Esperanza Smith  MRN: [id number]  Today's Date: 7/17/2024    Admit Date: 7/16/2024    Plan: AL PLAN: Routine home. (Needs Soft Collar thru [facility])     Discharge Needs Assessment       Row Name 07/17/24 1412       Living Environment    People in Home spouse    Current Living Arrangements home    Potentially Unsafe Housing Conditions none    In the past 12 months has the electric, gas, oil, or water company threatened to shut off services in your home? No    Primary Care Provided by self    Provides Primary Care For no one    Family Caregiver if Needed spouse    Quality of Family Relationships helpful;involved;supportive    Able to Return to Prior Arrangements yes       Resource/Environmental Concerns    Resource/Environmental Concerns none    Transportation Concerns none       Transportation Needs    In the past 12 months, has lack of transportation kept you from medical appointments or from getting medications? no    In the past 12 months, has lack of transportation kept you from meetings, work, or from getting things needed for daily living? No       Food Insecurity    Within the past 12 months, you worried that your food would run out before you got the money to buy more. Never true    Within the past 12 months, the food you bought just didn't last and you didn't have money to get more. Never true       Transition Planning    Patient/Family Anticipates Transition to home with family    Patient/Family Anticipated Services at Transition none    Transportation Anticipated car, drives self;family or friend will provide       Discharge Needs Assessment    Readmission Within the Last 30 Days no previous admission in last 30 days    Equipment Currently Used at Home none    Anticipated Changes Related to Illness none    Equipment Needed After Discharge none                   Discharge Plan       Row Name 07/17/24 1414       Plan    Patient/Family in Agreement with  Plan yes    Plan Comments Met with patient at bedside, from routine home with spouse. Independent with ADL's no DME. PCP is Sean, Pharmacy is [facility]. Able to afford medications, Denies any issues with food or utilities. Denies any transportation issues, Denies any HHC or SNF needs. Denies any concerns about return home. Recieved message from RN states patient needs soft collar, DCP report sent to [facility] and message sent to [facility], verbalized understanding. DC BARRIERS: MRI pending, Hemovac.      Row Name 07/17/24 1413       Plan    Plan DC PLAN: Routine home. (Needs Soft Collar thru [facility])    Patient/Family in Agreement with Plan yes                  Continued Care and Services - Admitted Since 7/16/2024       Durable Medical Equipment       Service Provider Request Status Selected Services Address Phone Fax Patient Preferred    [facility] Pending - Request Sent N/A [id number], [facility] [address] [phone] --                  Expected Discharge Date and Time       Expected Discharge Date Expected Discharge Time    Jul 18, 2024            Demographic Summary       Row Name 07/17/24 1412       General Information    Admission Type inpatient    Arrived From emergency department    Required Notices Provided Important Message from Medicare    Referral Source admission list    Reason for Consult discharge planning    Preferred Language English       Contact Information    Permission Granted to Share Info With     Contact Information Obtained for       Row Name 07/17/24 1411       General Information    Admission Type inpatient    Arrived From emergency department    Required Notices Provided Important Message from Medicare    Referral Source admission list    Reason for Consult discharge planning    Preferred Language English       Contact Information    Permission Granted to Share Info With     Contact Information Obtained for                     Functional Status       Row Name 07/17/24 1412       Functional Status    Usual Activity Tolerance excellent    Current Activity Tolerance excellent       Physical Activity    On average, how many days per week do you engage in moderate to strenuous exercise (like a brisk walk)? 0 days    On average, how many minutes do you engage in exercise at this level? 0 min    Number of minutes of exercise per week 0       Assessment of Health Literacy    How often do you have someone help you read hospital materials? Sometimes    How often do you have problems learning about your medical condition because of difficulty understanding written information? Sometimes    How often do you have a problem understanding what is told to you about your medical condition? Sometimes    How confident are you filling out medical forms by yourself? Somewhat    Health Literacy Moderate       Functional Status, IADL    Medications independent    Meal Preparation independent    Housekeeping independent    Laundry independent    Shopping independent       Mental Status    General Appearance WDL WDL       Mental Status Summary    Recent Changes in Mental Status/Cognitive Functioning no changes                       Patient Forms       Row Name 07/17/24 1143       Patient Forms    Important Message from Medicare (IMM) Delivered  IMM 7/16 per reg.  7/17 per cm    Delivered to Support person    Method of delivery In person                   Cori Obrien RN    Case Management  [phone] cell  [phone]

## 2024-07-17 NOTE — DISCHARGE PLACEMENT REQUEST
"Esperanza Moore (78 y.o. Female)       Date of Birth   1945    Social Security Number       Address   [address] [place] [address]    Home Phone   [phone]    MRN   [id number]       Faith   Sabianism    Marital Status                               Admission Date   7/16/24    Admission Type   Elective    Admitting Provider   Mario Urrutia IV, MD    Attending Provider   Mario Urrutia IV, MD    Department, Room/Bed   [facility], 4130/1       Discharge Date       Discharge Disposition       Discharge Destination                                 Attending Provider: Mario Urrutia IV, MD    Allergies: Diphenhydramine, Morphine, Penicillins, Latex    Isolation: None   Infection: None   Code Status: Prior    Ht: 165.1 cm (65\")   Wt: 85.1 kg (187 lb 9.6 oz)    Admission Cmt: None   Principal Problem: 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]                   Active Insurance as of 7/16/2024       Primary Coverage       Payor Plan Insurance Group Employer/Plan Group    MEDICARE MEDICARE A & B        Payor Plan Address Payor Plan Phone Number Payor Plan Fax Number Effective Dates    [address] [phone]  9/1/2012 - None Entered    [facility] [id number]         Subscriber Name Subscriber Birth Date Member ID       ESPERANZA MOORE 1945 [id number]               Secondary Coverage       Payor Plan Insurance Group Employer/Plan Group     FOR LIFE  FOR LIFE  SUP  NGN       Payor Plan Address Payor Plan Phone Number Payor Plan Fax Number Effective Dates    [address] [phone]  4/7/2020 - None Entered    [facility] [address]         Subscriber Name Subscriber Birth Date Member ID       ESPERANZA MOORE 1945 [id number]                     Emergency Contacts        (Rel.) Home Phone Work Phone Mobile Phone    JOEL MOORE (ANNMARIE) (Spouse) [phone] -- [phone]              "

## 2024-07-17 NOTE — PLAN OF CARE
Goal Outcome Evaluation:                 Patient alert and oriented x 4. Pain controlled with medications on the MAR. Family present at bedside for beginning of shift. Pt has espinosa with adequate output. Personal items and call light in reach. Plan of care is ongoing.

## 2024-07-17 NOTE — SIGNIFICANT NOTE
07/17/24 0807   OTHER   Discipline physical therapist   Rehab Time/Intention   Session Not Performed other (see comments)  (Pt on bedrest until noon- PT will check on pt in PM.)   Therapy Assessment/Plan (PT)   Criteria for Skilled Interventions Met (PT) yes;meets criteria   Recommendation   PT - Next Appointment 07/17/24

## 2024-07-17 NOTE — PLAN OF CARE
Goal Outcome Evaluation:   Aox4. Pain treated per MAR. Elevated blood pressure noted and reported to hospitalist, see change in ordered metoprolol for management. Pt refused to ambulate, saying that her pain was too bad. Pt was tearful today. Safety measures in place. Care plan ongoing.

## 2024-07-17 NOTE — PROGRESS NOTES
[facility] MEDICINE SERVICE  DAILY PROGRESS NOTE    NAME: Esperanza Smith  : 1945  MRN: [id number]      LOS: 1 day     PROVIDER OF SERVICE: Shadi Newby MD    Chief Complaint: Intradural extramedullary spinal tumor    Subjective:     Interval History:  History taken from: patient    History of Present Illness:    Very pleasant 78-year-old female with a past medical history of diabetes mellitus type 2, hypertension, hyperlipidemia, hypothyroidism, obesity, breast cancer, uterine cancer, who underwent today a cervical 2-4 laminectomy for resection of an intra dural tumor.  She is awake and alert and answering appropriately.  She tolerated her soft diet.  She reports she recently had diverticulitis and was on several antibiotics.  She feels she is improving.  She does report that her neck pain is not being controlled.  She is currently on 0.25 mg Dilaudid every 4 hours.  This was increased to 0.50 milligrams every 4 hours.  She will continue to be evaluated and treated.  Neurosurgery following.     2024 patient seen and examined in bed no acute distress, complaining of headache.  Family at bedside, discussed with RN.  .  Review of Systems  Constitutional: Negative.    HENT: Negative.     Eyes: Negative.    Respiratory: Negative.     Cardiovascular: Negative.    Gastrointestinal: Negative.    Endocrine: Negative.    Genitourinary: Negative.    Musculoskeletal:  Positive for neck pain.   Skin: Negative.    Allergic/Immunologic: Negative.    Neurological: Negative.    Hematological: Negative.    Psychiatric/Behavioral: Negative.     All other systems reviewed and are negative  Objective:     Vital Signs  Temp:  [97.4 °F (36.3 °C)-98.3 °F (36.8 °C)] 97.4 °F (36.3 °C)  Heart Rate:  [] 85  Resp:  [16-27] 17  BP: (141-173)/() 173/98  Flow (L/min):  [3] 3   Body mass index is 31.22 kg/m².    Physical Exam       Appearance: Normal appearance. She is obese.   HENT:      Head:  Normocephalic and atraumatic.      Right Ear: External ear normal.      Left Ear: External ear normal.      Nose: Nose normal.      Mouth/Throat:      Mouth: Mucous membranes are moist.   Eyes:      Extraocular Movements: Extraocular movements intact.   Cardiovascular:      Rate and Rhythm: Normal rate and regular rhythm.   Pulmonary:      Effort: Pulmonary effort is normal.      Breath sounds: Normal breath sounds.   Abdominal:      Palpations: Abdomen is soft.   Genitourinary:     Comments: deferred  Musculoskeletal:         General: Normal range of motion.      Cervical back: Tenderness present.   Skin:     General: Skin is warm and dry.   Neurological:      General: No focal deficit present.      Mental Status: She is alert and oriented to person, place, and time.   Psychiatric:         Mood and Affect: Mood normal.         Behavior: Behavior normal.         Thought Content: Thought content normal.         Judgment: Judgment normal.      Scheduled Meds   amLODIPine, 5 mg, Oral, Daily  dexAMETHasone, 4 mg, Intravenous, Q6H  enoxaparin, 40 mg, Subcutaneous, Daily  insulin lispro, 2-7 Units, Subcutaneous, 4x Daily AC & at Bedtime  levothyroxine, 50 mcg, Oral, Q AM  sodium chloride, 10 mL, Intravenous, Q12H       PRN Meds     acetaminophen **OR** acetaminophen **OR** acetaminophen    senna-docusate sodium **AND** polyethylene glycol **AND** bisacodyl **AND** bisacodyl    dextrose    dextrose    glucagon (human recombinant)    HYDROcodone-acetaminophen    HYDROmorphone    HYDROmorphone **AND** naloxone    melatonin    methocarbamol    ondansetron    ondansetron ODT    sodium chloride    sodium chloride   Infusions         Diagnostic Data    Results from last 7 days   Lab Units 07/17/24  0418   WBC 10*3/mm3 12.43*   HEMOGLOBIN g/dL 15.9   HEMATOCRIT % 46.3   PLATELETS 10*3/mm3 293   GLUCOSE mg/dL 167*   CREATININE mg/dL 0.53*   BUN mg/dL 5*   SODIUM mmol/L 138   POTASSIUM mmol/L 4.9   ANION GAP mmol/L 11.5       No  radiology results for the last day      I reviewed the patient's new clinical results.    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, being followed by neurosurgery on cefazolin per surgery, increased IV pain medication from 0.25 mg every 4 hours to 0.5 mg every 4 hours for comfort     Type 2 diabetes mellitus, hold home metformin while inpatient, consistent carb heart healthy diet, and SSI as needed with Accu-Cheks ACHS     Hyperlipidemia, on statin     Hypertension, on home Norvasc monitor BP Will add as needed medications if needed     Acquired hypothyroidism, home levothyroxine reordered     Breast cancer/uterine cancer, noted     Overweight BMI 31.22 lifestyle management education       VTE Prophylaxis:  Pharmacologic & mechanical VTE prophylaxis orders are present.         Code status is   There are no questions and answers to display.       Plan for disposition:rehab in 2 days    Time: 30 minutes    Signature: Electronically signed by Shadi Newby MD, 07/17/24, 12:44 EDT.  [facility] Hospitalist Team

## 2024-07-17 NOTE — SIGNIFICANT NOTE
07/17/24 0838   OTHER   Discipline physical therapist   Rehab Time/Intention   Session Not Performed other (see comments);unable to evaluate, medical status change  (Nursing reports that Dr. Urrutia does not want pt to have PT today- PT will follow-up on pt tomorrow.)   Recommendation   PT - Next Appointment 07/18/24

## 2024-07-17 NOTE — NURSING NOTE
During 0800 assessment, this RN noticed that the hemovac drain was to gravity. Being that hemovacs are typically compressed with spinal surgeries and there was no written communication nor an order from MD for the hemovac to be to gravity, this RN compressed it. The drain immediately sucked blood from the patient at a much quicker rate, and the patient began saying that her pain had increased substantially. The drain was compressed for approximately 30 seconds before this RN put it back to gravity. Dr. Urrutia was immediately notified and said that all that could be done was to give pain meds and muscle relaxers. Pain meds given and muscle relaxers refused by patient, see MAR.

## 2024-07-17 NOTE — SIGNIFICANT NOTE
07/17/24 0835   OTHER   Discipline occupational therapist   Rehab Time/Intention   Session Not Performed other (see comments)  (bedrest untill noon, will follow up as appropriate)   Recommendation   OT - Next Appointment 07/18/24

## 2024-07-17 NOTE — SIGNIFICANT NOTE
07/17/24 1012   OTHER   Discipline occupational therapist   Rehab Time/Intention   Session Not Performed other (see comments)  (per Dr. Urrutia, hold OT this date. OT will follow up as appropriate)   Recommendation   OT - Next Appointment 07/18/24

## 2024-07-17 NOTE — PROGRESS NOTES
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 Admits to headaches, but nothing too debilitating. Patient states that she has the expected surgical pain. Patient has no difficulty urinating and is passing flatus, but no bowel movement yet. No red flags at this time.     Current Medications:  Scheduled Meds:amLODIPine, 5 mg, Oral, Daily  ceFAZolin, 2,000 mg, Intravenous, Q8H  dexAMETHasone, 4 mg, Intravenous, Q6H  enoxaparin, 40 mg, Subcutaneous, Daily  insulin lispro, 2-7 Units, Subcutaneous, 4x Daily AC & at Bedtime  levothyroxine, 50 mcg, Oral, Q AM  sodium chloride, 10 mL, Intravenous, Q12H      Continuous Infusions:   PRN Meds:   acetaminophen **OR** acetaminophen **OR** acetaminophen    senna-docusate sodium **AND** polyethylene glycol **AND** bisacodyl **AND** bisacodyl    dextrose    dextrose    glucagon (human recombinant)    HYDROcodone-acetaminophen    HYDROmorphone    HYDROmorphone **AND** naloxone    melatonin    methocarbamol    ondansetron    ondansetron ODT    sodium chloride    sodium chloride      OBJECTIVE  Vitals:    24 2045 24 2340 24 0558 24 0723   BP: 144/85 154/97 162/98 165/93   BP Location: Left arm Left arm Left arm Left arm   Patient Position: Lying Lying Lying Lying   Pulse: 102 98 93 85   Resp:    Temp: 98.3 °F (36.8 °C) 97.9 °F (36.6 °C) 97.6 °F (36.4 °C) 97.6 °F (36.4 °C)   TempSrc: Oral Oral Oral Oral   SpO2: 93% 91% 92% 92%   Weight:       Height:           Physical exam    General  - WD/WN female, appears their stated age  - Awake, cooperative, in no acute distress  HEENT  - Normocephalic, atraumatic  - PERRLA, EOM intact  Respiratory  - Normal respiratory rate and effort  Musculoskeletal  - No joint redness, swelling, or tenderness  Skin  -  Warm and dry, no bleeding, bruising, or rash. Incision on posterior neck without drainage, non-erythematous and covered with dressing. Drain in tact.   NEUROLOGIC  - A/O x3, GCS 4-6-5  - CN II-XII grossly intact  - Moves all extremities symmetrically and w/ 5/5 strength  - Sensation intact throughout        Results review  CBC          7/2/2024    22:45 7/8/2024    11:23 7/17/2024    04:18   CBC   WBC 13.77  8.58  12.43    RBC 4.91  4.71  4.98    Hemoglobin 15.7  15.1  15.9    Hematocrit 45.9  44.4  46.3    MCV 93.5  94.3  93.0    MCH 32.0  32.1  31.9    MCHC 34.2  34.0  34.3    RDW 12.9  13.0  12.8    Platelets 230  245  293        BMP          7/8/2024    11:23 7/16/2024    06:50 7/17/2024    04:18   BMP   BUN 5   5    Creatinine 0.64   0.53    Sodium 136   138    Potassium 3.4  4.1  4.9    Chloride 98   101    CO2 24.0   25.5    Calcium 10.0   9.3                MRI Cervical Spine With Contrast    Result Date: 7/12/2024  MRI CERVICAL SPINE W CONTRAST Date of Exam: 7/12/2024 12:04 PM EDT Indication: NECK PAIN.  Comparison: June 14, 2024 Technique:  Routine multiplanar/multisequence sequence images of the cervical spine were obtained after the uneventful administration of Prohance.  Findings: A 3.0 x 1.2 x 1.7 cm enhancing intradural extramedullary mass along the left aspect of the C2-3 level demonstrates some central nonenhancement. This mass again displaces the spinal cord to the right with moderate to severe spinal canal stenosis at this level. No other enhancing mass is identified.     Impression: Impression: 3 cm enhancing intradural extramedullary mass at C2-3, which demonstrates some central nonenhancement. This is favored to represent a meningioma with possible partial central cystic changes or necrosis. Electronically Signed: Ruperto Wakefield MD  7/12/2024 6:25 PM EDT  Workstation ID: [id number]             ASSESSMENT/PLAN  This is a 78 y.o. female who underwent surgery. Patient is doing ok this morning.  Patient has the expected surgical pain and headaches post surgery. Patient states that her headache this morning was painful, but is relieved with Tylenol. Patient denies any new symptoms this morning and has good strength on physical exam. Patient is to keep her drain. Patient is to have a post-op MRI of her cervical spine. Once MRI is completed she may work with PT today depending on how she is feeling. She states that she may not be up to it due to her headaches, we will play it by ear. Patient should keep her HOB at 30 degrees until noon and then can adjust as needed. Please reach out with any questions or concerns.     Plan:  Post op tumor removal  - Pain management as needed  - Bowel regimen as needed  - Post op MRI  - PT eval if she feels ok  - Continue to keep HOB at 30 until noonish, then can adjust as needed  - Keep drain for another day       I discussed my assessment and recommendations with Dr. Renan Carrera PA-C  07/17/24  07:26 EDT      Part of this note may be an electronic transcription/translation of spoken language to printed text using the Dragon Dictation System.

## 2024-07-18 LAB
GLUCOSE BLDC GLUCOMTR-MCNC: 193 MG/DL (ref 70–105)
GLUCOSE BLDC GLUCOMTR-MCNC: 196 MG/DL (ref 70–105)
GLUCOSE BLDC GLUCOMTR-MCNC: 220 MG/DL (ref 70–105)
GLUCOSE BLDC GLUCOMTR-MCNC: 242 MG/DL (ref 70–105)

## 2024-07-18 PROCEDURE — 82948 REAGENT STRIP/BLOOD GLUCOSE: CPT

## 2024-07-18 PROCEDURE — 97166 OT EVAL MOD COMPLEX 45 MIN: CPT

## 2024-07-18 PROCEDURE — 25010000002 ENOXAPARIN PER 10 MG

## 2024-07-18 PROCEDURE — 25010000002 HYDRALAZINE PER 20 MG: Performed by: NURSE PRACTITIONER

## 2024-07-18 PROCEDURE — 63710000001 INSULIN LISPRO (HUMAN) PER 5 UNITS

## 2024-07-18 PROCEDURE — 25010000002 DEXAMETHASONE PER 1 MG

## 2024-07-18 PROCEDURE — 97162 PT EVAL MOD COMPLEX 30 MIN: CPT

## 2024-07-18 PROCEDURE — 99024 POSTOP FOLLOW-UP VISIT: CPT

## 2024-07-18 RX ORDER — METOPROLOL SUCCINATE 50 MG/1
50 TABLET, EXTENDED RELEASE ORAL
Status: DISCONTINUED | OUTPATIENT
Start: 2024-07-19 | End: 2024-07-19 | Stop reason: HOSPADM

## 2024-07-18 RX ORDER — HYDRALAZINE HYDROCHLORIDE 20 MG/ML
10 INJECTION INTRAMUSCULAR; INTRAVENOUS EVERY 6 HOURS PRN
Status: DISCONTINUED | OUTPATIENT
Start: 2024-07-18 | End: 2024-07-19 | Stop reason: HOSPADM

## 2024-07-18 RX ADMIN — DEXAMETHASONE SODIUM PHOSPHATE 4 MG: 4 INJECTION, SOLUTION INTRAMUSCULAR; INTRAVENOUS at 08:24

## 2024-07-18 RX ADMIN — Medication 10 ML: at 20:51

## 2024-07-18 RX ADMIN — HYDROCODONE BITARTRATE AND ACETAMINOPHEN 1 TABLET: 5; 325 TABLET ORAL at 18:13

## 2024-07-18 RX ADMIN — INSULIN LISPRO 2 UNITS: 100 INJECTION, SOLUTION INTRAVENOUS; SUBCUTANEOUS at 12:19

## 2024-07-18 RX ADMIN — HYDROCODONE BITARTRATE AND ACETAMINOPHEN 1 TABLET: 5; 325 TABLET ORAL at 03:25

## 2024-07-18 RX ADMIN — METOPROLOL SUCCINATE 25 MG: 25 TABLET, EXTENDED RELEASE ORAL at 08:10

## 2024-07-18 RX ADMIN — ENOXAPARIN SODIUM 40 MG: 100 INJECTION SUBCUTANEOUS at 08:11

## 2024-07-18 RX ADMIN — HYDRALAZINE HYDROCHLORIDE 10 MG: 20 INJECTION INTRAMUSCULAR; INTRAVENOUS at 05:13

## 2024-07-18 RX ADMIN — HYDROCODONE BITARTRATE AND ACETAMINOPHEN 1 TABLET: 5; 325 TABLET ORAL at 10:38

## 2024-07-18 RX ADMIN — Medication 10 ML: at 08:12

## 2024-07-18 RX ADMIN — DEXAMETHASONE SODIUM PHOSPHATE 4 MG: 4 INJECTION, SOLUTION INTRAMUSCULAR; INTRAVENOUS at 20:48

## 2024-07-18 RX ADMIN — AMLODIPINE BESYLATE 10 MG: 5 TABLET ORAL at 08:10

## 2024-07-18 RX ADMIN — INSULIN LISPRO 2 UNITS: 100 INJECTION, SOLUTION INTRAVENOUS; SUBCUTANEOUS at 18:12

## 2024-07-18 RX ADMIN — INSULIN LISPRO 2 UNITS: 100 INJECTION, SOLUTION INTRAVENOUS; SUBCUTANEOUS at 08:23

## 2024-07-18 RX ADMIN — INSULIN LISPRO 3 UNITS: 100 INJECTION, SOLUTION INTRAVENOUS; SUBCUTANEOUS at 20:47

## 2024-07-18 RX ADMIN — DEXAMETHASONE SODIUM PHOSPHATE 4 MG: 4 INJECTION, SOLUTION INTRAMUSCULAR; INTRAVENOUS at 16:23

## 2024-07-18 RX ADMIN — DEXAMETHASONE SODIUM PHOSPHATE 4 MG: 4 INJECTION, SOLUTION INTRAMUSCULAR; INTRAVENOUS at 03:18

## 2024-07-18 RX ADMIN — ACETAMINOPHEN 650 MG: 325 TABLET, FILM COATED ORAL at 04:21

## 2024-07-18 RX ADMIN — LEVOTHYROXINE SODIUM 50 MCG: 0.05 TABLET ORAL at 05:13

## 2024-07-18 NOTE — PROGRESS NOTES
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  Patient is urinating without difficulty but has not had a bowel movement yet.    Current Medications:  Scheduled Meds:amLODIPine, 10 mg, Oral, Daily  dexAMETHasone, 4 mg, Intravenous, Q6H  enoxaparin, 40 mg, Subcutaneous, Daily  insulin lispro, 2-7 Units, Subcutaneous, 4x Daily AC & at Bedtime  levothyroxine, 50 mcg, Oral, Q AM  metoprolol succinate XL, 25 mg, Oral, Q24H  sodium chloride, 10 mL, Intravenous, Q12H      Continuous Infusions:   PRN Meds:   acetaminophen **OR** acetaminophen **OR** acetaminophen    senna-docusate sodium **AND** polyethylene glycol **AND** bisacodyl **AND** bisacodyl    dextrose    dextrose    glucagon (human recombinant)    hydrALAZINE    HYDROcodone-acetaminophen    HYDROmorphone    HYDROmorphone **AND** naloxone    melatonin    methocarbamol    ondansetron    ondansetron ODT    sodium chloride    sodium chloride      OBJECTIVE  Vitals:    24 0017 24 0435 24 0608 24 0739   BP: 162/98 (!) 171/102 164/96 160/96   BP Location: Left arm Left arm  Left arm   Patient Position: Lying Lying  Lying   Pulse: 86 82  94   Resp: 17 16  16   Temp: 98.2 °F (36.8 °C) 98.4 °F (36.9 °C)  97.9 °F (36.6 °C)   TempSrc: Oral Oral  Oral   SpO2: 94% 94%  91%   Weight:       Height:           Physical exam    General  - WD/WN female, appears their stated age  - Awake, cooperative, in no acute distress  HEENT  - Normocephalic, atraumatic  - PERRLA, EOM intact  Respiratory  - Normal respiratory rate and effort  Musculoskeletal  - No joint redness, swelling, or tenderness  Skin  - Warm and dry, no bleeding, bruising, or rash.  Incision intact with dressing covered.  Drain  intact.  NEUROLOGIC  - A/O x3, GCS 4-6-5  - CN II-XII grossly intact  - Moves all extremities symmetrically and w/ 5/5 strength  - Sensation intact throughout        Results review  CBC          7/2/2024    22:45 7/8/2024    11:23 7/17/2024    04:18   CBC   WBC 13.77  8.58  12.43    RBC 4.91  4.71  4.98    Hemoglobin 15.7  15.1  15.9    Hematocrit 45.9  44.4  46.3    MCV 93.5  94.3  93.0    MCH 32.0  32.1  31.9    MCHC 34.2  34.0  34.3    RDW 12.9  13.0  12.8    Platelets 230  245  293        BMP          7/8/2024    11:23 7/16/2024    06:50 7/17/2024    04:18   BMP   BUN 5   5    Creatinine 0.64   0.53    Sodium 136   138    Potassium 3.4  4.1  4.9    Chloride 98   101    CO2 24.0   25.5    Calcium 10.0   9.3                MRI Cervical Spine With Contrast    Result Date: 7/12/2024  MRI CERVICAL SPINE W CONTRAST Date of Exam: 7/12/2024 12:04 PM EDT Indication: NECK PAIN.  Comparison: June 14, 2024 Technique:  Routine multiplanar/multisequence sequence images of the cervical spine were obtained after the uneventful administration of Prohance.  Findings: A 3.0 x 1.2 x 1.7 cm enhancing intradural extramedullary mass along the left aspect of the C2-3 level demonstrates some central nonenhancement. This mass again displaces the spinal cord to the right with moderate to severe spinal canal stenosis at this level. No other enhancing mass is identified.     Impression: Impression: 3 cm enhancing intradural extramedullary mass at C2-3, which demonstrates some central nonenhancement. This is favored to represent a meningioma with possible partial central cystic changes or necrosis. Electronically Signed: Ruperto Wakefield MD  7/12/2024 6:25 PM EDT  Workstation ID: [id number]             ASSESSMENT/PLAN  This is a 78 y.o. female who underwent surgery.  Patient continues to have headaches and I explained that this is normal.  Patient's pain is being properly managed with current pain regimen.  Patient is able to have her drain  removed, and therefore, we can hopefully proceed today with her postoperative MRI.  Patient and I agreed that she should try and work with physical therapy today, and I told the patient that if she feels as though it is too much she can stop at any point.  We will continue to monitor.  Please reach out any questions or concerns.      Plan:  Postop laminectomy for resection of intradural tumor  -Remove the drain  -PT/OT evaluation today  -Continue pain regimen  -Bowel regimen as needed  -Keep head of bed elevated as needed   - MRI today       I discussed my assessment and recommendations with Dr. Renan Carrera PA-C  07/18/24  08:46 EDT      Part of this note may be an electronic transcription/translation of spoken language to printed text using the Dragon Dictation System.

## 2024-07-18 NOTE — THERAPY EVALUATION
Patient Name: Esperanza Smith  : 1945    MRN: [id number]                              Today's Date: 2024       Admit Date: 2024    Visit Dx:     ICD-10-CM ICD-9-CM   1. 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 239.7     Patient Active Problem List   Diagnosis    Breast neoplasm, Tis (DCIS), right    Dermatophytosis    Screening for breast cancer    Enterocutaneous fistula    Hematuria    Hyperlipidemia    Hypertension    Intertrigo    Overweight    Vasovagal attack    Vitamin D deficiency    Medicare annual wellness visit, subsequent    Breast cancer    Obstructive sleep apnea syndrome    Other fatigue    Allergies    Need for vaccination    Age-related osteoporosis without current pathological fracture     Postmenopause    Need for hepatitis C screening test    Cervical spondylosis with radiculopathy    Intradural extramedullary spinal tumor    Type 2 diabetes mellitus    Acquired hypothyroidism     Past Medical History:   Diagnosis Date    Allergic Penicillian    Asthma congestion    Breast cancer     Cataract     Cervical disc disorder     Cholelithiasis surgery  removal    DDD (degenerative disc disease), lumbosacral     Diabetes mellitus     Diverticulitis     Diverticulosis     Headache     History of medical problems 54 inches left of small intesting    Hyperlipidemia     Hypertension     Hypothyroidism     IBS (irritable bowel syndrome)     Inflammatory bowel disease     Liver disease fatty liver    Low back pain Sciatic    Lumbosacral disc disease     Neuromuscular disorder neuropathy    Obesity     PONV (postoperative nausea and vomiting)     Seasonal allergies     Sleep apnea      Past Surgical History:   Procedure Laterality Date    BREAST BIOPSY      BREAST SURGERY Left 02/2012    x2     CERVICAL SPINAL CORD TUMOR REMOVAL N/A 2024    Procedure: Cervical 2-4 laminectomy for resection of intradural tumor;  Surgeon: Mario Urrutia IV, MD;  Location: [facility] MAIN OR;  Service:  Neurosurgery;  Laterality: N/A;    CHOLECYSTECTOMY  1996    COLON RESECTION SMALL BOWEL  05/25/2014    COLON SURGERY  small intestine repair    HYSTERECTOMY      LYMPH NODE BIOPSY      MASTECTOMY      OOPHORECTOMY  1965    SIMPLE MASTECTOMY Right 02/26/2020    Procedure: Right simple mastectomy;  Surgeon: Mario Quezada DO;  Location: [facility] MAIN OR;  Service: General;  Laterality: Right;    SMALL INTESTINE SURGERY      SUBTOTAL HYSTERECTOMY  full hysterectomy      General Information       Row Name 07/18/24 1515          OT Time and Intention    Document Type evaluation  -MS     Mode of Treatment occupational therapy  -MS       Row Name 07/18/24 1515          General Information    Patient Profile Reviewed yes  -MS     Prior Level of Function independent:;ADL's;all household mobility;community mobility;driving  -MS     Existing Precautions/Restrictions fall;spinal;other (see comments)  soft cervical collar for comfort per neurosx  -MS     Barriers to Rehab medically complex  -MS       Row Name 07/18/24 1515          Occupational Profile    Reason for Services/Referral (Occupational Profile) Pt is a 77 y/o F admitted to [facility] 7/16/24, POD#! C-C3 cervical laminectomy for resection of intradural tumor. PMHx significant for hx breast cancer, and DMII. At baseline Pt resides with spouse, (I) with ADLs, (I) with mobility without AD and is an active .  -MS     Environmental Supports and Barriers (Occupational Profile) supportive family  -MS       Row Name 07/18/24 1515          Living Environment    People in Home spouse  -MS       Row Name 07/18/24 1515          Cognition    Orientation Status (Cognition) oriented x 4  -MS       Row Name 07/18/24 1515          Safety Issues, Functional Mobility    Impairments Affecting Function (Mobility) balance;endurance/activity tolerance;pain;range of motion (ROM);strength;sensation/sensory awareness  -MS               User Key  (r) = Recorded By, (t) = Taken By, (c) = Cosigned  By      Initials Name Provider Type    Shari Lorenzo OT Occupational Therapist                     Mobility/ADL's       Row Name 07/18/24 1516          Bed Mobility    Bed Mobility supine-sit  -MS     Supine-Sit [place] (Bed Mobility) contact guard;2 person assist;verbal cues  -MS     Assistive Device (Bed Mobility) head of bed elevated;bed rails  -MS     Comment, (Bed Mobility) educated on log roll  -MS       Row Name 07/18/24 1516          Transfers    Transfers sit-stand transfer;toilet transfer  -MS       Row Name 07/18/24 1516          Sit-Stand Transfer    Sit-Stand [place] (Transfers) minimum assist (75% patient effort);verbal cues  -MS     Assistive Device (Sit-Stand Transfers) walker, front-wheeled  -MS       Row Name 07/18/24 1516          Toilet Transfer    Type (Toilet Transfer) sit-stand  -MS     [place] Level (Toilet Transfer) contact guard  -MS     Assistive Device (Toilet Transfer) commode  -MS       Row Name 07/18/24 1516          Functional Mobility    Patient was able to Ambulate yes  -MS       Row Name 07/18/24 1516          Activities of Daily Living    BADL Assessment/Intervention toileting;lower body dressing  -MS       Row Name 07/18/24 1516          Toileting Assessment/Training    [place] Level (Toileting) standby assist  -MS     Assistive Devices (Toileting) commode  -MS     Position (Toileting) unsupported sitting  -MS       Row Name 07/18/24 1516          Lower Body Dressing Assessment/Training    [place] Level (Lower Body Dressing) don;pants/bottoms;moderate assist (50% patient effort)  -MS     Position (Lower Body Dressing) unsupported sitting  -MS     Comment, (Lower Body Dressing) sarah sutton  -MS               User Key  (r) = Recorded By, (t) = Taken By, (c) = Cosigned By      Initials Name Provider Type    MS Shari Winston OT Occupational Therapist                   Obj/Interventions       Row Name 07/18/24 1518          Sensory Assessment  (Somatosensory)    Sensory Assessment reports neuropathy with alma feet  -MS       Row Name 07/18/24 1518          Vision Assessment/Intervention    Visual Impairment/Limitations WNL  -MS       Row Name 07/18/24 1518          Range of Motion Comprehensive    Comment, General Range of Motion BUE WFL  -MS       Row Name 07/18/24 1518          Strength Comprehensive (MMT)    Comment, General Manual Muscle Testing (MMT) Assessment BUE functionally 4/5  -MS       Row Name 07/18/24 1518          Balance    Balance Assessment sitting static balance;standing static balance;standing dynamic balance  -MS     Static Sitting Balance supervision  -MS     Dynamic Sitting Balance unable to assess  -MS     Position, Sitting Balance unsupported;sitting edge of bed  -MS     Static Standing Balance contact guard;2-person assist  -MS     Dynamic Standing Balance contact guard;2-person assist  -MS     Position/Device Used, Standing Balance supported;walker, front-wheeled  -MS               User Key  (r) = Recorded By, (t) = Taken By, (c) = Cosigned By      Initials Name Provider Type    Shari Lorenzo, OT Occupational Therapist                   Goals/Plan       Row Name 07/18/24 1524          Bed Mobility Goal 1 (OT)    Activity/Assistive Device (Bed Mobility Goal 1, OT) bed mobility activities, all  -MS     [place] Level/Cues Needed (Bed Mobility Goal 1, OT) modified independence  -MS     Time Frame (Bed Mobility Goal 1, OT) long term goal (LTG);2 weeks  -MS     Progress/Outcomes (Bed Mobility Goal 1, OT) new goal  -MS       Row Name 07/18/24 1524          Transfer Goal 1 (OT)    Activity/Assistive Device (Transfer Goal 1, OT) transfers, all  -MS     [place] Level/Cues Needed (Transfer Goal 1, OT) modified independence  -MS     Time Frame (Transfer Goal 1, OT) long term goal (LTG);2 weeks  -MS     Progress/Outcome (Transfer Goal 1, OT) new goal  -MS       Row Name 07/18/24 1524          Dressing Goal 1 (OT)     Activity/Device (Dressing Goal 1, OT) dressing skills, all  -MS     [place]/Cues Needed (Dressing Goal 1, OT) modified independence  -MS     Time Frame (Dressing Goal 1, OT) long term goal (LTG);2 weeks  -MS     Progress/Outcome (Dressing Goal 1, OT) new goal  -MS       Row Name 07/18/24 1524          Toileting Goal 1 (OT)    Activity/Device (Toileting Goal 1, OT) toileting skills, all  -MS     [place] Level/Cues Needed (Toileting Goal 1, OT) modified independence  -MS     Time Frame (Toileting Goal 1, OT) long term goal (LTG);2 weeks  -MS     Progress/Outcome (Toileting Goal 1, OT) new goal  -MS       Row Name 07/18/24 1524          Therapy Assessment/Plan (OT)    Planned Therapy Interventions (OT) activity tolerance training;adaptive equipment training;BADL retraining;IADL retraining;functional balance retraining;occupation/activity based interventions;passive ROM/stretching;patient/caregiver education/training;transfer/mobility retraining;strengthening exercise;ROM/therapeutic exercise  -MS               User Key  (r) = Recorded By, (t) = Taken By, (c) = Cosigned By      Initials Name Provider Type    MS Catrachito Shari, MELVIN Occupational Therapist                   Clinical Impression       Row Name 07/18/24 1511          Pain Assessment    Pretreatment Pain Rating 8/10  -MS     Posttreatment Pain Rating 8/10  -MS     Pain Location - head;neck  -MS     Pre/Posttreatment Pain Comment pt received pain medication prior to OT arrival  -MS     Pain Intervention(s) Repositioned;Emotional support;Nursing Notified  -MS       Row Name 07/18/24 3419          Plan of Care Review    Plan of Care Reviewed With patient  -MS     Progress no change  -MS     Outcome Evaluation Pt is a 79 y/o F admitted to [facility] 7/16/24, POD#1 C2-C3 cervical laminectomy for resection of intradural tumor. PMHx significant for hx breast cancer, and DMII. At baseline Pt resides with spouse, (I) with ADLs, (I) with mobility without AD and is an  active . This date pt A&Ox4 on 4L O2, in supine with HOB>30 upon arrival. Neurosx okay to wear soft collar for comfort as needed, okay to mobilize this date based on pt tolerance. Pt c/o 8/10 pain in head/neck, does not report increase with activity. Pt /102 in supine, remains stable 161/107 sitting edge of bed, decreased sitting up in chair post activity 133/90. Pt educated on cervical precautions, verbalized understanding. Pt educated on log roll, requires CGA x2 for supine to sit, requires min A to come to standing with RW and ambulates to bathroom and to chair within room with min A x2 with RW, 1 LOB noted. Pt likely to require assist with all ADLs at this time, would benefit from continued use of RW. Anticipate pt to progress well, likely safe to dc home safe with spouse assist and HHOT, will follow within acute setting.  -MS       Row Name 07/18/24 1519          Therapy Assessment/Plan (OT)    Rehab Potential (OT) good, to achieve stated therapy goals  -MS     Criteria for Skilled Therapeutic Interventions Met (OT) yes;meets criteria;skilled treatment is necessary  -MS     Therapy Frequency (OT) 5 times/wk  -MS     Predicted Duration of Therapy Intervention (OT) until d/c  -MS       Row Name 07/18/24 0090          Therapy Plan Review/Discharge Plan (OT)    Anticipated Discharge Disposition (OT) home with assist;home with home health  -MS       Row Name 07/18/24 1518          Vital Signs    Pre Systolic BP Rehab 166  -MS     Pre Treatment Diastolic   -MS     Intra Systolic BP Rehab 161  -MS     Intra Treatment Diastolic   -MS     Post Systolic BP Rehab 133  -MS     Post Treatment Diastolic BP 90  -MS     Pretreatment Heart Rate (beats/min) 109  -MS     Posttreatment Heart Rate (beats/min) 114  -MS     Pre SpO2 (%) 94  -MS     O2 Delivery Pre Treatment nasal cannula  -MS     O2 Delivery Intra Treatment nasal cannula  -MS     Post SpO2 (%) 90  -MS     O2 Delivery Post Treatment nasal  cannula  -MS     Pre Patient Position Supine  -MS     Intra Patient Position Standing  -MS     Post Patient Position Sitting  -MS       Row Name 07/18/24 1519          Positioning and Restraints    Pre-Treatment Position in bed  -MS     Post Treatment Position chair  -MS     In Chair reclined;notified nsg;call light within reach;exit alarm on;encouraged to call for assist;legs elevated  -MS               User Key  (r) = Recorded By, (t) = Taken By, (c) = Cosigned By      Initials Name Provider Type    Shari Lorenzo, MELVIN Occupational Therapist                   Outcome Measures       Row Name 07/18/24 1524          How much help from another is currently needed...    Putting on and taking off regular lower body clothing? 3  -MS     Bathing (including washing, rinsing, and drying) 3  -MS     Toileting (which includes using toilet bed pan or urinal) 3  -MS     Putting on and taking off regular upper body clothing 3  -MS     Taking care of personal grooming (such as brushing teeth) 3  -MS     Eating meals 3  -MS     AM-PAC 6 Clicks Score (OT) 18  -MS       Row Name 07/18/24 1454 07/18/24 0759       How much help from another person do you currently need...    Turning from your back to your side while in flat bed without using bedrails? 3  -BR 3  -ES    Moving from lying on back to sitting on the side of a flat bed without bedrails? 3  -BR 3  -ES    Moving to and from a bed to a chair (including a wheelchair)? 3  -BR 2  -ES    Standing up from a chair using your arms (e.g., wheelchair, bedside chair)? 3  -BR 2  -ES    Climbing 3-5 steps with a railing? 2  -BR 1  -ES    To walk in hospital room? 2  -BR 1  -ES    AM-PAC 6 Clicks Score (PT) 16  -BR 12  -ES    Highest Level of Mobility Goal 5 --> Static standing  -BR 4 --> Transfer to chair/commode  -ES      Row Name 07/18/24 1524 07/18/24 1454       Functional Assessment    Outcome Measure Options AM-PAC 6 Clicks Daily Activity (OT)  -MS AM-PAC 6 Clicks Basic Mobility  (PT)  -BR              User Key  (r) = Recorded By, (t) = Taken By, (c) = Cosigned By      Initials Name Provider Type    MS Shari Winston, OT Occupational Therapist    Opal Santana, PT Physical Therapist    Mayte Butts, RN Registered Nurse                    Occupational Therapy Education       Title: PT OT SLP Therapies (Done)       Topic: Occupational Therapy (Done)       Point: ADL training (Done)       Description:   Instruct learner(s) on proper safety adaptation and remediation techniques during self care or transfers.   Instruct in proper use of assistive devices.                  Learning Progress Summary             Patient Acceptance, E,TB, VU by MS at 7/18/2024 1524                         Point: Precautions (Done)       Description:   Instruct learner(s) on prescribed precautions during self-care and functional transfers.                  Learning Progress Summary             Patient Acceptance, E,TB, VU by MS at 7/18/2024 1524                         Point: Body mechanics (Done)       Description:   Instruct learner(s) on proper positioning and spine alignment during self-care, functional mobility activities and/or exercises.                  Learning Progress Summary             Patient Acceptance, E,TB, VU by MS at 7/18/2024 1524                                         User Key       Initials Effective Dates Name Provider Type Discipline    MS 07/13/22 -  Shari Winston, OT Occupational Therapist OT                  OT Recommendation and Plan  Planned Therapy Interventions (OT): activity tolerance training, adaptive equipment training, BADL retraining, IADL retraining, functional balance retraining, occupation/activity based interventions, passive ROM/stretching, patient/caregiver education/training, transfer/mobility retraining, strengthening exercise, ROM/therapeutic exercise  Therapy Frequency (OT): 5 times/wk  Plan of Care Review  Plan of Care Reviewed With: patient  Progress: no  change  Outcome Evaluation: Pt is a 79 y/o F admitted to [facility] 7/16/24, POD#1 C2-C3 cervical laminectomy for resection of intradural tumor. PMHx significant for hx breast cancer, and DMII. At baseline Pt resides with spouse, (I) with ADLs, (I) with mobility without AD and is an active . This date pt A&Ox4 on 4L O2, in supine with HOB>30 upon arrival. Neurosx okay to wear soft collar for comfort as needed, okay to mobilize this date based on pt tolerance. Pt c/o 8/10 pain in head/neck, does not report increase with activity. Pt /102 in supine, remains stable 161/107 sitting edge of bed, decreased sitting up in chair post activity 133/90. Pt educated on cervical precautions, verbalized understanding. Pt educated on log roll, requires CGA x2 for supine to sit, requires min A to come to standing with RW and ambulates to bathroom and to chair within room with min A x2 with RW, 1 LOB noted. Pt likely to require assist with all ADLs at this time, would benefit from continued use of RW. Anticipate pt to progress well, likely safe to dc home safe with spouse assist and HHOT, will follow within acute setting.     Time Calculation:                   Shari Winston OT  7/18/2024

## 2024-07-18 NOTE — PROGRESS NOTES
[facility] MEDICINE SERVICE  DAILY PROGRESS NOTE    NAME: Esperanza Smith  : 1945  MRN: [id number]      LOS: 2 days     PROVIDER OF SERVICE: Shadi Newby MD    Chief Complaint: Intradural extramedullary spinal tumor    Subjective:     Interval History:  History taken from: patient    History of Present Illness:    Very pleasant 78-year-old female with a past medical history of diabetes mellitus type 2, hypertension, hyperlipidemia, hypothyroidism, obesity, breast cancer, uterine cancer, who underwent today a cervical 2-4 laminectomy for resection of an intra dural tumor.  She is awake and alert and answering appropriately.  She tolerated her soft diet.  She reports she recently had diverticulitis and was on several antibiotics.  She feels she is improving.  She does report that her neck pain is not being controlled.  She is currently on 0.25 mg Dilaudid every 4 hours.  This was increased to 0.50 milligrams every 4 hours.  She will continue to be evaluated and treated.  Neurosurgery following.     2024 patient seen and examined in bed no acute distress, complaining of headache.  Family at bedside, discussed with RN.    24-seen and examined in bed no acute distress, vital signs stable, continues to have headaches this morning, but otherwise no new symptoms.  urinating without difficulty but has not had a bowel movement yet. .    Review of Systems  Constitutional: Negative.    HENT: Negative.     Eyes: Negative.    Respiratory: Negative.     Cardiovascular: Negative.    Gastrointestinal: Negative.    Endocrine: Negative.    Genitourinary: Negative.    Musculoskeletal:  Positive for neck pain.   Skin: Negative.    Allergic/Immunologic: Negative.    Neurological: Negative.    Hematological: Negative.    Psychiatric/Behavioral: Negative.     All other systems reviewed and are negative  Objective:     Vital Signs  Temp:  [97.4 °F (36.3 °C)-98.4 °F (36.9 °C)] 97.9 °F (36.6 °C)  Heart Rate:   [] 94  Resp:  [16-18] 16  BP: (160-173)/() 160/96  Flow (L/min):  [3] 3   Body mass index is 31.22 kg/m².    Physical Exam       Appearance: Normal appearance. She is obese.   HENT:      Head: Normocephalic and atraumatic.      Right Ear: External ear normal.      Left Ear: External ear normal.      Nose: Nose normal.      Mouth/Throat:      Mouth: Mucous membranes are moist.   Eyes:      Extraocular Movements: Extraocular movements intact.   Cardiovascular:      Rate and Rhythm: Normal rate and regular rhythm.   Pulmonary:      Effort: Pulmonary effort is normal.      Breath sounds: Normal breath sounds.   Abdominal:      Palpations: Abdomen is soft.   Genitourinary:     Comments: deferred  Musculoskeletal:         General: Normal range of motion.      Cervical back: Tenderness present.   Skin:     General: Skin is warm and dry.   Neurological:      General: No focal deficit present.      Mental Status: She is alert and oriented to person, place, and time.   Psychiatric:         Mood and Affect: Mood normal.         Behavior: Behavior normal.         Thought Content: Thought content normal.         Judgment: Judgment normal.      Scheduled Meds   amLODIPine, 10 mg, Oral, Daily  dexAMETHasone, 4 mg, Intravenous, Q6H  enoxaparin, 40 mg, Subcutaneous, Daily  insulin lispro, 2-7 Units, Subcutaneous, 4x Daily AC & at Bedtime  levothyroxine, 50 mcg, Oral, Q AM  metoprolol succinate XL, 25 mg, Oral, Q24H  sodium chloride, 10 mL, Intravenous, Q12H       PRN Meds     acetaminophen **OR** acetaminophen **OR** acetaminophen    senna-docusate sodium **AND** polyethylene glycol **AND** bisacodyl **AND** bisacodyl    dextrose    dextrose    glucagon (human recombinant)    hydrALAZINE    HYDROcodone-acetaminophen    HYDROmorphone    HYDROmorphone **AND** naloxone    melatonin    methocarbamol    ondansetron    ondansetron ODT    sodium chloride    sodium chloride   Infusions         Diagnostic Data    Results from  last 7 days   Lab Units 07/17/24  0418   WBC 10*3/mm3 12.43*   HEMOGLOBIN g/dL 15.9   HEMATOCRIT % 46.3   PLATELETS 10*3/mm3 293   GLUCOSE mg/dL 167*   CREATININE mg/dL 0.53*   BUN mg/dL 5*   SODIUM mmol/L 138   POTASSIUM mmol/L 4.9   ANION GAP mmol/L 11.5       No radiology results for the last day      I reviewed the patient's new clinical results.    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, being followed by neurosurgery on cefazolin per surgery, increased IV pain medication from 0.25 mg every 4 hours to 0.5 mg every 4 hours for comfort     Type 2 diabetes mellitus, hold home metformin while inpatient, consistent carb heart healthy diet, and SSI as needed with Accu-Cheks ACHS     Hyperlipidemia, on statin     Hypertension, on home Norvasc, metoprolol increased to 50 mg daily monitor BP Will add as needed medications if needed     Acquired hypothyroidism, home levothyroxine reordered     Breast cancer/uterine cancer, noted     Overweight BMI 31.22 lifestyle management education       VTE Prophylaxis:  Pharmacologic & mechanical VTE prophylaxis orders are present.         Code status is   There are no questions and answers to display.       Plan for disposition:rehab in 2 days    Time: 30 minutes    Signature: Electronically signed by Shadi Newby MD, 07/18/24, 09:29 EDT.  [facility] Hospitalist Team

## 2024-07-18 NOTE — PLAN OF CARE
Goal Outcome Evaluation:  Plan of Care Reviewed With: patient        Progress: no change  Outcome Evaluation: Pt is a 79 y/o F admitted to [facility] 7/16/24, POD#1 C2-C3 cervical laminectomy for resection of intradural tumor. PMHx significant for hx breast cancer, and DMII. At baseline Pt resides with spouse, (I) with ADLs, (I) with mobility without AD and is an active . This date pt A&Ox4 on 4L O2, in supine with HOB>30 upon arrival. Neurosx okay to wear soft collar for comfort as needed, okay to mobilize this date based on pt tolerance. Pt c/o 8/10 pain in head/neck, does not report increase with activity. Pt /102 in supine, remains stable 161/107 sitting edge of bed, decreased sitting up in chair post activity 133/90. Pt educated on cervical precautions, verbalized understanding. Pt educated on log roll, requires CGA x2 for supine to sit, requires min A to come to standing with RW and ambulates to bathroom and to chair within room with min A x2 with RW, 1 LOB noted. Pt likely to require assist with all ADLs at this time, would benefit from continued use of RW. Anticipate pt to progress well, likely safe to dc home safe with spouse assist and HHOT, will follow within acute setting.      Anticipated Discharge Disposition (OT): home with assist, home with home health

## 2024-07-18 NOTE — PLAN OF CARE
Goal Outcome Evaluation:                 Patient alert and oriented x 4. Patient ambulated to bedside commode with no issues. Pain controlled with medications on the MAR. Personal items and call light in reach. Plan of care ongoing.

## 2024-07-18 NOTE — PLAN OF CARE
Goal Outcome Evaluation:  Plan of Care Reviewed With: patient   Pt presents as a 77 y/o F for Cervical 2-4 laminectomy for resection of intradural tumor per Dr. Urrutia. Pt had post-op headache. Pt stayed in bed POD #1. Pt has OK to mobilize POD#2 per neurosurgery. Pt has a soft cervical collar that she is to wear for comfort and pt OK to mobilize despite pt's HA if pt is able to tolerate the mobility. Pt has hx breast and uterine CA, diabetes. Pt A and O x 4 with O2 at 4 L; pt does not use O2 at baseline. Pt has chronic numbness B feet. Pt has c/o headache at 8/10. BP is 166/102- Nsg aware. At baseline, pt lives with spouse in [facility] and she is typically independent with community mobility without AD. She has a rolling walker to use if needed. This date, pt requiring CGA of 2 for supine>sit, man assist for transfers and min assist of 2 for 20 feet of gait tolerance with rolling walker. Pt ambulates with decreased heel strike and moderate unsteadiness when up on her feet. Headache complaints were stable with activity. Ending BP was 133/90 with pt up in recliner. PT will follow pt as she is below her baseline for mobility. PT recommendation is Home with Assist and Home Health Physical Therapy.               Anticipated Discharge Disposition (PT): home with assist, home with home health

## 2024-07-18 NOTE — THERAPY EVALUATION
Patient Name: Esperanza Smith  : 1945    MRN: [id number]                              Today's Date: 2024       Admit Date: 2024    Visit Dx:     ICD-10-CM ICD-9-CM   1. 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 239.7     Patient Active Problem List   Diagnosis    Breast neoplasm, Tis (DCIS), right    Dermatophytosis    Screening for breast cancer    Enterocutaneous fistula    Hematuria    Hyperlipidemia    Hypertension    Intertrigo    Overweight    Vasovagal attack    Vitamin D deficiency    Medicare annual wellness visit, subsequent    Breast cancer    Obstructive sleep apnea syndrome    Other fatigue    Allergies    Need for vaccination    Age-related osteoporosis without current pathological fracture     Postmenopause    Need for hepatitis C screening test    Cervical spondylosis with radiculopathy    Intradural extramedullary spinal tumor    Type 2 diabetes mellitus    Acquired hypothyroidism     Past Medical History:   Diagnosis Date    Allergic Penicillian    Asthma congestion    Breast cancer     Cataract     Cervical disc disorder     Cholelithiasis surgery  removal    DDD (degenerative disc disease), lumbosacral     Diabetes mellitus     Diverticulitis     Diverticulosis     Headache     History of medical problems 54 inches left of small intesting    Hyperlipidemia     Hypertension     Hypothyroidism     IBS (irritable bowel syndrome)     Inflammatory bowel disease     Liver disease fatty liver    Low back pain Sciatic    Lumbosacral disc disease     Neuromuscular disorder neuropathy    Obesity     PONV (postoperative nausea and vomiting)     Seasonal allergies     Sleep apnea      Past Surgical History:   Procedure Laterality Date    BREAST BIOPSY      BREAST SURGERY Left 02/2012    x2     CERVICAL SPINAL CORD TUMOR REMOVAL N/A 2024    Procedure: Cervical 2-4 laminectomy for resection of intradural tumor;  Surgeon: Mario Urrutia IV, MD;  Location: [facility] MAIN OR;  Service:  Neurosurgery;  Laterality: N/A;    CHOLECYSTECTOMY  1996    COLON RESECTION SMALL BOWEL  05/25/2014    COLON SURGERY  small intestine repair    HYSTERECTOMY      LYMPH NODE BIOPSY      MASTECTOMY      OOPHORECTOMY  1965    SIMPLE MASTECTOMY Right 02/26/2020    Procedure: Right simple mastectomy;  Surgeon: Mario Quezada DO;  Location: [facility] MAIN OR;  Service: General;  Laterality: Right;    SMALL INTESTINE SURGERY      SUBTOTAL HYSTERECTOMY  full hysterectomy      General Information       Row Name 07/18/24 1439          Physical Therapy Time and Intention    Document Type evaluation  -BR     Mode of Treatment physical therapy  -BR       Row Name 07/18/24 1439          General Information    Patient Profile Reviewed yes  -BR     Prior Level of Function independent:;all household mobility;community mobility;gait;transfer;driving  -BR     Existing Precautions/Restrictions fall;spinal;other (see comments);oxygen therapy device and L/min  soft cervical collar as needed for pt comfort per neurospine  -BR     Barriers to Rehab medically complex  -BR       Row Name 07/18/24 1439          Living Environment    People in Home spouse  -BR       Row Name 07/18/24 1439          Cognition    Orientation Status (Cognition) oriented x 4  -BR       Row Name 07/18/24 1439          Safety Issues, Functional Mobility    Impairments Affecting Function (Mobility) balance;endurance/activity tolerance;pain;strength;sensation/sensory awareness  -BR               User Key  (r) = Recorded By, (t) = Taken By, (c) = Cosigned By      Initials Name Provider Type    BR Opal Benito, PT Physical Therapist                   Mobility       Row Name 07/18/24 1442          Bed Mobility    Bed Mobility supine-sit  -BR     Supine-Sit [place] (Bed Mobility) contact guard;2 person assist;verbal cues  -BR     Assistive Device (Bed Mobility) head of bed elevated  -BR       Row Name 07/18/24 1442          Sit-Stand Transfer    Sit-Stand  [place] (Transfers) minimum assist (75% patient effort);verbal cues  -BR       Row Name 07/18/24 1442          Gait/Stairs (Locomotion)    [place] Level (Gait) minimum assist (75% patient effort);2 person assist  -BR     Assistive Device (Gait) walker, front-wheeled  -BR     Patient was able to Ambulate yes  -BR     Distance in Feet (Gait) 20  -BR     Deviations/Abnormal Patterns (Gait) weight shifting decreased;paulino decreased;stride length decreased  -BR     Bilateral Gait Deviations heel strike decreased  -BR               User Key  (r) = Recorded By, (t) = Taken By, (c) = Cosigned By      Initials Name Provider Type    Opal Santana PT Physical Therapist                   Obj/Interventions       Row Name 07/18/24 1443          Range of Motion Comprehensive    General Range of Motion bilateral lower extremity ROM WFL  -BR       Row Name 07/18/24 1443          Strength Comprehensive (MMT)    Comment, General Manual Muscle Testing (MMT) Assessment BLE strength grossly 3+/5  -BR       Row Name 07/18/24 1443          Balance    Balance Assessment sitting static balance;standing static balance  -BR     Static Sitting Balance supervision  -BR     Dynamic Sitting Balance unable to assess;not tested;other (see comments)  -BR     Position, Sitting Balance sitting edge of bed  -BR     Static Standing Balance contact guard;2-person assist  -BR     Position/Device Used, Standing Balance supported;walker, rolling  -BR       Row Name 07/18/24 1443          Sensory Assessment (Somatosensory)    Sensory Assessment (Somatosensory) bilateral LE  -BR     Bilateral LE Sensory Assessment general sensation;impaired  -BR               User Key  (r) = Recorded By, (t) = Taken By, (c) = Cosigned By      Initials Name Provider Type    Opal Santana PT Physical Therapist                   Goals/Plan       Row Name 07/18/24 1453          Bed Mobility Goal 1 (PT)    Activity/Assistive Device (Bed Mobility Goal 1,  PT) bed mobility activities, all  -BR     [place] Level/Cues Needed (Bed Mobility Goal 1, PT) modified independence  -BR     Time Frame (Bed Mobility Goal 1, PT) long term goal (LTG);2 weeks  -BR       Row Name 07/18/24 1453          Transfer Goal 1 (PT)    Activity/Assistive Device (Transfer Goal 1, PT) transfers, all  -BR     [place] Level/Cues Needed (Transfer Goal 1, PT) modified independence  -BR     Time Frame (Transfer Goal 1, PT) long term goal (LTG);2 weeks  -BR       Row Name 07/18/24 1453          Gait Training Goal 1 (PT)    Activity/Assistive Device (Gait Training Goal 1, PT) gait (walking locomotion);assistive device use  -BR     [place] Level (Gait Training Goal 1, PT) modified independence  -BR     Distance (Gait Training Goal 1, PT) 150  -BR     Time Frame (Gait Training Goal 1, PT) long term goal (LTG);2 weeks  -BR       Row Name 07/18/24 1453          Stairs Goal 1 (PT)    Activity/Assistive Device (Stairs Goal 1, PT) stairs, all skills  -BR     [place] Level/Cues Needed (Stairs Goal 1, PT) contact guard required  -BR     Number of Stairs (Stairs Goal 1, PT) 3  -BR     Time Frame (Stairs Goal 1, PT) long term goal (LTG);2 weeks  -BR       Row Name 07/18/24 2801          Therapy Assessment/Plan (PT)    Planned Therapy Interventions (PT) balance training;bed mobility training;gait training;strengthening;ROM (range of motion);stair training;transfer training;patient/family education  -BR               User Key  (r) = Recorded By, (t) = Taken By, (c) = Cosigned By      Initials Name Provider Type    BR Opal Benito, PT Physical Therapist                   Clinical Impression       Row Name 07/18/24 1446          Pain    Pretreatment Pain Rating 8/10  -BR     Posttreatment Pain Rating 8/10  -BR     Pain Location - head  -BR       Row Name 07/18/24 1505 07/18/24 1446       Plan of Care Review    Plan of Care Reviewed With -- patient  -BR    Outcome Evaluation Pt presents as a 78  y/o F for Cervical 2-4 laminectomy for resection of intradural tumor per Dr. Urrutia. Pt had post-op headache. Pt stayed in bed POD #1. Pt has OK to mobilize POD#2 per neurosurgery. Pt has a soft cervical collar that she is to wear for comfort and pt OK to mobilize despite pt's HA if pt is able to tolerate the mobility. Pt has hx breast and uterine CA, diabetes. Pt A and O x 4 with O2 at 4 L; pt does not use O2 at baseline. Pt has chronic numbness B feet. Pt has c/o headache at 8/10. BP is 166/102- Nsg aware. At baseline, pt lives with spouse in [facility] and she is typically independent with community mobility without AD. She has a rolling walker to use if needed. This date, pt requiring CGA of 2 for supine>sit, man assist for transfers and min assist of 2 for 20 feet of gait tolerance with rolling walker. Pt ambulates with decreased heel strike and moderate unsteadiness when up on her feet. Headache complaints were stable with activity. Ending BP was 133/90 with pt up in recliner. PT will follow pt as she is below her baseline for mobility. PT recommendation is Home with Assist and Home Health Physical Therapy.  -BR --      Row Name 07/18/24 1446          Therapy Assessment/Plan (PT)    Rehab Potential (PT) good, to achieve stated therapy goals  -BR     Criteria for Skilled Interventions Met (PT) yes;meets criteria;skilled treatment is necessary  -BR     Therapy Frequency (PT) 5 times/wk  -BR     Predicted Duration of Therapy Intervention (PT) until D/C  -BR       Row Name 07/18/24 1446          Vital Signs    Pre Systolic BP Rehab 166  -BR     Pre Treatment Diastolic   -BR     Post Systolic BP Rehab 133  -BR     Post Treatment Diastolic BP 90  -BR     Pre SpO2 (%) 93  -BR     O2 Delivery Pre Treatment nasal cannula  4 L  -BR     Post SpO2 (%) 94  -BR     O2 Delivery Post Treatment nasal cannula  4 L  -BR     Pre Patient Position Supine  -BR     Intra Patient Position Standing  -BR     Post Patient Position Sitting   -BR       Row Name 07/18/24 1446          Positioning and Restraints    Pre-Treatment Position in bed  -BR     Post Treatment Position chair  -BR     In Chair notified nsg;reclined;call light within reach;encouraged to call for assist;exit alarm on;legs elevated;with OT  -BR               User Key  (r) = Recorded By, (t) = Taken By, (c) = Cosigned By      Initials Name Provider Type    Opal Santana PT Physical Therapist                   Outcome Measures       Row Name 07/18/24 1454 07/18/24 0759       How much help from another person do you currently need...    Turning from your back to your side while in flat bed without using bedrails? 3  -BR 3  -ES    Moving from lying on back to sitting on the side of a flat bed without bedrails? 3  -BR 3  -ES    Moving to and from a bed to a chair (including a wheelchair)? 3  -BR 2  -ES    Standing up from a chair using your arms (e.g., wheelchair, bedside chair)? 3  -BR 2  -ES    Climbing 3-5 steps with a railing? 2  -BR 1  -ES    To walk in hospital room? 2  -BR 1  -ES    AM-PAC 6 Clicks Score (PT) 16  -BR 12  -ES    Highest Level of Mobility Goal 5 --> Static standing  -BR 4 --> Transfer to chair/commode  -ES      Row Name 07/18/24 1454          Functional Assessment    Outcome Measure Options AM-PAC 6 Clicks Basic Mobility (PT)  -BR               User Key  (r) = Recorded By, (t) = Taken By, (c) = Cosigned By      Initials Name Provider Type    Opal Santana, MYNOR Physical Therapist    Mayte Butts RN Registered Nurse                                 Physical Therapy Education       Title: PT OT SLP Therapies (Done)       Topic: Physical Therapy (Done)       Point: Mobility training (Done)       Learning Progress Summary             Patient Acceptance, E,D, VU,DU,NR by SAIRA at 7/18/2024 7825                         Point: Body mechanics (Done)       Learning Progress Summary             Patient Acceptance, E,D, VU,DU,NR by SAIRA at 7/18/2024 5915                          Point: Precautions (Done)       Learning Progress Summary             Patient Acceptance, E,D, VU,DU,NR by BR at 7/18/2024 4071                                         User Key       Initials Effective Dates Name Provider Type Discipline    BR 02/01/22 -  Opal Benito PT Physical Therapist PT                  PT Recommendation and Plan  Planned Therapy Interventions (PT): balance training, bed mobility training, gait training, strengthening, ROM (range of motion), stair training, transfer training, patient/family education  Plan of Care Reviewed With: patient  Outcome Evaluation: Pt presents as a 77 y/o F for Cervical 2-4 laminectomy for resection of intradural tumor per Dr. Urrutia. Pt had post-op headache. Pt stayed in bed POD #1. Pt has OK to mobilize POD#2 per neurosurgery. Pt has a soft cervical collar that she is to wear for comfort and pt OK to mobilize despite pt's HA if pt is able to tolerate the mobility. Pt has hx breast and uterine CA, diabetes. Pt A and O x 4 with O2 at 4 L; pt does not use O2 at baseline. Pt has chronic numbness B feet. Pt has c/o headache at 8/10. BP is 166/102- Nsg aware. At baseline, pt lives with spouse in [facility] and she is typically independent with community mobility without AD. She has a rolling walker to use if needed. This date, pt requiring CGA of 2 for supine>sit, man assist for transfers and min assist of 2 for 20 feet of gait tolerance with rolling walker. Pt ambulates with decreased heel strike and moderate unsteadiness when up on her feet. Headache complaints were stable with activity. Ending BP was 133/90 with pt up in recliner. PT will follow pt as she is below her baseline for mobility. PT recommendation is Home with Assist and Home Health Physical Therapy.     Time Calculation:         PT Charges       Row Name 07/18/24 1505             Time Calculation    Start Time 1114  -BR      Stop Time 1145  -BR      Time Calculation (min) 31 min  -BR      PT  Received On 07/18/24  -BR      PT - Next Appointment 07/19/24  -BR      PT Goal Re-Cert Due Date 08/01/24  -BR         Time Calculation- PT    Total Timed Code Minutes- PT 0 minute(s)  -BR                User Key  (r) = Recorded By, (t) = Taken By, (c) = Cosigned By      Initials Name Provider Type    Opal Santana, PT Physical Therapist                  Therapy Charges for Today       Code Description Service Date Service Provider Modifiers Qty    75054593822 HC PT EVAL MOD COMPLEXITY 4 7/18/2024 Opal Benito, PT GP 1            PT G-Codes  Outcome Measure Options: AM-PAC 6 Clicks Basic Mobility (PT)  AM-PAC 6 Clicks Score (PT): 16  PT Discharge Summary  Anticipated Discharge Disposition (PT): home with assist, home with home health    Opal Benito, PT  7/18/2024

## 2024-07-19 ENCOUNTER — TELEPHONE (OUTPATIENT)
Dept: FAMILY MEDICINE CLINIC | Facility: CLINIC | Age: 79
End: 2024-07-19
Payer: MEDICARE

## 2024-07-19 ENCOUNTER — HOSPITAL ENCOUNTER (OUTPATIENT)
Dept: MRI IMAGING | Facility: HOSPITAL | Age: 79
Discharge: HOME | End: 2024-07-19
Payer: MEDICARE

## 2024-07-19 ENCOUNTER — READMISSION MANAGEMENT (OUTPATIENT)
Dept: CALL CENTER | Facility: HOSPITAL | Age: 79
End: 2024-07-19
Payer: MEDICARE

## 2024-07-19 VITALS
RESPIRATION RATE: 21 BRPM | BODY MASS INDEX: 31.25 KG/M2 | OXYGEN SATURATION: 91 % | TEMPERATURE: 97.6 F | WEIGHT: 187.6 LBS | SYSTOLIC BLOOD PRESSURE: 150 MMHG | HEIGHT: 65 IN | DIASTOLIC BLOOD PRESSURE: 86 MMHG | HEART RATE: 82 BPM

## 2024-07-19 DIAGNOSIS — Z98.890 STATUS POST SURGERY: ICD-10-CM

## 2024-07-19 LAB
GLUCOSE BLDC GLUCOMTR-MCNC: 163 MG/DL (ref 70–105)
GLUCOSE BLDC GLUCOMTR-MCNC: 206 MG/DL (ref 70–105)

## 2024-07-19 PROCEDURE — 25010000002 DEXAMETHASONE PER 1 MG

## 2024-07-19 PROCEDURE — 25010000002 HYDROMORPHONE 1 MG/ML SOLUTION: Performed by: NURSE PRACTITIONER

## 2024-07-19 PROCEDURE — 82948 REAGENT STRIP/BLOOD GLUCOSE: CPT

## 2024-07-19 PROCEDURE — 25010000002 HYDRALAZINE PER 20 MG: Performed by: NURSE PRACTITIONER

## 2024-07-19 RX ORDER — HYDROCODONE BITARTRATE AND ACETAMINOPHEN 5; 325 MG/1; MG/1
1 TABLET ORAL EVERY 6 HOURS PRN
Qty: 25 TABLET | Refills: 0 | Status: SHIPPED | OUTPATIENT
Start: 2024-07-19 | End: 2024-07-26 | Stop reason: SDUPTHER

## 2024-07-19 RX ORDER — CYCLOBENZAPRINE HCL 10 MG
10 TABLET ORAL 3 TIMES DAILY PRN
Qty: 30 TABLET | Refills: 1 | Status: SHIPPED | OUTPATIENT
Start: 2024-07-19

## 2024-07-19 RX ADMIN — DEXAMETHASONE SODIUM PHOSPHATE 4 MG: 4 INJECTION, SOLUTION INTRAMUSCULAR; INTRAVENOUS at 04:04

## 2024-07-19 RX ADMIN — HYDROCODONE BITARTRATE AND ACETAMINOPHEN 1 TABLET: 5; 325 TABLET ORAL at 00:25

## 2024-07-19 RX ADMIN — HYDROMORPHONE HYDROCHLORIDE 0.5 MG: 1 INJECTION, SOLUTION INTRAMUSCULAR; INTRAVENOUS; SUBCUTANEOUS at 01:50

## 2024-07-19 RX ADMIN — LEVOTHYROXINE SODIUM 50 MCG: 0.05 TABLET ORAL at 05:14

## 2024-07-19 RX ADMIN — HYDRALAZINE HYDROCHLORIDE 10 MG: 20 INJECTION INTRAMUSCULAR; INTRAVENOUS at 00:25

## 2024-07-19 NOTE — TELEPHONE ENCOUNTER
Patient is discharging from the hospital today. I gave verbal orders to Heidy with [facility] for home health.

## 2024-07-19 NOTE — DISCHARGE PLACEMENT REQUEST
"Esperanza Smith (78 y.o. Female)       Date of Birth   1945    Social Security Number       Address   [address] [place] [address]    Home Phone   [phone]    MRN   [id number]       Anabaptist   Quaker    Marital Status                               Admission Date   7/16/24    Admission Type   Elective    Admitting Provider   Mario Urrutia IV, MD    Attending Provider       Department, Room/Bed   [facility] SURGICAL INPATIENT, 4130/1       Discharge Date   7/19/2024    Discharge Disposition   Home or Self Care    Discharge Destination                                 Attending Provider: (none)   Allergies: Diphenhydramine, Morphine, Penicillins, Latex    Isolation: None   Infection: None   Code Status: Prior    Ht: 165.1 cm (65\")   Wt: 85.1 kg (187 lb 9.6 oz)    Admission Cmt: None   Principal Problem: 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]                   Active Insurance as of 7/16/2024       Primary Coverage       Payor Plan Insurance Group Employer/Plan Group    MEDICARE MEDICARE A & B        Payor Plan Address Payor Plan Phone Number Payor Plan Fax Number Effective Dates    [address] [phone]  9/1/2012 - None Entered    [facility] [id number]         Subscriber Name Subscriber Birth Date Member ID       ESPERANZA SMITH 1945 [id number]               Secondary Coverage       Payor Plan Insurance Group Employer/Plan Group     FOR LIFE  FOR LIFE [employer] SUP  NGN       Payor Plan Address Payor Plan Phone Number Payor Plan Fax Number Effective Dates    [address] [phone]  4/7/2020 - None Entered    [facility] [address]         Subscriber Name Subscriber Birth Date Member ID       ESPERANZA SMITH 1945 [id number]                     Emergency Contacts        (Rel.) Home Phone Work Phone Mobile Phone    WALKERJOEL (ANNMARIE) (Spouse) [phone] -- [phone]                "

## 2024-07-19 NOTE — OUTREACH NOTE
Prep Survey      Flowsheet Row Responses   [facility] patient discharged from? [facility]   Is LACE score < 7 ? No   Eligibility [facility]   Date of Admission 07/16/24   Date of Discharge 07/19/24   Discharge diagnosis Cervical 2-4 laminectomy for resection of intradural tumor   Does the patient have one of the following disease processes/diagnoses(primary or secondary)? General Surgery   Does the patient have Home health ordered? Yes   [facility]   Prep survey completed? Yes            Lynette PRIETO - Registered Nurse

## 2024-07-19 NOTE — PLAN OF CARE
Goal Outcome Evaluation:                              Patient alert and oriented x 4. Able to ambulate to bedside commode and chair. Patient complains of pain, treated per medications on the MAR. Blood pressure elevated and treated with Hydralazine per MAR. Call light and personal items in reach. Plan of care ongoing.

## 2024-07-20 NOTE — PROGRESS NOTES
[facility] MEDICINE SERVICE  DAILY PROGRESS NOTE    NAME: Esperanza Smith  : 1945  MRN: [id number]      LOS: 3 days     PROVIDER OF SERVICE: Shadi Newby MD    Chief Complaint: Intradural extramedullary spinal tumor    Subjective:     Interval History:  History taken from: patient    History of Present Illness:    Very pleasant 78-year-old female with a past medical history of diabetes mellitus type 2, hypertension, hyperlipidemia, hypothyroidism, obesity, breast cancer, uterine cancer, who underwent today a cervical 2-4 laminectomy for resection of an intra dural tumor.  She is awake and alert and answering appropriately.  She tolerated her soft diet.  She reports she recently had diverticulitis and was on several antibiotics.  She feels she is improving.  She does report that her neck pain is not being controlled.  She is currently on 0.25 mg Dilaudid every 4 hours.  This was increased to 0.50 milligrams every 4 hours.  She will continue to be evaluated and treated.  Neurosurgery following.     2024 patient seen and examined in bed no acute distress, complaining of headache.  Family at bedside, discussed with RN.    24-seen and examined in bed no acute distress, vital signs stable, continues to have headaches this morning, but otherwise no new symptoms.  urinating without difficulty but has not had a bowel movement yet. .  24 seen in bed NAD, doing better today, patient to be dc home today, after MRI, CHAI RN    Review of Systems  Constitutional: Negative.    HENT: Negative.     Eyes: Negative.    Respiratory: Negative.     Cardiovascular: Negative.    Gastrointestinal: Negative.    Endocrine: Negative.    Genitourinary: Negative.    Musculoskeletal:  Positive for neck pain.   Skin: Negative.    Allergic/Immunologic: Negative.    Neurological: Negative.    Hematological: Negative.    Psychiatric/Behavioral: Negative.     All other systems reviewed and are negative  Objective:      Vital Signs  Temp:  [97.6 °F (36.4 °C)] 97.6 °F (36.4 °C)  Heart Rate:  [] 82  Resp:  [18-21] 21  BP: (150-160)/() 150/86   Body mass index is 31.22 kg/m².    Physical Exam       Appearance: Normal appearance. She is obese.   HENT:      Head: Normocephalic and atraumatic.      Right Ear: External ear normal.      Left Ear: External ear normal.      Nose: Nose normal.      Mouth/Throat:      Mouth: Mucous membranes are moist.   Eyes:      Extraocular Movements: Extraocular movements intact.   Cardiovascular:      Rate and Rhythm: Normal rate and regular rhythm.   Pulmonary:      Effort: Pulmonary effort is normal.      Breath sounds: Normal breath sounds.   Abdominal:      Palpations: Abdomen is soft.   Genitourinary:     Comments: deferred  Musculoskeletal:         General: Normal range of motion.      Cervical back: Tenderness present.   Skin:     General: Skin is warm and dry.   Neurological:      General: No focal deficit present.      Mental Status: She is alert and oriented to person, place, and time.   Psychiatric:         Mood and Affect: Mood normal.         Behavior: Behavior normal.         Thought Content: Thought content normal.         Judgment: Judgment normal.      Scheduled Meds        PRN Meds      Infusions  No current facility-administered medications for this encounter.        Diagnostic Data    Results from last 7 days   Lab Units 07/17/24  0418   WBC 10*3/mm3 12.43*   HEMOGLOBIN g/dL 15.9   HEMATOCRIT % 46.3   PLATELETS 10*3/mm3 293   GLUCOSE mg/dL 167*   CREATININE mg/dL 0.53*   BUN mg/dL 5*   SODIUM mmol/L 138   POTASSIUM mmol/L 4.9   ANION GAP mmol/L 11.5       No radiology results for the last day      I reviewed the patient's new clinical results.    Assessment/Plan:     Active and Resolved Problems  Active Hospital Problems    Diagnosis  POA    **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]  Yes    Type 2 diabetes mellitus [E11.9]  Yes    Acquired hypothyroidism [E03.9]   Yes    Breast cancer [C50.919]  Yes    Hyperlipidemia [E78.5]  Yes    Overweight [E66.3]  Yes    Hypertension [I10]  Yes      Resolved Hospital Problems   No resolved problems to display.     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, being followed by neurosurgery on cefazolin per surgery, increased IV pain medication from 0.25 mg every 4 hours to 0.5 mg every 4 hours for comfort     Type 2 diabetes mellitus, hold home metformin while inpatient, consistent carb heart healthy diet, and SSI as needed with Accu-Cheks ACHS     Hyperlipidemia, on statin     Hypertension, on home Norvasc, metoprolol increased to 50 mg daily monitor BP Will add as needed medications if needed     Acquired hypothyroidism, home levothyroxine reordered     Breast cancer/uterine cancer, noted     Overweight BMI 31.22 lifestyle management education       VTE Prophylaxis:  Mechanical VTE prophylaxis orders are present.         Code status is   There are no questions and answers to display.       Plan for disposition:rehab in 2 days    Time: 30 minutes    Signature: Electronically signed by Shadi Newby MD, 07/19/24, 23:30 EDT.  [facility] Hospitalist Team

## 2024-07-22 ENCOUNTER — TRANSITIONAL CARE MANAGEMENT TELEPHONE ENCOUNTER (OUTPATIENT)
Dept: CALL CENTER | Facility: HOSPITAL | Age: 79
End: 2024-07-22
Payer: MEDICARE

## 2024-07-22 NOTE — OUTREACH NOTE
Call Center TCM Note      Flowsheet Row Responses   [facility] patient discharged from? [facility]   Does the patient have one of the following disease processes/diagnoses(primary or secondary)? General Surgery   TCM attempt successful? Yes   Call start time 1441   Call end time 1452   Discharge diagnosis Cervical 2-4 laminectomy for resection of intradural tumor   Meds reviewed with patient/caregiver? Yes   Is the patient having any side effects they believe may be caused by any medication additions or changes? No   Does the patient have all medications related to this admission filled (includes all antibiotics, pain medications, etc.) Yes   Prescription comments Pt states all needed medications and new rx's in place. No AVS available   Is the patient taking all medications as directed (includes completed medication regime)? Yes   Does the patient have an appointment with their PCP within 7-14 days of discharge? No appointments available   Nursing Interventions Patient desires to follow up with specialty only, Routed TCM call to PCP office, Patient declined scheduling/rescheduling appointment at this time   What is the Home health agency?  [facility] comments Pt has not heard from [facility] at time of this call, she does now have  contact info if needed.   Psychosocial issues? No   Did the patient receive a copy of their discharge instructions? Yes   Nursing interventions Reviewed instructions with patient   What is the patient's perception of their health status since discharge? Improving   Nursing interventions Nurse provided patient education   Is the patient /caregiver able to teach back basic post-op care? Continue use of incentive spirometry at least 1 week post discharge, Take showers only when approved by MD-sponge bathe until then, Do not remove steri-strips, Lifting as instructed by MD in discharge instructions, No tub bath, swimming, or hot tub until instructed by MD, Practice  'cough and deep breath', Drive as instructed by MD in discharge instructions, Keep incision areas clean,dry and protected   Is the patient/caregiver able to teach back signs and symptoms of incisional infection? Increased redness, swelling or pain at the incisonal site, Pus or odor from incision, Fever, Increased drainage or bleeding, Incisional warmth   Is the patient/caregiver able to teach back steps to recovery at home? Set small, achievable goals for return to baseline health, Practice good oral hygiene, Eat a well-balance diet, Rest and rebuild strength, gradually increase activity, Weigh daily, Make a list of questions for surgeon's appointment   If the patient is a current smoker, are they able to teach back resources for cessation? Not a smoker   Is the patient/caregiver able to teach back the hierarchy of who to call/visit for symptoms/problems? PCP, Specialist, Home health nurse, Urgent Care, ED, 911 Yes   TCM call completed? Yes   Wrap up additional comments D/C DX: Cervical 2-4 laminectomy for resection of intradural tumor - Pt managing, she is tired, and pain is moderate but tolerable with medications. No questions at this time. First POST OP appt is 07/30/2024. Pt declines TCM appt at this time.   Call end time [phone]            Margarita Mancia MA    7/22/2024, 14:55 EDT

## 2024-07-22 NOTE — CASE MANAGEMENT/SOCIAL WORK
Case Management Discharge Note      Final Note: VNA           Durable Medical Equipment Coordination complete.      Service Provider Selected Services Address Phone Fax Patient Preferred    [facility] [address], [place] [id number] [phone] --                    Home Medical Care Coordination complete.      Service Provider Selected Services Address Phone Fax Patient Preferred    [facility] ,  Home Nursing [address], [address], [place] [address] [phone] --               Transportation Services  Private: Car    Final Discharge Disposition Code: 06 - home with home health care

## 2024-07-26 DIAGNOSIS — M47.812 CERVICAL SPONDYLOSIS: ICD-10-CM

## 2024-07-26 DIAGNOSIS — D49.7 INTRADURAL EXTRAMEDULLARY SPINAL TUMOR: Primary | ICD-10-CM

## 2024-07-26 RX ORDER — TRAMADOL HYDROCHLORIDE 50 MG/1
50-100 TABLET ORAL EVERY 6 HOURS PRN
Qty: 60 TABLET | Refills: 0 | Status: SHIPPED | OUTPATIENT
Start: 2024-07-26

## 2024-07-29 ENCOUNTER — TELEPHONE (OUTPATIENT)
Dept: NEUROSURGERY | Facility: CLINIC | Age: 79
End: 2024-07-29
Payer: MEDICARE

## 2024-07-29 ENCOUNTER — APPOINTMENT (OUTPATIENT)
Dept: MRI IMAGING | Facility: HOSPITAL | Age: 79
End: 2024-07-29
Payer: MEDICARE

## 2024-07-29 ENCOUNTER — HOSPITAL ENCOUNTER (INPATIENT)
Facility: HOSPITAL | Age: 79
LOS: 9 days | Discharge: NURSING, REHAB OR LONG-TERM CARE | End: 2024-08-07
Attending: EMERGENCY MEDICINE | Admitting: INTERNAL MEDICINE
Payer: MEDICARE

## 2024-07-29 ENCOUNTER — TELEPHONE (OUTPATIENT)
Dept: FAMILY MEDICINE CLINIC | Facility: CLINIC | Age: 79
End: 2024-07-29
Payer: MEDICARE

## 2024-07-29 DIAGNOSIS — M54.2 NECK PAIN: Primary | ICD-10-CM

## 2024-07-29 DIAGNOSIS — G96.198 PSEUDOMENINGOCELE: ICD-10-CM

## 2024-07-29 DIAGNOSIS — Z98.890 STATUS POST SURGERY: ICD-10-CM

## 2024-07-29 LAB
ANION GAP SERPL CALCULATED.3IONS-SCNC: 13.6 MMOL/L (ref 5–15)
BASOPHILS # BLD AUTO: 0.05 10*3/MM3 (ref 0–0.2)
BASOPHILS NFR BLD AUTO: 0.2 % (ref 0–1.5)
BUN SERPL-MCNC: 10 MG/DL (ref 8–23)
BUN/CREAT SERPL: 22.7 (ref 7–25)
CALCIUM SPEC-SCNC: 10.6 MG/DL (ref 8.6–10.5)
CHLORIDE SERPL-SCNC: 94 MMOL/L (ref 98–107)
CO2 SERPL-SCNC: 24.4 MMOL/L (ref 22–29)
CREAT SERPL-MCNC: 0.44 MG/DL (ref 0.57–1)
DEPRECATED RDW RBC AUTO: 44.3 FL (ref 37–54)
EGFRCR SERPLBLD CKD-EPI 2021: 100.4 ML/MIN/1.73
EOSINOPHIL # BLD AUTO: 0.01 10*3/MM3 (ref 0–0.4)
EOSINOPHIL NFR BLD AUTO: 0 % (ref 0.3–6.2)
ERYTHROCYTE [DISTWIDTH] IN BLOOD BY AUTOMATED COUNT: 12.6 % (ref 12.3–15.4)
GLUCOSE SERPL-MCNC: 150 MG/DL (ref 65–99)
HCT VFR BLD AUTO: 44 % (ref 34–46.6)
HGB BLD-MCNC: 14.8 G/DL (ref 12–15.9)
IMM GRANULOCYTES # BLD AUTO: 0.21 10*3/MM3 (ref 0–0.05)
IMM GRANULOCYTES NFR BLD AUTO: 1 % (ref 0–0.5)
LYMPHOCYTES # BLD AUTO: 1.56 10*3/MM3 (ref 0.7–3.1)
LYMPHOCYTES NFR BLD AUTO: 7.5 % (ref 19.6–45.3)
MCH RBC QN AUTO: 32.1 PG (ref 26.6–33)
MCHC RBC AUTO-ENTMCNC: 33.6 G/DL (ref 31.5–35.7)
MCV RBC AUTO: 95.4 FL (ref 79–97)
MONOCYTES # BLD AUTO: 1.6 10*3/MM3 (ref 0.1–0.9)
MONOCYTES NFR BLD AUTO: 7.7 % (ref 5–12)
NEUTROPHILS NFR BLD AUTO: 17.3 10*3/MM3 (ref 1.7–7)
NEUTROPHILS NFR BLD AUTO: 83.6 % (ref 42.7–76)
NRBC BLD AUTO-RTO: 0 /100 WBC (ref 0–0.2)
PLATELET # BLD AUTO: 311 10*3/MM3 (ref 140–450)
PMV BLD AUTO: 9.4 FL (ref 6–12)
POTASSIUM SERPL-SCNC: 3.7 MMOL/L (ref 3.5–5.2)
RBC # BLD AUTO: 4.61 10*6/MM3 (ref 3.77–5.28)
SODIUM SERPL-SCNC: 132 MMOL/L (ref 136–145)
WBC NRBC COR # BLD AUTO: 20.73 10*3/MM3 (ref 3.4–10.8)

## 2024-07-29 PROCEDURE — 25010000002 GADOTERIDOL PER 1 ML: Performed by: EMERGENCY MEDICINE

## 2024-07-29 PROCEDURE — 25010000002 KETOROLAC TROMETHAMINE PER 15 MG: Performed by: INTERNAL MEDICINE

## 2024-07-29 PROCEDURE — 36415 COLL VENOUS BLD VENIPUNCTURE: CPT

## 2024-07-29 PROCEDURE — 80048 BASIC METABOLIC PNL TOTAL CA: CPT | Performed by: EMERGENCY MEDICINE

## 2024-07-29 PROCEDURE — 86140 C-REACTIVE PROTEIN: CPT | Performed by: HOSPITALIST

## 2024-07-29 PROCEDURE — 85652 RBC SED RATE AUTOMATED: CPT | Performed by: HOSPITALIST

## 2024-07-29 PROCEDURE — 85025 COMPLETE CBC W/AUTO DIFF WBC: CPT | Performed by: EMERGENCY MEDICINE

## 2024-07-29 PROCEDURE — 25010000002 ONDANSETRON PER 1 MG: Performed by: INTERNAL MEDICINE

## 2024-07-29 PROCEDURE — A9579 GAD-BASE MR CONTRAST NOS,1ML: HCPCS | Performed by: EMERGENCY MEDICINE

## 2024-07-29 PROCEDURE — 72156 MRI NECK SPINE W/O & W/DYE: CPT

## 2024-07-29 PROCEDURE — 99285 EMERGENCY DEPT VISIT HI MDM: CPT

## 2024-07-29 RX ORDER — AMOXICILLIN 250 MG
2 CAPSULE ORAL 2 TIMES DAILY PRN
Status: DISCONTINUED | OUTPATIENT
Start: 2024-07-29 | End: 2024-07-31 | Stop reason: SDUPTHER

## 2024-07-29 RX ORDER — POLYETHYLENE GLYCOL 3350 17 G/17G
17 POWDER, FOR SOLUTION ORAL DAILY PRN
Status: DISCONTINUED | OUTPATIENT
Start: 2024-07-29 | End: 2024-07-31 | Stop reason: SDUPTHER

## 2024-07-29 RX ORDER — SODIUM CHLORIDE 0.9 % (FLUSH) 0.9 %
10 SYRINGE (ML) INJECTION AS NEEDED
Status: DISCONTINUED | OUTPATIENT
Start: 2024-07-29 | End: 2024-08-07 | Stop reason: HOSPADM

## 2024-07-29 RX ORDER — CHOLECALCIFEROL (VITAMIN D3) 25 MCG
2000 TABLET ORAL DAILY
Status: DISCONTINUED | OUTPATIENT
Start: 2024-07-30 | End: 2024-08-07 | Stop reason: HOSPADM

## 2024-07-29 RX ORDER — KETOROLAC TROMETHAMINE 30 MG/ML
15 INJECTION, SOLUTION INTRAMUSCULAR; INTRAVENOUS EVERY 6 HOURS PRN
Status: DISCONTINUED | OUTPATIENT
Start: 2024-07-29 | End: 2024-08-01

## 2024-07-29 RX ORDER — SODIUM CHLORIDE 0.9 % (FLUSH) 0.9 %
10 SYRINGE (ML) INJECTION EVERY 12 HOURS SCHEDULED
Status: DISCONTINUED | OUTPATIENT
Start: 2024-07-29 | End: 2024-08-07 | Stop reason: HOSPADM

## 2024-07-29 RX ORDER — SODIUM CHLORIDE 0.9 % (FLUSH) 0.9 %
10 SYRINGE (ML) INJECTION AS NEEDED
Status: DISCONTINUED | OUTPATIENT
Start: 2024-07-29 | End: 2024-07-29

## 2024-07-29 RX ORDER — BISACODYL 5 MG/1
5 TABLET, DELAYED RELEASE ORAL DAILY PRN
Status: DISCONTINUED | OUTPATIENT
Start: 2024-07-29 | End: 2024-07-31 | Stop reason: SDUPTHER

## 2024-07-29 RX ORDER — AMLODIPINE BESYLATE 5 MG/1
5 TABLET ORAL DAILY
Status: DISCONTINUED | OUTPATIENT
Start: 2024-07-30 | End: 2024-08-07 | Stop reason: HOSPADM

## 2024-07-29 RX ORDER — CYCLOBENZAPRINE HCL 10 MG
10 TABLET ORAL 3 TIMES DAILY PRN
Status: DISCONTINUED | OUTPATIENT
Start: 2024-07-29 | End: 2024-08-07 | Stop reason: HOSPADM

## 2024-07-29 RX ORDER — HYDROCODONE BITARTRATE AND ACETAMINOPHEN 5; 325 MG/1; MG/1
1 TABLET ORAL EVERY 6 HOURS PRN
Status: DISCONTINUED | OUTPATIENT
Start: 2024-07-29 | End: 2024-08-07 | Stop reason: HOSPADM

## 2024-07-29 RX ORDER — VANCOMYCIN/0.9 % SOD CHLORIDE 1.5G/250ML
1500 PLASTIC BAG, INJECTION (ML) INTRAVENOUS ONCE
Status: DISCONTINUED | OUTPATIENT
Start: 2024-07-30 | End: 2024-07-30

## 2024-07-29 RX ORDER — ACETAMINOPHEN 325 MG/1
650 TABLET ORAL EVERY 6 HOURS PRN
COMMUNITY

## 2024-07-29 RX ORDER — HYDROCODONE BITARTRATE AND ACETAMINOPHEN 5; 325 MG/1; MG/1
1 TABLET ORAL EVERY 6 HOURS PRN
Qty: 25 TABLET | Refills: 0 | Status: ON HOLD | OUTPATIENT
Start: 2024-07-29

## 2024-07-29 RX ORDER — ASCORBIC ACID 500 MG
250 TABLET ORAL DAILY
Status: DISCONTINUED | OUTPATIENT
Start: 2024-07-30 | End: 2024-08-07 | Stop reason: HOSPADM

## 2024-07-29 RX ORDER — SODIUM CHLORIDE 9 MG/ML
40 INJECTION, SOLUTION INTRAVENOUS AS NEEDED
Status: DISCONTINUED | OUTPATIENT
Start: 2024-07-29 | End: 2024-08-07 | Stop reason: HOSPADM

## 2024-07-29 RX ORDER — ONDANSETRON 2 MG/ML
4 INJECTION INTRAMUSCULAR; INTRAVENOUS EVERY 6 HOURS PRN
Status: DISCONTINUED | OUTPATIENT
Start: 2024-07-29 | End: 2024-08-07 | Stop reason: HOSPADM

## 2024-07-29 RX ORDER — BISACODYL 10 MG
10 SUPPOSITORY, RECTAL RECTAL DAILY PRN
Status: DISCONTINUED | OUTPATIENT
Start: 2024-07-29 | End: 2024-07-31 | Stop reason: SDUPTHER

## 2024-07-29 RX ORDER — TRAMADOL HYDROCHLORIDE 50 MG/1
50 TABLET ORAL EVERY 6 HOURS PRN
Status: DISCONTINUED | OUTPATIENT
Start: 2024-07-29 | End: 2024-07-30

## 2024-07-29 RX ORDER — LEVOTHYROXINE SODIUM 0.05 MG/1
50 TABLET ORAL
Status: DISCONTINUED | OUTPATIENT
Start: 2024-07-30 | End: 2024-08-07 | Stop reason: HOSPADM

## 2024-07-29 RX ADMIN — GADOTERIDOL 18 ML: 279.3 INJECTION, SOLUTION INTRAVENOUS at 18:51

## 2024-07-29 RX ADMIN — KETOROLAC TROMETHAMINE 15 MG: 30 INJECTION, SOLUTION INTRAMUSCULAR at 23:49

## 2024-07-29 RX ADMIN — ONDANSETRON 4 MG: 2 INJECTION INTRAMUSCULAR; INTRAVENOUS at 22:38

## 2024-07-29 RX ADMIN — Medication 10 ML: at 22:38

## 2024-07-29 NOTE — TELEPHONE ENCOUNTER
Patient is getting more pain and weaker and worse one mobility. Robert her therapist called to let us know she is going to the Ed

## 2024-07-29 NOTE — DISCHARGE SUMMARY
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, being followed by neurosurgery on cefazolin per surgery, increased IV pain medication from 0.25 mg every 4 hours to 0.5 mg every 4 hours for comfort     Type 2 diabetes mellitus, hold home metformin while inpatient, consistent carb heart healthy diet, and SSI as needed with Accu-Cheks ACHS     Hyperlipidemia, on statin     Hypertension, on home Norvasc, metoprolol increased to 50 mg daily monitor BP Will add as needed medications if needed     Acquired hypothyroidism, home levothyroxine reordered     Breast cancer/uterine cancer, noted     Overweight BMI 31.22 lifestyle management education    Hospital Course     HPI: Very pleasant 78-year-old female with a past medical history of diabetes mellitus type 2, hypertension, hyperlipidemia, hypothyroidism, obesity, breast cancer, uterine cancer, who underwent today a cervical 2-4 laminectomy for resection of an intra dural tumor.  She is awake and alert and answering appropriately.  She tolerated her soft diet.  She reports she recently had diverticulitis and was on several antibiotics.  She  feels she is improving.  She does report that her neck pain is not being controlled.  She is currently on 0.25 mg Dilaudid every 4 hours.  This was increased to 0.50 milligrams every 4 hours.  She will continue to be evaluated and treated.  Neurosurgery following.     7/17/2024 patient seen and examined in bed no acute distress, complaining of headache.  Family at bedside, discussed with RN.     7/18/24-seen and examined in bed no acute distress, vital signs stable, continues to have headaches this morning, but otherwise no new symptoms.  urinating without difficulty but has not had a bowel movement yet. .  7/19/24 seen in bed NAD, doing better today, patient to be dc home today, after MRI, CHAI RN      DISCHARGE Follow Up Recommendations for labs and diagnostics: Follow-up with PCP in a week.  Follow-up with surgery in 2 weeks.      Reasons For Change In Medications and Indications for New Medications:      Day of Discharge     Vital Signs:       Physical Exam      Appearance: Normal appearance. She is obese.   HENT:      Head: Normocephalic and atraumatic.      Right Ear: External ear normal.      Left Ear: External ear normal.      Nose: Nose normal.      Mouth/Throat:      Mouth: Mucous membranes are moist.   Eyes:      Extraocular Movements: Extraocular movements intact.   Cardiovascular:      Rate and Rhythm: Normal rate and regular rhythm.   Pulmonary:      Effort: Pulmonary effort is normal.      Breath sounds: Normal breath sounds.   Abdominal:      Palpations: Abdomen is soft.   Genitourinary:     Comments: deferred  Musculoskeletal:         General: Normal range of motion.      Cervical back: Tenderness present.   Skin:     General: Skin is warm and dry.   Neurological:      General: No focal deficit present.      Mental Status: She is alert and oriented to person, place, and time.   Psychiatric:         Mood and Affect: Mood normal.         Behavior: Behavior normal.         Thought Content: Thought  content normal.         Judgment: Judgment normal.       Pertinent  and/or Most Recent Results     LAB RESULTS:                              Brief Urine Lab Results  (Last result in the past 365 days)        Color   Clarity   Blood   Leuk Est   Nitrite   Protein   CREAT   Urine HCG        07/08/24 1123 Yellow   Clear   Negative   Trace   Negative   Negative                 Microbiology Results (last 10 days)       ** No results found for the last 240 hours. **            MRI Cervical Spine Without Contrast    Result Date: 7/24/2024  Impression: Impression: Status post C2-C4 laminectomy. There is a 5.9 x 3.4 x 4 cm fluid collection posterior to the laminectomy site which is likely a postoperative seroma, an abscess or pseudomeningocele is less favored. Mild canal stenosis at C3-4 secondary to broad-based disc bulge. Electronically Signed: Robert Caballero MD  7/24/2024 4:43 PM EDT  Workstation ID: [id number]    MRI Cervical Spine With Contrast    Result Date: 7/12/2024  Impression: Impression: 3 cm enhancing intradural extramedullary mass at C2-3, which demonstrates some central nonenhancement. This is favored to represent a meningioma with possible partial central cystic changes or necrosis. Electronically Signed: Ruperto Wakefield MD  7/12/2024 6:25 PM EDT  Workstation ID: [id number]    CT Abdomen Pelvis Without Contrast    Result Date: 7/2/2024  Impression: Impression: 1.Findings suggestive of acute diverticulitis sigmoid portion of the colon. 2.Evidence for previous small bowel resection. 3.Atelectasis right middle lobe 4.Coronary artery calcification. Electronically Signed: Enrique Soto MD  7/2/2024 10:06 PM EDT  Workstation ID: [id number]                 Labs Pending at Discharge:      Procedures Performed  Procedure(s):  Cervical 2-4 laminectomy for resection of intradural tumor         Consults:   Consults       Date and Time Order Name Status Description    7/16/2024  2:02 PM Inpatient Hospitalist Consult Completed                Discharge Details        Discharge Medications        New Medications        Instructions Start Date   cyclobenzaprine 10 MG tablet  Commonly known as: FLEXERIL   10 mg, Oral, 3 Times Daily PRN             ASK your doctor about these medications        Instructions Start Date   amLODIPine 5 MG tablet  Commonly known as: NORVASC   5 mg, Oral, Daily      APPLE CIDER VINEGAR PO   1 tablet, Oral, Daily      fluticasone 50 MCG/ACT nasal spray  Commonly known as: FLONASE   2 sprays, Nasal, Daily PRN      Inositol 324 MG tablet   2 tablets, Oral, Every Morning Before Breakfast      levothyroxine 50 MCG tablet  Commonly known as: SYNTHROID, LEVOTHROID   50 mcg, Oral, Daily      metFORMIN 500 MG tablet  Commonly known as: GLUCOPHAGE   500 mg, Oral, 2 Times Daily With Meals      nystatin-triamcinolone 002649-6.1 UNIT/GM-% cream  Commonly known as: MYCOLOG II   1 Application 2 (Two) Times a Day As Needed.      Super B-Complex tablet   1 tablet, Oral, Daily      TURMERIC CURCUMIN PO   2 tablets, Oral, Daily      Vitamin C 250 MG chewable tablet   1 tablet, Oral, Daily      Vitamin D3 50 MCG (2000 UT) tablet   1 tablet, Oral, Daily               Allergies   Allergen Reactions    Diphenhydramine Rash    Morphine Other (See Comments)     nightmares    Penicillins Other (See Comments)     told not to take any more    Latex Rash         Discharge Disposition:   Home-Health Care c    Diet:  Hospital:No active diet order        Discharge Activity:         CODE STATUS:  There are no questions and answers to display.         Future Appointments   Date Time Provider Department Center   7/30/2024  9:00 AM Radha Leach APRN [facility]   12/2/2024  9:50 AM LAB [facility]   12/9/2024  1:30 PM Dom Estrada MD [facility]           Time spent on Discharge including face to face service:  >30 minutes    Signature: Electronically signed by Shadi Newby MD, 07/29/24, 13:18  EDT.  [facility] Hospitalist Team

## 2024-07-29 NOTE — TELEPHONE ENCOUNTER
KENYA/POLO P.T. PERFORMED EVAL 7/25, STARTING P.T. TWICE A WEEK FOR FOUR WEEKS, WORKING ON TRANSFERS, WALKING, FUNCTIONAL CAPACITY.

## 2024-07-30 LAB
CRP SERPL-MCNC: 3.9 MG/DL (ref 0–0.5)
ERYTHROCYTE [SEDIMENTATION RATE] IN BLOOD: 40 MM/HR (ref 0–30)
MRSA DNA SPEC QL NAA+PROBE: NORMAL

## 2024-07-30 PROCEDURE — 99024 POSTOP FOLLOW-UP VISIT: CPT | Performed by: NEUROLOGICAL SURGERY

## 2024-07-30 PROCEDURE — 25810000003 SODIUM CHLORIDE 0.9 % SOLUTION 250 ML FLEX CONT: Performed by: INTERNAL MEDICINE

## 2024-07-30 PROCEDURE — 05HB33Z INSERTION OF INFUSION DEVICE INTO RIGHT BASILIC VEIN, PERCUTANEOUS APPROACH: ICD-10-PCS | Performed by: HOSPITALIST

## 2024-07-30 PROCEDURE — 25010000002 KETOROLAC TROMETHAMINE PER 15 MG: Performed by: INTERNAL MEDICINE

## 2024-07-30 PROCEDURE — C1751 CATH, INF, PER/CENT/MIDLINE: HCPCS

## 2024-07-30 PROCEDURE — 87641 MR-STAPH DNA AMP PROBE: CPT

## 2024-07-30 PROCEDURE — 25010000002 CEFTRIAXONE PER 250 MG: Performed by: INTERNAL MEDICINE

## 2024-07-30 PROCEDURE — 25010000002 VANCOMYCIN HCL IN NACL 1.75-0.9 GM/500ML-% SOLUTION: Performed by: INTERNAL MEDICINE

## 2024-07-30 PROCEDURE — 25010000002 VANCOMYCIN HCL 1.25 G RECONSTITUTED SOLUTION 1 EACH VIAL: Performed by: INTERNAL MEDICINE

## 2024-07-30 RX ORDER — VANCOMYCIN 1.75 GRAM/500 ML IN 0.9 % SODIUM CHLORIDE INTRAVENOUS
1750 ONCE
Status: COMPLETED | OUTPATIENT
Start: 2024-07-30 | End: 2024-07-30

## 2024-07-30 RX ADMIN — AMLODIPINE BESYLATE 5 MG: 5 TABLET ORAL at 08:12

## 2024-07-30 RX ADMIN — HYDROCODONE BITARTRATE AND ACETAMINOPHEN 1 TABLET: 5; 325 TABLET ORAL at 21:51

## 2024-07-30 RX ADMIN — Medication 1750 MG: at 01:58

## 2024-07-30 RX ADMIN — CEFTRIAXONE 2000 MG: 2 INJECTION, POWDER, FOR SOLUTION INTRAMUSCULAR; INTRAVENOUS at 23:25

## 2024-07-30 RX ADMIN — TRAMADOL HYDROCHLORIDE 50 MG: 50 TABLET, COATED ORAL at 11:25

## 2024-07-30 RX ADMIN — Medication 2000 UNITS: at 08:12

## 2024-07-30 RX ADMIN — KETOROLAC TROMETHAMINE 15 MG: 30 INJECTION, SOLUTION INTRAMUSCULAR at 12:18

## 2024-07-30 RX ADMIN — CYCLOBENZAPRINE 10 MG: 10 TABLET, FILM COATED ORAL at 23:26

## 2024-07-30 RX ADMIN — Medication 10 ML: at 20:22

## 2024-07-30 RX ADMIN — OXYCODONE HYDROCHLORIDE AND ACETAMINOPHEN 250 MG: 500 TABLET ORAL at 08:12

## 2024-07-30 RX ADMIN — TRAMADOL HYDROCHLORIDE 50 MG: 50 TABLET, COATED ORAL at 02:47

## 2024-07-30 RX ADMIN — KETOROLAC TROMETHAMINE 15 MG: 30 INJECTION, SOLUTION INTRAMUSCULAR at 06:09

## 2024-07-30 RX ADMIN — Medication 10 ML: at 08:12

## 2024-07-30 RX ADMIN — LEVOTHYROXINE SODIUM 50 MCG: 0.05 TABLET ORAL at 06:09

## 2024-07-30 RX ADMIN — CEFTRIAXONE 2000 MG: 2 INJECTION, POWDER, FOR SOLUTION INTRAMUSCULAR; INTRAVENOUS at 00:36

## 2024-07-30 RX ADMIN — KETOROLAC TROMETHAMINE 15 MG: 30 INJECTION, SOLUTION INTRAMUSCULAR at 20:22

## 2024-07-30 RX ADMIN — HYDROCODONE BITARTRATE AND ACETAMINOPHEN 1 TABLET: 5; 325 TABLET ORAL at 15:36

## 2024-07-30 RX ADMIN — SODIUM CHLORIDE 1250 MG: 9 INJECTION, SOLUTION INTRAVENOUS at 14:35

## 2024-07-30 RX ADMIN — HYDROCODONE BITARTRATE AND ACETAMINOPHEN 1 TABLET: 5; 325 TABLET ORAL at 02:47

## 2024-07-30 NOTE — CONSULTS
[facility] NEUROSURGERY CONSULT NOTE    Patient name: Esperanza Smith  Referring Provider:     Percy Cross MD     Reason for Consultation: Admission    Patient Care Team:  Dom Estrada MD as PCP - General    Chief complaint: Neck pain    Subjective .     History of present illness:    Patient is a 78 y.o. female who underwent removal of meningioma with  on 7/16.  Patient returns to the hospital after experiencing worsening neck pain and headaches.  Patient states that the pain got so bad to the point that she needed to return.  Patient states that she is experiencing pain, numbness/tingling and weakness in her upper extremities but denies any radicular symptoms in her lower extremities.  She states that she does have some tremor in her legs.  Patient states that she has been dropping objects within the last 3 weeks admits to headaches and dizziness nausea and changes to her vision within the last 2 weeks.  Patient denies any vomiting.    Review of Systems  Review of Systems   Constitutional:  Positive for activity change.   Musculoskeletal:  Positive for neck pain and neck stiffness.   Neurological:  Positive for dizziness, tremors, weakness and headaches.       History  PAST MEDICAL HISTORY  Past Medical History:   Diagnosis Date    Allergic Penicillian    Asthma congestion    Breast cancer     Cataract     Cervical disc disorder     Cholelithiasis surgery 1996 removal    DDD (degenerative disc disease), lumbosacral     Diabetes mellitus     Diverticulitis     Diverticulosis     Headache     History of medical problems 54 inches left of small intesting    Hyperlipidemia     Hypertension     Hypothyroidism     IBS (irritable bowel syndrome)     Inflammatory bowel disease     Liver disease fatty liver    Low back pain Sciatic    Lumbosacral disc disease     Neuromuscular disorder neuropathy    Obesity     PONV (postoperative nausea and vomiting)     Seasonal allergies     Sleep apnea        PAST  SURGICAL HISTORY  Past Surgical History:   Procedure Laterality Date    BREAST BIOPSY      BREAST SURGERY Left 02/2012    x2     CERVICAL SPINAL CORD TUMOR REMOVAL N/A 7/16/2024    Procedure: Cervical 2-4 laminectomy for resection of intradural tumor;  Surgeon: Mario Urrutia IV, MD;  Location: [facility] MAIN OR;  Service: Neurosurgery;  Laterality: N/A;    CHOLECYSTECTOMY  1996    COLON RESECTION SMALL BOWEL  05/25/2014    COLON SURGERY  small intestine repair    HYSTERECTOMY      LYMPH NODE BIOPSY      MASTECTOMY      OOPHORECTOMY  1965    SIMPLE MASTECTOMY Right 02/26/2020    Procedure: Right simple mastectomy;  Surgeon: Mario Quezada DO;  Location: [facility] MAIN OR;  Service: General;  Laterality: Right;    SMALL INTESTINE SURGERY      SUBTOTAL HYSTERECTOMY  full hysterectomy       FAMILY HISTORY  Family History   Problem Relation Age of Onset    Breast cancer Sister     Breast cancer Maternal Grandmother        SOCIAL HISTORY  Social History     Tobacco Use    Smoking status: Never     Passive exposure: Never    Smokeless tobacco: Never   Vaping Use    Vaping status: Never Used   Substance Use Topics    Alcohol use: Not Currently     Alcohol/week: 1.0 standard drink of alcohol     Types: 1 Glasses of wine per week     Comment: occ    Drug use: No       Allergies:  Diphenhydramine, Morphine, Penicillins, and Latex    MEDICATIONS:  Medications Prior to Admission   Medication Sig Dispense Refill Last Dose    acetaminophen (TYLENOL) 325 MG tablet Take 2 tablets by mouth Every 6 (Six) Hours As Needed for Mild Pain.   7/29/2024    amLODIPine (NORVASC) 5 MG tablet TAKE 1 TABLET BY MOUTH DAILY 90 tablet 2 7/29/2024    APPLE CIDER VINEGAR PO Take 1 tablet by mouth Daily.   Past Month    Ascorbic Acid (VITAMIN C) 250 MG chewable tablet Chew 1 tablet Daily.   Past Month    B Complex-Biotin-FA (SUPER B-COMPLEX) tablet Take 1 tablet by mouth Daily.   Past Month    Cholecalciferol (VITAMIN D3) 2000 units tablet Take 1 tablet by  mouth Daily.   Past Month    HYDROcodone-acetaminophen (NORCO) 5-325 MG per tablet Take 1 tablet by mouth Every 6 (Six) Hours As Needed for Moderate Pain. 25 tablet 0 7/29/2024    Inositol 324 MG tablet Take 2 tablets by mouth Every Morning Before Breakfast.   Past Month    levothyroxine (SYNTHROID, LEVOTHROID) 50 MCG tablet TAKE 1 TABLET BY MOUTH DAILY 90 tablet 1 7/29/2024    metFORMIN (GLUCOPHAGE) 500 MG tablet TAKE 1 TABLET BY MOUTH TWICE DAILY WITH MEALS 180 tablet 2 7/29/2024    TURMERIC CURCUMIN PO Take 2 tablets by mouth Daily.   Past Month    cyclobenzaprine (FLEXERIL) 10 MG tablet Take 1 tablet by mouth 3 (Three) Times a Day As Needed for Muscle Spasms. 30 tablet 1     fluticasone (FLONASE) 50 MCG/ACT nasal spray 2 sprays into the nostril(s) as directed by provider Daily As Needed for Rhinitis.       nystatin-triamcinolone (MYCOLOG II) 120789-9.1 UNIT/GM-% cream 1 Application 2 (Two) Times a Day As Needed.       traMADol (ULTRAM) 50 MG tablet Take 1-2 tablets by mouth Every 6 (Six) Hours As Needed for Moderate Pain or Severe Pain (post op pain). 60 tablet 0          Current Facility-Administered Medications:     amLODIPine (NORVASC) tablet 5 mg, 5 mg, Oral, Daily, Luly Fitzgerald MD, 5 mg at 07/30/24 0812    ascorbic acid (VITAMIN C) tablet 250 mg, 250 mg, Oral, Daily, Luly Fitzgerald MD, 250 mg at 07/30/24 0812    sennosides-docusate (PERICOLACE) 8.6-50 MG per tablet 2 tablet, 2 tablet, Oral, BID PRN **AND** polyethylene glycol (MIRALAX) packet 17 g, 17 g, Oral, Daily PRN **AND** bisacodyl (DULCOLAX) EC tablet 5 mg, 5 mg, Oral, Daily PRN **AND** bisacodyl (DULCOLAX) suppository 10 mg, 10 mg, Rectal, Daily PRN, Luly Fitzgerald MD    cefTRIAXone (ROCEPHIN) 2,000 mg in sodium chloride 0.9 % 100 mL MBP, 2,000 mg, Intravenous, Q24H, Luly Fitzgerald MD, Last Rate: 200 mL/hr at 07/30/24 0036, 2,000 mg at 07/30/24 0036    cholecalciferol (VITAMIN D3) tablet 2,000 Units, 2,000 Units, Oral, Daily,  Luly Fitzgerald MD, 2,000 Units at 07/30/24 0812    cyclobenzaprine (FLEXERIL) tablet 10 mg, 10 mg, Oral, TID PRN, Luly Fitzgerald MD    HYDROcodone-acetaminophen (NORCO) 5-325 MG per tablet 1 tablet, 1 tablet, Oral, Q6H PRN, Luly Fitzgerald MD, 1 tablet at 07/30/24 0247    ketorolac (TORADOL) injection 15 mg, 15 mg, Intravenous, Q6H PRN, Luly Fitzgerald MD, 15 mg at 07/30/24 0609    levothyroxine (SYNTHROID, LEVOTHROID) tablet 50 mcg, 50 mcg, Oral, Q AM, Luly Fitzgerald MD, 50 mcg at 07/30/24 0609    nystatin-triamcinolone (MYCOLOG II) 797059-6.1 UNIT/GM-% cream 1 Application, 1 Application, Topical, BID PRN, Luly Fitzgerald MD    ondansetron (ZOFRAN) injection 4 mg, 4 mg, Intravenous, Q6H PRN, Luly Fitzgerald MD, 4 mg at 07/29/24 2238    Pharmacy to dose vancomycin, , Does not apply, Continuous PRN, Luly Fitzgerald MD    [COMPLETED] Insert Peripheral IV, , , Once **AND** sodium chloride 0.9 % flush 10 mL, 10 mL, Intravenous, PRN, Percy Cross MD    sodium chloride 0.9 % flush 10 mL, 10 mL, Intravenous, Q12H, Luly Fitzgerald MD, 10 mL at 07/30/24 0812    sodium chloride 0.9 % flush 10 mL, 10 mL, Intravenous, PRN, Luly Fitzgerald MD    sodium chloride 0.9 % infusion 40 mL, 40 mL, Intravenous, PRN, Luly Fitzgerald MD    traMADol (ULTRAM) tablet 50 mg, 50 mg, Oral, Q6H PRN, Luly Fitzgerald MD, 50 mg at 07/30/24 0247    Vancomycin HCl 1,250 mg in sodium chloride 0.9 % 250 mL VTB, 1,250 mg, Intravenous, Q12H, Luly Fitzgerald MD      Objective     Results Review:  LABS:  Results from last 7 days   Lab Units 07/29/24  1752   WBC 10*3/mm3 20.73*   HEMOGLOBIN g/dL 14.8   HEMATOCRIT % 44.0   PLATELETS 10*3/mm3 311     Results from last 7 days   Lab Units 07/29/24  1752   SODIUM mmol/L 132*   POTASSIUM mmol/L 3.7   CHLORIDE mmol/L 94*   CO2 mmol/L 24.4   BUN mg/dL 10   CREATININE mg/dL 0.44*   CALCIUM mg/dL 10.6*   GLUCOSE mg/dL 150*         DIAGNOSTICS:  MRI CERVICAL SPINE W WO  CONTRAST     Date of Exam: 7/29/2024 6:27 PM EDT     Indication: Postop neck pain.     Comparison: 7/19/2024     Technique:  Routine multiplanar/multisequence sequence images of the cervical spine were obtained before and after the uneventful administration of Prohance.          Findings:  The visualized intracranial structures are unremarkable.     The craniocervical junction is unremarkable.  Subtle T2/FLAIR hyperintensity is noted within the spinal cord at the level of C2-C3, most likely representing myelomalacia from prior cord compression. Alternately this may represent mild cord edema, although this is unchanged.  Otherwise the spinal cord is normal in signal and caliber.  There is mild enhancement of the posterior epidural space at the level of the craniocervical junction and at the level of C4-C5, most likely representing postsurgical changes. No evidence of definite intraspinal canal fluid collection is noted.     Extensive postsurgical changes noted within the posterior aspect of the upper neck secondary to laminectomy at C1, C2, C3 and C4.  Once again a large fluid collection is noted within the posterior aspect of the neck measuring approximately 4.2 x 8.1 x 5.6 cm. This has increased in size since most recent comparison. Additionally there is also mildly worsened mass effect, from the   fluid collection, anteriorly onto the spinal cord when compared to most recent study.  There is mild peripheral enhancement.     The visualized vertebral body heights are maintained.  Overall alignment is maintained.  Loss of intervertebral disc base height, similar to prior study. There is mild worsening of spinal canal stenosis noted at C3-C4 and C4-C5 secondary to the mildly expanded posterior fluid collection as characterized above. There is associated mass effect   with possible mild cord compression at the level of C3-C4.     The remainder of the visualized degenerative changes are unchanged from most recent  MRI.  The remainder of the visualized soft tissues of the neck are unremarkable.     IMPRESSION:  Impression:  Large fluid collection noted within the posterior aspect of the neck at the site of C1, C2, C3 and C4 laminectomy, has increased in size since prior study, now measuring up to 8.1 cm in the largest dimension. While this may represent a seroma, given its   increase in size, this is highly concerning for a pseudomeningocele or CSF leak. Given the level of peripheral enhancement, this is less likely to represent an abscess, although it is not completely excluded.     The expanding fluid collection in the posterior neck causes mild mass effect on the posterior aspect of the spinal cord with now moderate to severe spinal canal stenosis and possible mild cord compression noted at the level of C3-C4. This has mildly   worsened since prior study.    Results Review:   I reviewed the patient's new clinical results.  I personally viewed patient's chart and imaging    Vital Signs   Temp:  [97.8 °F (36.6 °C)-98.7 °F (37.1 °C)] 97.9 °F (36.6 °C)  Heart Rate:  [] 95  Resp:  [18-30] 20  BP: (118-169)/() 137/81    Physical Exam:  Physical Exam  Vitals reviewed.   Eyes:      Extraocular Movements: Extraocular movements intact.      Conjunctiva/sclera: Conjunctivae normal.      Pupils: Pupils are equal, round, and reactive to light.   Musculoskeletal:         General: Normal range of motion.      Cervical back: Normal range of motion.   Skin:     General: Skin is warm and dry.   Neurological:      General: No focal deficit present.      Mental Status: She is alert and oriented to person, place, and time.      Cranial Nerves: Cranial nerves 2-12 are intact.   Psychiatric:         Mood and Affect: Mood normal.       Neurologic Exam     Mental Status   Oriented to person, place, and time.     Cranial Nerves   Cranial nerves II through XII intact.     CN III, IV, VI   Pupils are equal, round, and reactive to  light.    Motor Exam   Muscle bulk: normal  Right arm tone: normal  Left arm tone: normal  Right leg tone: normal  Left leg tone: normal    Strength   Right deltoid: 5/5  Left deltoid: 5/5  Right biceps: 5/5  Left biceps: 5/5  Right triceps: 5/5  Left triceps: 5/5  Right wrist flexion: 5/5  Left wrist flexion: 5/5  Right wrist extension: 5/5  Left wrist extension: 5/5    Sensory Exam   Decrease sensation in upper extremities.  However, patient states that she has a history of peripheral neuropathy     Gait, Coordination, and Reflexes Not tested       Assessment & Plan       Neck pain      Problem List Items Addressed This Visit          Musculoskeletal and Injuries    * (Principal) Neck pain - Primary        COMORBID CONDITIONS:  Diabetes Type 2 and Hypertension    Patient is a 78-year-old female who presents to [facility] for ongoing neck pain.  Couple weeks ago patient underwent surgery for removal of meningioma.  Patient states that since surgery her neck pain has gotten increasingly worse in addition to onset of headaches.  Patient denies postural headaches and states that no matter what position she has her headaches are bad.  Patient has great strength on physical exam with negative Zane's.    Patient had an MRI of her neck showing a large fluid collection at the posterior aspect of the neck at C1, C2, C3 and C4.  This may represent a seroma but given its size it is most likely a pseudomeningocele or CSF leak.  Given that she does not describe postural headaches, I do not necessarily think this is a CSF leak.  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  There does appear to be severe cord compression on imaging, but patient does have good strength on exam.  Patient does describe lack of sensation in her upper extremities, but states that she has peripheral neuropathy from her type 2 diabetes.    We plan on taking her to the OR tomorrow for repair and a lumbar drain. Patient is to be NPO at midnight in  "addition to PT/INR labs. Patient does have an increased WBC but denies any recent fever. She also denies any burning sensation when she urinates. I recommend an x-ray of her chest to look for any potential causes to her WBC.     Please reach out with any questions or concerns.     PLAN: Pseudomeningocele  - Plan for surgery tomorrow  - NPO after midnight   - PT/INR labs     I discussed the patient's findings and my recommendations with patient, nursing staff, and Dr. Urrutia    During patient visit, I utilized appropriate personal protective equipment including gloves and mask.  Mask used was standard procedure mask. Appropriate PPE was worn during the entire visit.  Hand hygiene was completed before and after.     Ely Carrera PA-C  07/30/24  08:53 EDT    \"Dictated utilizing Dragon dictation\".    "

## 2024-07-30 NOTE — PROGRESS NOTES
"Pharmacy Antimicrobial Dosing Service    Subjective:  Esperanza Smith is a 78 y.o.female  s/p recent neck surgery for meningioma of spinal cord admitted with complaints of neck pain . Pharmacy has been consulted to dose Vancomycin for possible CNS infection.    PMH: T2DM      Assessment/Plan    1. Day #1 Vancomycin: Goal -600 mcg*h/mL. 1750mg (~22mg/kg ABW) IV x1 dose followed by 1250mg (~15mg/kg ABW) IV q12h.  Tr level ordered for 7/31 at 0100.    2. Day #1 Ceftriaxone: 2000mg IV q24h.    Will continue to monitor drug levels, renal function, culture and sensitivities, and patient clinical status.       Objective:  Relevant clinical data and objective history reviewed:  165.1 cm (65\")   80.7 kg (177 lb 14.6 oz)   Ideal body weight: 57 kg (125 lb 10.6 oz)  Adjusted ideal body weight: 66.5 kg (146 lb 9 oz)  Body mass index is 29.61 kg/m².        Results from last 7 days   Lab Units 07/29/24  1752   CREATININE mg/dL 0.44*     Estimated Creatinine Clearance: 114.2 mL/min (A) (by C-G formula based on SCr of 0.44 mg/dL (L)).  No intake/output data recorded.    Results from last 7 days   Lab Units 07/29/24  1752   WBC 10*3/mm3 20.73*     Temperature    07/29/24 1652 07/30/24 0007   Temp: 98.7 °F (37.1 °C) 98.5 °F (36.9 °C)     Baseline culture/source/susceptibility:  Microbiology Results (last 10 days)       ** No results found for the last 240 hours. **            Abilio Mejia  07/30/24 02:56 EDT    "

## 2024-07-30 NOTE — CASE MANAGEMENT/SOCIAL WORK
Discharge Planning Assessment   Seth     Patient Name: Esperanza Smith  MRN: [id number]  Today's Date: 7/30/2024    Admit Date: 7/29/2024    Plan: Plan to return home with spouse pending clinical course and PT/OT eval.   Discharge Needs Assessment       Row Name 07/30/24 1655       Living Environment    People in Home spouse    Name(s) of People in Home Antelmo - spouse    Current Living Arrangements home    Potentially Unsafe Housing Conditions none    In the past 12 months has the electric, gas, oil, or water company threatened to shut off services in your home? No    Primary Care Provided by self    Provides Primary Care For no one    Family Caregiver if Needed spouse    Family Caregiver Names Antelmo - spouse    Quality of Family Relationships helpful;involved;supportive    Able to Return to Prior Arrangements yes       Resource/Environmental Concerns    Resource/Environmental Concerns none    Transportation Concerns none       Transportation Needs    In the past 12 months, has lack of transportation kept you from medical appointments or from getting medications? no    In the past 12 months, has lack of transportation kept you from meetings, work, or from getting things needed for daily living? No       Food Insecurity    Within the past 12 months, you worried that your food would run out before you got the money to buy more. Never true    Within the past 12 months, the food you bought just didn't last and you didn't have money to get more. Never true       Transition Planning    Patient/Family Anticipates Transition to home with family    Patient/Family Anticipated Services at Transition none    Transportation Anticipated car, drives self;family or friend will provide       Discharge Needs Assessment    Readmission Within the Last 30 Days no previous admission in last 30 days    Equipment Currently Used at Home walker, rolling    Concerns to be Addressed discharge planning    Anticipated Changes Related to  Illness none    Equipment Needed After Discharge none                   Discharge Plan       Row Name 07/30/24 170       Plan    Plan Plan to return home with spouse pending clinical course and PT/OT eval.    Patient/Family in Agreement with Plan yes    Plan Comments CM met with patient and spouse at bedside. Patient A&Ox4. Patient lives at home with spouse who will transport at discharge. Patient performs ADLs bur does have a RW, lift chair and adjustable bed if needed. [facility] current [facility]. PCP and pharmacy confirmed. Agreeable to M2B.  Denies financial assistance needs for medication and/or food.  DC Barriers:  Plan surgery 7/31, IV Abxs, NC 3L, Neuro Sx following.                  Continued Care and Services - Admitted Since 7/29/2024       Home Medical Care Coordination complete.      Service Provider Request Status Selected Services Address Phone Fax Patient Preferred    [facility]  [facility] ,  Home Rehabilitation [address], [facility] [address] [id number] [phone] --                  Selected Continued Care - Prior Encounters Includes continued care and service providers with selected services from prior encounters from 4/30/2024 to 7/30/2024      Discharged on 7/19/2024 Admission date: 7/16/2024 - Discharge disposition: Home-Health Care Svc      Durable Medical Equipment       Service Provider Selected Services Address Phone Fax Patient Preferred    [facility] [address], [facility] [address] [phone] --              Home Medical Care       Service Provider Selected Services Address Phone Fax Patient Preferred    [facility] ,  Home Nursing [address], [address] [address] [phone] --                          Expected Discharge Date and Time       Expected Discharge Date  Expected Discharge Time    Jul 31, 2024            Demographic Summary       Row Name 07/30/24 1655       General Information    Admission Type inpatient    Arrived From emergency department    Required Notices Provided Important Message from Medicare    Referral Source admission list    Reason for Consult discharge planning    Preferred Language English                   Functional Status       Row Name 07/30/24 1655       Functional Status    Usual Activity Tolerance good    Current Activity Tolerance moderate       Functional Status, IADL    Medications independent    Meal Preparation independent    Housekeeping independent    Laundry independent    Shopping assistive equipment       Mental Status    General Appearance WDL WDL       Mental Status Summary    Recent Changes in Mental Status/Cognitive Functioning no changes                  Patient Forms       Row Name 07/30/24 1653       Patient Forms    Important Message from Medicare (IMM) Delivered  TRI IMM reviewed, signed, copy given 7/30    Delivered to Patient    Method of delivery In person                      WALTER Welch RN  SIPS/ICU   O: [phone]  C: [phone]  [email or web address]

## 2024-07-30 NOTE — CONSULTS
PICC Line Insertion Procedure Note    Procedure: Insertion of #5 FR/16G PICC    Indications:  Poor Access    Active Time Out:  Correct patient: Yes  Correct procedure: Yes  Correct site: Yes  Verified with: primary RN    Procedure Details:  Informed consent was obtained for the procedure.  Risk include, but are not limited to infection, air embolism, catheter tip moving, catheter blockage and phlebitis.     Maximum sterile technique was used including antiseptics, cap, gloves, gown, hand hygiene, mask, and sheet.    Ultrasound Guidance: Yes    #5 FR/16G PICC inserted to the R Basilic vein per hospital protocol by mehdi hawk RN.   Non-pulsatile blood return: yes    Lot #: [id number]  Expiration date: 05/31/2025    Complications:  PICC went well, but patient informed me of double mastectomy near completion of the procedure. No restricted extremity bracelets were in place, nor did the RN or providers mention this. Patient made aware of risks of using arms for IVs and consented.    Findings:  Catheter inserted to 38 cm, with 0 cm exposed.   Mid upper arm circumference is 31 cm.   Catheter was flushed with 20 cc NS and sterile dressing applied.  Patient tolerated procedure well.  PICC tip verified by:       [x] Sapiens 3cg       [] Chest X-ray    Recommendations:  Verbal and/or written Care/Maintenance instructions provided to patient.   Primary nurse notified.    Mehdi Hawk RN  07/30/24  19:57 EDT

## 2024-07-30 NOTE — SIGNIFICANT NOTE
07/30/24 1538   Readmission Indications   Is the patient and/or family able to complete the readmission assessment questions? Yes   Is this hospitalization related to the prior hospital diagnosis? Yes   Recommendation for rehospitalization   Did you speak with your physician prior to coming to the hospital Yes   If yes, what physician did you speak with? Dr Urrutia   Follow-up Appointments   Do you have a PCP? Yes   Did you have an appointment with PCP after your hospitalization? Yes   When was your appointment scheduled? 07/26/24   Did you go to appointment? Yes   Did you have an appointment with a Specialist? Yes   When was your appointment scheduled? 07/30/24   Did you go to appointment? No  (admit to hospital 7/29 & appointment is 7/30)   Are you current with the Pulmonary Clinic? No   Are you current with the CHF Clinic? No   Medications   Did you have newly prescribed medications at discharge? Yes   Did you understand the reasons for your medications at discharge and how to take them? Yes   Did you understand the side effects of your medications? Yes   Are you taking all of you prescribed medications? Yes   What pharmacy was used to fill prescription(s)? [facility]   Were medications picked up? Yes   Discharge Instructions   Did you understand your discharge instructions? Yes   Did your family/caregiver hear your instructions? Yes   Were you told to eat a special diet? No   Were you given a number of someone to call if you had questions or concerns? Yes   Index discharge location/services   Where did you go upon discharge? Home with services   Do you have supportive family or friends in the home? Yes   What services were arranged at discharge? Home Health   Home Health Service used? [facility]   Have you had a Home Health visit since discharge? Yes   Discharge Readiness   On a scale of 1-5 (5 being well prepared), how ready were you for discharge 5   Palliative Care/Hospice   Are you current with Palliative Care? No    Are you current with Hospice Care? No   Readmission Assessment Final Comments   Final Comments PREVIOUS ADMIT 7/16-7/19:  Planned spinal tumor removed and Cervical Laminectomy/Resection; IV Abxs completed, IV pain meds weaned to PO. Arcadio diet, had a BM, Blood sugars monitored and controlled.  DC home with [facility]  SN.  PRESENT ADMIT 7/29/24:  redness/swelling/pain at Cervical surgery site. Plan to return to surgery 7/31 for LUMBAR DRAIN INSERTION REPAIR OF DURA.  On IV Abxs.

## 2024-07-30 NOTE — ED PROVIDER NOTES
Subjective   History of Present Illness  Patient is 78-year-old female complaining of neck pain increasing over the past several days.  She states approximately 2 to 3 weeks ago she had surgery in her neck to relieve remove the meningioma.  She has had increased pain since that time.  She is known to have a seroma at that site per MRI scan 3 days ago.  She denies numbness tingling weakness or other complaint      Review of Systems    Past Medical History:   Diagnosis Date    Allergic Penicillian    Asthma congestion    Breast cancer     Cataract     Cervical disc disorder     Cholelithiasis surgery 1996 removal    DDD (degenerative disc disease), lumbosacral     Diabetes mellitus     Diverticulitis     Diverticulosis     Headache     History of medical problems 54 inches left of small intesting    Hyperlipidemia     Hypertension     Hypothyroidism     IBS (irritable bowel syndrome)     Inflammatory bowel disease     Liver disease fatty liver    Low back pain Sciatic    Lumbosacral disc disease     Neuromuscular disorder neuropathy    Obesity     PONV (postoperative nausea and vomiting)     Seasonal allergies     Sleep apnea        Allergies   Allergen Reactions    Diphenhydramine Rash    Morphine Other (See Comments)     nightmares    Penicillins Other (See Comments)     told not to take any more    Latex Rash       Past Surgical History:   Procedure Laterality Date    BREAST BIOPSY      BREAST SURGERY Left 02/2012    x2     CERVICAL SPINAL CORD TUMOR REMOVAL N/A 7/16/2024    Procedure: Cervical 2-4 laminectomy for resection of intradural tumor;  Surgeon: Mario Urrutia IV, MD;  Location: [facility] MAIN OR;  Service: Neurosurgery;  Laterality: N/A;    CHOLECYSTECTOMY  1996    COLON RESECTION SMALL BOWEL  05/25/2014    COLON SURGERY  small intestine repair    HYSTERECTOMY      LYMPH NODE BIOPSY      MASTECTOMY      OOPHORECTOMY  1965    SIMPLE MASTECTOMY Right 02/26/2020    Procedure: Right simple mastectomy;  Surgeon:  Mario Quezada, DO;  Location: [facility] MAIN OR;  Service: General;  Laterality: Right;    SMALL INTESTINE SURGERY      SUBTOTAL HYSTERECTOMY  full hysterectomy       Family History   Problem Relation Age of Onset    Breast cancer Sister     Breast cancer Maternal Grandmother        Social History     Socioeconomic History    Marital status:    Tobacco Use    Smoking status: Never     Passive exposure: Never    Smokeless tobacco: Never   Vaping Use    Vaping status: Never Used   Substance and Sexual Activity    Alcohol use: Not Currently     Alcohol/week: 1.0 standard drink of alcohol     Types: 1 Glasses of wine per week     Comment: occ    Drug use: No    Sexual activity: Not Currently     Partners: Male     Birth control/protection: None           Objective   Physical Exam  Neurologic exam is nonfocal.  Neck has mild tenderness with a healing wound.  Extremities exam shows the patient to have full range of motion with 2+ pulses.  Patient is neurovasc intact.  Procedures           ED C  ourse      MRI Cervical Spine With & Without Contrast    Result Date: 7/29/2024  Impression: Large fluid collection noted within the posterior aspect of the neck at the site of C1, C2, C3 and C4 laminectomy, has increased in size since prior study, now measuring up to 8.1 cm in the largest dimension. While this may represent a seroma, given its increase in size, this is highly concerning for a pseudomeningocele or CSF leak. Given the level of peripheral enhancement, this is less likely to represent an abscess, although it is not completely excluded. The expanding fluid collection in the posterior neck causes mild mass effect on the posterior aspect of the spinal cord with now moderate to severe spinal canal stenosis and possible mild cord compression noted at the level of C3-C4. This has mildly worsened since prior study. Additional findings as characterized above, not significantly changed since prior study Electronically  Signed: Uri Birch,   7/29/2024 7:40 PM EDT  Workstation ID: [id number]                              Results for orders placed or performed during the hospital encounter of 07/29/24   Basic Metabolic Panel    Specimen: Arm, Left; Blood   Result Value Ref Range    Glucose 150 (H) 65 - 99 mg/dL    BUN 10 8 - 23 mg/dL    Creatinine 0.44 (L) 0.57 - 1.00 mg/dL    Sodium 132 (L) 136 - 145 mmol/L    Potassium 3.7 3.5 - 5.2 mmol/L    Chloride 94 (L) 98 - 107 mmol/L    CO2 24.4 22.0 - 29.0 mmol/L    Calcium 10.6 (H) 8.6 - 10.5 mg/dL    BUN/Creatinine Ratio 22.7 7.0 - 25.0    Anion Gap 13.6 5.0 - 15.0 mmol/L    eGFR 100.4 >60.0 mL/min/1.73   CBC Auto Differential    Specimen: Arm, Left; Blood   Result Value Ref Range    WBC 20.73 (H) 3.40 - 10.80 10*3/mm3    RBC 4.61 3.77 - 5.28 10*6/mm3    Hemoglobin 14.8 12.0 - 15.9 g/dL    Hematocrit 44.0 34.0 - 46.6 %    MCV 95.4 79.0 - 97.0 fL    MCH 32.1 26.6 - 33.0 pg    MCHC 33.6 31.5 - 35.7 g/dL    RDW 12.6 12.3 - 15.4 %    RDW-SD 44.3 37.0 - 54.0 fl    MPV 9.4 6.0 - 12.0 fL    Platelets 311 140 - 450 10*3/mm3    Neutrophil % 83.6 (H) 42.7 - 76.0 %    Lymphocyte % 7.5 (L) 19.6 - 45.3 %    Monocyte % 7.7 5.0 - 12.0 %    Eosinophil % 0.0 (L) 0.3 - 6.2 %    Basophil % 0.2 0.0 - 1.5 %    Immature Grans % 1.0 (H) 0.0 - 0.5 %    Neutrophils, Absolute 17.30 (H) 1.70 - 7.00 10*3/mm3    Lymphocytes, Absolute 1.56 0.70 - 3.10 10*3/mm3    Monocytes, Absolute 1.60 (H) 0.10 - 0.90 10*3/mm3    Eosinophils, Absolute 0.01 0.00 - 0.40 10*3/mm3    Basophils, Absolute 0.05 0.00 - 0.20 10*3/mm3    Immature Grans, Absolute 0.21 (H) 0.00 - 0.05 10*3/mm3    nRBC 0.0 0.0 - 0.2 /100 WBC                 Medical Decision Making  Per the radiologist patient has an enlarging probable seroma to her neck.  I did speak to the patient's neurosurgeon.  Patient be admitted for pain control and further evaluation.  I did speak the on-call hospitalist.  Patient's metabolic panel shows no renal insufficiency or  electrolyte abnormality.  Patient does have leukocytosis at 20,000 with mild left shift and no anemia.    Amount and/or Complexity of Data Reviewed  Labs: ordered. Decision-making details documented in ED Course.  Radiology: ordered and independent interpretation performed.    Risk  Prescription drug management.  Decision regarding hospitalization.        Final diagnoses:   Neck pain       ED Disposition  ED Disposition       ED Disposition   Decision to Admit    Condition   --    Comment   --               No follow-up provider specified.       Medication List      No changes were made to your prescriptions during this visit.            Percy Cross MD  07/29/24 6367

## 2024-07-30 NOTE — PROGRESS NOTES
[facility] MEDICINE SERVICE  DAILY PROGRESS NOTE    NAME: Esperanza Smith  : 1945  MRN: [id number]      LOS: 1 day     PROVIDER OF SERVICE: Kumar Franco MD    Chief Complaint: Neck pain    Subjective:     Interval History:  History taken from: patient chart RN  Afebrile pain controlled. Ambulating in room    Review of Systems:   Review of Systems  All negative except as above   Objective:     Vital Signs  Temp:  [97.4 °F (36.3 °C)-98.7 °F (37.1 °C)] 97.4 °F (36.3 °C)  Heart Rate:  [] 108  Resp:  [18-30] 20  BP: (118-169)/() 141/88  Flow (L/min):  [3] 3   Body mass index is 29.61 kg/m².    Physical Exam  Physical Exam  AOx3 NAD  RRR S1-S2 audible  Lungs with fair air entry  Abdomen soft nontender nondistended  moving all 4 extremities  Scheduled Meds   amLODIPine, 5 mg, Oral, Daily  ascorbic acid, 250 mg, Oral, Daily  cefTRIAXone, 2,000 mg, Intravenous, Q24H  cholecalciferol, 2,000 Units, Oral, Daily  levothyroxine, 50 mcg, Oral, Q AM  sodium chloride, 10 mL, Intravenous, Q12H  vancomycin, 1,250 mg, Intravenous, Q12H       PRN Meds     senna-docusate sodium **AND** polyethylene glycol **AND** bisacodyl **AND** bisacodyl    cyclobenzaprine    HYDROcodone-acetaminophen    ketorolac    nystatin-triamcinolone    ondansetron    Pharmacy to dose vancomycin    [COMPLETED] Insert Peripheral IV **AND** sodium chloride    sodium chloride    sodium chloride   Infusions  Pharmacy to dose vancomycin,           Diagnostic Data    Results from last 7 days   Lab Units 24  1752   WBC 10*3/mm3 20.73*   HEMOGLOBIN g/dL 14.8   HEMATOCRIT % 44.0   PLATELETS 10*3/mm3 311   GLUCOSE mg/dL 150*   CREATININE mg/dL 0.44*   BUN mg/dL 10   SODIUM mmol/L 132*   POTASSIUM mmol/L 3.7   ANION GAP mmol/L 13.6       MRI Cervical Spine With & Without Contrast    Result Date: 2024  Impression: Large fluid collection noted within the posterior aspect of the neck at the site of C1, C2, C3 and C4 laminectomy,  has increased in size since prior study, now measuring up to 8.1 cm in the largest dimension. While this may represent a seroma, given its increase in size, this is highly concerning for a pseudomeningocele or CSF leak. Given the level of peripheral enhancement, this is less likely to represent an abscess, although it is not completely excluded. The expanding fluid collection in the posterior neck causes mild mass effect on the posterior aspect of the spinal cord with now moderate to severe spinal canal stenosis and possible mild cord compression noted at the level of C3-C4. This has mildly worsened since prior study. Additional findings as characterized above, not significantly changed since prior study Electronically Signed: Uri Birch DO  7/29/2024 7:40 PM EDT  Workstation ID: [id number]       I reviewed the patient's new clinical results.  I reviewed the patient's new imaging results and agree with the interpretation.    Assessment/Plan:     Active and Resolved Problems  Active Hospital Problems    Diagnosis  POA    **Neck pain [M54.2]  Yes      Resolved Hospital Problems   No resolved problems to display.       78-year-old female with history of DM2, hypertension, hyperlipidemia, hypothyroidism, breast cancer, uterine cancer, cervical meningioma status post C2-4 laminectomy on 7/16/2024 admitted to [facility] 7/29 with worsening neck pain. MRI C-spine showed large fluid collection within the posterior aspect of neck at site of C1-C4 laminectomy.  Increased in size from prior study.  8.1 cm      #Cervical meningioma with recent laminectomy now complicated by large fluid collection  at Op Site    Neurosurgery following noted plan for or tomorrow  Continue current pain regimen  Low suspicion for abscess continue antibiotics for now  #Hypertension  Continue bladder pain    # Hypothyroidism  Continue levothyroxine      #History of breast and uterine cancer  Outpatient follow-up    VTE  Prophylaxis:  Mechanical VTE prophylaxis orders are present.         Code status is   There are no questions and answers to display.       Plan for disposition: Pending clinical course    Time: 30 minutes    Signature: Electronically signed by Kumar Franco MD, 07/30/24, 13:04 EDT.  [facility] Hospitalist Team

## 2024-07-30 NOTE — H&P
[facility] Medicine Services  History & Physical    Patient Name: Esperanza Smith  : 1945  MRN: [id number]  Primary Care Physician:  Dom Estrada MD  Date of admission: 2024  Date and Time of Service: 2024     Subjective      Chief Complaint: Neck pain    History of Present Illness:   This is a 78-year-old female with a past medical history of type 2 diabetes mellitus, hypertension, hyperlipidemia, hypothyroidism, obesity, breast cancer, uterine cancer and a recent neck surgery for meningioma of spinal cord who presents to the ED with complaints of neck pain.  She states that after her studies.  She had mild pain in her neck which she was able to manage with hydrocortisone and tramadol however the pain later became worse and she decided come to the ED for further workup and management.    Vital signs on presentation showed a blood pressure of 161/119, heart rate 133, urgency radiating saturation 92% on room air and afebrile 37.1 °C    Labs sodium 132, potassium 3.7, bicarb 24, BUN 10, creatinine 0.44, glucose 150, calcium 10.6, WBC 20.7 otherwise CBC within normal limit.    MRI of the cervical spine with and without contrast showed Large fluid collection noted within the posterior aspect of the neck at the site of C1, C2, C3 and C4 laminectomy, has increased in size since prior study, now measuring up to 8.1 cm in the largest dimension. While this may represent a seroma, given its   increase in size, this is highly concerning for a pseudomeningocele or CSF leak. Given the level of peripheral enhancement, this is less likely to represent an abscess, although it is not completely excluded.     The expanding fluid collection in the posterior neck causes mild mass effect on the posterior aspect of the spinal cord with now moderate to severe spinal canal stenosis and possible mild cord compression noted at the level of C3-C4. This has mildly worsened since prior study.    Case discussed  with neurosurgery by ED team, neurosurgery to see patient in the morning          Review of Systems    Personal History     Past Medical History:   Diagnosis Date    Allergic Penicillian    Asthma congestion    Breast cancer     Cataract     Cervical disc disorder     Cholelithiasis surgery 1996 removal    DDD (degenerative disc disease), lumbosacral     Diabetes mellitus     Diverticulitis     Diverticulosis     Headache     History of medical problems 54 inches left of small intesting    Hyperlipidemia     Hypertension     Hypothyroidism     IBS (irritable bowel syndrome)     Inflammatory bowel disease     Liver disease fatty liver    Low back pain Sciatic    Lumbosacral disc disease     Neuromuscular disorder neuropathy    Obesity     PONV (postoperative nausea and vomiting)     Seasonal allergies     Sleep apnea        Past Surgical History:   Procedure Laterality Date    BREAST BIOPSY      BREAST SURGERY Left 02/2012    x2     CERVICAL SPINAL CORD TUMOR REMOVAL N/A 7/16/2024    Procedure: Cervical 2-4 laminectomy for resection of intradural tumor;  Surgeon: Mario Urrutia IV, MD;  Location: [facility];  Service: Neurosurgery;  Laterality: N/A;    CHOLECYSTECTOMY  1996    COLON RESECTION SMALL BOWEL  05/25/2014    COLON SURGERY  small intestine repair    HYSTERECTOMY      LYMPH NODE BIOPSY      MASTECTOMY      OOPHORECTOMY  1965    SIMPLE MASTECTOMY Right 02/26/2020    Procedure: Right simple mastectomy;  Surgeon: Mario Quezada DO;  Location: [facility];  Service: General;  Laterality: Right;    SMALL INTESTINE SURGERY      SUBTOTAL HYSTERECTOMY  full hysterectomy       Family History: family history includes Breast cancer in her maternal grandmother and sister. Otherwise pertinent FHx was reviewed and not pertinent to current issue.    Social History:  reports that she has never smoked. She has never been exposed to tobacco smoke. She has never used smokeless tobacco. She reports that she does not  currently use alcohol after a past usage of about 1.0 standard drink of alcohol per week. She reports that she does not use drugs.    Home Medications:  Prior to Admission Medications       Prescriptions Last Dose Informant Patient Reported? Taking?    acetaminophen (TYLENOL) 325 MG tablet 7/29/2024  Yes Yes    Take 2 tablets by mouth Every 6 (Six) Hours As Needed for Mild Pain.    amLODIPine (NORVASC) 5 MG tablet 7/29/2024  No Yes    TAKE 1 TABLET BY MOUTH DAILY    APPLE CIDER VINEGAR PO Past Month Self Yes Yes    Take 1 tablet by mouth Daily.    Ascorbic Acid (VITAMIN C) 250 MG chewable tablet Past Month  Yes Yes    Chew 1 tablet Daily.    B Complex-Biotin-FA (SUPER B-COMPLEX) tablet Past Month  Yes Yes    Take 1 tablet by mouth Daily.    Cholecalciferol (VITAMIN D3) 2000 units tablet Past Month  Yes Yes    Take 1 tablet by mouth Daily.    HYDROcodone-acetaminophen (NORCO) 5-325 MG per tablet 7/29/2024  No Yes    Take 1 tablet by mouth Every 6 (Six) Hours As Needed for Moderate Pain.    Inositol 324 MG tablet Past Month  Yes Yes    Take 2 tablets by mouth Every Morning Before Breakfast.    levothyroxine (SYNTHROID, LEVOTHROID) 50 MCG tablet 7/29/2024  No Yes    TAKE 1 TABLET BY MOUTH DAILY    metFORMIN (GLUCOPHAGE) 500 MG tablet 7/29/2024  No Yes    TAKE 1 TABLET BY MOUTH TWICE DAILY WITH MEALS    TURMERIC CURCUMIN PO Past Month Self Yes Yes    Take 2 tablets by mouth Daily.    cyclobenzaprine (FLEXERIL) 10 MG tablet   No No    Take 1 tablet by mouth 3 (Three) Times a Day As Needed for Muscle Spasms.    fluticasone (FLONASE) 50 MCG/ACT nasal spray   Yes No    2 sprays into the nostril(s) as directed by provider Daily As Needed for Rhinitis.    nystatin-triamcinolone (MYCOLOG II) 863952-7.1 UNIT/GM-% cream   Yes No    1 Application 2 (Two) Times a Day As Needed.    traMADol (ULTRAM) 50 MG tablet   No No    Take 1-2 tablets by mouth Every 6 (Six) Hours As Needed for Moderate Pain or Severe Pain (post op pain).               Allergies:  Allergies   Allergen Reactions    Diphenhydramine Rash    Morphine Other (See Comments)     nightmares    Penicillins Other (See Comments)     told not to take any more    Latex Rash       Objective      Vitals:   Temp:  [98.7 °F (37.1 °C)] 98.7 °F (37.1 °C)  Heart Rate:  [113-133] 114  Resp:  [18-30] 25  BP: (156-169)/() 162/105  Body mass index is 30.79 kg/m².  Physical Exam    Appearance: No apparent distress, nontoxic-appearing.   HEENT/Neck: Neck exam limited secondary to pain, extraocular movements intact, Moist mucous membranes,   Cardiovascular: Tachycardic with regular rhythm  Pulmonary: Clear to auscultation Bilaterally.   Abdomen: BS+, Soft, nontender, nondistended.   Ext: No Cyanosis, Clubbing, Edema.  Neuro: Nonfocal, Alert & Oriented x 3        Diagnostic Data:  Lab Results (last 24 hours)       Procedure Component Value Units Date/Time    Basic Metabolic Panel [[id number]]  (Abnormal) Collected: 07/29/24 1752    Specimen: Blood from Arm, Left Updated: 07/29/24 1821     Glucose 150 mg/dL      BUN 10 mg/dL      Creatinine 0.44 mg/dL      Sodium 132 mmol/L      Potassium 3.7 mmol/L      Chloride 94 mmol/L      CO2 24.4 mmol/L      Calcium 10.6 mg/dL      BUN/Creatinine Ratio 22.7     Anion Gap 13.6 mmol/L      eGFR 100.4 mL/min/1.73     Narrative:      GFR Normal >60  Chronic Kidney Disease <60  Kidney Failure <15    The GFR formula is only valid for adults with stable renal function between ages 18 and 70.    CBC & Differential [[id number]]  (Abnormal) Collected: 07/29/24 1752    Specimen: Blood from Arm, Left Updated: 07/29/24 1803    Narrative:      The following orders were created for panel order CBC & Differential.  Procedure                               Abnormality         Status                     ---------                               -----------         ------                     CBC Auto Differential[[id number]]        Abnormal            Final result                  Please view results for these tests on the individual orders.    CBC Auto Differential [[id number]]  (Abnormal) Collected: 07/29/24 1752    Specimen: Blood from Arm, Left Updated: 07/29/24 1803     WBC 20.73 10*3/mm3      RBC 4.61 10*6/mm3      Hemoglobin 14.8 g/dL      Hematocrit 44.0 %      MCV 95.4 fL      MCH 32.1 pg      MCHC 33.6 g/dL      RDW 12.6 %      RDW-SD 44.3 fl      MPV 9.4 fL      Platelets 311 10*3/mm3      Neutrophil % 83.6 %      Lymphocyte % 7.5 %      Monocyte % 7.7 %      Eosinophil % 0.0 %      Basophil % 0.2 %      Immature Grans % 1.0 %      Neutrophils, Absolute 17.30 10*3/mm3      Lymphocytes, Absolute 1.56 10*3/mm3      Monocytes, Absolute 1.60 10*3/mm3      Eosinophils, Absolute 0.01 10*3/mm3      Basophils, Absolute 0.05 10*3/mm3      Immature Grans, Absolute 0.21 10*3/mm3      nRBC 0.0 /100 WBC              Imaging Results (Last 24 Hours)       Procedure Component Value Units Date/Time    MRI Cervical Spine With & Without Contrast [[id number]] Collected: 07/29/24 1931     Updated: 07/29/24 1942    Narrative:      MRI CERVICAL SPINE W WO CONTRAST    Date of Exam: 7/29/2024 6:27 PM EDT    Indication: Postop neck pain.     Comparison: 7/19/2024    Technique:  Routine multiplanar/multisequence sequence images of the cervical spine were obtained before and after the uneventful administration of Prohance.        Findings:  The visualized intracranial structures are unremarkable.    The craniocervical junction is unremarkable.  Subtle T2/FLAIR hyperintensity is noted within the spinal cord at the level of C2-C3, most likely representing myelomalacia from prior cord compression. Alternately this may represent mild cord edema, although this is unchanged.  Otherwise the spinal cord is normal in signal and caliber.  There is mild enhancement of the posterior epidural space at the level of the craniocervical junction and at the level of C4-C5, most likely representing postsurgical changes. No  evidence of definite intraspinal canal fluid collection is noted.    Extensive postsurgical changes noted within the posterior aspect of the upper neck secondary to laminectomy at C1, C2, C3 and C4.  Once again a large fluid collection is noted within the posterior aspect of the neck measuring approximately 4.2 x 8.1 x 5.6 cm. This has increased in size since most recent comparison. Additionally there is also mildly worsened mass effect, from the   fluid collection, anteriorly onto the spinal cord when compared to most recent study.  There is mild peripheral enhancement.    The visualized vertebral body heights are maintained.  Overall alignment is maintained.  Loss of intervertebral disc base height, similar to prior study. There is mild worsening of spinal canal stenosis noted at C3-C4 and C4-C5 secondary to the mildly expanded posterior fluid collection as characterized above. There is associated mass effect   with possible mild cord compression at the level of C3-C4.    The remainder of the visualized degenerative changes are unchanged from most recent MRI.  The remainder of the visualized soft tissues of the neck are unremarkable.      Impression:      Impression:  Large fluid collection noted within the posterior aspect of the neck at the site of C1, C2, C3 and C4 laminectomy, has increased in size since prior study, now measuring up to 8.1 cm in the largest dimension. While this may represent a seroma, given its   increase in size, this is highly concerning for a pseudomeningocele or CSF leak. Given the level of peripheral enhancement, this is less likely to represent an abscess, although it is not completely excluded.    The expanding fluid collection in the posterior neck causes mild mass effect on the posterior aspect of the spinal cord with now moderate to severe spinal canal stenosis and possible mild cord compression noted at the level of C3-C4. This has mildly   worsened since prior  study.    Additional findings as characterized above, not significantly changed since prior study        Electronically Signed: Uri Birch     7/29/2024 7:40 PM EDT    Workstation ID: [id number]              Assessment & Plan    Intradural extramedullary spinal tumor   Diabetes mellitus  Hypothyroidism  Breast cancer  Hyperlipidemia  Hypertension  Leukocytosis  -Patient had intradural extramedullary spinal tumor status post cervical laminectomy, she presents with worsening neck pain and MRI of the cervical spine showing findings concerning for increasing seroma.  -Neurosurgery consulted, continue IV Toradol and Norco's as needed for pain.  -Resume home meds, blood pressure heart rate elevated on presentation likely pain driven, will control pain and monitor vital signs.  -She has noted to have leukocytosis otherwise no other signs of infection, she has been on steroids since that discharge.  However given the significant pain in her neck, headache, dizziness/lightheadedness will cover with antibiotics for now  -Continue to monitor.  Neurosurgery to see in the morning          VTE Prophylaxis: SCDs  Full code  Inpatient level of care        Signature:     This document has been electronically signed by Luly Fitzgerald MD on July 29, 2024 21:29 EDT   [facility] Hospitalist Team

## 2024-07-30 NOTE — PLAN OF CARE
Goal Outcome Evaluation:              Outcome Evaluation: pt c/o pain throughout day. pt to have surgery tomorrow- NPO at midnight. PICC line being placed. no other changes

## 2024-07-30 NOTE — PLAN OF CARE
Goal Outcome Evaluation:         Pt admitted to ICU as med surg overflow. Toradol IV given in ER effective per pt. Abx started. Physician communicated blood cultures not needed per ER RN. Pt tachycardic, 2L NC, tachypneic, BP stable.

## 2024-07-30 NOTE — PLAN OF CARE
Problem: Adult Inpatient Plan of Care  Goal: Plan of Care Review  Outcome: Ongoing, Progressing  Flowsheets (Taken 7/30/2024 0507)  Progress: improving  Plan of Care Reviewed With: patient  Outcome Evaluation: Pt is a new admission with post operative complications. Pt taking oral medication to adress pain in neck and upper bcak, Pt able to repositio but does ask for assistance if needed. Call light used appropriately. Pt remains neurologically intact.  Goal: Patient-Specific Goal (Individualized)  Outcome: Ongoing, Progressing  Goal: Absence of Hospital-Acquired Illness or Injury  Outcome: Ongoing, Progressing  Intervention: Identify and Manage Fall Risk  Recent Flowsheet Documentation  Taken 7/30/2024 0400 by Rena Sheppard RN  Safety Promotion/Fall Prevention:   safety round/check completed   room organization consistent   nonskid shoes/slippers when out of bed   lighting adjusted   fall prevention program maintained   clutter free environment maintained   assistive device/personal items within reach  Taken 7/30/2024 0200 by Rena Sheppard RN  Safety Promotion/Fall Prevention:   safety round/check completed   room organization consistent   nonskid shoes/slippers when out of bed   lighting adjusted   fall prevention program maintained   clutter free environment maintained   assistive device/personal items within reach  Intervention: Prevent Skin Injury  Recent Flowsheet Documentation  Taken 7/30/2024 0400 by Rena Sheppard RN  Body Position:   turned   right  Skin Protection:   adhesive use limited   transparent dressing maintained   tubing/devices free from skin contact  Taken 7/30/2024 0200 by Rena Sheppard RN  Body Position: patient/family refused  Intervention: Prevent and Manage VTE (Venous Thromboembolism) Risk  Recent Flowsheet Documentation  Taken 7/30/2024 0400 by Rena Sheppard RN  VTE Prevention/Management:   bilateral   sequential compression devices off  Range of Motion:  active ROM (range of motion) encouraged  Intervention: Prevent Infection  Recent Flowsheet Documentation  Taken 7/30/2024 0400 by Rena Sheppard RN  Infection Prevention:   single patient room provided   rest/sleep promoted   hand hygiene promoted   environmental surveillance performed  Taken 7/30/2024 0200 by Rena Sheppard RN  Infection Prevention:   single patient room provided   rest/sleep promoted   hand hygiene promoted   environmental surveillance performed  Goal: Optimal Comfort and Wellbeing  Outcome: Ongoing, Progressing  Intervention: Monitor Pain and Promote Comfort  Recent Flowsheet Documentation  Taken 7/30/2024 0247 by Rena Sheppard RN  Pain Management Interventions: see MAR  Intervention: Provide Person-Centered Care  Recent Flowsheet Documentation  Taken 7/30/2024 0400 by Rena Sheppard RN  Trust Relationship/Rapport:   care explained   questions answered  Goal: Readiness for Transition of Care  Outcome: Ongoing, Progressing     Problem: Skin Injury Risk Increased  Goal: Skin Health and Integrity  Outcome: Ongoing, Progressing  Intervention: Optimize Skin Protection  Recent Flowsheet Documentation  Taken 7/30/2024 0400 by Rena Sheppard RN  Pressure Reduction Techniques: weight shift assistance provided  Head of Bed (HOB) Positioning: HOB at 30 degrees  Pressure Reduction Devices: specialty bed utilized  Skin Protection:   adhesive use limited   transparent dressing maintained   tubing/devices free from skin contact  Taken 7/30/2024 0200 by Rena Sheppard RN  Head of Bed (HOB) Positioning: HOB at 30 degrees     Problem: Diabetes Comorbidity  Goal: Blood Glucose Level Within Targeted Range  Outcome: Ongoing, Progressing     Problem: Hypertension Comorbidity  Goal: Blood Pressure in Desired Range  Outcome: Ongoing, Progressing  Intervention: Maintain Blood Pressure Management  Recent Flowsheet Documentation  Taken 7/30/2024 0400 by Rena Sheppard RN  Medication  Review/Management: medications reviewed  Taken 7/30/2024 0200 by Rena Sheppard RN  Medication Review/Management: medications reviewed     Problem: Adjustment to Illness (Sepsis/Septic Shock)  Goal: Optimal Coping  Outcome: Ongoing, Progressing     Problem: Bleeding (Sepsis/Septic Shock)  Goal: Absence of Bleeding  Outcome: Ongoing, Progressing     Problem: Glycemic Control Impaired (Sepsis/Septic Shock)  Goal: Blood Glucose Level Within Desired Range  Outcome: Ongoing, Progressing     Problem: Infection Progression (Sepsis/Septic Shock)  Goal: Absence of Infection Signs and Symptoms  Outcome: Ongoing, Progressing  Intervention: Initiate Sepsis Management  Recent Flowsheet Documentation  Taken 7/30/2024 0400 by Rena Sheppard RN  Infection Prevention:   single patient room provided   rest/sleep promoted   hand hygiene promoted   environmental surveillance performed  Taken 7/30/2024 0200 by Rena Sheppard RN  Infection Prevention:   single patient room provided   rest/sleep promoted   hand hygiene promoted   environmental surveillance performed  Intervention: Promote Stabilization  Recent Flowsheet Documentation  Taken 7/30/2024 0400 by Rena Sheppard RN  Fluid/Electrolyte Management: fluids provided     Problem: Nutrition Impaired (Sepsis/Septic Shock)  Goal: Optimal Nutrition Intake  Outcome: Ongoing, Progressing   Goal Outcome Evaluation:  Plan of Care Reviewed With: patient        Progress: improving  Outcome Evaluation: Pt is a new admission with post operative complications. Pt taking oral medication to adress pain in neck and upper bcak, Pt able to repositio but does ask for assistance if needed. Call light used appropriately. Pt remains neurologically intact.

## 2024-07-31 ENCOUNTER — ANESTHESIA (OUTPATIENT)
Dept: PERIOP | Facility: HOSPITAL | Age: 79
End: 2024-07-31
Payer: MEDICARE

## 2024-07-31 ENCOUNTER — ANESTHESIA EVENT (OUTPATIENT)
Dept: PERIOP | Facility: HOSPITAL | Age: 79
End: 2024-07-31
Payer: MEDICARE

## 2024-07-31 ENCOUNTER — PREP FOR SURGERY (OUTPATIENT)
Dept: OTHER | Facility: HOSPITAL | Age: 79
End: 2024-07-31
Payer: MEDICARE

## 2024-07-31 DIAGNOSIS — Z98.890 STATUS POST SURGERY: Primary | ICD-10-CM

## 2024-07-31 LAB
ALBUMIN SERPL-MCNC: 3.5 G/DL (ref 3.5–5.2)
ALBUMIN/GLOB SERPL: 1.1 G/DL
ALP SERPL-CCNC: 73 U/L (ref 39–117)
ALT SERPL W P-5'-P-CCNC: 25 U/L (ref 1–33)
ANION GAP SERPL CALCULATED.3IONS-SCNC: 11.9 MMOL/L (ref 5–15)
AST SERPL-CCNC: 21 U/L (ref 1–32)
BASOPHILS # BLD AUTO: 0.05 10*3/MM3 (ref 0–0.2)
BASOPHILS NFR BLD AUTO: 0.3 % (ref 0–1.5)
BILIRUB SERPL-MCNC: 0.4 MG/DL (ref 0–1.2)
BUN SERPL-MCNC: 9 MG/DL (ref 8–23)
BUN/CREAT SERPL: 17 (ref 7–25)
CALCIUM SPEC-SCNC: 10 MG/DL (ref 8.6–10.5)
CHLORIDE SERPL-SCNC: 93 MMOL/L (ref 98–107)
CO2 SERPL-SCNC: 27.1 MMOL/L (ref 22–29)
CREAT SERPL-MCNC: 0.53 MG/DL (ref 0.57–1)
DEPRECATED RDW RBC AUTO: 42.9 FL (ref 37–54)
EGFRCR SERPLBLD CKD-EPI 2021: 96 ML/MIN/1.73
EOSINOPHIL # BLD AUTO: 0.04 10*3/MM3 (ref 0–0.4)
EOSINOPHIL NFR BLD AUTO: 0.2 % (ref 0.3–6.2)
ERYTHROCYTE [DISTWIDTH] IN BLOOD BY AUTOMATED COUNT: 12.4 % (ref 12.3–15.4)
GLOBULIN UR ELPH-MCNC: 3.3 GM/DL
GLUCOSE BLDC GLUCOMTR-MCNC: 173 MG/DL (ref 70–105)
GLUCOSE BLDC GLUCOMTR-MCNC: 189 MG/DL (ref 70–105)
GLUCOSE BLDC GLUCOMTR-MCNC: 211 MG/DL (ref 70–105)
GLUCOSE SERPL-MCNC: 152 MG/DL (ref 65–99)
HCT VFR BLD AUTO: 40.3 % (ref 34–46.6)
HGB BLD-MCNC: 13.8 G/DL (ref 12–15.9)
IMM GRANULOCYTES # BLD AUTO: 0.13 10*3/MM3 (ref 0–0.05)
IMM GRANULOCYTES NFR BLD AUTO: 0.8 % (ref 0–0.5)
INR PPP: 1.1 (ref 0.93–1.1)
LYMPHOCYTES # BLD AUTO: 2.27 10*3/MM3 (ref 0.7–3.1)
LYMPHOCYTES NFR BLD AUTO: 13.9 % (ref 19.6–45.3)
MAGNESIUM SERPL-MCNC: 1.6 MG/DL (ref 1.6–2.4)
MCH RBC QN AUTO: 32.2 PG (ref 26.6–33)
MCHC RBC AUTO-ENTMCNC: 34.2 G/DL (ref 31.5–35.7)
MCV RBC AUTO: 93.9 FL (ref 79–97)
MONOCYTES # BLD AUTO: 1.62 10*3/MM3 (ref 0.1–0.9)
MONOCYTES NFR BLD AUTO: 9.9 % (ref 5–12)
NEUTROPHILS NFR BLD AUTO: 12.22 10*3/MM3 (ref 1.7–7)
NEUTROPHILS NFR BLD AUTO: 74.9 % (ref 42.7–76)
NRBC BLD AUTO-RTO: 0 /100 WBC (ref 0–0.2)
PHOSPHATE SERPL-MCNC: 3.3 MG/DL (ref 2.5–4.5)
PLATELET # BLD AUTO: 264 10*3/MM3 (ref 140–450)
PMV BLD AUTO: 9.2 FL (ref 6–12)
POTASSIUM SERPL-SCNC: 3.6 MMOL/L (ref 3.5–5.2)
PROT SERPL-MCNC: 6.8 G/DL (ref 6–8.5)
PROTHROMBIN TIME: 11.9 SECONDS (ref 9.6–11.7)
RBC # BLD AUTO: 4.29 10*6/MM3 (ref 3.77–5.28)
SODIUM SERPL-SCNC: 132 MMOL/L (ref 136–145)
VANCOMYCIN TROUGH SERPL-MCNC: 8.95 MCG/ML (ref 5–20)
WBC NRBC COR # BLD AUTO: 16.33 10*3/MM3 (ref 3.4–10.8)

## 2024-07-31 PROCEDURE — 25010000002 DEXAMETHASONE PER 1 MG: Performed by: NURSE ANESTHETIST, CERTIFIED REGISTERED

## 2024-07-31 PROCEDURE — 83735 ASSAY OF MAGNESIUM: CPT | Performed by: HOSPITALIST

## 2024-07-31 PROCEDURE — 00QT0ZZ REPAIR SPINAL MENINGES, OPEN APPROACH: ICD-10-PCS | Performed by: NEUROLOGICAL SURGERY

## 2024-07-31 PROCEDURE — 25810000003 SODIUM CHLORIDE 0.9 % SOLUTION 250 ML FLEX CONT: Performed by: NURSE ANESTHETIST, CERTIFIED REGISTERED

## 2024-07-31 PROCEDURE — 25010000002 KETOROLAC TROMETHAMINE PER 15 MG: Performed by: INTERNAL MEDICINE

## 2024-07-31 PROCEDURE — 25810000003 SODIUM CHLORIDE 0.9 % SOLUTION 250 ML FLEX CONT: Performed by: INTERNAL MEDICINE

## 2024-07-31 PROCEDURE — C1889 IMPLANT/INSERT DEVICE, NOC: HCPCS | Performed by: NEUROLOGICAL SURGERY

## 2024-07-31 PROCEDURE — 25010000002 FENTANYL CITRATE (PF) 100 MCG/2ML SOLUTION: Performed by: NURSE ANESTHETIST, CERTIFIED REGISTERED

## 2024-07-31 PROCEDURE — 25010000002 KETOROLAC TROMETHAMINE PER 15 MG

## 2024-07-31 PROCEDURE — 25010000002 PHENYLEPHRINE 10 MG/ML SOLUTION 5 ML VIAL: Performed by: NURSE ANESTHETIST, CERTIFIED REGISTERED

## 2024-07-31 PROCEDURE — 63710000001 INSULIN LISPRO (HUMAN) PER 5 UNITS: Performed by: NURSE PRACTITIONER

## 2024-07-31 PROCEDURE — 25010000002 VANCOMYCIN HCL IN NACL 1.5-0.9 GM/500ML-% SOLUTION: Performed by: INTERNAL MEDICINE

## 2024-07-31 PROCEDURE — 0 LABETALOL 5 MG/ML SOLUTION: Performed by: NURSE ANESTHETIST, CERTIFIED REGISTERED

## 2024-07-31 PROCEDURE — 84100 ASSAY OF PHOSPHORUS: CPT | Performed by: HOSPITALIST

## 2024-07-31 PROCEDURE — 00CT0ZZ EXTIRPATION OF MATTER FROM SPINAL MENINGES, OPEN APPROACH: ICD-10-PCS | Performed by: NEUROLOGICAL SURGERY

## 2024-07-31 PROCEDURE — C1729 CATH, DRAINAGE: HCPCS | Performed by: NEUROLOGICAL SURGERY

## 2024-07-31 PROCEDURE — 25810000003 LACTATED RINGERS PER 1000 ML: Performed by: NURSE ANESTHETIST, CERTIFIED REGISTERED

## 2024-07-31 PROCEDURE — 87070 CULTURE OTHR SPECIMN AEROBIC: CPT | Performed by: NEUROLOGICAL SURGERY

## 2024-07-31 PROCEDURE — 80053 COMPREHEN METABOLIC PANEL: CPT | Performed by: HOSPITALIST

## 2024-07-31 PROCEDURE — 85025 COMPLETE CBC W/AUTO DIFF WBC: CPT | Performed by: HOSPITALIST

## 2024-07-31 PROCEDURE — 25010000002 VANCOMYCIN HCL 1.25 G RECONSTITUTED SOLUTION 1 EACH VIAL: Performed by: INTERNAL MEDICINE

## 2024-07-31 PROCEDURE — 25010000002 PROPOFOL 200 MG/20ML EMULSION: Performed by: NURSE ANESTHETIST, CERTIFIED REGISTERED

## 2024-07-31 PROCEDURE — 87205 SMEAR GRAM STAIN: CPT | Performed by: NEUROLOGICAL SURGERY

## 2024-07-31 PROCEDURE — 63707 REPAIR SPINAL FLUID LEAKAGE: CPT

## 2024-07-31 PROCEDURE — 82948 REAGENT STRIP/BLOOD GLUCOSE: CPT

## 2024-07-31 PROCEDURE — 63707 REPAIR SPINAL FLUID LEAKAGE: CPT | Performed by: NEUROLOGICAL SURGERY

## 2024-07-31 PROCEDURE — 25010000002 CEFAZOLIN PER 500 MG

## 2024-07-31 PROCEDURE — 62272 THER SPI PNXR DRG CSF: CPT | Performed by: NEUROLOGICAL SURGERY

## 2024-07-31 PROCEDURE — 80202 ASSAY OF VANCOMYCIN: CPT | Performed by: INTERNAL MEDICINE

## 2024-07-31 PROCEDURE — 25010000002 ONDANSETRON PER 1 MG: Performed by: INTERNAL MEDICINE

## 2024-07-31 PROCEDURE — 25010000002 SUGAMMADEX 200 MG/2ML SOLUTION: Performed by: NURSE ANESTHETIST, CERTIFIED REGISTERED

## 2024-07-31 PROCEDURE — 85610 PROTHROMBIN TIME: CPT

## 2024-07-31 PROCEDURE — 25010000002 CEFAZOLIN PER 500 MG: Performed by: NEUROLOGICAL SURGERY

## 2024-07-31 PROCEDURE — 25010000002 LABETALOL 5 MG/ML SOLUTION: Performed by: NURSE PRACTITIONER

## 2024-07-31 PROCEDURE — 87075 CULTR BACTERIA EXCEPT BLOOD: CPT | Performed by: NEUROLOGICAL SURGERY

## 2024-07-31 PROCEDURE — 009U30Z DRAINAGE OF SPINAL CANAL WITH DRAINAGE DEVICE, PERCUTANEOUS APPROACH: ICD-10-PCS | Performed by: NEUROLOGICAL SURGERY

## 2024-07-31 DEVICE — DEV WND/CLS CONTRL TISS STRATAFIX SYMM PDS PLS CTX 60CM VIL: Type: IMPLANTABLE DEVICE | Site: SPINE CERVICAL | Status: FUNCTIONAL

## 2024-07-31 DEVICE — DURAGEN® PLUS DURAL REGENERATION MATRIX , 4 IN X 5 IN
Type: IMPLANTABLE DEVICE | Site: SPINE CERVICAL | Status: FUNCTIONAL
Brand: DURAGEN® PLUS

## 2024-07-31 DEVICE — SEAL DURL ADHERUS/AUTOSPRAY HYDROGEL DS: Type: IMPLANTABLE DEVICE | Site: SPINE CERVICAL | Status: FUNCTIONAL

## 2024-07-31 DEVICE — CATH 46420 EDM LUMBAR 80CM/OPEN TIP: Type: IMPLANTABLE DEVICE | Site: SPINE CERVICAL | Status: FUNCTIONAL

## 2024-07-31 DEVICE — DEV CONTRL TISS STRATAFIX SPIRAL MNCRYL UD 3/0 PLS 30CM: Type: IMPLANTABLE DEVICE | Site: SPINE CERVICAL | Status: FUNCTIONAL

## 2024-07-31 RX ORDER — IBUPROFEN 600 MG/1
1 TABLET ORAL
Status: DISCONTINUED | OUTPATIENT
Start: 2024-07-31 | End: 2024-08-07 | Stop reason: HOSPADM

## 2024-07-31 RX ORDER — DEXTROSE MONOHYDRATE 25 G/50ML
25 INJECTION, SOLUTION INTRAVENOUS
Status: DISCONTINUED | OUTPATIENT
Start: 2024-07-31 | End: 2024-08-07 | Stop reason: HOSPADM

## 2024-07-31 RX ORDER — DEXAMETHASONE SODIUM PHOSPHATE 4 MG/ML
INJECTION, SOLUTION INTRA-ARTICULAR; INTRALESIONAL; INTRAMUSCULAR; INTRAVENOUS; SOFT TISSUE AS NEEDED
Status: DISCONTINUED | OUTPATIENT
Start: 2024-07-31 | End: 2024-07-31 | Stop reason: SURG

## 2024-07-31 RX ORDER — HYDRALAZINE HYDROCHLORIDE 20 MG/ML
5 INJECTION INTRAMUSCULAR; INTRAVENOUS
Status: DISCONTINUED | OUTPATIENT
Start: 2024-07-31 | End: 2024-07-31 | Stop reason: HOSPADM

## 2024-07-31 RX ORDER — PROPOFOL 10 MG/ML
INJECTION, EMULSION INTRAVENOUS AS NEEDED
Status: DISCONTINUED | OUTPATIENT
Start: 2024-07-31 | End: 2024-07-31 | Stop reason: SURG

## 2024-07-31 RX ORDER — PROMETHAZINE HYDROCHLORIDE 25 MG/1
25 SUPPOSITORY RECTAL ONCE AS NEEDED
Status: DISCONTINUED | OUTPATIENT
Start: 2024-07-31 | End: 2024-07-31 | Stop reason: HOSPADM

## 2024-07-31 RX ORDER — DROPERIDOL 2.5 MG/ML
0.62 INJECTION, SOLUTION INTRAMUSCULAR; INTRAVENOUS ONCE AS NEEDED
Status: DISCONTINUED | OUTPATIENT
Start: 2024-07-31 | End: 2024-07-31 | Stop reason: HOSPADM

## 2024-07-31 RX ORDER — LABETALOL HYDROCHLORIDE 5 MG/ML
INJECTION, SOLUTION INTRAVENOUS AS NEEDED
Status: DISCONTINUED | OUTPATIENT
Start: 2024-07-31 | End: 2024-07-31 | Stop reason: SURG

## 2024-07-31 RX ORDER — SODIUM CHLORIDE 0.9 % (FLUSH) 0.9 %
10 SYRINGE (ML) INJECTION EVERY 12 HOURS SCHEDULED
Status: DISCONTINUED | OUTPATIENT
Start: 2024-07-31 | End: 2024-08-07 | Stop reason: HOSPADM

## 2024-07-31 RX ORDER — FENTANYL CITRATE 50 UG/ML
25 INJECTION, SOLUTION INTRAMUSCULAR; INTRAVENOUS
Status: DISCONTINUED | OUTPATIENT
Start: 2024-07-31 | End: 2024-07-31 | Stop reason: HOSPADM

## 2024-07-31 RX ORDER — SODIUM CHLORIDE 9 MG/ML
40 INJECTION, SOLUTION INTRAVENOUS AS NEEDED
Status: DISCONTINUED | OUTPATIENT
Start: 2024-07-31 | End: 2024-08-07 | Stop reason: HOSPADM

## 2024-07-31 RX ORDER — POLYETHYLENE GLYCOL 3350 17 G/17G
17 POWDER, FOR SOLUTION ORAL DAILY PRN
Status: DISCONTINUED | OUTPATIENT
Start: 2024-07-31 | End: 2024-08-07 | Stop reason: HOSPADM

## 2024-07-31 RX ORDER — BISACODYL 5 MG/1
5 TABLET, DELAYED RELEASE ORAL DAILY PRN
Status: DISCONTINUED | OUTPATIENT
Start: 2024-07-31 | End: 2024-08-07 | Stop reason: HOSPADM

## 2024-07-31 RX ORDER — NICOTINE POLACRILEX 4 MG
15 LOZENGE BUCCAL
Status: DISCONTINUED | OUTPATIENT
Start: 2024-07-31 | End: 2024-08-07 | Stop reason: HOSPADM

## 2024-07-31 RX ORDER — LIDOCAINE HYDROCHLORIDE 20 MG/ML
INJECTION, SOLUTION EPIDURAL; INFILTRATION; INTRACAUDAL; PERINEURAL AS NEEDED
Status: DISCONTINUED | OUTPATIENT
Start: 2024-07-31 | End: 2024-07-31 | Stop reason: SURG

## 2024-07-31 RX ORDER — LABETALOL HYDROCHLORIDE 5 MG/ML
10 INJECTION, SOLUTION INTRAVENOUS
Status: DISCONTINUED | OUTPATIENT
Start: 2024-07-31 | End: 2024-08-07 | Stop reason: HOSPADM

## 2024-07-31 RX ORDER — NALOXONE HCL 0.4 MG/ML
0.4 VIAL (ML) INJECTION AS NEEDED
Status: DISCONTINUED | OUTPATIENT
Start: 2024-07-31 | End: 2024-07-31 | Stop reason: HOSPADM

## 2024-07-31 RX ORDER — INSULIN LISPRO 100 [IU]/ML
2-7 INJECTION, SOLUTION INTRAVENOUS; SUBCUTANEOUS
Status: DISCONTINUED | OUTPATIENT
Start: 2024-07-31 | End: 2024-08-07 | Stop reason: HOSPADM

## 2024-07-31 RX ORDER — MEPERIDINE HYDROCHLORIDE 25 MG/ML
12.5 INJECTION INTRAMUSCULAR; INTRAVENOUS; SUBCUTANEOUS
Status: DISCONTINUED | OUTPATIENT
Start: 2024-07-31 | End: 2024-07-31 | Stop reason: HOSPADM

## 2024-07-31 RX ORDER — VANCOMYCIN/0.9 % SOD CHLORIDE 1.5G/250ML
1500 PLASTIC BAG, INJECTION (ML) INTRAVENOUS EVERY 12 HOURS
Status: DISCONTINUED | OUTPATIENT
Start: 2024-08-01 | End: 2024-07-31

## 2024-07-31 RX ORDER — PROMETHAZINE HYDROCHLORIDE 25 MG/1
25 TABLET ORAL ONCE AS NEEDED
Status: DISCONTINUED | OUTPATIENT
Start: 2024-07-31 | End: 2024-07-31 | Stop reason: HOSPADM

## 2024-07-31 RX ORDER — ROCURONIUM BROMIDE 10 MG/ML
INJECTION, SOLUTION INTRAVENOUS AS NEEDED
Status: DISCONTINUED | OUTPATIENT
Start: 2024-07-31 | End: 2024-07-31 | Stop reason: SURG

## 2024-07-31 RX ORDER — ALBUTEROL SULFATE 2.5 MG/3ML
2.5 SOLUTION RESPIRATORY (INHALATION) ONCE AS NEEDED
Status: DISCONTINUED | OUTPATIENT
Start: 2024-07-31 | End: 2024-07-31 | Stop reason: HOSPADM

## 2024-07-31 RX ORDER — ONDANSETRON 2 MG/ML
4 INJECTION INTRAMUSCULAR; INTRAVENOUS ONCE AS NEEDED
Status: DISCONTINUED | OUTPATIENT
Start: 2024-07-31 | End: 2024-07-31 | Stop reason: HOSPADM

## 2024-07-31 RX ORDER — LABETALOL HYDROCHLORIDE 5 MG/ML
5 INJECTION, SOLUTION INTRAVENOUS
Status: DISCONTINUED | OUTPATIENT
Start: 2024-07-31 | End: 2024-07-31 | Stop reason: HOSPADM

## 2024-07-31 RX ORDER — SODIUM CHLORIDE, SODIUM LACTATE, POTASSIUM CHLORIDE, CALCIUM CHLORIDE 600; 310; 30; 20 MG/100ML; MG/100ML; MG/100ML; MG/100ML
INJECTION, SOLUTION INTRAVENOUS CONTINUOUS PRN
Status: DISCONTINUED | OUTPATIENT
Start: 2024-07-31 | End: 2024-07-31 | Stop reason: SURG

## 2024-07-31 RX ORDER — AMOXICILLIN 250 MG
2 CAPSULE ORAL 2 TIMES DAILY PRN
Status: DISCONTINUED | OUTPATIENT
Start: 2024-07-31 | End: 2024-08-07 | Stop reason: HOSPADM

## 2024-07-31 RX ORDER — FENTANYL CITRATE 50 UG/ML
INJECTION, SOLUTION INTRAMUSCULAR; INTRAVENOUS AS NEEDED
Status: DISCONTINUED | OUTPATIENT
Start: 2024-07-31 | End: 2024-07-31 | Stop reason: SURG

## 2024-07-31 RX ORDER — PHENYLEPHRINE HCL IN 0.9% NACL 1 MG/10 ML
SYRINGE (ML) INTRAVENOUS AS NEEDED
Status: DISCONTINUED | OUTPATIENT
Start: 2024-07-31 | End: 2024-07-31 | Stop reason: SURG

## 2024-07-31 RX ORDER — GINSENG 100 MG
CAPSULE ORAL AS NEEDED
Status: DISCONTINUED | OUTPATIENT
Start: 2024-07-31 | End: 2024-07-31 | Stop reason: HOSPADM

## 2024-07-31 RX ORDER — SODIUM CHLORIDE 0.9 % (FLUSH) 0.9 %
10 SYRINGE (ML) INJECTION AS NEEDED
Status: DISCONTINUED | OUTPATIENT
Start: 2024-07-31 | End: 2024-08-07 | Stop reason: HOSPADM

## 2024-07-31 RX ORDER — FENTANYL CITRATE 50 UG/ML
25 INJECTION, SOLUTION INTRAMUSCULAR; INTRAVENOUS
Status: ACTIVE | OUTPATIENT
Start: 2024-07-31 | End: 2024-08-05

## 2024-07-31 RX ORDER — BISACODYL 10 MG
10 SUPPOSITORY, RECTAL RECTAL DAILY PRN
Status: DISCONTINUED | OUTPATIENT
Start: 2024-07-31 | End: 2024-08-07 | Stop reason: HOSPADM

## 2024-07-31 RX ORDER — SODIUM CHLORIDE, SODIUM LACTATE, POTASSIUM CHLORIDE, AND CALCIUM CHLORIDE .6; .31; .03; .02 G/100ML; G/100ML; G/100ML; G/100ML
20 INJECTION, SOLUTION INTRAVENOUS CONTINUOUS
Status: DISCONTINUED | OUTPATIENT
Start: 2024-07-31 | End: 2024-07-31

## 2024-07-31 RX ADMIN — SODIUM CHLORIDE 2 G: 900 INJECTION INTRAVENOUS at 18:50

## 2024-07-31 RX ADMIN — Medication 10 ML: at 16:05

## 2024-07-31 RX ADMIN — HYDROCODONE BITARTRATE AND ACETAMINOPHEN 1 TABLET: 5; 325 TABLET ORAL at 16:56

## 2024-07-31 RX ADMIN — SODIUM CHLORIDE, POTASSIUM CHLORIDE, SODIUM LACTATE AND CALCIUM CHLORIDE 20 ML/HR: 600; 310; 30; 20 INJECTION, SOLUTION INTRAVENOUS at 11:04

## 2024-07-31 RX ADMIN — CYCLOBENZAPRINE 10 MG: 10 TABLET, FILM COATED ORAL at 21:02

## 2024-07-31 RX ADMIN — PROPOFOL 150 MG: 10 INJECTION, EMULSION INTRAVENOUS at 11:19

## 2024-07-31 RX ADMIN — Medication 5 MG: at 21:02

## 2024-07-31 RX ADMIN — SUGAMMADEX 162 MG: 100 INJECTION, SOLUTION INTRAVENOUS at 12:56

## 2024-07-31 RX ADMIN — LIDOCAINE HYDROCHLORIDE 100 MG: 20 INJECTION, SOLUTION EPIDURAL; INFILTRATION; INTRACAUDAL; PERINEURAL at 11:19

## 2024-07-31 RX ADMIN — LABETALOL HYDROCHLORIDE 10 MG: 5 INJECTION, SOLUTION INTRAVENOUS at 18:55

## 2024-07-31 RX ADMIN — Medication 100 MCG: at 11:40

## 2024-07-31 RX ADMIN — Medication 10 ML: at 09:20

## 2024-07-31 RX ADMIN — Medication 10 ML: at 21:02

## 2024-07-31 RX ADMIN — ONDANSETRON 4 MG: 2 INJECTION INTRAMUSCULAR; INTRAVENOUS at 09:20

## 2024-07-31 RX ADMIN — ROCURONIUM BROMIDE 50 MG: 10 INJECTION, SOLUTION INTRAVENOUS at 11:19

## 2024-07-31 RX ADMIN — CEFAZOLIN 2 G: 2 INJECTION, POWDER, FOR SOLUTION INTRAMUSCULAR; INTRAVENOUS at 11:05

## 2024-07-31 RX ADMIN — PROPOFOL 50 MG: 10 INJECTION, EMULSION INTRAVENOUS at 11:25

## 2024-07-31 RX ADMIN — KETOROLAC TROMETHAMINE 15 MG: 30 INJECTION, SOLUTION INTRAMUSCULAR at 18:08

## 2024-07-31 RX ADMIN — SUGAMMADEX 162 MG: 100 INJECTION, SOLUTION INTRAVENOUS at 13:03

## 2024-07-31 RX ADMIN — FENTANYL CITRATE 50 MCG: 50 INJECTION, SOLUTION INTRAMUSCULAR; INTRAVENOUS at 11:13

## 2024-07-31 RX ADMIN — ONDANSETRON 4 MG: 2 INJECTION INTRAMUSCULAR; INTRAVENOUS at 12:46

## 2024-07-31 RX ADMIN — CEFAZOLIN 2 G: 2 INJECTION, POWDER, FOR SOLUTION INTRAMUSCULAR; INTRAVENOUS at 10:07

## 2024-07-31 RX ADMIN — Medication 1500 MG: at 16:04

## 2024-07-31 RX ADMIN — SODIUM CHLORIDE 1250 MG: 9 INJECTION, SOLUTION INTRAVENOUS at 01:58

## 2024-07-31 RX ADMIN — SODIUM CHLORIDE, SODIUM LACTATE, POTASSIUM CHLORIDE, AND CALCIUM CHLORIDE: .6; .31; .03; .02 INJECTION, SOLUTION INTRAVENOUS at 11:12

## 2024-07-31 RX ADMIN — LABETALOL 20 MG/4 ML (5 MG/ML) INTRAVENOUS SYRINGE 5 MG: at 11:25

## 2024-07-31 RX ADMIN — PROPOFOL 150 MCG/KG/MIN: 10 INJECTION, EMULSION INTRAVENOUS at 11:22

## 2024-07-31 RX ADMIN — KETOROLAC TROMETHAMINE 15 MG: 30 INJECTION, SOLUTION INTRAMUSCULAR at 04:37

## 2024-07-31 RX ADMIN — DEXAMETHASONE SODIUM PHOSPHATE 8 MG: 4 INJECTION, SOLUTION INTRAMUSCULAR; INTRAVENOUS at 11:33

## 2024-07-31 RX ADMIN — FENTANYL CITRATE 50 MCG: 50 INJECTION, SOLUTION INTRAMUSCULAR; INTRAVENOUS at 12:02

## 2024-07-31 RX ADMIN — PHENYLEPHRINE HYDROCHLORIDE 0.5 MCG/KG/MIN: 10 INJECTION INTRAVENOUS at 11:35

## 2024-07-31 RX ADMIN — INSULIN LISPRO 2 UNITS: 100 INJECTION, SOLUTION INTRAVENOUS; SUBCUTANEOUS at 18:55
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
## 2024-07-31 NOTE — CONSULTS
Critical Care Consult Note   Esperanza Smith : 1945 MRN:[id number] LOS:2 ROOM: [facility] MAIN OR/MAIN OR     Reason for admission: Neck pain     Assessment / Plan     Post-operative pseudomeningocele  Recent cervical meningioma with recent laminectomy of C2-C4 for resection of intradural tumor on 2024, now complicated by large fluid collection at operative site.  MRI Cervical Spine (): Large fluid collection within posterior aspect of neck at the site of C1, C2, C3, and C4 laminectomy, increased since prior study, measuring 8.1 cm in the largest dimension with mild mass effect on the posterior aspect of the spinal cord with now moderate to severe spinal canal stenosis and possible mild cord compression noted at level C3-C4.  Neurosurgery previously consulted and continues to follow.  Previously on Rocephin and Vancomycin, per MOODY, not felt to be infectious, this was discontinued post-operatively with continuation of surgical prophylaxis with cefazolin x 3 doses.  Follow surgical wound cultures.  MRSA Screen negative.  Status post cervical evacuation of pseudomeningocele and dural repair with insertion of lumbar drain.  Hourly neuro checks, adjusted per neurosurgery recommendations.  Lumbar drain management per MOODY: Drain 5 mL/hr; if unable to drain 3 mL/hr for 3 consecutive hours, notify neurosurgery.  Analgesia as ordered by MOODY.    Visual hallucinations  Started earlier in the day, prior to surgery.  Not been sleeping well, possible etiology, versus delirium.  Patient realizes that she is hallucinating.  Encourage cluster care to promote resting between neuro checks.  Scheduled melatonin 5 mg.  Further workup as clinically indicated.    Essential hypertension: well controlled.   Continue amlodipine .   Titrate medications as needed.    Diabetes mellitus Type 2, not insulin-dependent : well controlled.   Diet controlled, on no home regimen.  A1c on 2024 was 6.10.  Accu checks ACHS or Every 6 Hours,  "based on diet, with sliding scale admelog coverage as needed.     Hypothyroidism: Continue levothyroxine.  History of breast and uterine cancer  Hyperlipidemia: Intolerant of statins.    Code Status (Patient has no pulse and is not breathing): CPR (Attempt to Resuscitate)  Medical Interventions (Patient has pulse or is breathing): Full Support       Nutrition: Diet: Diabetic; Consistent Carbohydrate; Fluid Consistency: Thin (IDDSI 0)     VTE Prophylaxis:  Mechanical VTE prophylaxis orders are present.       History of Present illness     A 78 y.o. old female patient with PMH of hypertension, hypothyroidism, diabetes mellitus type 2, history of breast and uterine cancer, hyperlipidemia, and known recent laminectomy of C2-C4 for resection of intradural tumor/cervical meningioma on 7/16/2024, and presented on 7/29/2024 to the hospital with complaints of neck pain.  Initial outpatient postoperative MRI cervical spine on 7/20/2024 revealed a 5.9 x 3.4 x 4 cm fluid collection posterior to the laminectomy site which was felt to be a postoperative seroma, and abscess, or pseudomeningocele being less favored; additionally there was noted mild canal stenosis at C3-4 secondary to broad-based disc bulge.  Neurosurgery continued to follow as outpatient.  This had been managed with pain medication and hydrocortisone as outpatient, but the pain continued to worsen, prompting her to come into the ED for evaluation.  Repeat MRI with and without contrast of the cervical spine on 7/29/2024 revealed: \"Large fluid collection noted within the posterior aspect of the neck at the site of C1, C2, C3 and C4 laminectomy, has increased in size since prior study, now measuring up to 8.1 cm in the largest dimension. While this may represent a seroma, given its increase in size, this is highly concerning for a pseudomeningocele or CSF leak. Given the level of peripheral enhancement, this is less likely to represent an abscess, although it is not " "completely excluded.  The expanding fluid collection in the posterior neck causes mild mass effect on the posterior aspect of the spinal cord with now moderate to severe spinal canal stenosis and possible mild cord compression noted at the level of C3-C4. This has mildly worsened since prior study.\"  Neurosurgery was consulted.  Patient was started on empiric antimicrobial therapy with Rocephin and vancomycin, though it was felt that this was unlikely to be infectious.  Patient underwent cervical evacuation of pseudomeningocele and dural repair with insertion of lumbar drain on 7/31/2024.  Patient had initially been admitted to the hospitalist service, and have now been transitioned to the intensivist service with the placement of the lumbar drain.  Patient did also state that she started to have visual hallucinations that started earlier in the day, prior to her surgery, as she has been seeing bugs on the ceiling, but she is aware that that she is hallucinating.  She endorses fatigue, and has had difficulty sleeping since her admission.  Patient endorses a persistent headache, though it is less then it was preoperatively, but does state that her back pain is worse postoperatively.  She does endorse some neuropathy of her feet, but states they are at her baseline.  She denies numbness, tingling.    ACP: No advanced care planning documentation on file.  Patient is currently decisional, but in the event she is unable, her spouse would be next of kin and decision maker.    Patient was seen and examined on 07/31/24 at 13:54 EDT .    Subjective / Review of systems     Review of Systems   Constitutional:  Positive for activity change and fatigue. Negative for appetite change and fever.        Feels cold since returning from the OR.   HENT:  Positive for congestion. Negative for sore throat.    Respiratory:  Negative for cough and shortness of breath.    Cardiovascular:  Negative for chest pain and palpitations. "   Gastrointestinal:  Negative for abdominal distention and abdominal pain.   Endocrine: Negative for cold intolerance and heat intolerance.   Genitourinary:  Negative for dysuria and flank pain.   Musculoskeletal:  Positive for back pain and neck pain.   Neurological:  Positive for headaches. Negative for speech difficulty, weakness and numbness.        Endorses history of neuropathy of bilateral lower extremities.   Psychiatric/Behavioral:  Positive for hallucinations (Visual hallucinations, seeing bugs on the ceiling.). Negative for confusion. The patient is not nervous/anxious.         Past Medical/Surgical/Social/Family History & Allergies     Past Medical History:   Diagnosis Date    Allergic Penicillian    Asthma congestion    Breast cancer     Cataract     Cervical disc disorder     Cholelithiasis surgery 1996 removal    DDD (degenerative disc disease), lumbosacral     Diabetes mellitus     Diverticulitis     Diverticulosis     Headache     History of medical problems 54 inches left of small intesting    Hyperlipidemia     Hypertension     Hypothyroidism     IBS (irritable bowel syndrome)     Inflammatory bowel disease     Liver disease fatty liver    Low back pain Sciatic    Lumbosacral disc disease     Neuromuscular disorder neuropathy    Obesity     PONV (postoperative nausea and vomiting)     Seasonal allergies     Sleep apnea       Past Surgical History:   Procedure Laterality Date    BREAST BIOPSY      BREAST SURGERY Left 02/2012    x2     CERVICAL SPINAL CORD TUMOR REMOVAL N/A 7/16/2024    Procedure: Cervical 2-4 laminectomy for resection of intradural tumor;  Surgeon: Mario Urrutia IV, MD;  Location: [facility] MAIN OR;  Service: Neurosurgery;  Laterality: N/A;    CHOLECYSTECTOMY  1996    COLON RESECTION SMALL BOWEL  05/25/2014    COLON SURGERY  small intestine repair    HYSTERECTOMY      LYMPH NODE BIOPSY      MASTECTOMY      OOPHORECTOMY  1965    SIMPLE MASTECTOMY Right 02/26/2020    Procedure: Right  simple mastectomy;  Surgeon: Mario Quezada DO;  Location: [facility] MAIN OR;  Service: General;  Laterality: Right;    SMALL INTESTINE SURGERY      SUBTOTAL HYSTERECTOMY  full hysterectomy      Social History     Socioeconomic History    Marital status:    Tobacco Use    Smoking status: Never     Passive exposure: Never    Smokeless tobacco: Never   Vaping Use    Vaping status: Never Used   Substance and Sexual Activity    Alcohol use: Not Currently     Alcohol/week: 1.0 standard drink of alcohol     Types: 1 Glasses of wine per week     Comment: occ    Drug use: No    Sexual activity: Not Currently     Partners: Male     Birth control/protection: None      Family History   Problem Relation Age of Onset    Breast cancer Sister     Breast cancer Maternal Grandmother       Allergies   Allergen Reactions    Diphenhydramine Rash    Morphine Other (See Comments)     nightmares    Penicillins Other (See Comments)     told not to take any more    Latex Rash        Home Medications     Prior to Admission medications    Medication Sig Start Date End Date Taking? Authorizing Provider   acetaminophen (TYLENOL) 325 MG tablet Take 2 tablets by mouth Every 6 (Six) Hours As Needed for Mild Pain.   Yes Zackery Mcbride MD   amLODIPine (NORVASC) 5 MG tablet TAKE 1 TABLET BY MOUTH DAILY 3/6/24  Yes Dom Estrada MD   APPLE CIDER VINEGAR PO Take 1 tablet by mouth Daily.   Yes Zackery Mcbride MD   Ascorbic Acid (VITAMIN C) 250 MG chewable tablet Chew 1 tablet Daily. 9/1/19  Yes Zackery Mcbride MD   B Complex-Biotin-FA (SUPER B-COMPLEX) tablet Take 1 tablet by mouth Daily. 2/19/16  Yes Zackery Mcbride MD   Cholecalciferol (VITAMIN D3) 2000 units tablet Take 1 tablet by mouth Daily. 2/19/16  Yes Zackery Mcbride MD   HYDROcodone-acetaminophen (NORCO) 5-325 MG per tablet Take 1 tablet by mouth Every 6 (Six) Hours As Needed for Moderate Pain. 7/29/24  Yes Mario Urrutia IV, MD   Inositol 324 MG tablet  Take 2 tablets by mouth Every Morning Before Breakfast. 2/19/16  Yes Zackery Mcbride MD   levothyroxine (SYNTHROID, LEVOTHROID) 50 MCG tablet TAKE 1 TABLET BY MOUTH DAILY 5/14/24  Yes Dom Estrada MD   metFORMIN (GLUCOPHAGE) 500 MG tablet TAKE 1 TABLET BY MOUTH TWICE DAILY WITH MEALS 6/3/24  Yes Dom Estrada MD   TURMERIC CURCUMIN PO Take 2 tablets by mouth Daily.   Yes Zackery Mcbride MD   cyclobenzaprine (FLEXERIL) 10 MG tablet Take 1 tablet by mouth 3 (Three) Times a Day As Needed for Muscle Spasms. 7/19/24   Mimi Vázquez APRN   fluticasone (FLONASE) 50 MCG/ACT nasal spray 2 sprays into the nostril(s) as directed by provider Daily As Needed for Rhinitis.    ProviderZackery MD   nystatin-triamcinolone (MYCOLOG II) 702830-4.1 UNIT/GM-% cream 1 Application 2 (Two) Times a Day As Needed. 9/5/23   Zackery Mcbride MD   traMADol (ULTRAM) 50 MG tablet Take 1-2 tablets by mouth Every 6 (Six) Hours As Needed for Moderate Pain or Severe Pain (post op pain). 7/26/24   Dom Estrada MD        Objective / Physical Exam     Vital signs:  Temp: 97.8 °F (36.6 °C)  BP: 125/71  Heart Rate: 67  Resp: 20  SpO2: 94 %  Weight: 80.8 kg (178 lb 2.1 oz)    Admission Weight: Weight: 83.9 kg (185 lb)    Physical Exam  Vitals and nursing note reviewed.   Constitutional:       General: She is not in acute distress.     Appearance: She is not ill-appearing, toxic-appearing or diaphoretic.   HENT:      Head: Normocephalic and atraumatic.      Nose: Nose normal. No congestion.      Mouth/Throat:      Mouth: Mucous membranes are moist.      Pharynx: Oropharynx is clear. No oropharyngeal exudate.   Eyes:      General: No scleral icterus.     Extraocular Movements: Extraocular movements intact.      Conjunctiva/sclera: Conjunctivae normal.      Pupils: Pupils are equal, round, and reactive to light.   Cardiovascular:      Rate and Rhythm: Normal rate and regular rhythm.      Pulses: Normal  pulses.      Heart sounds: Normal heart sounds. No murmur heard.  Pulmonary:      Effort: Pulmonary effort is normal. No respiratory distress.      Comments: Diminished bibasilar breath sounds, without rhonchi, rales, or wheezes.  Abdominal:      General: Bowel sounds are normal. There is no distension.      Palpations: Abdomen is soft.      Tenderness: There is no abdominal tenderness.   Musculoskeletal:         General: No swelling.      Right lower leg: No edema.      Left lower leg: No edema.   Skin:     General: Skin is warm and dry.      Findings: No rash.   Neurological:      General: No focal deficit present.      Mental Status: She is alert and oriented to person, place, and time.      Sensory: No sensory deficit.      Motor: No weakness.      Comments: Lumbar drain in place.   Psychiatric:      Comments: Calm, cooperative.          Labs     Results from last 7 days   Lab Units 07/31/24  0054 07/29/24  1752   WBC 10*3/mm3 16.33* 20.73*   HEMATOCRIT % 40.3 44.0   PLATELETS 10*3/mm3 264 311      Results from last 7 days   Lab Units 07/31/24  0054 07/29/24  1752   SODIUM mmol/L 132* 132*   POTASSIUM mmol/L 3.6 3.7   CHLORIDE mmol/L 93* 94*   CO2 mmol/L 27.1 24.4   BUN mg/dL 9 10   CREATININE mg/dL 0.53* 0.44*        Imaging     MRI Cervical Spine With & Without Contrast    Result Date: 7/29/2024  Impression: Large fluid collection noted within the posterior aspect of the neck at the site of C1, C2, C3 and C4 laminectomy, has increased in size since prior study, now measuring up to 8.1 cm in the largest dimension. While this may represent a seroma, given its increase in size, this is highly concerning for a pseudomeningocele or CSF leak. Given the level of peripheral enhancement, this is less likely to represent an abscess, although it is not completely excluded. The expanding fluid collection in the posterior neck causes mild mass effect on the posterior aspect of the spinal cord with now moderate to severe  spinal canal stenosis and possible mild cord compression noted at the level of C3-C4. This has mildly worsened since prior study. Additional findings as characterized above, not significantly changed since prior study Electronically Signed: Uri Birch DO  7/29/2024 7:40 PM EDT  Workstation ID: [id number]        Current Medications     Scheduled Meds:  amLODIPine, 5 mg, Oral, Daily  ascorbic acid, 250 mg, Oral, Daily  ceFAZolin, 2 g, Intravenous, Q8H  cholecalciferol, 2,000 Units, Oral, Daily  insulin lispro, 2-7 Units, Subcutaneous, 4x Daily AC & at Bedtime  levothyroxine, 50 mcg, Oral, Q AM  melatonin, 5 mg, Oral, Nightly  sodium chloride, 10 mL, Intravenous, Q12H  sodium chloride, 10 mL, Intravenous, Q12H         Continuous Infusions:  lactated ringers, 20 mL/hr, Last Rate: 20 mL/hr (07/31/24 1104)  niCARdipine, 5-15 mg/hr  Pharmacy to dose vancomycin,        Plan discussed with RN. Reviewed all other data in the last 24 hours, including but not limited to vitals, labs, microbiology, imaging and pertinent notes from other providers.  Plan also discussed with patient at the bedside.      DANNA Manning   Critical Care  07/31/24   13:54 EDT

## 2024-07-31 NOTE — ANESTHESIA PROCEDURE NOTES
Airway  Urgency: elective    Date/Time: 7/31/2024 11:22 AM  Airway not difficult    General Information and Staff    Patient location during procedure: OR  CRNA/CAA: Phoebe Scott CRNA    Indications and Patient Condition  Indications for airway management: airway protection    Preoxygenated: yes  MILS not maintained throughout  Mask difficulty assessment: 1 - vent by mask    Final Airway Details  Final airway type: endotracheal airway      Successful airway: ETT  Cuffed: yes   Successful intubation technique: direct laryngoscopy  Endotracheal tube insertion site: oral  Blade: Bridgette  Blade size: 3  ETT size (mm): 7.0  Cormack-Lehane Classification: grade I - full view of glottis  Placement verified by: chest auscultation and capnometry   Cuff volume (mL): 8  Measured from: lips  ETT/EBT  to lips (cm): 21  Number of attempts at approach: 1  Assessment: lips, teeth, and gum same as pre-op and atraumatic intubation    Additional Comments  Pt preoxygenated prior to induction, easy mask airway, atraumatic intubation,+ ETCO2, + bs bilat,  ETT secured and connected to ventilator.

## 2024-07-31 NOTE — PROGRESS NOTES
No events.  Patient continues to have significant neck pain and headaches and some blurry vision    Awake and alert  EOMI, face equal  Full strength bilateral upper extremities    Plan: 78-year-old female status post resection of upper cervical meningioma has developed a pseudomeningocele has increased in size over short duration  -OR today for evacuation of pseudomeningocele and repair of dura as well as placement of lumbar drain  -Return to ICU postoperatively for 3 to 5 days of CSF drainage via lumbar drain  -ICU care

## 2024-07-31 NOTE — PROGRESS NOTES
"Pharmacy Antimicrobial Dosing Service    Subjective:  Esperanza Smith is a 78 y.o.female  s/p recent neck surgery for meningioma of spinal cord admitted with complaints of neck pain . Pharmacy has been consulted to dose Vancomycin for possible CNS infection.    PMH: T2DM, HTN, meningioma removal 7/16      Assessment/Plan    1. Day #2 Vancomycin: Goal -600 mcg*h/mL. Loaded with vanc 1.75 g (~22 mg/kg TBW) IV x1 7/30, followed by vanc 1.25 g (15.5 mg/kg TBW) IV q12h. Level was collected after 2 doses. Based on Bayesian modeling, ssAUC is within goal range but on the lower side at 434 mcg*h/mL. Will increase to vanc 1.5 g (~18 mg/kg TBW) IV q12h for a predicted AUC of 520 mcg*h/mL. Obtain peak on 8/2 at 07:00 & Tr 8/2 at 14:00. Specimen sent for culture from OR today. Will follow cultures & Neurosurgery notes to de-escalate when appropriate.     2. Day #2 Ceftriaxone: 2 g IV q24h.    Will continue to monitor drug levels, renal function, culture and sensitivities, and patient clinical status.       Objective:  Relevant clinical data and objective history reviewed:  165.1 cm (65\")   80.8 kg (178 lb 2.1 oz)   Ideal body weight: 57 kg (125 lb 10.6 oz)  Adjusted ideal body weight: 66.5 kg (146 lb 10.4 oz)  Body mass index is 29.64 kg/m².    Results from last 7 days   Lab Units 07/31/24  0054   VANCOMYCIN TR mcg/mL 8.95     Results from last 7 days   Lab Units 07/31/24  0054 07/29/24  1752   CREATININE mg/dL 0.53* 0.44*     Estimated Creatinine Clearance: 94.8 mL/min (A) (by C-G formula based on SCr of 0.53 mg/dL (L)).  I/O last 3 completed shifts:  In: 340 [P.O.:240; IV Piggyback:100]  Out: -     Results from last 7 days   Lab Units 07/31/24  0054 07/29/24  1752   WBC 10*3/mm3 16.33* 20.73*     Temperature    07/31/24 1050 07/31/24 1310 07/31/24 1340   Temp: 98.7 °F (37.1 °C) 97.7 °F (36.5 °C) 97.8 °F (36.6 °C)     Baseline culture/source/susceptibility:  Microbiology Results (last 10 days)       Procedure Component " Value - Date/Time    Wound Culture - Surgical Site, Back, Upper [[id number]] Collected: 07/31/24 1202    Lab Status: Preliminary result Specimen: Surgical Site from Back, Upper Updated: 07/31/24 1342     Gram Stain Many (4+) WBCs per low power field      No organisms seen    MRSA Screen, PCR (Inpatient) - Swab, Nares [[id number]]  (Normal) Collected: 07/30/24 0612    Lab Status: Final result Specimen: Swab from Nares Updated: 07/30/24 0812     MRSA PCR No MRSA Detected    Narrative:      The negative predictive value of this diagnostic test is high and should only be used to consider de-escalating anti-MRSA therapy. A positive result may indicate colonization with MRSA and must be correlated clinically.            Amarilis Naqvi RPH  07/31/24 14:12 EDT

## 2024-07-31 NOTE — ANESTHESIA POSTPROCEDURE EVALUATION
Patient: Esperanza Smith    Procedure Summary       Date: 07/31/24 Room / Location: [facility] OR 12 / [facility] MAIN OR    Anesthesia Start: 1112 Anesthesia Stop: 1314    Procedure: LUMBAR DRAIN INSERTION REPAIR OF CERVICAL DURA (Spine Cervical) Diagnosis:     Surgeons: Mario Urrutia IV, MD Provider: Maria Teresa Dillon MD    Anesthesia Type: general ASA Status: 3            Anesthesia Type: general    Vitals  Vitals Value Taken Time   /79 07/31/24 1344   Temp 97.8 °F (36.6 °C) 07/31/24 1340   Pulse 79 07/31/24 1348   Resp 20 07/31/24 1340   SpO2 96 % 07/31/24 1348   Vitals shown include unfiled device data.        Post Anesthesia Care and Evaluation    Patient location during evaluation: PACU  Patient participation: complete - patient participated  Level of consciousness: awake and alert  Pain management: satisfactory to patient    Airway patency: patent  Anesthetic complications: No anesthetic complications  PONV Status: none  Cardiovascular status: acceptable  Respiratory status: acceptable  Hydration status: acceptable

## 2024-07-31 NOTE — PLAN OF CARE
Goal Outcome Evaluation:      Pt up to chair and back to bed multiple times throughout the shift. Pt did not sleep at all during the night. Pt back in forth to chair and bed to try and help with discomfort. Pt given pain medication per MAR for pain. All questions and concerns addressed.

## 2024-07-31 NOTE — OP NOTE
POSTERIOR CERVICAL FUSION  Procedure Report    Patient Name:  Esperanza Smith  YOB: 1945    Date of Surgery:  7/31/2024     Indications: 78-year-old female status post resection of upper cervical meningioma about 2 weeks ago who developed a large pseudomeningocele with severe neck pain and headaches.  Given this patient was brought to the OR for placement of lumbar drain and vacuolation of pseudomeningocele with dural repair.  Patient understood the risks of surgery including bleeding infection continued CSF leakage need for future operation among many other risks and she agreed to undergo the procedure    Pre-op Diagnosis:   Pseudomeningocele       Post-Op Diagnosis Codes:  Pseudomeningocele    Procedure/CPT® Codes:      Procedure(s):  Insertion of lumbar drain  Posterior cervical evacuation of pseudomeningocele and dural repair    Staff:  Surgeon(s):  Mario Urrutia IV, MD    Assistant: Angel Ojeda PA    Anesthesia: General    Estimated Blood Loss:  30cc    Implants:    Implant Name Type Inv. Item Serial No.  Lot No. LRB No. Used Action   DURALMATRIX DURAGEN PLS 4X5IN - [id number] Implant DURALMATRIX DURAGEN PLS 4X5IN  [employer] [id number] N/A 1 Implanted   KT SEAL HEMOS ABS FLOSEAL MATRX 1.5/FAST/PREP 5000/IU 10ML - [id number] Implant KT SEAL HEMOS ABS FLOSEAL MATRX 1.5/FAST/PREP 5000/IU 10ML  [facility] [id number] N/A 1 Implanted   DEV CONTRL TISS STRATAFIX SPIRAL MNCRYL UD 3/0 PLS 30CM - [id number] Implant DEV CONTRL TISS STRATAFIX SPIRAL MNCRYL UD 3/0 PLS 30CM  [employer]  DIV OF J AND J [id number] N/A 1 Implanted   DEV WND/CLS CONTRL TISS STRATAFIX SYMM PDS PLS CTX 60CM SVETLANA - [id number] Implant DEV WND/CLS CONTRL TISS STRATAFIX SYMM PDS PLS CTX 60CM SVETLANA  [employer]  DIV OF J AND J [id number] N/A 1 Implanted   CATH CSF EXT EDM LUMB W/BA 80CM - [id number] Implant CATH CSF EXT EDM LUMB W/BA 80CM  [employer] [id number] N/A 1 Implanted       Specimen:          Specimens       ID Source  Type Tests Collected By Collected At Frozen?    1 Back, Upper Surgical Site ANAEROBIC CULTURE  WOUND CULTURE   Mario Urrutia IV, MD 7/31/24 120     Description: Pseudomeningocele    This specimen was not marked as sent.                Findings: Pseudomeningocele, dural defect    Complications: None    Description of Procedure: Patient was brought to the OR and placed under general endotracheal anesthesia.  She was placed in Vega head felipe and flipped into the prone position on Michael table with her head and neck held in neutral position.  Patient was prepped and draped in usual fashion over previous incision as well as the lumbar area.  Large gauge spinal needle was then passed percutaneously through the skin into the thecal sac until CSF was reached at approximately the level of L4-5.  Stylette was removed and lumbar drain was passed into the thecal space.  Needle was then removed and lumbar drain was secured in place with Tegaderms.  It was attached to a drainage bag at the end of the case.    The posterior cervical incision was then reopened with 15 blade and scissors.  Clear fluid consistent with CSF was identified after opening the fascia.  Specimen was sent for culture.  Self-retaining retractors were placed exposing the previous laminectomy site and the dura.  Remaining DuraGen was removed from over the dura.  Valsalva was performed and there appeared to be leakage at a single small area on the dura.  This area was repaired with Nurolon sutures and muscle graft.  Valsalva performed after placement of the graft demonstrated no continued drainage of CSF.  The dura was again covered with DuraGen and dural sealant.  Hemostasis was achieved with bipolar cautery and Gelfoam powder and the wound was thoroughly irrigated.  Muscle was reapproximated using 0 Vicryl sutures, the fascia was closed with running strata fix 1, the deep dermal layer was closed with 2-0 Vicryl sutures and the skin was closed with a  running subcuticular Monocryl.  Dermabond was placed over the wound.                Assistant: Angel Ojeda PA  was responsible for performing the following activities: Retraction, Suction, Irrigation, Suturing, Closing, and Placing Dressing and their skilled assistance was necessary for the success of this case.    Mario Urrutia IV, MD     Date: 7/31/2024  Time: 12:34 EDT

## 2024-07-31 NOTE — ANESTHESIA PREPROCEDURE EVALUATION
Anesthesia Evaluation     Patient summary reviewed and Nursing notes reviewed   history of anesthetic complications:  PONV  NPO Solid Status: > 8 hours  NPO Liquid Status: > 2 hours           Airway   Mallampati: II  TM distance: >3 FB  Neck ROM: full  No difficulty expected  Dental    (+) upper dentures    Pulmonary    (+) asthma,sleep apnea  Cardiovascular     (+) hypertension, hyperlipidemia      Neuro/Psych  (+) headaches, syncope, numbness    ROS Comment:  On MRI there is an increased size of collection is most consistent with a pseudomeningocele.  She has worsening neck pain and headaches likely due to increasing size of the pseudomeningocele.  Patient is neurologically intact.  Her white count is elevated but no other overt signs of meningitis.  There is no leakage of fluid from her wound.  Pseudomeningocele is expected in her case due to the fact that a watertight closure of the dura was not possible after resection of her meningioma due to distraction and thinning of the dura due to the tumor.  The pseudomeningocele is expected however it has increased significantly in size since her postoperative MRI over a week ago.  Given this I think surgical intervention is indicated.  We will plan for placement of lumbar drain and drainage of pseudomeningocele with attempt at reclosure of the dura.  Will plan to continue lumbar drain for a total of 5 days and patient will need to remain in the ICU while lumbar drain is in place.  GI/Hepatic/Renal/Endo    (+) liver disease, diabetes mellitus, thyroid problem     Musculoskeletal     Abdominal    Substance History      OB/GYN          Other   arthritis,   history of cancer    ROS/Med Hx Other: Preserved LV systolic function EF around 60%  Diastolic LV dysfunction  No significant Doppler abnormalities                Anesthesia Plan    ASA 3     general   total IV anesthesia  intravenous induction     Anesthetic plan, risks, benefits, and alternatives have been provided,  discussed and informed consent has been obtained with: patient.    Plan discussed with CRNA.    CODE STATUS:

## 2024-07-31 NOTE — PROGRESS NOTES
[facility] MEDICINE SERVICE  DAILY PROGRESS NOTE    NAME: Esperanza Smith  : 1945  MRN: [id number]      LOS: 2 days     PROVIDER OF SERVICE: Kumar Franco MD    Chief Complaint: Neck pain    Subjective:     Interval History:  History taken from: patient chart RN  No new symptoms seen prior to OR    Review of Systems:   Review of Systems  All negative except as above   Objective:     Vital Signs  Temp:  [97.8 °F (36.6 °C)-99 °F (37.2 °C)] 98.7 °F (37.1 °C)  Heart Rate:  [] 103  Resp:  [21-30] 30  BP: (138-166)/(82-99) 166/99  Flow (L/min):  [2-3] 2   Body mass index is 29.64 kg/m².    Physical Exam  Physical Exam  AOx3 NAD  RRR S1-S2 audible  Lungs with fair air entry  Abdomen soft nontender nondistended  moving all 4 extremities  Scheduled Meds   amLODIPine, 5 mg, Oral, Daily  [Transfer Hold] ascorbic acid, 250 mg, Oral, Daily  cefTRIAXone, 2,000 mg, Intravenous, Q24H  [Transfer Hold] cholecalciferol, 2,000 Units, Oral, Daily  [Transfer Hold] levothyroxine, 50 mcg, Oral, Q AM  [Transfer Hold] sodium chloride, 10 mL, Intravenous, Q12H  vancomycin, 1,250 mg, Intravenous, Q12H       PRN Meds     albuterol    [Transfer Hold] senna-docusate sodium **AND** [Transfer Hold] polyethylene glycol **AND** [Transfer Hold] bisacodyl **AND** [Transfer Hold] bisacodyl    [Transfer Hold] cyclobenzaprine    droperidol **OR** droperidol    fentanyl    hydrALAZINE    [Transfer Hold] HYDROcodone-acetaminophen    HYDROmorphone    [Transfer Hold] ketorolac    labetalol    meperidine    naloxone    niCARdipine    [Transfer Hold] nystatin-triamcinolone    [Transfer Hold] ondansetron    ondansetron    Pharmacy to dose vancomycin    promethazine **OR** promethazine    [COMPLETED] Insert Peripheral IV **AND** [Transfer Hold] sodium chloride    [Transfer Hold] sodium chloride    [Transfer Hold] sodium chloride   Infusions  lactated ringers, 20 mL/hr, Last Rate: 20 mL/hr (24 1104)  niCARdipine, 5-15  mg/hr  Pharmacy to dose vancomycin,           Diagnostic Data    Results from last 7 days   Lab Units 07/31/24  0054   WBC 10*3/mm3 16.33*   HEMOGLOBIN g/dL 13.8   HEMATOCRIT % 40.3   PLATELETS 10*3/mm3 264   GLUCOSE mg/dL 152*   CREATININE mg/dL 0.53*   BUN mg/dL 9   SODIUM mmol/L 132*   POTASSIUM mmol/L 3.6   AST (SGOT) U/L 21   ALT (SGPT) U/L 25   ALK PHOS U/L 73   BILIRUBIN mg/dL 0.4   ANION GAP mmol/L 11.9       MRI Cervical Spine With & Without Contrast    Result Date: 7/29/2024  Impression: Large fluid collection noted within the posterior aspect of the neck at the site of C1, C2, C3 and C4 laminectomy, has increased in size since prior study, now measuring up to 8.1 cm in the largest dimension. While this may represent a seroma, given its increase in size, this is highly concerning for a pseudomeningocele or CSF leak. Given the level of peripheral enhancement, this is less likely to represent an abscess, although it is not completely excluded. The expanding fluid collection in the posterior neck causes mild mass effect on the posterior aspect of the spinal cord with now moderate to severe spinal canal stenosis and possible mild cord compression noted at the level of C3-C4. This has mildly worsened since prior study. Additional findings as characterized above, not significantly changed since prior study Electronically Signed: Uri Birch DO  7/29/2024 7:40 PM EDT  Workstation ID: [id number]       I reviewed the patient's new clinical results.  I reviewed the patient's new imaging results and agree with the interpretation.    Assessment/Plan:     Active and Resolved Problems  Active Hospital Problems    Diagnosis  POA    **Neck pain [M54.2]  Yes      Resolved Hospital Problems   No resolved problems to display.       78-year-old female with history of DM2, hypertension, hyperlipidemia, hypothyroidism, breast cancer, uterine cancer, cervical meningioma status post C2-4 laminectomy on 7/16/2024 admitted to  [facility] 7/29 with worsening neck pain. MRI C-spine showed large fluid collection within the posterior aspect of neck at site of C1-C4 laminectomy.  Increased in size from prior study.  8.1 cm        #Cervical meningioma with recent laminectomy now complicated by large fluid collection  at Op Site     Neurosurgery following to OR today underwent posterior cervical evacuation meningocele and dural repair.  Incisional lumbar drain  Continue current pain regimen  Low suspicion of infection defer antibiotics to [facility]    #Hypertension  Continue amlodipine     # Hypothyroidism  Continue levothyroxine        #History of breast and uterine cancer  Outpatient follow-up    VTE Prophylaxis:  Mechanical VTE prophylaxis orders are present.         Code status is   There are no questions and answers to display.       Plan for disposition: Pending clinical course    Time: 30 minutes    Signature: Electronically signed by Kumar Franco MD, 07/31/24, 13:18 EDT.  [facility] Hospitalist Team

## 2024-08-01 LAB
ALBUMIN SERPL-MCNC: 3.2 G/DL (ref 3.5–5.2)
ALBUMIN/GLOB SERPL: 1.2 G/DL
ALP SERPL-CCNC: 63 U/L (ref 39–117)
ALT SERPL W P-5'-P-CCNC: 20 U/L (ref 1–33)
ANION GAP SERPL CALCULATED.3IONS-SCNC: 8.4 MMOL/L (ref 5–15)
AST SERPL-CCNC: 20 U/L (ref 1–32)
BASOPHILS # BLD AUTO: 0.02 10*3/MM3 (ref 0–0.2)
BASOPHILS NFR BLD AUTO: 0.2 % (ref 0–1.5)
BILIRUB SERPL-MCNC: 0.3 MG/DL (ref 0–1.2)
BUN SERPL-MCNC: 11 MG/DL (ref 8–23)
BUN/CREAT SERPL: 20 (ref 7–25)
CALCIUM SPEC-SCNC: 9.7 MG/DL (ref 8.6–10.5)
CHLORIDE SERPL-SCNC: 96 MMOL/L (ref 98–107)
CO2 SERPL-SCNC: 30.6 MMOL/L (ref 22–29)
CREAT SERPL-MCNC: 0.55 MG/DL (ref 0.57–1)
DEPRECATED RDW RBC AUTO: 42.5 FL (ref 37–54)
EGFRCR SERPLBLD CKD-EPI 2021: 95.1 ML/MIN/1.73
EOSINOPHIL # BLD AUTO: 0.01 10*3/MM3 (ref 0–0.4)
EOSINOPHIL NFR BLD AUTO: 0.1 % (ref 0.3–6.2)
ERYTHROCYTE [DISTWIDTH] IN BLOOD BY AUTOMATED COUNT: 12.2 % (ref 12.3–15.4)
GLOBULIN UR ELPH-MCNC: 2.7 GM/DL
GLUCOSE BLDC GLUCOMTR-MCNC: 141 MG/DL (ref 70–105)
GLUCOSE BLDC GLUCOMTR-MCNC: 145 MG/DL (ref 70–105)
GLUCOSE BLDC GLUCOMTR-MCNC: 149 MG/DL (ref 70–105)
GLUCOSE BLDC GLUCOMTR-MCNC: 154 MG/DL (ref 70–105)
GLUCOSE SERPL-MCNC: 135 MG/DL (ref 65–99)
HCT VFR BLD AUTO: 34.9 % (ref 34–46.6)
HGB BLD-MCNC: 11.9 G/DL (ref 12–15.9)
IMM GRANULOCYTES # BLD AUTO: 0.11 10*3/MM3 (ref 0–0.05)
IMM GRANULOCYTES NFR BLD AUTO: 0.9 % (ref 0–0.5)
LYMPHOCYTES # BLD AUTO: 1.61 10*3/MM3 (ref 0.7–3.1)
LYMPHOCYTES NFR BLD AUTO: 12.9 % (ref 19.6–45.3)
MAGNESIUM SERPL-MCNC: 1.6 MG/DL (ref 1.6–2.4)
MCH RBC QN AUTO: 31.9 PG (ref 26.6–33)
MCHC RBC AUTO-ENTMCNC: 34.1 G/DL (ref 31.5–35.7)
MCV RBC AUTO: 93.6 FL (ref 79–97)
MONOCYTES # BLD AUTO: 1.44 10*3/MM3 (ref 0.1–0.9)
MONOCYTES NFR BLD AUTO: 11.5 % (ref 5–12)
NEUTROPHILS NFR BLD AUTO: 74.4 % (ref 42.7–76)
NEUTROPHILS NFR BLD AUTO: 9.33 10*3/MM3 (ref 1.7–7)
NRBC BLD AUTO-RTO: 0 /100 WBC (ref 0–0.2)
PHOSPHATE SERPL-MCNC: 3.7 MG/DL (ref 2.5–4.5)
PLATELET # BLD AUTO: 279 10*3/MM3 (ref 140–450)
PMV BLD AUTO: 9.4 FL (ref 6–12)
POTASSIUM SERPL-SCNC: 3.4 MMOL/L (ref 3.5–5.2)
POTASSIUM SERPL-SCNC: 4.2 MMOL/L (ref 3.5–5.2)
PROT SERPL-MCNC: 5.9 G/DL (ref 6–8.5)
RBC # BLD AUTO: 3.73 10*6/MM3 (ref 3.77–5.28)
SODIUM SERPL-SCNC: 135 MMOL/L (ref 136–145)
WBC NRBC COR # BLD AUTO: 12.52 10*3/MM3 (ref 3.4–10.8)

## 2024-08-01 PROCEDURE — 84100 ASSAY OF PHOSPHORUS: CPT

## 2024-08-01 PROCEDURE — 82948 REAGENT STRIP/BLOOD GLUCOSE: CPT | Performed by: NURSE PRACTITIONER

## 2024-08-01 PROCEDURE — 25010000002 LABETALOL 5 MG/ML SOLUTION: Performed by: NURSE PRACTITIONER

## 2024-08-01 PROCEDURE — 80053 COMPREHEN METABOLIC PANEL: CPT

## 2024-08-01 PROCEDURE — 83735 ASSAY OF MAGNESIUM: CPT

## 2024-08-01 PROCEDURE — 25010000002 CEFAZOLIN PER 500 MG

## 2024-08-01 PROCEDURE — 63710000001 INSULIN LISPRO (HUMAN) PER 5 UNITS: Performed by: NURSE PRACTITIONER

## 2024-08-01 PROCEDURE — 84132 ASSAY OF SERUM POTASSIUM: CPT | Performed by: INTERNAL MEDICINE

## 2024-08-01 PROCEDURE — 82948 REAGENT STRIP/BLOOD GLUCOSE: CPT

## 2024-08-01 PROCEDURE — 99024 POSTOP FOLLOW-UP VISIT: CPT

## 2024-08-01 PROCEDURE — 85025 COMPLETE CBC W/AUTO DIFF WBC: CPT

## 2024-08-01 PROCEDURE — 25010000002 KETOROLAC TROMETHAMINE PER 15 MG

## 2024-08-01 RX ORDER — POTASSIUM CHLORIDE 20 MEQ/1
40 TABLET, EXTENDED RELEASE ORAL EVERY 4 HOURS
Status: COMPLETED | OUTPATIENT
Start: 2024-08-01 | End: 2024-08-01

## 2024-08-01 RX ORDER — ACETAMINOPHEN 325 MG/1
650 TABLET ORAL EVERY 6 HOURS PRN
Status: DISCONTINUED | OUTPATIENT
Start: 2024-08-01 | End: 2024-08-07 | Stop reason: HOSPADM

## 2024-08-01 RX ADMIN — KETOROLAC TROMETHAMINE 15 MG: 30 INJECTION, SOLUTION INTRAMUSCULAR at 03:13

## 2024-08-01 RX ADMIN — Medication 10 ML: at 08:08

## 2024-08-01 RX ADMIN — BISACODYL 10 MG: 10 SUPPOSITORY RECTAL at 23:08

## 2024-08-01 RX ADMIN — LABETALOL HYDROCHLORIDE 10 MG: 5 INJECTION, SOLUTION INTRAVENOUS at 22:07

## 2024-08-01 RX ADMIN — LEVOTHYROXINE SODIUM 50 MCG: 0.05 TABLET ORAL at 05:10

## 2024-08-01 RX ADMIN — OXYCODONE HYDROCHLORIDE AND ACETAMINOPHEN 250 MG: 500 TABLET ORAL at 08:08

## 2024-08-01 RX ADMIN — LABETALOL HYDROCHLORIDE 10 MG: 5 INJECTION, SOLUTION INTRAVENOUS at 00:25

## 2024-08-01 RX ADMIN — AMLODIPINE BESYLATE 5 MG: 5 TABLET ORAL at 08:07

## 2024-08-01 RX ADMIN — Medication 10 ML: at 21:03

## 2024-08-01 RX ADMIN — Medication 5 MG: at 21:02

## 2024-08-01 RX ADMIN — CYCLOBENZAPRINE 10 MG: 10 TABLET, FILM COATED ORAL at 12:08

## 2024-08-01 RX ADMIN — POTASSIUM CHLORIDE 40 MEQ: 1500 TABLET, EXTENDED RELEASE ORAL at 09:00

## 2024-08-01 RX ADMIN — Medication 2000 UNITS: at 08:07

## 2024-08-01 RX ADMIN — INSULIN LISPRO 2 UNITS: 100 INJECTION, SOLUTION INTRAVENOUS; SUBCUTANEOUS at 21:02

## 2024-08-01 RX ADMIN — SODIUM CHLORIDE 2 G: 900 INJECTION INTRAVENOUS at 11:04

## 2024-08-01 RX ADMIN — SODIUM CHLORIDE 2 G: 900 INJECTION INTRAVENOUS at 03:13

## 2024-08-01 RX ADMIN — POTASSIUM CHLORIDE 40 MEQ: 1500 TABLET, EXTENDED RELEASE ORAL at 13:00

## 2024-08-01 NOTE — PLAN OF CARE
Goal Outcome Evaluation:      Pt rested well throughout the night. Lumbar drain WDL, see I/O and flowsheet. Pain controlled with prn flexeril and toradol. VSS. Denies pain/discomfort at this time. Pt still hallucinating at times; oriented x4, however, pt can be confused/forgetful at time. Neuro checks WNL, see flowsheet.

## 2024-08-01 NOTE — PROGRESS NOTES
"Critical Care Progress Note     Esperanza Smith : 1945 MRN:[id number] LOS:3  Rm: 2316/1     Principal Problem: Neck pain     Reason for follow up: All the medical problems listed below    Summary     A 78 y.o. old female patient with PMH of hypertension, hypothyroidism, diabetes mellitus type 2, history of breast and uterine cancer, hyperlipidemia, and known recent laminectomy of C2-C4 for resection of intradural tumor/cervical meningioma on 2024, and presented on 2024 to the hospital with complaints of neck pain. Initial outpatient postoperative MRI cervical spine on 2024 revealed a 5.9 x 3.4 x 4 cm fluid collection posterior to the laminectomy site which was felt to be a postoperative seroma, and abscess, or pseudomeningocele being less favored; additionally there was noted mild canal stenosis at C3-4 secondary to broad-based disc bulge. Repeat MRI with and without contrast of the cervical spine on 2024 revealed: \"Large fluid collection noted within the posterior aspect of the neck at the site of C1, C2, C3 and C4 laminectomy, has increased in size since prior study, now measuring up to 8.1 cm in the largest dimension. While this may represent a seroma, given its increase in size, this is highly concerning for a pseudomeningocele or CSF leak. Neurosurgery was consulted. Patient was started on empiric antimicrobial therapy with Rocephin and vancomycin, though it was felt that this was unlikely to be infectious. Patient underwent cervical evacuation of pseudomeningocele and dural repair with insertion of lumbar drain on 2024.     Significant Events     24 : Continuing q1h neuro checks and 5 cc drainage per hour. No issues overnight.     Assessment / Plan     Post-operative pseudomeningocele  Recent cervical meningioma with recent laminectomy of C2-C4 for resection of intradural tumor on 2024, now complicated by large fluid collection at operative site.  MRI Cervical Spine " (7/29): Large fluid collection within posterior aspect of neck at the site of C1, C2, C3, and C4 laminectomy, increased since prior study, measuring 8.1 cm in the largest dimension with mild mass effect on the posterior aspect of the spinal cord with now moderate to severe spinal canal stenosis and possible mild cord compression noted at level C3-C4.  Neurosurgery previously consulted and continues to follow.  Previously on Rocephin and Vancomycin, per [facility], not felt to be infectious, this was discontinued post-operatively with continuation of surgical prophylaxis with cefazolin x 3 doses.  Follow surgical wound cultures.  MRSA Screen negative.  Status post cervical evacuation of pseudomeningocele and dural repair with insertion of lumbar drain.  Hourly neuro checks, adjusted per neurosurgery recommendations.  Lumbar drain management per [facility]: Drain 5 mL/hr; if unable to drain 3 mL/hr for 3 consecutive hours, notify neurosurgery.  Analgesia as ordered by MOODY.     Visual hallucinations  Started earlier in the day, prior to surgery.  Not been sleeping well, possible etiology, versus delirium.  Patient realizes that she is hallucinating.  Encourage cluster care to promote resting between neuro checks.  Scheduled melatonin 5 mg.  Further workup as clinically indicated.     Essential hypertension: well controlled.   Continue amlodipine .   Titrate medications as needed.     Diabetes mellitus Type 2, not insulin-dependent : well controlled.   Diet controlled, on no home regimen.  A1c on 7/8/2024 was 6.10.  Accu checks ACHS or Every 6 Hours, based on diet, with sliding scale admelog coverage as needed.      Hypothyroidism: Continue levothyroxine.  History of breast and uterine cancer  Hyperlipidemia: Intolerant of statins.    Disposition:  Remains in ICU while drain in place.    Code status:   Code Status (Patient has no pulse and is not breathing): CPR (Attempt to Resuscitate)  Medical Interventions (Patient has pulse or is  breathing): Full Support       Nutrition:   Diet: Diabetic; Consistent Carbohydrate; Fluid Consistency: Thin (IDDSI 0)   Patient isn't on Tube Feeding     VTE Prophylaxis:  Mechanical VTE prophylaxis orders are present.         Subjective / Review of systems     Review of Systems   Constitutional:  Positive for fatigue. Negative for chills.   Respiratory:  Negative for shortness of breath.    Cardiovascular:  Negative for chest pain.   Gastrointestinal:  Negative for abdominal pain and nausea.   Genitourinary:  Negative for difficulty urinating.   Neurological:  Negative for dizziness, light-headedness and headaches.        Objective / Physical Exam     Vital signs:  Temp: 97.4 °F (36.3 °C)  BP: 139/74  Heart Rate: 83  Resp: 16  SpO2: 97 %  Weight: 80.8 kg (178 lb 2.1 oz)    Admission Weight: Weight: 83.9 kg (185 lb)  Current Weight: Weight: 80.8 kg (178 lb 2.1 oz)    Input/Output in last 24 hours:    Intake/Output Summary (Last 24 hours) at 8/1/2024 0828  Last data filed at 8/1/2024 0800  Gross per 24 hour   Intake 420 ml   Output 1190 ml   Net -770 ml      Net IO Since Admission: -430 mL [08/01/24 0828]     Physical Exam  Vitals and nursing note reviewed.   Constitutional:       General: She is not in acute distress.     Appearance: She is not ill-appearing.   HENT:      Head: Normocephalic.      Mouth/Throat:      Mouth: Mucous membranes are moist.      Pharynx: Oropharynx is clear.   Eyes:      Extraocular Movements: Extraocular movements intact.      Conjunctiva/sclera: Conjunctivae normal.      Pupils: Pupils are equal, round, and reactive to light.   Cardiovascular:      Rate and Rhythm: Normal rate and regular rhythm.      Pulses: Normal pulses.      Heart sounds: Normal heart sounds.   Pulmonary:      Effort: Pulmonary effort is normal.      Breath sounds: Normal breath sounds.   Abdominal:      General: Bowel sounds are normal.      Palpations: Abdomen is soft.   Musculoskeletal:      Right lower leg: No  edema.      Left lower leg: No edema.   Skin:     General: Skin is warm and dry.      Findings: No rash.   Neurological:      Mental Status: She is alert and oriented to person, place, and time.   Psychiatric:         Mood and Affect: Mood normal.         Behavior: Behavior normal.          Radiology and Labs     Results from last 7 days   Lab Units 08/01/24 0517 07/31/24 0054 07/29/24  1752   WBC 10*3/mm3 12.52* 16.33* 20.73*   HEMOGLOBIN g/dL 11.9* 13.8 14.8   HEMATOCRIT % 34.9 40.3 44.0   PLATELETS 10*3/mm3 279 264 311      Results from last 7 days   Lab Units 07/31/24 0054   PROTIME Seconds 11.9*   INR  1.10      Results from last 7 days   Lab Units 08/01/24 0517 07/31/24 0054 07/29/24  1752   SODIUM mmol/L 135* 132* 132*   POTASSIUM mmol/L 3.4* 3.6 3.7   CHLORIDE mmol/L 96* 93* 94*   CO2 mmol/L 30.6* 27.1 24.4   BUN mg/dL 11 9 10   CREATININE mg/dL 0.55* 0.53* 0.44*   GLUCOSE mg/dL 135* 152* 150*   MAGNESIUM mg/dL 1.6 1.6  --    PHOSPHORUS mg/dL 3.7 3.3  --       Results from last 7 days   Lab Units 08/01/24 0517 07/31/24 0054   ALK PHOS U/L 63 73   AST (SGOT) U/L 20 21   ALT (SGPT) U/L 20 25           No radiology results for the last day      Current medications     Scheduled Meds:   amLODIPine, 5 mg, Oral, Daily  ascorbic acid, 250 mg, Oral, Daily  ceFAZolin, 2 g, Intravenous, Q8H  cholecalciferol, 2,000 Units, Oral, Daily  insulin lispro, 2-7 Units, Subcutaneous, 4x Daily AC & at Bedtime  levothyroxine, 50 mcg, Oral, Q AM  melatonin, 5 mg, Oral, Nightly  potassium chloride ER, 40 mEq, Oral, Q4H  sodium chloride, 10 mL, Intravenous, Q12H  sodium chloride, 10 mL, Intravenous, Q12H        Continuous Infusions:   niCARdipine, 5-15 mg/hr          Plan discussed with RN. Reviewed all other data in the last 24 hours, including but not limited to vitals, labs, microbiology, imaging and pertinent notes from other providers.  Plan also discussed with patient at the bedside.      DANNA Biswas    Critical Care  08/01/24   08:28 EDT

## 2024-08-01 NOTE — CONSULTS
Nutrition Services    Patient Name: Esperanza Smith  YOB: 1945  MRN: [id number]  Admission date: 7/29/2024    Comment:  -- Continue current diet and encourage good PO intakes      CLINICAL NUTRITION ASSESSMENT      Reason for Assessment 8/1: Consult for Nursing Admission Screen, MST of 2      H&P      Past Medical History:   Diagnosis Date    Allergic Penicillian    Asthma congestion    Breast cancer     Cataract     Cervical disc disorder     Cholelithiasis surgery 1996 removal    DDD (degenerative disc disease), lumbosacral     Diabetes mellitus     Diverticulitis     Diverticulosis     Headache     History of medical problems 54 inches left of small intesting    Hyperlipidemia     Hypertension     Hypothyroidism     IBS (irritable bowel syndrome)     Inflammatory bowel disease     Liver disease fatty liver    Low back pain Sciatic    Lumbosacral disc disease     Neuromuscular disorder neuropathy    Obesity     PONV (postoperative nausea and vomiting)     Seasonal allergies     Sleep apnea        Past Surgical History:   Procedure Laterality Date    BREAST BIOPSY      BREAST SURGERY Left 02/2012    x2     CERVICAL SPINAL CORD TUMOR REMOVAL N/A 7/16/2024    Procedure: Cervical 2-4 laminectomy for resection of intradural tumor;  Surgeon: Mario Urrutia IV, MD;  Location: [facility] MAIN OR;  Service: Neurosurgery;  Laterality: N/A;    CHOLECYSTECTOMY  1996    COLON RESECTION SMALL BOWEL  05/25/2014    COLON SURGERY  small intestine repair    HYSTERECTOMY      LYMPH NODE BIOPSY      MASTECTOMY      OOPHORECTOMY  1965    SIMPLE MASTECTOMY Right 02/26/2020    Procedure: Right simple mastectomy;  Surgeon: Mario Quezada DO;  Location: [facility] MAIN OR;  Service: General;  Laterality: Right;    SMALL INTESTINE SURGERY      SUBTOTAL HYSTERECTOMY  full hysterectomy        Current Problems   Intradural extramedullary spinal tumor   - Neurosurgery following  - S/P lumbar drain 7/31    Diabetes  "mellitus    Hypothyroidism    Breast cancer    Hyperlipidemia    Hypertension    Leukocytosis       Encounter Information        Trending Narrative     8/1: Admitted for neck pain.  S/P lumbar drain placement 7/31.  Transferred to ICU level of care post-op.  RD visited patient at bedside.  Patient reports being diagnosed with diverticulitis a few weeks PTA, began changing diet to help with that flair up.  Aside from this, patient reported no other nutritional concerns.  Patient reports good appetite, no N/V, no chewing/swallowing issues noted, no recent weight changes.  NFPE completed and not consistent with nutrition diagnosis of malnutrition at this time using AND/ASPEN criteria.  Patient discussed in AM rounds.  Noted with need for bedrest x 3-4 days.         Anthropometrics        Current Height, Weight Height: 165.1 cm (65\")  Weight: 80.8 kg (178 lb 2.1 oz) (07/31/24 0500)       Usual Body Weight (UBW) Unable to obtain from patient        Trending Weight Hx     This admission: 8/1: 178#             PTA: 5.2% weight loss x 6 months     Wt Readings from Last 30 Encounters:   07/31/24 0500 80.8 kg (178 lb 2.1 oz)   07/30/24 0007 80.7 kg (177 lb 14.6 oz)   07/29/24 1652 83.9 kg (185 lb)   07/16/24 0612 85.1 kg (187 lb 9.6 oz)   07/02/24 1615 84.8 kg (187 lb)   07/02/24 2030 84.8 kg (187 lb)   06/21/24 0817 86.6 kg (191 lb)   06/14/24 1100 84.8 kg (187 lb)   04/19/24 1023 87.8 kg (193 lb 9.6 oz)   01/05/24 1637 85.7 kg (189 lb)   12/07/23 0954 85.7 kg (189 lb)   04/07/23 1313 86.2 kg (190 lb)   10/21/22 1032 88.5 kg (195 lb)   10/15/21 1428 88.9 kg (196 lb)   07/23/21 1335 91.2 kg (201 lb)   06/23/21 1553 93.4 kg (206 lb)   03/24/20 0936 89.4 kg (197 lb)   02/26/20 0859 90.1 kg (198 lb 10.2 oz)   02/18/20 1011 91.5 kg (201 lb 12.8 oz)   01/30/20 1424 90.4 kg (199 lb 3.2 oz)   11/20/19 1042 88.5 kg (195 lb)   08/23/19 0922 91.6 kg (202 lb)   03/08/19 0845 91.9 kg (202 lb 9.6 oz)   07/09/18 1626 92.5 kg (204 lb) "   05/15/17 0811 90.7 kg (200 lb)   02/13/17 0745 90.7 kg (200 lb)   01/30/17 0920 90.7 kg (200 lb)   12/29/16 0930 90.7 kg (200 lb)   12/20/16 0704 90.7 kg (200 lb)   12/01/16 0815 90.7 kg (200 lb)   11/28/16 1143 91.2 kg (201 lb)   11/03/16 1332 89.8 kg (198 lb)   02/19/16 1413 92.5 kg (204 lb)      BMI kg/m2 Body mass index is 29.64 kg/m².       Labs        Pertinent Labs    Results from last 7 days   Lab Units 08/01/24  0517 07/31/24  0054 07/29/24  1752   SODIUM mmol/L 135* 132* 132*   POTASSIUM mmol/L 3.4* 3.6 3.7   CHLORIDE mmol/L 96* 93* 94*   CO2 mmol/L 30.6* 27.1 24.4   BUN mg/dL 11 9 10   CREATININE mg/dL 0.55* 0.53* 0.44*   CALCIUM mg/dL 9.7 10.0 10.6*   BILIRUBIN mg/dL 0.3 0.4  --    ALK PHOS U/L 63 73  --    ALT (SGPT) U/L 20 25  --    AST (SGOT) U/L 20 21  --    GLUCOSE mg/dL 135* 152* 150*     Results from last 7 days   Lab Units 08/01/24 0517 07/31/24  0054   MAGNESIUM mg/dL 1.6 1.6   PHOSPHORUS mg/dL 3.7 3.3   HEMOGLOBIN g/dL 11.9* 13.8   HEMATOCRIT % 34.9 40.3     Lab Results   Component Value Date    HGBA1C 6.10 (H) 07/08/2024        Medications    Scheduled Medications amLODIPine, 5 mg, Oral, Daily  ascorbic acid, 250 mg, Oral, Daily  ceFAZolin, 2 g, Intravenous, Q8H  cholecalciferol, 2,000 Units, Oral, Daily  insulin lispro, 2-7 Units, Subcutaneous, 4x Daily AC & at Bedtime  levothyroxine, 50 mcg, Oral, Q AM  melatonin, 5 mg, Oral, Nightly  potassium chloride ER, 40 mEq, Oral, Q4H  sodium chloride, 10 mL, Intravenous, Q12H  sodium chloride, 10 mL, Intravenous, Q12H        Infusions niCARdipine, 5-15 mg/hr        PRN Medications   senna-docusate sodium **AND** polyethylene glycol **AND** bisacodyl **AND** bisacodyl    cyclobenzaprine    dextrose    dextrose    fentaNYL citrate (PF)    glucagon (human recombinant)    HYDROcodone-acetaminophen    labetalol    Magnesium Standard Dose Replacement - Follow Nurse / BPA Driven Protocol    niCARdipine    nystatin-triamcinolone    ondansetron     Phosphorus Replacement - Follow Nurse / BPA Driven Protocol    Potassium Replacement - Follow Nurse / BPA Driven Protocol    [COMPLETED] Insert Peripheral IV **AND** sodium chloride    sodium chloride    sodium chloride    sodium chloride    sodium chloride     Physical Findings        Trending Physical   Appearance, NFPE 8/1: NFPE completed and not consistent with nutrition diagnosis of malnutrition at this time using AND/ASPEN criteria      --  Edema  1+ periorbital      Bowel Function Last documented BM 7/29 (x 2 days ago)     Tubes No feeding tube      Chewing/Swallowing No known issues      Skin Cervical spine incision      --  Current Nutrition Orders & Evaluation of Intake       Oral Nutrition     Food Allergies NKFA   Current PO Diet Diet: Diabetic; Consistent Carbohydrate; Fluid Consistency: Thin (IDDSI 0)   Supplement None ordered   PO Evaluation     Trending % PO Intake 8/1: 58% average PO intakes x 3 documented meals since admission    --  Nutritional Risk Screening        NRS-2002 Score          Nutrition Diagnosis         Nutrition Dx Problem 1 No nutritional diagnosis noted at this time, RD will continue to monitor for any nutritional diagnosis that may arise.      Nutrition Dx Problem 2        Intervention Goal         Intervention Goal(s) PO intakes at least 65%  Stable weight      Nutrition Intervention        RD Action Completed NFPE  Brief diverticulitis diet education      Nutrition Prescription          Diet Prescription Consistent CHO    Supplement Prescription None    --  Monitor/Evaluation        Monitor Per protocol, I&O, PO intake, Supplement intake, Pertinent labs, Weight, Skin status, GI status, Symptoms, POC/GOC       Electronically signed by:  Luma Wing RD  08/01/24 08:31 EDT

## 2024-08-01 NOTE — PROGRESS NOTES
Neurosurgery Progress Note    Date: 24     Patient: Esperanza Smith   Sex: female   : 1945      SUBJECTIVE    CC: Post op day 1 LUMBAR DRAIN INSERTION REPAIR OF CERVICAL DURA     Interval history:  Patient doing well this morning. Patient describes little pain in the back of her neck, and states it feels better than before surgery. Per nurse, patient has been confused when she first wakes up but is able to quickly become lucid. Continues to have drain.     Current Medications:  Scheduled Meds:amLODIPine, 5 mg, Oral, Daily  ascorbic acid, 250 mg, Oral, Daily  ceFAZolin, 2 g, Intravenous, Q8H  cholecalciferol, 2,000 Units, Oral, Daily  insulin lispro, 2-7 Units, Subcutaneous, 4x Daily AC & at Bedtime  levothyroxine, 50 mcg, Oral, Q AM  melatonin, 5 mg, Oral, Nightly  sodium chloride, 10 mL, Intravenous, Q12H  sodium chloride, 10 mL, Intravenous, Q12H      Continuous Infusions:niCARdipine, 5-15 mg/hr      PRN Meds:   senna-docusate sodium **AND** polyethylene glycol **AND** bisacodyl **AND** bisacodyl    cyclobenzaprine    dextrose    dextrose    fentaNYL citrate (PF)    glucagon (human recombinant)    HYDROcodone-acetaminophen    ketorolac    labetalol    niCARdipine    nystatin-triamcinolone    ondansetron    [COMPLETED] Insert Peripheral IV **AND** sodium chloride    sodium chloride    sodium chloride    sodium chloride    sodium chloride      OBJECTIVE  Vitals:    24 0403 24 0500 24 0600 24 0700   BP:  137/73 111/69 137/79   Pulse:  80 78 84   Resp:       Temp: 97.4 °F (36.3 °C)      TempSrc: Oral      SpO2:  96% 97% 95%   Weight:       Height:           Physical exam    General  - WD/WN female, appears their stated age  - Awake, cooperative, in no acute distress  HEENT  - Normocephalic, atraumatic  - PERRLA, EOM intact  - Posterior neck incision covered   Respiratory  - Normal respiratory rate and effort  Musculoskeletal  - No joint redness, swelling, or tenderness  Skin  -  Warm and dry, no bleeding, bruising, or rash. Lumbar drain in place.   NEUROLOGIC  - A/O x3, GCS 4-6-5  - CN II-XII grossly intact  - Moves all extremities symmetrically and w/ 5/5 strength  - Sensation intact throughout        Results review  CBC          7/29/2024    17:52 7/31/2024    00:54 8/1/2024    05:17   CBC   WBC 20.73  16.33  12.52    RBC 4.61  4.29  3.73    Hemoglobin 14.8  13.8  11.9    Hematocrit 44.0  40.3  34.9    MCV 95.4  93.9  93.6    MCH 32.1  32.2  31.9    MCHC 33.6  34.2  34.1    RDW 12.6  12.4  12.2    Platelets 311  264  279        BMP          7/29/2024    17:52 7/31/2024    00:54 8/1/2024    05:17   BMP   BUN 10  9  11    Creatinine 0.44  0.53  0.55    Sodium 132  132  135    Potassium 3.7  3.6  3.4    Chloride 94  93  96    CO2 24.4  27.1  30.6    Calcium 10.6  10.0  9.7                MRI Cervical Spine With & Without Contrast    Result Date: 7/29/2024  MRI CERVICAL SPINE W WO CONTRAST Date of Exam: 7/29/2024 6:27 PM EDT Indication: Postop neck pain.  Comparison: 7/19/2024 Technique:  Routine multiplanar/multisequence sequence images of the cervical spine were obtained before and after the uneventful administration of Prohance.  Findings: The visualized intracranial structures are unremarkable. The craniocervical junction is unremarkable. Subtle T2/FLAIR hyperintensity is noted within the spinal cord at the level of C2-C3, most likely representing myelomalacia from prior cord compression. Alternately this may represent mild cord edema, although this is unchanged. Otherwise the spinal cord is normal in signal and caliber. There is mild enhancement of the posterior epidural space at the level of the craniocervical junction and at the level of C4-C5, most likely representing postsurgical changes. No evidence of definite intraspinal canal fluid collection is noted. Extensive postsurgical changes noted within the posterior aspect of the upper neck secondary to laminectomy at C1, C2, C3 and C4.  Once again a large fluid collection is noted within the posterior aspect of the neck measuring approximately 4.2 x 8.1 x 5.6 cm. This has increased in size since most recent comparison. Additionally there is also mildly worsened mass effect, from the fluid collection, anteriorly onto the spinal cord when compared to most recent study. There is mild peripheral enhancement. The visualized vertebral body heights are maintained. Overall alignment is maintained. Loss of intervertebral disc base height, similar to prior study. There is mild worsening of spinal canal stenosis noted at C3-C4 and C4-C5 secondary to the mildly expanded posterior fluid collection as characterized above. There is associated mass effect  with possible mild cord compression at the level of C3-C4. The remainder of the visualized degenerative changes are unchanged from most recent MRI. The remainder of the visualized soft tissues of the neck are unremarkable.     Impression: Impression: Large fluid collection noted within the posterior aspect of the neck at the site of C1, C2, C3 and C4 laminectomy, has increased in size since prior study, now measuring up to 8.1 cm in the largest dimension. While this may represent a seroma, given its increase in size, this is highly concerning for a pseudomeningocele or CSF leak. Given the level of peripheral enhancement, this is less likely to represent an abscess, although it is not completely excluded. The expanding fluid collection in the posterior neck causes mild mass effect on the posterior aspect of the spinal cord with now moderate to severe spinal canal stenosis and possible mild cord compression noted at the level of C3-C4. This has mildly worsened since prior study. Additional findings as characterized above, not significantly changed since prior study Electronically Signed: Uri Birch DO  7/29/2024 7:40 PM EDT  Workstation ID: [id number]    MRI Cervical Spine Without Contrast    Result Date:  7/24/2024  MRI CERVICAL SPINE WO CONTRAST Date of Exam: 7/19/2024 9:20 AM EDT Indication: Postop C2-C4 laminectomy for meningioma..  Comparison: 7/12/2024 Technique:  Routine multiplanar/multisequence sequence images of the cervical spine were obtained without contrast administration.  Findings: No abnormal cord signal is identified. The previously identified meningioma is no longer present. The patient is status post C2-C4 laminectomy. There is a 5.9 x 3.4 x 4 cm fluid signal lesion posterior to the laminectomy site, likely representing a postoperative seroma, a pseudomeningocele is less favored but not excluded. An infection is also much less favored, though difficult to exclude without contrast. There is a mild C3-4 anterior disc bulge with effacement of the ventral CSF space resulting in mild canal stenosis but no significant foraminal narrowing. Minimal multilevel disc bulges are identified, but no other areas of canal stenosis. The visualized  posterior fossa is unremarkable.     Impression: Impression: Status post C2-C4 laminectomy. There is a 5.9 x 3.4 x 4 cm fluid collection posterior to the laminectomy site which is likely a postoperative seroma, an abscess or pseudomeningocele is less favored. Mild canal stenosis at C3-4 secondary to broad-based disc bulge. Electronically Signed: Robert Caballero MD  7/24/2024 4:43 PM EDT  Workstation ID: [id number]    MRI Cervical Spine With Contrast    Result Date: 7/12/2024  MRI CERVICAL SPINE W CONTRAST Date of Exam: 7/12/2024 12:04 PM EDT Indication: NECK PAIN.  Comparison: June 14, 2024 Technique:  Routine multiplanar/multisequence sequence images of the cervical spine were obtained after the uneventful administration of Prohance.  Findings: A 3.0 x 1.2 x 1.7 cm enhancing intradural extramedullary mass along the left aspect of the C2-3 level demonstrates some central nonenhancement. This mass again displaces the spinal cord to the right with moderate to severe spinal  canal stenosis at this level. No other enhancing mass is identified.     Impression: Impression: 3 cm enhancing intradural extramedullary mass at C2-3, which demonstrates some central nonenhancement. This is favored to represent a meningioma with possible partial central cystic changes or necrosis. Electronically Signed: Ruperto Wakefield MD  7/12/2024 6:25 PM EDT  Workstation ID: [id number]             ASSESSMENT/PLAN  This is a 78 y.o. female who underwent surgery. Patient is doing well this morning and states that her posterior neck pain has improved. Patient has her lumbar drain in place with an output of 80cc. CSF is clear with a slight yellow tint. Nurse states that she had no difficulty getting out 5cc per hour.     Patient describes no headache, dizziness or severe neck pain. Patient has good strength on exam and was able to follow all of my commands.     Patient is neurologically intact. Patient is to continue having head of bed no higher than 30 degrees. Patient should continue to be on strict bed rest until Monday. Please notify neurosurgery if having a difficult time getting 5cc/ per hour.     Plan:  LUMBAR DRAIN INSERTION REPAIR OF CERVICAL DURA   - continue lumbar drain  - continue pain meds per primary  - continue strict bed rest  - continue HOB <30 degrees   - please notify neurosurgery if cannot get 5cc per hour        I discussed my assessment and recommendations with Dr. Renan Carrera PA-C  08/01/24  08:04 EDT      Part of this note may be an electronic transcription/translation of spoken language to printed text using the Dragon Dictation System.

## 2024-08-01 NOTE — SIGNIFICANT NOTE
24 1520   OTHER   Discipline physical therapist   Rehab Time/Intention   Session Not Performed patient unavailable for evaluation  (Please re-order PT once bed rest orders are .)

## 2024-08-01 NOTE — PLAN OF CARE
Goal Outcome Evaluation:  Plan of Care Reviewed With: patient, spouse, daughter      Patient alert and oriented. Intermittent confusion and hallucinations this AM. MD notified of confusion and hallucinations. Plan to use tylenol as pain medicine over night to see if these improve. Only one dose of flexeril given this shift. 5cc/hr drained from lumbar drain. Family and patient updated on plan of care at this time.

## 2024-08-01 NOTE — PLAN OF CARE
Pt on spinal drain and on bedrest at 30 degrees until Monday.  Will require new order when appropriate.

## 2024-08-01 NOTE — CASE MANAGEMENT/SOCIAL WORK
Continued Stay Note   Seth     Patient Name: Esperanza Smith  MRN: [id number]  Today's Date: 8/1/2024    Admit Date: 7/29/2024    Plan: Plan to return home with spouse pending clinical course and PT/OT eval.   Discharge Plan       Row Name 08/01/24 1134       Plan    Plan Plan to return home with spouse pending clinical course and PT/OT eval.    Plan Comments DC Barriers:  bedrest til Monday 8/5, HOB 30, 2L NC, PICC, IV Abxs, neuro checks, lumbar drain, Neuro Sx following.                 Expected Discharge Date and Time       Expected Discharge Date Expected Discharge Time    Aug 5, 2024               WALTER Welch RN  SIPS/ICU   O: [phone]  C: [phone]  [email or web address]

## 2024-08-02 LAB
ALBUMIN SERPL-MCNC: 3.7 G/DL (ref 3.5–5.2)
ALBUMIN/GLOB SERPL: 1.2 G/DL
ALP SERPL-CCNC: 72 U/L (ref 39–117)
ALT SERPL W P-5'-P-CCNC: 26 U/L (ref 1–33)
ANION GAP SERPL CALCULATED.3IONS-SCNC: 11.7 MMOL/L (ref 5–15)
AST SERPL-CCNC: 29 U/L (ref 1–32)
BASOPHILS # BLD AUTO: 0.03 10*3/MM3 (ref 0–0.2)
BASOPHILS NFR BLD AUTO: 0.2 % (ref 0–1.5)
BILIRUB SERPL-MCNC: 0.5 MG/DL (ref 0–1.2)
BUN SERPL-MCNC: 11 MG/DL (ref 8–23)
BUN/CREAT SERPL: 21.6 (ref 7–25)
CALCIUM SPEC-SCNC: 10.3 MG/DL (ref 8.6–10.5)
CHLORIDE SERPL-SCNC: 94 MMOL/L (ref 98–107)
CO2 SERPL-SCNC: 30.3 MMOL/L (ref 22–29)
CREAT SERPL-MCNC: 0.51 MG/DL (ref 0.57–1)
DEPRECATED RDW RBC AUTO: 43.2 FL (ref 37–54)
EGFRCR SERPLBLD CKD-EPI 2021: 96.9 ML/MIN/1.73
EOSINOPHIL # BLD AUTO: 0.07 10*3/MM3 (ref 0–0.4)
EOSINOPHIL NFR BLD AUTO: 0.5 % (ref 0.3–6.2)
ERYTHROCYTE [DISTWIDTH] IN BLOOD BY AUTOMATED COUNT: 12.5 % (ref 12.3–15.4)
GLOBULIN UR ELPH-MCNC: 3.1 GM/DL
GLUCOSE BLDC GLUCOMTR-MCNC: 115 MG/DL (ref 70–105)
GLUCOSE BLDC GLUCOMTR-MCNC: 123 MG/DL (ref 70–105)
GLUCOSE BLDC GLUCOMTR-MCNC: 151 MG/DL (ref 70–105)
GLUCOSE BLDC GLUCOMTR-MCNC: 156 MG/DL (ref 70–105)
GLUCOSE SERPL-MCNC: 142 MG/DL (ref 65–99)
HCT VFR BLD AUTO: 41.1 % (ref 34–46.6)
HGB BLD-MCNC: 13.9 G/DL (ref 12–15.9)
IMM GRANULOCYTES # BLD AUTO: 0.09 10*3/MM3 (ref 0–0.05)
IMM GRANULOCYTES NFR BLD AUTO: 0.6 % (ref 0–0.5)
LYMPHOCYTES # BLD AUTO: 1.43 10*3/MM3 (ref 0.7–3.1)
LYMPHOCYTES NFR BLD AUTO: 9.9 % (ref 19.6–45.3)
MAGNESIUM SERPL-MCNC: 1.6 MG/DL (ref 1.6–2.4)
MCH RBC QN AUTO: 32 PG (ref 26.6–33)
MCHC RBC AUTO-ENTMCNC: 33.8 G/DL (ref 31.5–35.7)
MCV RBC AUTO: 94.7 FL (ref 79–97)
MONOCYTES # BLD AUTO: 1.29 10*3/MM3 (ref 0.1–0.9)
MONOCYTES NFR BLD AUTO: 8.9 % (ref 5–12)
NEUTROPHILS NFR BLD AUTO: 11.54 10*3/MM3 (ref 1.7–7)
NEUTROPHILS NFR BLD AUTO: 79.9 % (ref 42.7–76)
NRBC BLD AUTO-RTO: 0 /100 WBC (ref 0–0.2)
PHOSPHATE SERPL-MCNC: 3 MG/DL (ref 2.5–4.5)
PLATELET # BLD AUTO: 307 10*3/MM3 (ref 140–450)
PMV BLD AUTO: 9.1 FL (ref 6–12)
POTASSIUM SERPL-SCNC: 4.3 MMOL/L (ref 3.5–5.2)
PROT SERPL-MCNC: 6.8 G/DL (ref 6–8.5)
RBC # BLD AUTO: 4.34 10*6/MM3 (ref 3.77–5.28)
SODIUM SERPL-SCNC: 136 MMOL/L (ref 136–145)
WBC NRBC COR # BLD AUTO: 14.45 10*3/MM3 (ref 3.4–10.8)

## 2024-08-02 PROCEDURE — 80053 COMPREHEN METABOLIC PANEL: CPT

## 2024-08-02 PROCEDURE — 25010000002 LABETALOL 5 MG/ML SOLUTION: Performed by: NURSE PRACTITIONER

## 2024-08-02 PROCEDURE — 63710000001 INSULIN LISPRO (HUMAN) PER 5 UNITS: Performed by: NURSE PRACTITIONER

## 2024-08-02 PROCEDURE — 83735 ASSAY OF MAGNESIUM: CPT

## 2024-08-02 PROCEDURE — 85025 COMPLETE CBC W/AUTO DIFF WBC: CPT

## 2024-08-02 PROCEDURE — 99024 POSTOP FOLLOW-UP VISIT: CPT

## 2024-08-02 PROCEDURE — 82948 REAGENT STRIP/BLOOD GLUCOSE: CPT | Performed by: NURSE PRACTITIONER

## 2024-08-02 PROCEDURE — 84100 ASSAY OF PHOSPHORUS: CPT

## 2024-08-02 PROCEDURE — 82948 REAGENT STRIP/BLOOD GLUCOSE: CPT

## 2024-08-02 RX ADMIN — LABETALOL HYDROCHLORIDE 10 MG: 5 INJECTION, SOLUTION INTRAVENOUS at 11:56

## 2024-08-02 RX ADMIN — Medication 10 ML: at 21:22

## 2024-08-02 RX ADMIN — Medication 10 ML: at 20:50

## 2024-08-02 RX ADMIN — Medication 10 ML: at 08:13

## 2024-08-02 RX ADMIN — AMLODIPINE BESYLATE 5 MG: 5 TABLET ORAL at 08:12

## 2024-08-02 RX ADMIN — INSULIN LISPRO 2 UNITS: 100 INJECTION, SOLUTION INTRAVENOUS; SUBCUTANEOUS at 08:11

## 2024-08-02 RX ADMIN — LABETALOL HYDROCHLORIDE 10 MG: 5 INJECTION, SOLUTION INTRAVENOUS at 03:12

## 2024-08-02 RX ADMIN — LABETALOL HYDROCHLORIDE 10 MG: 5 INJECTION, SOLUTION INTRAVENOUS at 17:24

## 2024-08-02 RX ADMIN — OXYCODONE HYDROCHLORIDE AND ACETAMINOPHEN 250 MG: 500 TABLET ORAL at 08:12

## 2024-08-02 RX ADMIN — LABETALOL HYDROCHLORIDE 10 MG: 5 INJECTION, SOLUTION INTRAVENOUS at 20:40

## 2024-08-02 RX ADMIN — Medication 10 ML: at 20:49

## 2024-08-02 RX ADMIN — Medication 10 ML: at 20:30

## 2024-08-02 RX ADMIN — INSULIN LISPRO 2 UNITS: 100 INJECTION, SOLUTION INTRAVENOUS; SUBCUTANEOUS at 20:28

## 2024-08-02 RX ADMIN — Medication 2000 UNITS: at 08:12

## 2024-08-02 RX ADMIN — ACETAMINOPHEN 650 MG: 325 TABLET, FILM COATED ORAL at 17:24

## 2024-08-02 RX ADMIN — HYDROCODONE BITARTRATE AND ACETAMINOPHEN 1 TABLET: 5; 325 TABLET ORAL at 20:28

## 2024-08-02 RX ADMIN — Medication 10 MG: at 20:28

## 2024-08-02 NOTE — PROGRESS NOTES
Neurosurgery Progress Note    Date: 24     Patient: Esperanza Smith   Sex: female   : 1945      SUBJECTIVE    CC:Post op day 2 LUMBAR DRAIN INSERTION REPAIR OF CERVICAL DURA      Interval history:  Patient is experiencing some headaches, dizziness and nausea this morning. Last night neurosurgery was made aware of fluid draining from her incision. Bandage was changed and her incision is dry this morning.     Current Medications:  Scheduled Meds:amLODIPine, 5 mg, Oral, Daily  ascorbic acid, 250 mg, Oral, Daily  cholecalciferol, 2,000 Units, Oral, Daily  insulin lispro, 2-7 Units, Subcutaneous, 4x Daily AC & at Bedtime  levothyroxine, 50 mcg, Oral, Q AM  melatonin, 10 mg, Oral, Nightly  sodium chloride, 10 mL, Intravenous, Q12H  sodium chloride, 10 mL, Intravenous, Q12H      Continuous Infusions:niCARdipine, 5-15 mg/hr      PRN Meds:   acetaminophen    senna-docusate sodium **AND** polyethylene glycol **AND** bisacodyl **AND** bisacodyl    cyclobenzaprine    dextrose    dextrose    fentaNYL citrate (PF)    glucagon (human recombinant)    HYDROcodone-acetaminophen    labetalol    Magnesium Standard Dose Replacement - Follow Nurse / BPA Driven Protocol    niCARdipine    nystatin-triamcinolone    ondansetron    Phosphorus Replacement - Follow Nurse / BPA Driven Protocol    Potassium Replacement - Follow Nurse / BPA Driven Protocol    [COMPLETED] Insert Peripheral IV **AND** sodium chloride    sodium chloride    sodium chloride    sodium chloride    sodium chloride      OBJECTIVE  Vitals:    24 0600 24 0700 24 0800 24 0900   BP: (!) 173/101 162/99 (!) 173/124 153/92   BP Location:  Left arm Left arm Left arm   Patient Position:  Lying Lying Lying   Pulse: 96 100 119 101   Resp:  (!) 30 27 (!) 29   Temp:   98.2 °F (36.8 °C)    TempSrc:   Oral    SpO2: 97% 95% 94% 97%   Weight:       Height:           Physical exam    General  - WD/WN female, appears their stated age  - Awake,  cooperative, in no acute distress  HEENT  - Normocephalic, atraumatic  - PERRLA, EOM intact  Respiratory  - Normal respiratory rate and effort  Musculoskeletal  - No joint redness, swelling, or tenderness  Skin  - Warm and dry, no bleeding, bruising, or rash. Bandage covering incision. Lumbar drain intact.   NEUROLOGIC  - A/O x3, GCS 4-6-5  - CN II-XII grossly intact  - Moves all extremities symmetrically and w/ 5/5 strength  - Sensation intact throughout        Results review  CBC          7/31/2024    00:54 8/1/2024    05:17 8/2/2024    03:54   CBC   WBC 16.33  12.52  14.45    RBC 4.29  3.73  4.34    Hemoglobin 13.8  11.9  13.9    Hematocrit 40.3  34.9  41.1    MCV 93.9  93.6  94.7    MCH 32.2  31.9  32.0    MCHC 34.2  34.1  33.8    RDW 12.4  12.2  12.5    Platelets 264  279  307        BMP          7/31/2024    00:54 8/1/2024    05:17 8/1/2024    17:27 8/2/2024    03:54   BMP   BUN 9  11   11    Creatinine 0.53  0.55   0.51    Sodium 132  135   136    Potassium 3.6  3.4  4.2  4.3    Chloride 93  96   94    CO2 27.1  30.6   30.3    Calcium 10.0  9.7   10.3                MRI Cervical Spine With & Without Contrast    Result Date: 7/29/2024  MRI CERVICAL SPINE W WO CONTRAST Date of Exam: 7/29/2024 6:27 PM EDT Indication: Postop neck pain.  Comparison: 7/19/2024 Technique:  Routine multiplanar/multisequence sequence images of the cervical spine were obtained before and after the uneventful administration of Prohance.  Findings: The visualized intracranial structures are unremarkable. The craniocervical junction is unremarkable. Subtle T2/FLAIR hyperintensity is noted within the spinal cord at the level of C2-C3, most likely representing myelomalacia from prior cord compression. Alternately this may represent mild cord edema, although this is unchanged. Otherwise the spinal cord is normal in signal and caliber. There is mild enhancement of the posterior epidural space at the level of the craniocervical junction and at  the level of C4-C5, most likely representing postsurgical changes. No evidence of definite intraspinal canal fluid collection is noted. Extensive postsurgical changes noted within the posterior aspect of the upper neck secondary to laminectomy at C1, C2, C3 and C4. Once again a large fluid collection is noted within the posterior aspect of the neck measuring approximately 4.2 x 8.1 x 5.6 cm. This has increased in size since most recent comparison. Additionally there is also mildly worsened mass effect, from the fluid collection, anteriorly onto the spinal cord when compared to most recent study. There is mild peripheral enhancement. The visualized vertebral body heights are maintained. Overall alignment is maintained. Loss of intervertebral disc base height, similar to prior study. There is mild worsening of spinal canal stenosis noted at C3-C4 and C4-C5 secondary to the mildly expanded posterior fluid collection as characterized above. There is associated mass effect  with possible mild cord compression at the level of C3-C4. The remainder of the visualized degenerative changes are unchanged from most recent MRI. The remainder of the visualized soft tissues of the neck are unremarkable.     Impression: Impression: Large fluid collection noted within the posterior aspect of the neck at the site of C1, C2, C3 and C4 laminectomy, has increased in size since prior study, now measuring up to 8.1 cm in the largest dimension. While this may represent a seroma, given its increase in size, this is highly concerning for a pseudomeningocele or CSF leak. Given the level of peripheral enhancement, this is less likely to represent an abscess, although it is not completely excluded. The expanding fluid collection in the posterior neck causes mild mass effect on the posterior aspect of the spinal cord with now moderate to severe spinal canal stenosis and possible mild cord compression noted at the level of C3-C4. This has mildly  worsened since prior study. Additional findings as characterized above, not significantly changed since prior study Electronically Signed: Uri Birch DO  7/29/2024 7:40 PM EDT  Workstation ID: [id number]    MRI Cervical Spine Without Contrast    Result Date: 7/24/2024  MRI CERVICAL SPINE WO CONTRAST Date of Exam: 7/19/2024 9:20 AM EDT Indication: Postop C2-C4 laminectomy for meningioma..  Comparison: 7/12/2024 Technique:  Routine multiplanar/multisequence sequence images of the cervical spine were obtained without contrast administration.  Findings: No abnormal cord signal is identified. The previously identified meningioma is no longer present. The patient is status post C2-C4 laminectomy. There is a 5.9 x 3.4 x 4 cm fluid signal lesion posterior to the laminectomy site, likely representing a postoperative seroma, a pseudomeningocele is less favored but not excluded. An infection is also much less favored, though difficult to exclude without contrast. There is a mild C3-4 anterior disc bulge with effacement of the ventral CSF space resulting in mild canal stenosis but no significant foraminal narrowing. Minimal multilevel disc bulges are identified, but no other areas of canal stenosis. The visualized  posterior fossa is unremarkable.     Impression: Impression: Status post C2-C4 laminectomy. There is a 5.9 x 3.4 x 4 cm fluid collection posterior to the laminectomy site which is likely a postoperative seroma, an abscess or pseudomeningocele is less favored. Mild canal stenosis at C3-4 secondary to broad-based disc bulge. Electronically Signed: Robert Caballero MD  7/24/2024 4:43 PM EDT  Workstation ID: [id number]    MRI Cervical Spine With Contrast    Result Date: 7/12/2024  MRI CERVICAL SPINE W CONTRAST Date of Exam: 7/12/2024 12:04 PM EDT Indication: NECK PAIN.  Comparison: June 14, 2024 Technique:  Routine multiplanar/multisequence sequence images of the cervical spine were obtained after the uneventful  administration of Prohance.  Findings: A 3.0 x 1.2 x 1.7 cm enhancing intradural extramedullary mass along the left aspect of the C2-3 level demonstrates some central nonenhancement. This mass again displaces the spinal cord to the right with moderate to severe spinal canal stenosis at this level. No other enhancing mass is identified.     Impression: Impression: 3 cm enhancing intradural extramedullary mass at C2-3, which demonstrates some central nonenhancement. This is favored to represent a meningioma with possible partial central cystic changes or necrosis. Electronically Signed: Ruperto Wakefield MD  7/12/2024 6:25 PM EDT  Workstation ID: [id number]             ASSESSMENT/PLAN  This is a 78 y.o. female who underwent surgery. Patient is having headaches, dizziness and nausea this morning. She was having some leakage from her incision, but her incision is covered and dried this morning. Patient had an output of 120cc yesterday. Patient should continue to have the drain in with 5cc per hour.     Patient should continue to have her incision checked daily and have the bandage changed daily as well. We will continue to have patient on strict bed rest and HOB <30 until Monday.     Patient had a good physical exam and good strength this morning. We will continue to monitor over the weekend. Please reach out with any questions or concerns.     Plan:  S/P LUMBAR DRAIN INSERTION REPAIR OF CERVICAL DURA   - Continue lumbar drain  - Continue to change incision bandage daily  - Continue strict bed rest   - HOB <30   - Continue pain meds per primary       I discussed my assessment and recommendations with Dr. Renan Carrera PA-C  08/02/24  09:39 EDT      Part of this note may be an electronic transcription/translation of spoken language to printed text using the Dragon Dictation System.

## 2024-08-02 NOTE — PROGRESS NOTES
"Critical Care Progress Note     Esperanza Smith : 1945 MRN:[id number] LOS:4  Rm: 2316/1     Principal Problem: Neck pain     Reason for follow up: All the medical problems listed below    Summary     A 78 y.o. old female patient with PMH of hypertension, hypothyroidism, diabetes mellitus type 2, history of breast and uterine cancer, hyperlipidemia, and known recent laminectomy of C2-C4 for resection of intradural tumor/cervical meningioma on 2024, and presented on 2024 to the hospital with complaints of neck pain. Initial outpatient postoperative MRI cervical spine on 2024 revealed a 5.9 x 3.4 x 4 cm fluid collection posterior to the laminectomy site which was felt to be a postoperative seroma, and abscess, or pseudomeningocele being less favored; additionally there was noted mild canal stenosis at C3-4 secondary to broad-based disc bulge. Repeat MRI with and without contrast of the cervical spine on 2024 revealed: \"Large fluid collection noted within the posterior aspect of the neck at the site of C1, C2, C3 and C4 laminectomy, has increased in size since prior study, now measuring up to 8.1 cm in the largest dimension. While this may represent a seroma, given its increase in size, this is highly concerning for a pseudomeningocele or CSF leak. Neurosurgery was consulted. Patient was started on empiric antimicrobial therapy with Rocephin and vancomycin, though it was felt that this was unlikely to be infectious. Patient underwent cervical evacuation of pseudomeningocele and dural repair with insertion of lumbar drain on 2024.     Significant Events     24 : Continuing q1h neuro checks and 5 cc drainage per hour. Patient continues to have hallucinations and isn't sleeping.   Increased melatonin.  Eye drops ordered per patient request.  Of note, patient was at drain site, and neurosurgery informed nursing to change lumbar drain dressing, which has resolved patient's " leaking.    Assessment / Plan     Post-operative pseudomeningocele  Recent cervical meningioma with recent laminectomy of C2-C4 for resection of intradural tumor on 7/16/2024, now complicated by large fluid collection at operative site.  MRI Cervical Spine (7/29): Large fluid collection within posterior aspect of neck at the site of C1, C2, C3, and C4 laminectomy, increased since prior study, measuring 8.1 cm in the largest dimension with mild mass effect on the posterior aspect of the spinal cord with now moderate to severe spinal canal stenosis and possible mild cord compression noted at level C3-C4.  Neurosurgery previously consulted and continues to follow.  Previously on Rocephin and Vancomycin, per MOODY, not felt to be infectious, this was discontinued post-operatively with continuation of surgical prophylaxis with cefazolin x 3 doses.  Follow surgical wound cultures.  MRSA Screen negative.  Status post cervical evacuation of pseudomeningocele and dural repair with insertion of lumbar drain.  Hourly neuro checks, adjusted per neurosurgery recommendations.  Lumbar drain management per MOODY: Drain 5 mL/hr; if unable to drain 3 mL/hr for 3 consecutive hours, notify neurosurgery.  Analgesia as ordered by MOODY.     Visual hallucinations  Started earlier in the day, prior to surgery.  Not been sleeping well, possible etiology, versus delirium.  Patient realizes that she is hallucinating.  Encourage cluster care to promote resting between neuro checks.  Increased melatonin to 10 mg.  Further workup as clinically indicated.     Essential hypertension: well controlled.   Continue amlodipine .   Titrate medications as needed.     Diabetes mellitus Type 2, not insulin-dependent : well controlled.   Diet controlled, on no home regimen.  A1c on 7/8/2024 was 6.10.  Accu checks ACHS or Every 6 Hours, based on diet, with sliding scale admelog coverage as needed.      Hypothyroidism: Continue levothyroxine.  History of breast and  uterine cancer  Hyperlipidemia: Intolerant of statins.    Disposition:  Remains in ICU while drain in place.    Code status:   Code Status (Patient has no pulse and is not breathing): CPR (Attempt to Resuscitate)  Medical Interventions (Patient has pulse or is breathing): Full Support       Nutrition:   Diet: Diabetic; Consistent Carbohydrate; Fluid Consistency: Thin (IDDSI 0)   Patient isn't on Tube Feeding     VTE Prophylaxis:  Mechanical VTE prophylaxis orders are present.         Subjective / Review of systems     Review of Systems   Constitutional:  Positive for fatigue. Negative for chills.   HENT:  Negative for congestion and sore throat.    Respiratory:  Negative for shortness of breath.    Cardiovascular:  Negative for chest pain.   Gastrointestinal:  Negative for abdominal pain and nausea.   Genitourinary:  Negative for difficulty urinating and dysuria.   Musculoskeletal:  Positive for arthralgias and back pain.   Neurological:  Negative for dizziness, light-headedness and headaches.   Psychiatric/Behavioral:  Positive for hallucinations and sleep disturbance. Negative for confusion. The patient is not nervous/anxious.         Objective / Physical Exam     Vital signs:  Temp: 98.2 °F (36.8 °C)  BP: (!) 173/124  Heart Rate: 119  Resp: 27  SpO2: 94 %  Weight: 80.8 kg (178 lb 2.1 oz)    Admission Weight: Weight: 83.9 kg (185 lb)  Current Weight: Weight: 80.8 kg (178 lb 2.1 oz)    Input/Output in last 24 hours:    Intake/Output Summary (Last 24 hours) at 8/2/2024 0902  Last data filed at 8/2/2024 0800  Gross per 24 hour   Intake 545 ml   Output 2615 ml   Net -2070 ml      Net IO Since Admission: -2,505 mL [08/02/24 0902]     Physical Exam  Vitals and nursing note reviewed.   Constitutional:       General: She is not in acute distress.     Appearance: She is not ill-appearing, toxic-appearing or diaphoretic.   HENT:      Head: Normocephalic and atraumatic.      Nose: Nose normal. No congestion.       Mouth/Throat:      Mouth: Mucous membranes are moist.      Pharynx: Oropharynx is clear. No oropharyngeal exudate.   Eyes:      Extraocular Movements: Extraocular movements intact.      Conjunctiva/sclera: Conjunctivae normal.      Pupils: Pupils are equal, round, and reactive to light.   Cardiovascular:      Rate and Rhythm: Normal rate and regular rhythm.      Pulses: Normal pulses.      Heart sounds: Normal heart sounds.   Pulmonary:      Effort: Pulmonary effort is normal.      Breath sounds: Normal breath sounds.   Abdominal:      General: Bowel sounds are normal.      Palpations: Abdomen is soft.   Musculoskeletal:      Right lower leg: No edema.      Left lower leg: No edema.   Skin:     General: Skin is warm and dry.      Findings: No rash.   Neurological:      Mental Status: She is alert and oriented to person, place, and time.   Psychiatric:         Mood and Affect: Mood normal.         Behavior: Behavior normal.          Radiology and Labs     Results from last 7 days   Lab Units 08/02/24  0354 08/01/24  0517 07/31/24  0054 07/29/24  1752   WBC 10*3/mm3 14.45* 12.52* 16.33* 20.73*   HEMOGLOBIN g/dL 13.9 11.9* 13.8 14.8   HEMATOCRIT % 41.1 34.9 40.3 44.0   PLATELETS 10*3/mm3 307 279 264 311      Results from last 7 days   Lab Units 07/31/24 0054   PROTIME Seconds 11.9*   INR  1.10      Results from last 7 days   Lab Units 08/02/24  0354 08/01/24  1727 08/01/24  0517 07/31/24  0054 07/29/24  1752   SODIUM mmol/L 136  --  135* 132* 132*   POTASSIUM mmol/L 4.3 4.2 3.4* 3.6 3.7   CHLORIDE mmol/L 94*  --  96* 93* 94*   CO2 mmol/L 30.3*  --  30.6* 27.1 24.4   BUN mg/dL 11  --  11 9 10   CREATININE mg/dL 0.51*  --  0.55* 0.53* 0.44*   GLUCOSE mg/dL 142*  --  135* 152* 150*   MAGNESIUM mg/dL 1.6  --  1.6 1.6  --    PHOSPHORUS mg/dL 3.0  --  3.7 3.3  --       Results from last 7 days   Lab Units 08/02/24  0354 08/01/24  0517 07/31/24  0054   ALK PHOS U/L 72 63 73   AST (SGOT) U/L 29 20 21   ALT (SGPT) U/L 26 20 25            No radiology results for the last day      Current medications     Scheduled Meds:   amLODIPine, 5 mg, Oral, Daily  ascorbic acid, 250 mg, Oral, Daily  cholecalciferol, 2,000 Units, Oral, Daily  insulin lispro, 2-7 Units, Subcutaneous, 4x Daily AC & at Bedtime  levothyroxine, 50 mcg, Oral, Q AM  melatonin, 10 mg, Oral, Nightly  sodium chloride, 10 mL, Intravenous, Q12H  sodium chloride, 10 mL, Intravenous, Q12H        Continuous Infusions:   niCARdipine, 5-15 mg/hr          Plan discussed with RN. Reviewed all other data in the last 24 hours, including but not limited to vitals, labs, microbiology, imaging and pertinent notes from other providers.  Plan also discussed with patient at the bedside.      DANNA Manning   Critical Care  08/02/24   09:02 EDT

## 2024-08-02 NOTE — CASE MANAGEMENT/SOCIAL WORK
Continued Stay Note   Seth     Patient Name: Esperanza Smith  MRN: [id number]  Today's Date: 8/2/2024    Admit Date: 7/29/2024    Plan: Plan to return home with spouse pending clinical course and PT/OT eval.   Discharge Plan       Row Name 08/02/24 0902       Plan    Plan Plan to return home with spouse pending clinical course and PT/OT eval.    Plan Comments DC Barriers: bedrest til Monday 8/5, HOB 30, 2L NC, PICC, IV Abxs, neuro checks, lumbar drain, Neuro Sx following.               Expected Discharge Date and Time       Expected Discharge Date Expected Discharge Time    Aug 6, 2024               WALTER Welch RN  SIPS/ICU   O: [phone]  C: [phone]  [email or web address]

## 2024-08-02 NOTE — PLAN OF CARE
Goal Outcome Evaluation:      Pt did not sleep this shift. Pt having visual/auditory hallucinations. At 0400 assessment, cervical spine dressing was saturated with clear yellow fluid. Neurosurgery notified, instructed RN to change dressing. Bordered gauze dressing removed and new dressing applied. No c/o pain/discomfort overnight. Lumbar drain site WDL. VSS.

## 2024-08-02 NOTE — PLAN OF CARE
Problem: Adult Inpatient Plan of Care  Goal: Plan of Care Review  Outcome: Ongoing, Progressing  Goal: Patient-Specific Goal (Individualized)  Outcome: Ongoing, Progressing  Goal: Absence of Hospital-Acquired Illness or Injury  Outcome: Ongoing, Progressing  Intervention: Identify and Manage Fall Risk  Recent Flowsheet Documentation  Taken 8/2/2024 1500 by Wally Cat RN  Safety Promotion/Fall Prevention: safety round/check completed  Taken 8/2/2024 1400 by Wally Cat RN  Safety Promotion/Fall Prevention: safety round/check completed  Taken 8/2/2024 1300 by Wally Cat RN  Safety Promotion/Fall Prevention: safety round/check completed  Taken 8/2/2024 1200 by Wally Cat RN  Safety Promotion/Fall Prevention: safety round/check completed  Taken 8/2/2024 1100 by Wally Cat RN  Safety Promotion/Fall Prevention: safety round/check completed  Taken 8/2/2024 1000 by Wally Cat RN  Safety Promotion/Fall Prevention: safety round/check completed  Taken 8/2/2024 0900 by Wally Cat RN  Safety Promotion/Fall Prevention: safety round/check completed  Taken 8/2/2024 0800 by Wally Cat RN  Safety Promotion/Fall Prevention: safety round/check completed  Taken 8/2/2024 0700 by Wally Cat RN  Safety Promotion/Fall Prevention: safety round/check completed  Intervention: Prevent Skin Injury  Recent Flowsheet Documentation  Taken 8/2/2024 1500 by Wally Cat RN  Body Position:   turned   right  Taken 8/2/2024 1300 by Wally Cat RN  Body Position: weight shifting  Taken 8/2/2024 1200 by Wally Cat RN  Body Position: patient/family refused  Skin Protection:   adhesive use limited   incontinence pads utilized   skin-to-device areas padded   skin-to-skin areas padded   tubing/devices free from skin contact  Taken 8/2/2024 1100 by Wally Cat RN  Body Position: weight shifting  Taken 8/2/2024 0900 by Wally Cat RN  Body Position:   turned   left  Taken 8/2/2024  0800 by Wally Cat RN  Body Position: weight shifting  Skin Protection:   adhesive use limited   incontinence pads utilized   skin-to-device areas padded   skin-to-skin areas padded   tubing/devices free from skin contact   transparent dressing maintained  Taken 8/2/2024 0700 by Wally Cat RN  Body Position:   turned   right  Intervention: Prevent and Manage VTE (Venous Thromboembolism) Risk  Recent Flowsheet Documentation  Taken 8/2/2024 1500 by Wally Cat RN  Activity Management: bedrest  Taken 8/2/2024 1200 by Wally Cat RN  Activity Management: bedrest  VTE Prevention/Management:   bilateral   sequential compression devices on  Range of Motion: active ROM (range of motion) encouraged  Taken 8/2/2024 1100 by Wally Cat RN  Activity Management: bedrest  Taken 8/2/2024 0900 by Wally Cat RN  Activity Management: bedrest  Taken 8/2/2024 0800 by Wally Cat RN  Activity Management: bedrest  VTE Prevention/Management:   bilateral   sequential compression devices on  Range of Motion: active ROM (range of motion) encouraged  Intervention: Prevent Infection  Recent Flowsheet Documentation  Taken 8/2/2024 1200 by Wally Cat RN  Infection Prevention:   single patient room provided   rest/sleep promoted   personal protective equipment utilized   hand hygiene promoted  Taken 8/2/2024 0800 by Wally Cat RN  Infection Prevention:   single patient room provided   personal protective equipment utilized   rest/sleep promoted   hand hygiene promoted  Goal: Optimal Comfort and Wellbeing  Outcome: Ongoing, Progressing  Intervention: Monitor Pain and Promote Comfort  Recent Flowsheet Documentation  Taken 8/2/2024 0800 by Wally Cat RN  Pain Management Interventions:   pillow support provided   position adjusted   quiet environment facilitated   see MAR  Intervention: Provide Person-Centered Care  Recent Flowsheet Documentation  Taken 8/2/2024 1200 by Wally Cat  RN  Trust Relationship/Rapport:   care explained   choices provided   emotional support provided   empathic listening provided   reassurance provided   thoughts/feelings acknowledged  Taken 8/2/2024 0800 by Wally Cat RN  Trust Relationship/Rapport:   care explained   choices provided   emotional support provided   reassurance provided   thoughts/feelings acknowledged  Goal: Readiness for Transition of Care  Outcome: Ongoing, Progressing   Goal Outcome Evaluation:      Pt to remain at 30 degrees or less till Monday. Pt occasionally has hallucinations but is alert and oriented x4. Continuing to drain 5ml every hour from spinal drainage.

## 2024-08-03 ENCOUNTER — APPOINTMENT (OUTPATIENT)
Dept: CT IMAGING | Facility: HOSPITAL | Age: 79
End: 2024-08-03
Payer: MEDICARE

## 2024-08-03 LAB
ALBUMIN SERPL-MCNC: 3.6 G/DL (ref 3.5–5.2)
ALBUMIN/GLOB SERPL: 1.1 G/DL
ALP SERPL-CCNC: 75 U/L (ref 39–117)
ALT SERPL W P-5'-P-CCNC: 31 U/L (ref 1–33)
ANION GAP SERPL CALCULATED.3IONS-SCNC: 11.5 MMOL/L (ref 5–15)
AST SERPL-CCNC: 31 U/L (ref 1–32)
BACTERIA SPEC AEROBE CULT: NORMAL
BASOPHILS # BLD AUTO: 0.04 10*3/MM3 (ref 0–0.2)
BASOPHILS NFR BLD AUTO: 0.3 % (ref 0–1.5)
BILIRUB SERPL-MCNC: 0.7 MG/DL (ref 0–1.2)
BILIRUB UR QL STRIP: NEGATIVE
BUN SERPL-MCNC: 14 MG/DL (ref 8–23)
BUN/CREAT SERPL: 29.8 (ref 7–25)
CALCIUM SPEC-SCNC: 10.7 MG/DL (ref 8.6–10.5)
CHLORIDE SERPL-SCNC: 95 MMOL/L (ref 98–107)
CLARITY UR: ABNORMAL
CO2 SERPL-SCNC: 30.5 MMOL/L (ref 22–29)
COLOR UR: YELLOW
CREAT SERPL-MCNC: 0.47 MG/DL (ref 0.57–1)
DEPRECATED RDW RBC AUTO: 43.5 FL (ref 37–54)
EGFRCR SERPLBLD CKD-EPI 2021: 98.8 ML/MIN/1.73
EOSINOPHIL # BLD AUTO: 0.26 10*3/MM3 (ref 0–0.4)
EOSINOPHIL NFR BLD AUTO: 1.9 % (ref 0.3–6.2)
ERYTHROCYTE [DISTWIDTH] IN BLOOD BY AUTOMATED COUNT: 12.3 % (ref 12.3–15.4)
GLOBULIN UR ELPH-MCNC: 3.3 GM/DL
GLUCOSE BLDC GLUCOMTR-MCNC: 129 MG/DL (ref 70–105)
GLUCOSE BLDC GLUCOMTR-MCNC: 141 MG/DL (ref 70–105)
GLUCOSE BLDC GLUCOMTR-MCNC: 150 MG/DL (ref 70–105)
GLUCOSE SERPL-MCNC: 140 MG/DL (ref 65–99)
GLUCOSE UR STRIP-MCNC: NEGATIVE MG/DL
GRAM STN SPEC: NORMAL
GRAM STN SPEC: NORMAL
HCT VFR BLD AUTO: 41.6 % (ref 34–46.6)
HGB BLD-MCNC: 14 G/DL (ref 12–15.9)
HGB UR QL STRIP.AUTO: NEGATIVE
IMM GRANULOCYTES # BLD AUTO: 0.07 10*3/MM3 (ref 0–0.05)
IMM GRANULOCYTES NFR BLD AUTO: 0.5 % (ref 0–0.5)
KETONES UR QL STRIP: NEGATIVE
LEUKOCYTE ESTERASE UR QL STRIP.AUTO: NEGATIVE
LYMPHOCYTES # BLD AUTO: 2.05 10*3/MM3 (ref 0.7–3.1)
LYMPHOCYTES NFR BLD AUTO: 15.1 % (ref 19.6–45.3)
MAGNESIUM SERPL-MCNC: 1.8 MG/DL (ref 1.6–2.4)
MCH RBC QN AUTO: 32 PG (ref 26.6–33)
MCHC RBC AUTO-ENTMCNC: 33.7 G/DL (ref 31.5–35.7)
MCV RBC AUTO: 95 FL (ref 79–97)
MONOCYTES # BLD AUTO: 1.39 10*3/MM3 (ref 0.1–0.9)
MONOCYTES NFR BLD AUTO: 10.3 % (ref 5–12)
NEUTROPHILS NFR BLD AUTO: 71.9 % (ref 42.7–76)
NEUTROPHILS NFR BLD AUTO: 9.75 10*3/MM3 (ref 1.7–7)
NITRITE UR QL STRIP: NEGATIVE
NRBC BLD AUTO-RTO: 0 /100 WBC (ref 0–0.2)
PH UR STRIP.AUTO: 7 [PH] (ref 5–8)
PHOSPHATE SERPL-MCNC: 3.9 MG/DL (ref 2.5–4.5)
PLATELET # BLD AUTO: 302 10*3/MM3 (ref 140–450)
PMV BLD AUTO: 9.2 FL (ref 6–12)
POTASSIUM SERPL-SCNC: 3.7 MMOL/L (ref 3.5–5.2)
PROT SERPL-MCNC: 6.9 G/DL (ref 6–8.5)
PROT UR QL STRIP: NEGATIVE
RBC # BLD AUTO: 4.38 10*6/MM3 (ref 3.77–5.28)
SODIUM SERPL-SCNC: 137 MMOL/L (ref 136–145)
SP GR UR STRIP: 1.01 (ref 1–1.03)
UROBILINOGEN UR QL STRIP: ABNORMAL
WBC NRBC COR # BLD AUTO: 13.56 10*3/MM3 (ref 3.4–10.8)

## 2024-08-03 PROCEDURE — 80053 COMPREHEN METABOLIC PANEL: CPT

## 2024-08-03 PROCEDURE — 81003 URINALYSIS AUTO W/O SCOPE: CPT | Performed by: STUDENT IN AN ORGANIZED HEALTH CARE EDUCATION/TRAINING PROGRAM

## 2024-08-03 PROCEDURE — 84100 ASSAY OF PHOSPHORUS: CPT

## 2024-08-03 PROCEDURE — 85025 COMPLETE CBC W/AUTO DIFF WBC: CPT

## 2024-08-03 PROCEDURE — 93010 ELECTROCARDIOGRAM REPORT: CPT | Performed by: INTERNAL MEDICINE

## 2024-08-03 PROCEDURE — 99222 1ST HOSP IP/OBS MODERATE 55: CPT

## 2024-08-03 PROCEDURE — 83735 ASSAY OF MAGNESIUM: CPT

## 2024-08-03 PROCEDURE — 70450 CT HEAD/BRAIN W/O DYE: CPT

## 2024-08-03 PROCEDURE — 93005 ELECTROCARDIOGRAM TRACING: CPT

## 2024-08-03 PROCEDURE — 82948 REAGENT STRIP/BLOOD GLUCOSE: CPT | Performed by: NURSE PRACTITIONER

## 2024-08-03 RX ORDER — QUETIAPINE FUMARATE 25 MG/1
25 TABLET, FILM COATED ORAL NIGHTLY
Status: DISCONTINUED | OUTPATIENT
Start: 2024-08-03 | End: 2024-08-07 | Stop reason: HOSPADM

## 2024-08-03 RX ADMIN — Medication 2000 UNITS: at 08:25

## 2024-08-03 RX ADMIN — Medication 10 ML: at 08:25

## 2024-08-03 RX ADMIN — AMLODIPINE BESYLATE 5 MG: 5 TABLET ORAL at 08:25

## 2024-08-03 RX ADMIN — LEVOTHYROXINE SODIUM 50 MCG: 0.05 TABLET ORAL at 06:03

## 2024-08-03 RX ADMIN — Medication 10 MG: at 20:02

## 2024-08-03 RX ADMIN — Medication 10 ML: at 20:02

## 2024-08-03 RX ADMIN — QUETIAPINE FUMARATE 25 MG: 25 TABLET ORAL at 20:02

## 2024-08-03 RX ADMIN — OXYCODONE HYDROCHLORIDE AND ACETAMINOPHEN 250 MG: 500 TABLET ORAL at 08:25

## 2024-08-03 NOTE — PROGRESS NOTES
Patient hallucinating again overnight.  No other new issues.     Awake and alert  Full strength  Incision CDI    CT head: No acute intracranial process, no hygromas    Plan: 78-year-old female status post resection of upper cervical meningioma who developed pseudomeningocele now s/p repair of pseudomeningocele and placement of lumbar drain  -Neuro stable.  Patient likely experiencing sundowning with hallucinations primarily at night.  CT looks okay  -Continue draining 5 cc/h via lumbar drain  -Will plan to clamp drain tomorrow a.m.  -ICU care
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
## 2024-08-03 NOTE — NURSING NOTE
"Pt had no UOP charted since 1500. Performed a bladder scan on the pt with the pts consent. Bladder scan showed >999 urine in the bladder. Pt was asked if she wanted to try to pee on her own. When pt was unable to urinate on her own, pt was informed that the next step was to perform a straight catheterization. The procedure was explained to the pt and the pt consented to the procedure. During the procedure, the patient fell asleep. The patient then woke up and demanded the procedure to be stopped. The procedure was stopped. We got 850mL UOP from the straight cath. Pt. Started to become agitated and restless after procedure stating \"you're both evil\" and \"Jaren get my gun. I'm gonna kill you both\". Pt answers all orientation questions correctly. Will continue to monitor.  "

## 2024-08-03 NOTE — PLAN OF CARE
Goal Outcome Evaluation:      Pt calm and cooperative. No obvious signs of auditory or visual hallucinations. Was able to have a few short periods of sleep. Tolerating diet. Still unable to void spontaneously. Continuing to monitor via bladder scan.

## 2024-08-03 NOTE — NURSING NOTE
Upon assessment patient was AxO x4, but pt was responding to hallucinations. Checked with dayshift nurse if this was baseline and she described similar findings. Will continue to monitor.

## 2024-08-03 NOTE — PLAN OF CARE
Goal Outcome Evaluation:      This shift pt became increasingly more agitated and anxious. Pt was having auditory and visual hallucinations all shift. Pt refused a bath. Increased delirium led nurse practitioner to call a STAT CT of the head. CT came back normal. Called neuro doctor on call to give an update. They said to report this to Dr. Urrutia when he gets here in the morning. This nurse will report this to dayshift nurse at shift change. VSS.

## 2024-08-03 NOTE — NURSING NOTE
"Pt is AxO x4, but has become increasingly more delusional. Pt will answer orientation questions and follow commands, but will follow it up with statements saying \"I'll answer after the shootout\" or \"I refuse to let you kill me\". APRN was notified of this change and STAT CT of the head was ordered as well as a UA. UA will be collected whenever possible.   "

## 2024-08-04 LAB
ALBUMIN SERPL-MCNC: 3.4 G/DL (ref 3.5–5.2)
ALBUMIN/GLOB SERPL: 1.2 G/DL
ALP SERPL-CCNC: 67 U/L (ref 39–117)
ALT SERPL W P-5'-P-CCNC: 30 U/L (ref 1–33)
ANION GAP SERPL CALCULATED.3IONS-SCNC: 6.8 MMOL/L (ref 5–15)
AST SERPL-CCNC: 39 U/L (ref 1–32)
BASOPHILS # BLD AUTO: 0.06 10*3/MM3 (ref 0–0.2)
BASOPHILS NFR BLD AUTO: 0.5 % (ref 0–1.5)
BILIRUB SERPL-MCNC: 0.6 MG/DL (ref 0–1.2)
BUN SERPL-MCNC: 18 MG/DL (ref 8–23)
BUN/CREAT SERPL: 38.3 (ref 7–25)
CALCIUM SPEC-SCNC: 10 MG/DL (ref 8.6–10.5)
CHLORIDE SERPL-SCNC: 98 MMOL/L (ref 98–107)
CO2 SERPL-SCNC: 33.2 MMOL/L (ref 22–29)
CREAT SERPL-MCNC: 0.47 MG/DL (ref 0.57–1)
DEPRECATED RDW RBC AUTO: 44 FL (ref 37–54)
EGFRCR SERPLBLD CKD-EPI 2021: 98.8 ML/MIN/1.73
EOSINOPHIL # BLD AUTO: 0.35 10*3/MM3 (ref 0–0.4)
EOSINOPHIL NFR BLD AUTO: 2.8 % (ref 0.3–6.2)
ERYTHROCYTE [DISTWIDTH] IN BLOOD BY AUTOMATED COUNT: 12.3 % (ref 12.3–15.4)
GLOBULIN UR ELPH-MCNC: 2.9 GM/DL
GLUCOSE BLDC GLUCOMTR-MCNC: 114 MG/DL (ref 70–105)
GLUCOSE BLDC GLUCOMTR-MCNC: 117 MG/DL (ref 70–105)
GLUCOSE BLDC GLUCOMTR-MCNC: 153 MG/DL (ref 70–105)
GLUCOSE BLDC GLUCOMTR-MCNC: 218 MG/DL (ref 70–105)
GLUCOSE SERPL-MCNC: 120 MG/DL (ref 65–99)
HCT VFR BLD AUTO: 38.9 % (ref 34–46.6)
HGB BLD-MCNC: 12.9 G/DL (ref 12–15.9)
IMM GRANULOCYTES # BLD AUTO: 0.04 10*3/MM3 (ref 0–0.05)
IMM GRANULOCYTES NFR BLD AUTO: 0.3 % (ref 0–0.5)
LYMPHOCYTES # BLD AUTO: 2.3 10*3/MM3 (ref 0.7–3.1)
LYMPHOCYTES NFR BLD AUTO: 18.7 % (ref 19.6–45.3)
MAGNESIUM SERPL-MCNC: 1.8 MG/DL (ref 1.6–2.4)
MCH RBC QN AUTO: 31.9 PG (ref 26.6–33)
MCHC RBC AUTO-ENTMCNC: 33.2 G/DL (ref 31.5–35.7)
MCV RBC AUTO: 96.3 FL (ref 79–97)
MONOCYTES # BLD AUTO: 1.22 10*3/MM3 (ref 0.1–0.9)
MONOCYTES NFR BLD AUTO: 9.9 % (ref 5–12)
NEUTROPHILS NFR BLD AUTO: 67.8 % (ref 42.7–76)
NEUTROPHILS NFR BLD AUTO: 8.36 10*3/MM3 (ref 1.7–7)
NRBC BLD AUTO-RTO: 0 /100 WBC (ref 0–0.2)
PHOSPHATE SERPL-MCNC: 3.5 MG/DL (ref 2.5–4.5)
PLATELET # BLD AUTO: 258 10*3/MM3 (ref 140–450)
PMV BLD AUTO: 9.3 FL (ref 6–12)
POTASSIUM SERPL-SCNC: 3.6 MMOL/L (ref 3.5–5.2)
PROT SERPL-MCNC: 6.3 G/DL (ref 6–8.5)
QT INTERVAL: 351 MS
QTC INTERVAL: 433 MS
RBC # BLD AUTO: 4.04 10*6/MM3 (ref 3.77–5.28)
SODIUM SERPL-SCNC: 138 MMOL/L (ref 136–145)
WBC NRBC COR # BLD AUTO: 12.33 10*3/MM3 (ref 3.4–10.8)

## 2024-08-04 PROCEDURE — 85025 COMPLETE CBC W/AUTO DIFF WBC: CPT

## 2024-08-04 PROCEDURE — 82948 REAGENT STRIP/BLOOD GLUCOSE: CPT | Performed by: NURSE PRACTITIONER

## 2024-08-04 PROCEDURE — 83735 ASSAY OF MAGNESIUM: CPT

## 2024-08-04 PROCEDURE — 99232 SBSQ HOSP IP/OBS MODERATE 35: CPT

## 2024-08-04 PROCEDURE — 82948 REAGENT STRIP/BLOOD GLUCOSE: CPT

## 2024-08-04 PROCEDURE — 84100 ASSAY OF PHOSPHORUS: CPT

## 2024-08-04 PROCEDURE — 80053 COMPREHEN METABOLIC PANEL: CPT

## 2024-08-04 PROCEDURE — 63710000001 INSULIN LISPRO (HUMAN) PER 5 UNITS: Performed by: NURSE PRACTITIONER

## 2024-08-04 RX ORDER — POTASSIUM CHLORIDE 1.5 G/1.58G
40 POWDER, FOR SOLUTION ORAL EVERY 4 HOURS
Status: DISCONTINUED | OUTPATIENT
Start: 2024-08-04 | End: 2024-08-04

## 2024-08-04 RX ORDER — POTASSIUM CHLORIDE 20 MEQ/1
40 TABLET, EXTENDED RELEASE ORAL EVERY 4 HOURS
Status: DISCONTINUED | OUTPATIENT
Start: 2024-08-04 | End: 2024-08-04

## 2024-08-04 RX ORDER — POTASSIUM CHLORIDE 1.5 G/1.58G
40 POWDER, FOR SOLUTION ORAL ONCE
Status: COMPLETED | OUTPATIENT
Start: 2024-08-04 | End: 2024-08-04

## 2024-08-04 RX ADMIN — SENNOSIDES AND DOCUSATE SODIUM 2 TABLET: 50; 8.6 TABLET ORAL at 12:06

## 2024-08-04 RX ADMIN — Medication 10 ML: at 20:29

## 2024-08-04 RX ADMIN — INSULIN LISPRO 2 UNITS: 100 INJECTION, SOLUTION INTRAVENOUS; SUBCUTANEOUS at 20:28

## 2024-08-04 RX ADMIN — Medication 2000 UNITS: at 08:47

## 2024-08-04 RX ADMIN — POTASSIUM CHLORIDE 40 MEQ: 1.5 POWDER, FOR SOLUTION ORAL at 17:27

## 2024-08-04 RX ADMIN — Medication 10 ML: at 08:49

## 2024-08-04 RX ADMIN — OXYCODONE HYDROCHLORIDE AND ACETAMINOPHEN 250 MG: 500 TABLET ORAL at 08:48

## 2024-08-04 RX ADMIN — POTASSIUM CHLORIDE 40 MEQ: 1.5 POWDER, FOR SOLUTION ORAL at 05:15

## 2024-08-04 RX ADMIN — INSULIN LISPRO 3 UNITS: 100 INJECTION, SOLUTION INTRAVENOUS; SUBCUTANEOUS at 11:51

## 2024-08-04 RX ADMIN — Medication 10 MG: at 20:16

## 2024-08-04 RX ADMIN — QUETIAPINE FUMARATE 25 MG: 25 TABLET ORAL at 20:16

## 2024-08-04 RX ADMIN — AMLODIPINE BESYLATE 5 MG: 5 TABLET ORAL at 08:47

## 2024-08-04 RX ADMIN — ACETAMINOPHEN 650 MG: 325 TABLET, FILM COATED ORAL at 20:16

## 2024-08-04 RX ADMIN — POTASSIUM CHLORIDE 40 MEQ: 1500 TABLET, EXTENDED RELEASE ORAL at 11:31

## 2024-08-04 NOTE — NURSING NOTE
Pt has not voided on her own in the last 24 hrs. Pt retaining urine, straight cath indicated multiple times within last 24 hrs, bladder distended. Multiple bladder scans completed. Pt states she does not have to pee. RN talked with provider, espinosa catheter to be placed. Pt educated on espinosa catheter insertion and care. FC placed with no issues or significant events.

## 2024-08-04 NOTE — PROGRESS NOTES
"  Chief complaint mild pain     Subjective .     History of present illness:  The patient is a 78 y.o. female who was admitted secondary to neck pain. Recent cervical meningioma and laminectomy on 7/16/2024, admitted this admission for complications related to procedure.  PMH:  Breast cancer, HTN, HLD, hypothyroidism, cervical disc disorder, sleep apnea, asthma.  Psychiatry consulted secondary to agitation, and hallucinations.     Per RN pt has been intermittently agitated, paranoid, and observed to be responding to visual hallucinations. RN reported pt had sudden change in behavior and mood and worse in the evenings. Symptoms were interfering with treatment. Acute process ruled out with MOODY.   Pt seen today, pt has lumbar drain, must remain flat   Oriented to self, place, (hospital), situation. Disoriented to day but accurate year reported  Reports not sleeping well, generally uncomfortable but understood why she needed to be flat. Pt reported she has had multiple surgeries and hospitalizations since she was in her 20s. Reports her family has witnessed a decline in her cognition over the last few years or so. Pt reports she is sometimes forgetful or \"unlike myself and they seem to notice when I am\"  Pt reports she has had previous episodes of severe confusion while in the hospital. Psychiatric hx unremarkable , denied previous psychiatric hospitalizations, attempts. Pt believes she may have struggled with depression and anxiety before but believed this was related to previous breast cancer and hysterectomy , unable to have children as a result.   Pt denied feeling depressed or hopeless now   Endorsed feeling bored, and uncomfortable, unable to sleep due to this and environment. Reported having visual impairment, she was unable to view her tv, has glasses at the bedside but they do not help.   She enjoys reading but cannot hold a book in the position she is in. Expressed interest in audio books which she has on her " "phone and music.  Unable to locate the pts phone.      Denied AVH/SI/HI  Denies substance abuse including alcohol and illicit and prescription drugs          Today   Pt is upright, alert watching tv   Calm, pleasant and smiling   Remembered seeing this provider yesterday   Pt reports sleeping well overnight, oriented to self place, situation. Expressed feeling better since she can sit up now.   Reports decreased appetite today   No family at bedside   No events reported overnight       The following portions of the patient's history were reviewed and updated as appropriate: allergies, current medications, past family history, past medical history, past social history, past surgical history and problem list.    History    Objective     Vital Signs   /69   Pulse 103   Temp 97.6 °F (36.4 °C) (Oral)   Resp 18   Ht 165.1 cm (65\")   Wt 78.4 kg (172 lb 13.5 oz)   SpO2 94%   BMI 28.76 kg/m²     MENTAL STATUS EXAM   General Appearance:  Cleanly groomed and dressed  Eye Contact:  Good eye contact  Attitude:  Cooperative  Motor Activity:  Normal gait, posture  Muscle Strength:  Normal  Speech:  Normal rate, tone, volume  Language:  Spontaneous  Mood and affect:  Normal, pleasant  Hopelessness:  Denies  Thought Process:  Logical, goal-directed and linear  Associations/ Thought Content:  No delusions  Hallucinations:  None  Suicidal Ideations:  Not present  Homicidal Ideation:  Not present  Sensorium:  Alert and clear  Orientation:  Person, place, situation and time  Attention Span/ Concentration:  Good  Fund of Knowledge:  Appropriate for age and educational level  Intellectual Functioning:  Average range  Insight:  Fair  Judgement:  Fair  Reliability:  Fair  Impulse Control:  Good         Assessment & Plan       Neck pain       Assessment: : Acute hyperactive delirium due to multiple etiologies (recent anaesthesia multiple sedating medications, multiple days in the ICU, visual impairment, previous delirium " episodes, age)     Treatment Plan:  Pt has sxs consistent with delirium, Nursing reported agitation, visual hallucinations, and paranoia. Symptoms reported as severe and interfering with necessary treatment. Acute process ruled out with MOODY.  Pt is high risk to develop delirium based on age, visual impairment (glasses do not help), current situation and recent anesthesia, and medications.  Delirium is best treated by encouraging normal routines, sleep/wake cycles as much as possible. Pts immobility at this time may be contributing to worsening delirium symptoms. Medication options discussed with pt, pt agreeable to start medication for delirium symptoms.  I discussed with nursing about phone and pt expressing she wanted to listen to audio books or music.   EKG obtained Qtc 433  Repeat in morning   Continue Seroquel 25 mg nightly for delirium, sleep, hallucinations, monitor Qtc.   Pt may have underlying mild cognitive impairment and may benefit from neurology evaluation outpatient, based on reported troubles with memory.    Will follow   Treatment Plan discussed with: Patient    I discussed the patients findings and my recommendations with patient    I have reviewed and approved the behavioral health treatment plans and problem list. Yes  Thank you for the consult   Referring MD has access to consult report and progress notes in EMR     Adrienne Pineda DNP, APRN  08/04/24  16:14 EDT

## 2024-08-04 NOTE — PROGRESS NOTES
"Critical Care Progress Note     Esperanza Smith : 1945 MRN:[id number] LOS:6  Rm: 2316/1     Principal Problem: Neck pain     Reason for follow up: All the medical problems listed below    Summary     A 78 y.o. old female patient with PMH of hypertension, hypothyroidism, diabetes mellitus type 2, history of breast and uterine cancer, hyperlipidemia, and known recent laminectomy of C2-C4 for resection of intradural tumor/cervical meningioma on 2024, and presented on 2024 to the hospital with complaints of neck pain. Initial outpatient postoperative MRI cervical spine on 2024 revealed a 5.9 x 3.4 x 4 cm fluid collection posterior to the laminectomy site which was felt to be a postoperative seroma, and abscess, or pseudomeningocele being less favored; additionally there was noted mild canal stenosis at C3-4 secondary to broad-based disc bulge. Repeat MRI with and without contrast of the cervical spine on 2024 revealed: \"Large fluid collection noted within the posterior aspect of the neck at the site of C1, C2, C3 and C4 laminectomy, has increased in size since prior study, now measuring up to 8.1 cm in the largest dimension. While this may represent a seroma, given its increase in size, this is highly concerning for a pseudomeningocele or CSF leak. Neurosurgery was consulted. Patient was started on empiric antimicrobial therapy with Rocephin and vancomycin, though it was felt that this was unlikely to be infectious. Patient underwent cervical evacuation of pseudomeningocele and dural repair with insertion of lumbar drain on 2024.     Significant Events     24 :   Patient remains afebrile, all vital signs are within normal limits.  She has had 2.1 L of urine output over the last 24 hours and is net -4.8 L for the admission.  Chemistry panel unremarkable.CBC also fairly unremarkable.  Per neurosurgery, the lumbar drain was clamped this morning and we will plan to start ambulating " patient today.  Possibly, patient can have drain removed tomorrow.  Patient did sleep better overnight with the Seroquel and seems more alert and oriented today.    Assessment / Plan     Post-operative pseudomeningocele  Recent cervical meningioma with recent laminectomy of C2-C4 for resection of intradural tumor on 7/16/2024, now complicated by large fluid collection at operative site.  MRI Cervical Spine (7/29): Large fluid collection within posterior aspect of neck at the site of C1, C2, C3, and C4 laminectomy, increased since prior study, measuring 8.1 cm in the largest dimension with mild mass effect on the posterior aspect of the spinal cord with now moderate to severe spinal canal stenosis and possible mild cord compression noted at level C3-C4.  Neurosurgery previously consulted and continues to follow.  Previously on Rocephin and Vancomycin, per MOODY, not felt to be infectious, this was discontinued post-operatively with continuation of surgical prophylaxis with cefazolin x 3 doses.  Follow surgical wound cultures.  MRSA Screen negative.  Status post cervical evacuation of pseudomeningocele and dural repair with insertion of lumbar drain.  Hourly neuro checks, adjusted per neurosurgery recommendations.  Lumbar drain management per MOODY: Drain 5 mL/hr; if unable to drain 3 mL/hr for 3 consecutive hours, notify neurosurgery.  Analgesia as ordered by MOODY.     Visual hallucinations/Delerium  Started earlier in the day, prior to surgery.  Not been sleeping well, possible etiology, versus delirium.  Patient realizes that she is hallucinating.  Encourage cluster care to promote resting between neuro checks.  Increased melatonin to 10 mg.  Psychiatry consulted 8/3 & pt started on seroquel qhs.     Essential hypertension: well controlled.   Continue amlodipine .   Titrate medications as needed.     Diabetes mellitus Type 2, not insulin-dependent : well controlled.   Diet controlled, on no home regimen.  A1c on  7/8/2024 was 6.10.  Accu checks ACHS or Every 6 Hours, based on diet, with sliding scale admelog coverage as needed.      Hypothyroidism: Continue levothyroxine.  History of breast and uterine cancer  Hyperlipidemia: Intolerant of statins.    Disposition:  Remains in ICU while drain in place.    Code status:   Code Status (Patient has no pulse and is not breathing): CPR (Attempt to Resuscitate)  Medical Interventions (Patient has pulse or is breathing): Full Support       Nutrition:   Diet: Diabetic; Consistent Carbohydrate; Fluid Consistency: Thin (IDDSI 0)   Patient isn't on Tube Feeding     VTE Prophylaxis:  Mechanical VTE prophylaxis orders are present.         Subjective / Review of systems     She denies any shortness of breath or chest pain.  She denies any abdominal pain or dysuria.  Denies any fevers, chills, sweats, nausea, or vomiting.  Denies have any neck pain. No dizziness today.  Slept well.    Objective / Physical Exam     Vital signs:  Temp: 97.5 °F (36.4 °C)  BP: 122/76  Heart Rate: 84  Resp: 17  SpO2: 95 %  Weight: 78.4 kg (172 lb 13.5 oz)    Admission Weight: Weight: 83.9 kg (185 lb)  Current Weight: Weight: 78.4 kg (172 lb 13.5 oz)    Input/Output in last 24 hours:    Intake/Output Summary (Last 24 hours) at 8/4/2024 1053  Last data filed at 8/4/2024 0912  Gross per 24 hour   Intake 860 ml   Output 955 ml   Net -95 ml      Net IO Since Admission: -4,845 mL [08/04/24 1053]     Physical Exam  Vitals and nursing note reviewed.   Constitutional:       General: She is not in acute distress.     Appearance: Normal appearance. She is obese. She is not ill-appearing, toxic-appearing or diaphoretic.   HENT:      Head: Normocephalic and atraumatic.      Nose: Nose normal. No congestion.      Mouth/Throat:      Mouth: Mucous membranes are moist.      Pharynx: Oropharynx is clear. No oropharyngeal exudate.   Eyes:      Extraocular Movements: Extraocular movements intact.      Conjunctiva/sclera:  Conjunctivae normal.      Pupils: Pupils are equal, round, and reactive to light.   Cardiovascular:      Rate and Rhythm: Normal rate and regular rhythm.      Pulses: Normal pulses.      Heart sounds: Normal heart sounds.   Pulmonary:      Effort: Pulmonary effort is normal.      Breath sounds: Normal breath sounds.   Abdominal:      General: Bowel sounds are normal.      Palpations: Abdomen is soft.   Musculoskeletal:      Right lower leg: No edema.      Left lower leg: No edema.   Skin:     General: Skin is warm and dry.      Findings: No rash.   Neurological:      Mental Status: She is alert and oriented to person, place, and time.   Psychiatric:         Mood and Affect: Mood normal.         Behavior: Behavior normal.          Radiology and Labs     Results from last 7 days   Lab Units 08/04/24 0315 08/03/24 0419 08/02/24  0354 08/01/24  0517 07/31/24  0054   WBC 10*3/mm3 12.33* 13.56* 14.45* 12.52* 16.33*   HEMOGLOBIN g/dL 12.9 14.0 13.9 11.9* 13.8   HEMATOCRIT % 38.9 41.6 41.1 34.9 40.3   PLATELETS 10*3/mm3 258 302 307 279 264      Results from last 7 days   Lab Units 07/31/24  0054   PROTIME Seconds 11.9*   INR  1.10      Results from last 7 days   Lab Units 08/04/24 0315 08/03/24 0419 08/02/24  0354 08/01/24  1727 08/01/24  0517 07/31/24  0054   SODIUM mmol/L 138 137 136  --  135* 132*   POTASSIUM mmol/L 3.6 3.7 4.3 4.2 3.4* 3.6   CHLORIDE mmol/L 98 95* 94*  --  96* 93*   CO2 mmol/L 33.2* 30.5* 30.3*  --  30.6* 27.1   BUN mg/dL 18 14 11  --  11 9   CREATININE mg/dL 0.47* 0.47* 0.51*  --  0.55* 0.53*   GLUCOSE mg/dL 120* 140* 142*  --  135* 152*   MAGNESIUM mg/dL 1.8 1.8 1.6  --  1.6 1.6   PHOSPHORUS mg/dL 3.5 3.9 3.0  --  3.7 3.3      Results from last 7 days   Lab Units 08/04/24  0315 08/03/24  0419 08/02/24  0354 08/01/24  0517 07/31/24  0054   ALK PHOS U/L 67 75 72 63 73   AST (SGOT) U/L 39* 31 29 20 21   ALT (SGPT) U/L 30 31 26 20 25           CT Head Without Contrast    Result Date:  8/3/2024  Impression: No evidence of hemorrhage, mass effect or midline shift. No acute intracranial process identified. Electronically Signed: Jeni Patterson MD  8/3/2024 4:04 AM EDT  Workstation ID: [id number]       Current medications     Scheduled Meds:   amLODIPine, 5 mg, Oral, Daily  ascorbic acid, 250 mg, Oral, Daily  cholecalciferol, 2,000 Units, Oral, Daily  insulin lispro, 2-7 Units, Subcutaneous, 4x Daily AC & at Bedtime  levothyroxine, 50 mcg, Oral, Q AM  melatonin, 10 mg, Oral, Nightly  potassium chloride ER, 40 mEq, Oral, Q4H  QUEtiapine, 25 mg, Oral, Nightly  sodium chloride, 10 mL, Intravenous, Q12H  sodium chloride, 10 mL, Intravenous, Q12H        Continuous Infusions:   niCARdipine, 5-15 mg/hr          Plan discussed with RN. Reviewed all other data in the last 24 hours, including but not limited to vitals, labs, microbiology, imaging and pertinent notes from other providers.  Plan also discussed with patient at the bedside.      Josue Simmons DO   Critical Care  08/04/24   10:53 EDT

## 2024-08-04 NOTE — NURSING NOTE
"Pt currently refusing any care. Pt is refusing to answer questions, but is following commands. Pt states \"You are going to kill me\" anytime this RN attempts to provide any patient care. Pt educated on the importance of allowing medical team to manage and care for her during hospital stay. APRN notified, continue to monitor. VSS @ the time of this note.   "

## 2024-08-04 NOTE — CONSULTS
"  Referring Provider: Radha Saenz APRN   Reason for Consultation: Hallucinations     Chief complaint : pain, discomfort     Subjective .     History of present illness:  The patient is a 78 y.o. female who was admitted secondary to neck pain. Recent cervical meningioma and laminectomy on 7/16/2024, admitted this admission for complications related to procedure.  PMH:  Breast cancer, HTN, HLD, hypothyroidism, cervical disc disorder, sleep apnea, asthma.  Psychiatry consulted secondary to agitation, and hallucinations.    Per RN pt has been intermittently agitated, paranoid, and observed to be responding to visual hallucinations. RN reported pt had sudden change in behavior and mood and worse in the evenings. Symptoms were interfering with treatment. Acute process ruled out with MOODY.   Pt seen today, pt has lumbar drain, must remain flat   Oriented to self, place, (hospital), situation. Disoriented to day but accurate year reported  Reports not sleeping well, generally uncomfortable but understood why she needed to be flat. Pt reported she has had multiple surgeries and hospitalizations since she was in her 20s. Reports her family has witnessed a decline in her cognition over the last few years or so. Pt reports she is sometimes forgetful or \"unlike myself and they seem to notice when I am\"  Pt reports she has had previous episodes of severe confusion while in the hospital. Psychiatric hx unremarkable , denied previous psychiatric hospitalizations, attempts. Pt believes she may have struggled with depression and anxiety before but believed this was related to previous breast cancer and hysterectomy , unable to have children as a result.   Pt denied feeling depressed or hopeless now   Endorsed feeling bored, and uncomfortable, unable to sleep due to this and environment. Reported having visual impairment, she was unable to view her tv, has glasses at the bedside but they do not help.   She enjoys reading but cannot " "hold a book in the position she is in. Expressed interest in audio books which she has on her phone and music.  Unable to locate the pts phone.      Denied AVH/SI/HI  Denies substance abuse including alcohol and illicit and prescription drugs       The following portions of the patient's history were reviewed and updated as appropriate: allergies, current medications, past family history, past medical history, past social history, past surgical history and problem list.    History    Objective     Vital Signs   /75   Pulse 92   Temp 97.5 °F (36.4 °C) (Oral)   Resp 17   Ht 165.1 cm (65\")   Wt 78.4 kg (172 lb 13.5 oz)   SpO2 (!) 88%   BMI 28.76 kg/m²       MENTAL STATUS EXAM   General Appearance:  Cleanly groomed and dressed  Eye Contact:  Fair  Attitude:  Cooperative and polite  Motor Activity:  Slow  Muscle Strength:  Normal  Speech:  Normal rate, tone, volume  Language:  Spontaneous  Mood and affect:  Normal, pleasant  Hopelessness:  Denies  Thought Process:  Logical, goal-directed and linear  Associations/ Thought Content:  No delusions  Hallucinations:  None  Suicidal Ideations:  Not present  Homicidal Ideation:  Not present  Sensorium:  Alert and clear  Orientation:  Person, place and situation  Attention Span/ Concentration:  Good  Fund of Knowledge:  Appropriate for age and educational level  Intellectual Functioning:  Average range  Insight:  Fair  Judgement:  Fair  Reliability:  Fair  Impulse Control:  Fair         Assessment & Plan       Neck pain       Assessment: Acute hyperactive delirium due to multiple etiologies (recent anaesthesia multiple sedating medications, multiple days in the ICU, visual impairment, previous delirium episodes, age)    Treatment Plan: Pt has sxs consistent with delirium, Nursing reported agitation, visual hallucinations, and paranoia. Symptoms reported as severe and interfering with necessary treatment. Acute process ruled out with MOODY.  Pt is high risk to develop " delirium based on age, visual impairment (glasses do not help), current situation and recent anesthesia, and medications.  Delirium is best treated by encouraging normal routines, sleep/wake cycles as much as possible. Pts immobility at this time may be contributing to worsening delirium symptoms. Medication options discussed with pt, pt agreeable to start medication for delirium symptoms.  I discussed with nursing about phone and pt expressing she wanted to listen to audio books or music.   EKG obtained Qtc 433  Will start Seroquel 25 mg nightly for delirium, sleep, hallucinations, monitor Qtc.   Pt may have underlying mild cognitive impairment and may benefit from neurology evaluation outpatient, based on reported troubles with memory.    Will follow   Treatment Plan discussed with: Patient    I discussed the patients findings and my recommendations with patient and nursing staff    I have reviewed and approved the behavioral health treatment plans and problem list. Yes  Thank you for the consult   Referring MD has access to consult report and progress notes in EMR     Adrienne Pineda DNP, APRN  08/04/24  09:56 EDT

## 2024-08-04 NOTE — NURSING NOTE
Lumbar drained clamped (no longer draining CSF) and HOB gradually increased as patient tolerates per Dr. Urrutia.   Dr. Urrutia also notified about patient's urinary status.

## 2024-08-04 NOTE — PLAN OF CARE
Goal Outcome Evaluation:       Pt A&Ox4 this shift, with intermittent confusion, visual & auditory hallucinations present. Pt thought staff was trying to harm her, and remains distrustful to staff at the times of confusion and hallucinations, Pt refused most care throughout the night. Though, pt was able to sleep. Pt experiencing urinary retention, FC placed per order. 5cc/hr drained from lumbar drain, drain WDL, see flowsheets. Pt denied any pain throughout the night. Pt refused bath, educated multiple times about the importance of hygiene. This RN was able to complete partial bath for line/tube/drain. Plans to clamp drain this AM. VSS, plan of care ongoing.

## 2024-08-04 NOTE — PROGRESS NOTES
No events     Awake and alert  Full strength      Plan: 78-year-old female status post resection of upper cervical meningioma who developed pseudomeningocele now s/p repair of pseudomeningocele and placement of lumbar drain  -Neuro stable.    -Drain clamped this a.m.  -Slowly elevate head of bed.  If patient tolerates with no postural headache can mobilize to chair this afternoon or evening.  Will plan for patient to work with physical therapy tomorrow if tolerating well and possibly remove lumbar drain.  -ICU care

## 2024-08-04 NOTE — SIGNIFICANT NOTE
"   08/04/24 2803   OTHER   Discipline physical therapist   Rehab Time/Intention   Session Not Performed other (see comments)  (Per neurosurgery, trialing raising HOB and geting up to chair this pm; \"will plan for patient to work with physical therapy tomorrow.\" PT will f/u for evaluation tomorrow when appropriate.)   Therapy Assessment/Plan (PT)   Criteria for Skilled Interventions Met (PT) yes;meets criteria       "

## 2024-08-05 ENCOUNTER — INPATIENT HOSPITAL (OUTPATIENT)
Dept: URBAN - METROPOLITAN AREA HOSPITAL 84 | Facility: HOSPITAL | Age: 79
End: 2024-08-05
Payer: MEDICARE

## 2024-08-05 DIAGNOSIS — Q89.8 OTHER SPECIFIED CONGENITAL MALFORMATIONS: ICD-10-CM

## 2024-08-05 LAB
ALBUMIN SERPL-MCNC: 3.2 G/DL (ref 3.5–5.2)
ALBUMIN/GLOB SERPL: 1.2 G/DL
ALP SERPL-CCNC: 67 U/L (ref 39–117)
ALT SERPL W P-5'-P-CCNC: 35 U/L (ref 1–33)
ANION GAP SERPL CALCULATED.3IONS-SCNC: 6.1 MMOL/L (ref 5–15)
AST SERPL-CCNC: 28 U/L (ref 1–32)
BACTERIA SPEC ANAEROBE CULT: NORMAL
BASOPHILS # BLD AUTO: 0.04 10*3/MM3 (ref 0–0.2)
BASOPHILS NFR BLD AUTO: 0.3 % (ref 0–1.5)
BILIRUB SERPL-MCNC: 0.6 MG/DL (ref 0–1.2)
BUN SERPL-MCNC: 16 MG/DL (ref 8–23)
BUN/CREAT SERPL: 34 (ref 7–25)
CALCIUM SPEC-SCNC: 10.1 MG/DL (ref 8.6–10.5)
CHLORIDE SERPL-SCNC: 98 MMOL/L (ref 98–107)
CO2 SERPL-SCNC: 31.9 MMOL/L (ref 22–29)
CREAT SERPL-MCNC: 0.47 MG/DL (ref 0.57–1)
D-LACTATE SERPL-SCNC: 0.9 MMOL/L (ref 0.5–2)
DEPRECATED RDW RBC AUTO: 44 FL (ref 37–54)
EGFRCR SERPLBLD CKD-EPI 2021: 98.8 ML/MIN/1.73
EOSINOPHIL # BLD AUTO: 0.31 10*3/MM3 (ref 0–0.4)
EOSINOPHIL NFR BLD AUTO: 2.7 % (ref 0.3–6.2)
ERYTHROCYTE [DISTWIDTH] IN BLOOD BY AUTOMATED COUNT: 12.3 % (ref 12.3–15.4)
GLOBULIN UR ELPH-MCNC: 2.7 GM/DL
GLUCOSE BLDC GLUCOMTR-MCNC: 109 MG/DL (ref 70–105)
GLUCOSE BLDC GLUCOMTR-MCNC: 127 MG/DL (ref 70–105)
GLUCOSE BLDC GLUCOMTR-MCNC: 154 MG/DL (ref 70–105)
GLUCOSE BLDC GLUCOMTR-MCNC: 177 MG/DL (ref 70–105)
GLUCOSE SERPL-MCNC: 95 MG/DL (ref 65–99)
HCT VFR BLD AUTO: 36.5 % (ref 34–46.6)
HGB BLD-MCNC: 12 G/DL (ref 12–15.9)
IMM GRANULOCYTES # BLD AUTO: 0.05 10*3/MM3 (ref 0–0.05)
IMM GRANULOCYTES NFR BLD AUTO: 0.4 % (ref 0–0.5)
LYMPHOCYTES # BLD AUTO: 2.4 10*3/MM3 (ref 0.7–3.1)
LYMPHOCYTES NFR BLD AUTO: 20.9 % (ref 19.6–45.3)
MAGNESIUM SERPL-MCNC: 1.6 MG/DL (ref 1.6–2.4)
MCH RBC QN AUTO: 32 PG (ref 26.6–33)
MCHC RBC AUTO-ENTMCNC: 32.9 G/DL (ref 31.5–35.7)
MCV RBC AUTO: 97.3 FL (ref 79–97)
MONOCYTES # BLD AUTO: 1.22 10*3/MM3 (ref 0.1–0.9)
MONOCYTES NFR BLD AUTO: 10.6 % (ref 5–12)
NEUTROPHILS NFR BLD AUTO: 65.1 % (ref 42.7–76)
NEUTROPHILS NFR BLD AUTO: 7.44 10*3/MM3 (ref 1.7–7)
NRBC BLD AUTO-RTO: 0 /100 WBC (ref 0–0.2)
PHOSPHATE SERPL-MCNC: 3.4 MG/DL (ref 2.5–4.5)
PLATELET # BLD AUTO: 272 10*3/MM3 (ref 140–450)
PMV BLD AUTO: 9.6 FL (ref 6–12)
POTASSIUM SERPL-SCNC: 4.1 MMOL/L (ref 3.5–5.2)
PROT SERPL-MCNC: 5.9 G/DL (ref 6–8.5)
RBC # BLD AUTO: 3.75 10*6/MM3 (ref 3.77–5.28)
RBC MORPH BLD: NORMAL
SMALL PLATELETS BLD QL SMEAR: ADEQUATE
SODIUM SERPL-SCNC: 136 MMOL/L (ref 136–145)
WBC MORPH BLD: NORMAL
WBC NRBC COR # BLD AUTO: 11.46 10*3/MM3 (ref 3.4–10.8)

## 2024-08-05 PROCEDURE — 93010 ELECTROCARDIOGRAM REPORT: CPT | Performed by: INTERNAL MEDICINE

## 2024-08-05 PROCEDURE — 97162 PT EVAL MOD COMPLEX 30 MIN: CPT | Performed by: PHYSICAL THERAPIST

## 2024-08-05 PROCEDURE — 82948 REAGENT STRIP/BLOOD GLUCOSE: CPT | Performed by: NURSE PRACTITIONER

## 2024-08-05 PROCEDURE — 84100 ASSAY OF PHOSPHORUS: CPT

## 2024-08-05 PROCEDURE — 82948 REAGENT STRIP/BLOOD GLUCOSE: CPT

## 2024-08-05 PROCEDURE — 80053 COMPREHEN METABOLIC PANEL: CPT

## 2024-08-05 PROCEDURE — 99232 SBSQ HOSP IP/OBS MODERATE 35: CPT

## 2024-08-05 PROCEDURE — 63710000001 INSULIN LISPRO (HUMAN) PER 5 UNITS: Performed by: NURSE PRACTITIONER

## 2024-08-05 PROCEDURE — 83735 ASSAY OF MAGNESIUM: CPT

## 2024-08-05 PROCEDURE — 93005 ELECTROCARDIOGRAM TRACING: CPT

## 2024-08-05 PROCEDURE — 99222 1ST HOSP IP/OBS MODERATE 55: CPT | Mod: FS | Performed by: NURSE PRACTITIONER

## 2024-08-05 PROCEDURE — 97530 THERAPEUTIC ACTIVITIES: CPT | Performed by: PHYSICAL THERAPIST

## 2024-08-05 PROCEDURE — 99024 POSTOP FOLLOW-UP VISIT: CPT

## 2024-08-05 PROCEDURE — 85007 BL SMEAR W/DIFF WBC COUNT: CPT

## 2024-08-05 PROCEDURE — 83605 ASSAY OF LACTIC ACID: CPT | Performed by: INTERNAL MEDICINE

## 2024-08-05 PROCEDURE — 99222 1ST HOSP IP/OBS MODERATE 55: CPT | Performed by: SURGERY

## 2024-08-05 PROCEDURE — 97166 OT EVAL MOD COMPLEX 45 MIN: CPT

## 2024-08-05 PROCEDURE — 85025 COMPLETE CBC W/AUTO DIFF WBC: CPT

## 2024-08-05 RX ADMIN — Medication 10 ML: at 08:33

## 2024-08-05 RX ADMIN — Medication 10 MG: at 20:43

## 2024-08-05 RX ADMIN — Medication 10 ML: at 20:53

## 2024-08-05 RX ADMIN — QUETIAPINE FUMARATE 25 MG: 25 TABLET ORAL at 20:43

## 2024-08-05 RX ADMIN — LEVOTHYROXINE SODIUM 50 MCG: 0.05 TABLET ORAL at 05:08

## 2024-08-05 RX ADMIN — INSULIN LISPRO 2 UNITS: 100 INJECTION, SOLUTION INTRAVENOUS; SUBCUTANEOUS at 20:53

## 2024-08-05 RX ADMIN — OXYCODONE HYDROCHLORIDE AND ACETAMINOPHEN 250 MG: 500 TABLET ORAL at 08:30

## 2024-08-05 RX ADMIN — INSULIN LISPRO 2 UNITS: 100 INJECTION, SOLUTION INTRAVENOUS; SUBCUTANEOUS at 12:14

## 2024-08-05 RX ADMIN — ACETAMINOPHEN 650 MG: 325 TABLET, FILM COATED ORAL at 04:11

## 2024-08-05 RX ADMIN — AMLODIPINE BESYLATE 5 MG: 5 TABLET ORAL at 08:29

## 2024-08-05 RX ADMIN — Medication 10 ML: at 08:32

## 2024-08-05 RX ADMIN — Medication 2000 UNITS: at 08:30

## 2024-08-05 NOTE — CONSULTS
GI CONSULT  NOTE:    Referring Provider:  Dr. Espitia    Chief complaint: Neck pain    Subjective .     History of present illness: Patient is a 78-year-old female with history of removal of cervical meningioma 7/16/2024, diabetes, hypertension, breast cancer, uterine cancer, cholecystectomy, and hysterectomy 10 years ago complicated by small bowel injury requiring ex lap and small bowel resection with fistula.  She presented to the hospital on 7/29/2024 with complaints of neck pain and was found to have a fluid collection.  On 7/31/2024 she underwent evacuation of pseudomeningocele and lumbar drain was placed.  Drain was removed today.  She was noted to have drainage from umbilicus so general surgery consulted.  We were then asked to consult due to concern for fistula.  She had diverticulitis diagnosed on 7/2/2024.  Patient reports being constipated after the initial surgery for about 5 days.  She took a stool softener and had bowel movements.  Denies any bright red blood per rectum or melena.  No fevers or chills.      Endo History:  No record of previous endoscopy.    Past Medical History:  Past Medical History:   Diagnosis Date    Allergic Penicillian    Asthma congestion    Breast cancer     Cataract     Cervical disc disorder     Cholelithiasis surgery 1996 removal    DDD (degenerative disc disease), lumbosacral     Diabetes mellitus     Diverticulitis     Diverticulosis     Headache     History of medical problems 54 inches left of small intesting    Hyperlipidemia     Hypertension     Hypothyroidism     IBS (irritable bowel syndrome)     Inflammatory bowel disease     Liver disease fatty liver    Low back pain Sciatic    Lumbosacral disc disease     Neuromuscular disorder neuropathy    Obesity     PONV (postoperative nausea and vomiting)     Seasonal allergies     Sleep apnea        Past Surgical History:  Past Surgical History:   Procedure Laterality Date    BREAST BIOPSY      BREAST SURGERY Left  02/2012    x2     CERVICAL SPINAL CORD TUMOR REMOVAL N/A 7/16/2024    Procedure: Cervical 2-4 laminectomy for resection of intradural tumor;  Surgeon: Mario Urrutia IV, MD;  Location: [facility] MAIN OR;  Service: Neurosurgery;  Laterality: N/A;    CHOLECYSTECTOMY  1996    COLON RESECTION SMALL BOWEL  05/25/2014    COLON SURGERY  small intestine repair    HYSTERECTOMY      LYMPH NODE BIOPSY      MASTECTOMY      OOPHORECTOMY  1965    POSTERIOR CERVICAL FUSION N/A 7/31/2024    Procedure: LUMBAR DRAIN INSERTION REPAIR OF CERVICAL DURA;  Surgeon: Mario Urrutia IV, MD;  Location: [facility] MAIN OR;  Service: Neurosurgery;  Laterality: N/A;    SIMPLE MASTECTOMY Right 02/26/2020    Procedure: Right simple mastectomy;  Surgeon: Mario Quezada DO;  Location: [facility] MAIN OR;  Service: General;  Laterality: Right;    SMALL INTESTINE SURGERY      SUBTOTAL HYSTERECTOMY  full hysterectomy       Social History:  Social History     Tobacco Use    Smoking status: Never     Passive exposure: Never    Smokeless tobacco: Never   Vaping Use    Vaping status: Never Used   Substance Use Topics    Alcohol use: Not Currently     Alcohol/week: 1.0 standard drink of alcohol     Types: 1 Glasses of wine per week     Comment: occ    Drug use: No       Family History:  Family History   Problem Relation Age of Onset    Breast cancer Sister     Breast cancer Maternal Grandmother        Medications:  Medications Prior to Admission   Medication Sig Dispense Refill Last Dose    acetaminophen (TYLENOL) 325 MG tablet Take 2 tablets by mouth Every 6 (Six) Hours As Needed for Mild Pain.   7/29/2024    amLODIPine (NORVASC) 5 MG tablet TAKE 1 TABLET BY MOUTH DAILY 90 tablet 2 7/29/2024    APPLE CIDER VINEGAR PO Take 1 tablet by mouth Daily.   Past Month    Ascorbic Acid (VITAMIN C) 250 MG chewable tablet Chew 1 tablet Daily.   Past Month    B Complex-Biotin-FA (SUPER B-COMPLEX) tablet Take 1 tablet by mouth Daily.   Past Month    Cholecalciferol (VITAMIN D3) 2000  units tablet Take 1 tablet by mouth Daily.   Past Month    HYDROcodone-acetaminophen (NORCO) 5-325 MG per tablet Take 1 tablet by mouth Every 6 (Six) Hours As Needed for Moderate Pain. 25 tablet 0 7/29/2024    Inositol 324 MG tablet Take 2 tablets by mouth Every Morning Before Breakfast.   Past Month    levothyroxine (SYNTHROID, LEVOTHROID) 50 MCG tablet TAKE 1 TABLET BY MOUTH DAILY 90 tablet 1 7/29/2024    metFORMIN (GLUCOPHAGE) 500 MG tablet TAKE 1 TABLET BY MOUTH TWICE DAILY WITH MEALS 180 tablet 2 7/29/2024    TURMERIC CURCUMIN PO Take 2 tablets by mouth Daily.   Past Month    cyclobenzaprine (FLEXERIL) 10 MG tablet Take 1 tablet by mouth 3 (Three) Times a Day As Needed for Muscle Spasms. 30 tablet 1     fluticasone (FLONASE) 50 MCG/ACT nasal spray 2 sprays into the nostril(s) as directed by provider Daily As Needed for Rhinitis.       nystatin-triamcinolone (MYCOLOG II) 241043-7.1 UNIT/GM-% cream 1 Application 2 (Two) Times a Day As Needed.       traMADol (ULTRAM) 50 MG tablet Take 1-2 tablets by mouth Every 6 (Six) Hours As Needed for Moderate Pain or Severe Pain (post op pain). 60 tablet 0        Scheduled Meds:amLODIPine, 5 mg, Oral, Daily  ascorbic acid, 250 mg, Oral, Daily  cholecalciferol, 2,000 Units, Oral, Daily  insulin lispro, 2-7 Units, Subcutaneous, 4x Daily AC & at Bedtime  levothyroxine, 50 mcg, Oral, Q AM  melatonin, 10 mg, Oral, Nightly  QUEtiapine, 25 mg, Oral, Nightly  sodium chloride, 10 mL, Intravenous, Q12H  sodium chloride, 10 mL, Intravenous, Q12H      Continuous Infusions:   PRN Meds:.  acetaminophen    senna-docusate sodium **AND** polyethylene glycol **AND** bisacodyl **AND** bisacodyl    cyclobenzaprine    dextrose    dextrose    fentaNYL citrate (PF)    glucagon (human recombinant)    HYDROcodone-acetaminophen    labetalol    Magnesium Standard Dose Replacement - Follow Nurse / BPA Driven Protocol    nystatin-triamcinolone    ondansetron    Phosphorus Replacement - Follow Nurse / BPA  "Driven Protocol    polyethyl glycol-propyl glycol    [COMPLETED] Insert Peripheral IV **AND** sodium chloride    sodium chloride    sodium chloride    sodium chloride    sodium chloride    ALLERGIES:  Diphenhydramine, Morphine, Penicillins, and Latex    ROS:  Review of Systems   Respiratory:  Negative for cough and shortness of breath.    Cardiovascular:  Negative for chest pain.   Gastrointestinal:  Negative for abdominal pain, blood in stool, nausea and vomiting.   Neurological:  Positive for weakness and headaches.   Psychiatric/Behavioral:  Negative for agitation and confusion.        Objective     Vital Signs:   Visit Vitals  /68   Pulse 90   Temp 97.3 °F (36.3 °C) (Oral)   Resp 18   Ht 165.1 cm (65\")   Wt 78.4 kg (172 lb 13.5 oz)   SpO2 97%   BMI 28.76 kg/m²       Physical Exam:      General Appearance:    Awake and alert, drowsy   Head:    Normocephalic, without obvious abnormality, atraumatic   Eyes:            Conjunctivae normal, anicteric sclera   Ears:    Ears appear intact with no abnormalities noted   Throat:   No oral lesions, no thrush, oral mucosa moist       Lungs:     Respirations regular, even and unlabored       Chest Wall:    No abnormalities observed   Abdomen:     Soft, non-tender, no rebound or guarding, fistula at umbilicus with ostomy bag intact, feculent material coming from fistula site, non-distended, no hepatosplenomegaly   Rectal:     Deferred   Extremities:   No edema, no cyanosis, no  redness   Pulses:   Pulses palpable and equal bilaterally   Skin:   No bleeding, bruising or rash, no jaundice       Neurologic:   Sensation intact       Results Review:   I reviewed the patient's labs and imaging.  CBC  Results from last 7 days   Lab Units 08/05/24  0457 08/04/24  0315 08/03/24  0419 08/02/24  0354 08/01/24  0517 07/31/24  0054 07/29/24  1752   RBC 10*6/mm3 3.75* 4.04 4.38 4.34 3.73* 4.29 4.61   WBC 10*3/mm3 11.46* 12.33* 13.56* 14.45* 12.52* 16.33* 20.73*   HEMOGLOBIN g/dL 12.0 " 12.9 14.0 13.9 11.9* 13.8 14.8   PLATELETS 10*3/mm3 272 258 302 307 279 264 311       CMP  Results from last 7 days   Lab Units 08/05/24  0408 08/04/24  0315 08/03/24  0419 08/02/24  0354 08/01/24  1727 08/01/24  0517 07/31/24  0054 07/29/24  1752   SODIUM mmol/L 136 138 137 136  --  135* 132* 132*   POTASSIUM mmol/L 4.1 3.6 3.7 4.3 4.2 3.4* 3.6 3.7   CHLORIDE mmol/L 98 98 95* 94*  --  96* 93* 94*   CO2 mmol/L 31.9* 33.2* 30.5* 30.3*  --  30.6* 27.1 24.4   BUN mg/dL 16 18 14 11  --  11 9 10   CREATININE mg/dL 0.47* 0.47* 0.47* 0.51*  --  0.55* 0.53* 0.44*   GLUCOSE mg/dL 95 120* 140* 142*  --  135* 152* 150*   ALBUMIN g/dL 3.2* 3.4* 3.6 3.7  --  3.2* 3.5  --    BILIRUBIN mg/dL 0.6 0.6 0.7 0.5  --  0.3 0.4  --    ALK PHOS U/L 67 67 75 72  --  63 73  --    AST (SGOT) U/L 28 39* 31 29  --  20 21  --    ALT (SGPT) U/L 35* 30 31 26  --  20 25  --        Amylase and Lipase        CRP     Results from last 7 days   Lab Units 07/29/24  1752   CRP mg/dL 3.90*       Imaging Results (Last 24 Hours)       ** No results found for the last 24 hours. **              ASSESSMENT AND PLAN:  -Drainage from umbilical fistula with fluid and feculent material  -Recent diverticulitis 7/2/2024  -Cervical meningioma status post surgery 7/16  -Status post evacuation of pseudomeningocele and lumbar drain insertion -removal of drain 8/5  -History of hysterectomy 10 years ago complicated by small bowel injury with small bowel resection and reported fistula  -Leukocytosis  -Minimally elevated ALT  -Diabetes  -History of cholecystectomy  -History of breast and uterine cancer    Principal Problem:    Neck pain     Plan:  78-year-old female admitted 7/29/2024 with neck pain.  She had recent cervical meningioma surgery 7/16/2024.  Found to have large fluid collection and had post cervical evacuation of pseudomeningocele and lumbar drain insertion on 7/31/2024.  Today there was noticeable drainage from umbilicus and GI asked to consult due to  fistula.    Patient had diverticulitis diagnosed on 7/2/2024.  She was treated with antibiotics.  After the initial surgery on 7/16 she did have constipation and took stool softeners to help her bowels move at home.  She denies having abdominal pain or rectal bleeding.  Does report history of a fistula 10 years ago after complicated hysterectomy.  General surgery is following as well.  We will proceed with CT of the abdomen/pelvis today.  WBC 11.46.  ALT is 35 otherwise CMP unremarkable.  Continue supportive care and follow-up on CT findings.    I discussed the patients findings and my recommendations with the patient.  Avril Adrian, DANNA  08/05/24  15:27 EDT

## 2024-08-05 NOTE — THERAPY EVALUATION
Patient Name: Esperanza Smith  : 1945    MRN: [id number]                              Today's Date: 2024       Admit Date: 2024    Visit Dx:     ICD-10-CM ICD-9-CM   1. Neck pain  M54.2 723.1   2. Status post surgery  Z98.890 V45.89   3. Pseudomeningocele  G96.198 349.2     Patient Active Problem List   Diagnosis    Breast neoplasm, Tis (DCIS), right    Dermatophytosis    Screening for breast cancer    Enterocutaneous fistula    Hematuria    Hyperlipidemia    Hypertension    Intertrigo    Overweight    Vasovagal attack    Vitamin D deficiency    Medicare annual wellness visit, subsequent    Breast cancer    Obstructive sleep apnea syndrome    Other fatigue    Allergies    Need for vaccination    Age-related osteoporosis without current pathological fracture     Postmenopause    Need for hepatitis C screening test    Cervical spondylosis with radiculopathy    Intradural extramedullary spinal tumor    Type 2 diabetes mellitus    Acquired hypothyroidism    Neck pain     Past Medical History:   Diagnosis Date    Allergic Penicillian    Asthma congestion    Breast cancer     Cataract     Cervical disc disorder     Cholelithiasis surgery  removal    DDD (degenerative disc disease), lumbosacral     Diabetes mellitus     Diverticulitis     Diverticulosis     Headache     History of medical problems 54 inches left of small intesting    Hyperlipidemia     Hypertension     Hypothyroidism     IBS (irritable bowel syndrome)     Inflammatory bowel disease     Liver disease fatty liver    Low back pain Sciatic    Lumbosacral disc disease     Neuromuscular disorder neuropathy    Obesity     PONV (postoperative nausea and vomiting)     Seasonal allergies     Sleep apnea      Past Surgical History:   Procedure Laterality Date    BREAST BIOPSY      BREAST SURGERY Left 02/2012    x2     CERVICAL SPINAL CORD TUMOR REMOVAL N/A 2024    Procedure: Cervical 2-4 laminectomy for resection of intradural tumor;   Surgeon: Mario Urrutia IV, MD;  Location: [facility] MAIN OR;  Service: Neurosurgery;  Laterality: N/A;    CHOLECYSTECTOMY  1996    COLON RESECTION SMALL BOWEL  05/25/2014    COLON SURGERY  small intestine repair    HYSTERECTOMY      LYMPH NODE BIOPSY      MASTECTOMY      OOPHORECTOMY  1965    POSTERIOR CERVICAL FUSION N/A 7/31/2024    Procedure: LUMBAR DRAIN INSERTION REPAIR OF CERVICAL DURA;  Surgeon: Mario Urrutia IV, MD;  Location: [facility] MAIN OR;  Service: Neurosurgery;  Laterality: N/A;    SIMPLE MASTECTOMY Right 02/26/2020    Procedure: Right simple mastectomy;  Surgeon: Mario Quezada DO;  Location: [facility] MAIN OR;  Service: General;  Laterality: Right;    SMALL INTESTINE SURGERY      SUBTOTAL HYSTERECTOMY  full hysterectomy      General Information       Row Name 08/05/24 1624          OT Time and Intention    Document Type evaluation  -SR     Mode of Treatment occupational therapy  -SR       Row Name 08/05/24 1624          Occupational Profile    Reason for Services/Referral (Occupational Profile) Patient is a 79 y/o F who was admitted to [facility] on 7/29/24 with c/o worsening neck pain.  P had a C1-4 laminectomy ~2-3 weeks ago.  Since that procedure pt had been having worsening pain.  MRI performed showing large fluid collection noted within the posterior aspect of the neck at the site of C1, C2, C3 and C4 laminectomy.  This had grown since previous imaging and was concering for pseudomeningocele or CSF leak.  The expanding fluid collection in the posterior neck caused mild mass effect on the posterior aspect of the spinal cord with moderate to severe spinal canal stenosis and possible mild cord compression noted at the level of C3-C4.  Neurosurgery was consulted and recommended pt have surgery, and on 8/1/24 pt had a lumbar drain insertion and repair of cerivcal dura (pt currently POD#5).  Lumbar drain was removed this date.  PMHx includes type 2 diabetes mellitus, hypertension, hyperlipidemia, hypothyroidism,  obesity, breast cancer, uterine cancer w/recent fistula, and a recent neck surgery for meningioma of spinal cord.  At baseline, pt reports she is typically independent with all ADLs/mobility and was driving.  She lives at home with her spouse who is able to assist her some as needed.  -SR       Row Name 08/05/24 1624          Living Environment    People in Home spouse  -SR       Row Name 08/05/24 1624          Cognition    Orientation Status (Cognition) oriented x 4  -SR               User Key  (r) = Recorded By, (t) = Taken By, (c) = Cosigned By      Initials Name Provider Type    SR Maryse Linn, OT Occupational Therapist                     Mobility/ADL's       Row Name 08/05/24 1624          Bed Mobility    Bed Mobility bed mobility (all) activities  -SR     All Activities, [place] (Bed Mobility) minimum assist (75% patient effort)  -SR       Row Name 08/05/24 1624          Bed-Chair Transfer    Bed-Chair [place] (Transfers) minimum assist (75% patient effort)  -SR     Assistive Device (Bed-Chair Transfers) walker, front-wheeled  -SR       Row Name 08/05/24 1624          Sit-Stand Transfer    Sit-Stand [place] (Transfers) minimum assist (75% patient effort)  -SR     Assistive Device (Sit-Stand Transfers) walker, front-wheeled  -SR       Row Name 08/05/24 1624          Activities of Daily Living    BADL Assessment/Intervention lower body dressing  -SR       Row Name 08/05/24 1624          Lower Body Dressing Assessment/Training    [place] Level (Lower Body Dressing) don;socks;dependent (less than 25% patient effort)  -SR               User Key  (r) = Recorded By, (t) = Taken By, (c) = Cosigned By      Initials Name Provider Type    SR Maryse Linn OT Occupational Therapist                   Obj/Interventions       Row Name 08/05/24 1625          Range of Motion Comprehensive    General Range of Motion no range of motion deficits identified  -SR       Row Name 08/05/24  1625          Strength Comprehensive (MMT)    General Manual Muscle Testing (MMT) Assessment no strength deficits identified  -SR       Row Name 08/05/24 1625          Balance    Static Standing Balance contact guard  -SR     Dynamic Standing Balance minimal assist  -SR     Position/Device Used, Standing Balance walker, front-wheeled  -SR     Balance Interventions sitting;standing;sit to stand;supported;dynamic;static;minimal challenge;moderate challenge  -SR               User Key  (r) = Recorded By, (t) = Taken By, (c) = Cosigned By      Initials Name Provider Type    SR Maryse Linn, OT Occupational Therapist                   Goals/Plan       Row Name 08/05/24 1629          Toileting Goal 1 (OT)    Activity/Device (Toileting Goal 1, OT) toileting skills, all  -SR     [place] Level/Cues Needed (Toileting Goal 1, OT) moderate assist (50-74% patient effort)  -SR     Time Frame (Toileting Goal 1, OT) 2 weeks  -SR       Row Name 08/05/24 1629          Grooming Goal 1 (OT)    Activity/Device (Grooming Goal 1, OT) grooming skills, all  -SR     [place] (Grooming Goal 1, OT) contact guard required  -SR     Time Frame (Grooming Goal 1, OT) 2 weeks  -SR       [facility] Name 08/05/24 1629          Therapy Assessment/Plan (OT)    Planned Therapy Interventions (OT) activity tolerance training;BADL retraining;IADL retraining;functional balance retraining;neuromuscular control/coordination retraining;ROM/therapeutic exercise;transfer/mobility retraining;strengthening exercise;occupation/activity based interventions  -SR               User Key  (r) = Recorded By, (t) = Taken By, (c) = Cosigned By      Initials Name Provider Type    SR Maryse Linn OT Occupational Therapist                   Clinical Impression       Row Name 08/05/24 1626          Pain Assessment    Pretreatment Pain Rating 3/10  -SR     Posttreatment Pain Rating 3/10  -SR       Row Name 08/05/24 1626          Plan of Care Review     Outcome Evaluation Patient is a 77 y/o F who was admitted to [facility] on 7/29/24 with c/o worsening neck pain.  P had a C1-4 laminectomy ~2-3 weeks ago.  Since that procedure pt had been having worsening pain.  MRI performed showing large fluid collection noted within the posterior aspect of the neck at the site of C1, C2, C3 and C4 laminectomy.  This had grown since previous imaging and was concering for pseudomeningocele or CSF leak.  The expanding fluid collection in the posterior neck caused mild mass effect on the posterior aspect of the spinal cord with moderate to severe spinal canal stenosis and possible mild cord compression noted at the level of C3-C4.  Neurosurgery was consulted and recommended pt have surgery, and on 8/1/24 pt had a lumbar drain insertion and repair of cerivcal dura (pt currently POD#5).  Lumbar drain was removed this date.  PMHx includes type 2 diabetes mellitus, hypertension, hyperlipidemia, hypothyroidism, obesity, breast cancer, uterine cancer w/recent fistula, and a recent neck surgery for meningioma of spinal cord.  At baseline, pt reports she is typically independent with all ADLs/mobility and was driving.  She lives at home with her spouse who is able to assist her some as needed.  This date pt requires min assist for bed mobility and transfer to chair with cues for posture. She demos R tilt in neck.  Demos good ROM in BUE (to 90* and reaching to back of head), though did not complete MMT due to recent cervical surgery.  Pt is not safe to return home due to general weakness.  Recommend SNF at discharge.  -SR       Row Name 08/05/24 8103          Therapy Assessment/Plan (OT)    Rehab Potential (OT) good, to achieve stated therapy goals  -SR     Criteria for Skilled Therapeutic Interventions Met (OT) yes  -SR     Therapy Frequency (OT) 3 times/wk  -SR     Predicted Duration of Therapy Intervention (OT) Until discharge  -SR       Row Name 08/05/24 3421          Therapy Plan  Review/Discharge Plan (OT)    Anticipated Discharge Disposition (OT) skilled nursing facility  -SR       Row Name 08/05/24 1626          Positioning and Restraints    In Chair reclined;call light within reach;encouraged to call for assist;exit alarm on  -SR               User Key  (r) = Recorded By, (t) = Taken By, (c) = Cosigned By      Initials Name Provider Type    SR Maryse Linn, OT Occupational Therapist                   Outcome Measures       Row Name 08/05/24 1400          How much help from another person do you currently need...    Turning from your back to your side while in flat bed without using bedrails? 3  -EJ     Moving from lying on back to sitting on the side of a flat bed without bedrails? 3  -EJ     Moving to and from a bed to a chair (including a wheelchair)? 3  -EJ     Standing up from a chair using your arms (e.g., wheelchair, bedside chair)? 3  -EJ     Climbing 3-5 steps with a railing? 2  -EJ     To walk in hospital room? 3  -EJ     AM-PAC 6 Clicks Score (PT) 17  -EJ     Highest Level of Mobility Goal 5 --> Static standing  -EJ       Row Name 08/05/24 1400          Functional Assessment    Outcome Measure Options AM-PAC 6 Clicks Basic Mobility (PT)  -EJ               User Key  (r) = Recorded By, (t) = Taken By, (c) = Cosigned By      Initials Name Provider Type    EJ Mayte Bell, PT Physical Therapist                    Occupational Therapy Education       Title: PT OT SLP Therapies (In Progress)       Topic: Occupational Therapy (In Progress)       Point: ADL training (In Progress)       Description:   Instruct learner(s) on proper safety adaptation and remediation techniques during self care or transfers.   Instruct in proper use of assistive devices.                  Learning Progress Summary             Patient Acceptance, E,TB, NR by SR at 8/5/2024 1630    Acceptance, E,TB, VU by MC at 8/2/2024 1335    Acceptance, E,TB, VU by PG at 7/30/2024 1837    Acceptance, E, VU by  AE at 7/30/2024 0101                         Point: Precautions (Done)       Description:   Instruct learner(s) on prescribed precautions during self-care and functional transfers.                  Learning Progress Summary             Patient Acceptance, E,TB, VU by MC at 8/2/2024 1335    Acceptance, E,TB, VU by PG at 7/30/2024 1837    Acceptance, E, VU by AE at 7/30/2024 0101                         Point: Body mechanics (In Progress)       Description:   Instruct learner(s) on proper positioning and spine alignment during self-care, functional mobility activities and/or exercises.                  Learning Progress Summary             Patient Acceptance, E,TB, NR by  at 8/5/2024 1630    Acceptance, E,TB, VU by MC at 8/2/2024 1335    Acceptance, E,TB, VU by PG at 7/30/2024 1837    Acceptance, E, VU by AE at 7/30/2024 0101                                         User Key       Initials Effective Dates Name Provider Type Discipline     06/16/21 -  Maryse Linn OT Occupational Therapist OT     11/10/21 -  Carolyn Reilly, RN Registered Nurse Nurse     01/25/24 -  Diogenes Johnson, RN Registered Nurse Nurse     11/28/23 -  Wally Cat, RN Registered Nurse Nurse                  OT Recommendation and Plan  Planned Therapy Interventions (OT): activity tolerance training, BADL retraining, IADL retraining, functional balance retraining, neuromuscular control/coordination retraining, ROM/therapeutic exercise, transfer/mobility retraining, strengthening exercise, occupation/activity based interventions  Therapy Frequency (OT): 3 times/wk  Plan of Care Review  Outcome Evaluation: Patient is a 77 y/o F who was admitted to [facility] on 7/29/24 with c/o worsening neck pain.  P had a C1-4 laminectomy ~2-3 weeks ago.  Since that procedure pt had been having worsening pain.  MRI performed showing large fluid collection noted within the posterior aspect of the neck at the site of C1, C2, C3 and C4 laminectomy.   This had grown since previous imaging and was concering for pseudomeningocele or CSF leak.  The expanding fluid collection in the posterior neck caused mild mass effect on the posterior aspect of the spinal cord with moderate to severe spinal canal stenosis and possible mild cord compression noted at the level of C3-C4.  Neurosurgery was consulted and recommended pt have surgery, and on 8/1/24 pt had a lumbar drain insertion and repair of cerivcal dura (pt currently POD#5).  Lumbar drain was removed this date.  PMHx includes type 2 diabetes mellitus, hypertension, hyperlipidemia, hypothyroidism, obesity, breast cancer, uterine cancer w/recent fistula, and a recent neck surgery for meningioma of spinal cord.  At baseline, pt reports she is typically independent with all ADLs/mobility and was driving.  She lives at home with her spouse who is able to assist her some as needed.  This date pt requires min assist for bed mobility and transfer to chair with cues for posture. She demos R tilt in neck.  Demos good ROM in BUE (to 90* and reaching to back of head), though did not complete MMT due to recent cervical surgery.  Pt is not safe to return home due to general weakness.  Recommend SNF at discharge.     Time Calculation:         Time Calculation- OT       Row Name 08/05/24 1630             Time Calculation- OT    OT Start Time 0915  -SR      OT Stop Time 0940  -SR      OT Time Calculation (min) 25 min  -SR      Total Timed Code Minutes- OT 0 minute(s)  -SR      OT Received On 08/05/24  -SR      OT - Next Appointment 08/07/24  -SR      OT Goal Re-Cert Due Date 08/19/24  -SR                User Key  (r) = Recorded By, (t) = Taken By, (c) = Cosigned By      Initials Name Provider Type    SR Maryse Linn, OT Occupational Therapist                  Therapy Charges for Today       Code Description Service Date Service Provider Modifiers Qty    [id number] HC OT EVAL MOD COMPLEXITY 4 8/5/2024 Maryse Linn  JIMENA, OT GO 1                 Maryse Linn, OT  8/5/2024

## 2024-08-05 NOTE — THERAPY EVALUATION
Patient Name: Esperanza Smith  : 1945    MRN: [id number]                              Today's Date: 2024       Admit Date: 2024    Visit Dx:     ICD-10-CM ICD-9-CM   1. Neck pain  M54.2 723.1   2. Status post surgery  Z98.890 V45.89   3. Pseudomeningocele  G96.198 349.2     Patient Active Problem List   Diagnosis    Breast neoplasm, Tis (DCIS), right    Dermatophytosis    Screening for breast cancer    Enterocutaneous fistula    Hematuria    Hyperlipidemia    Hypertension    Intertrigo    Overweight    Vasovagal attack    Vitamin D deficiency    Medicare annual wellness visit, subsequent    Breast cancer    Obstructive sleep apnea syndrome    Other fatigue    Allergies    Need for vaccination    Age-related osteoporosis without current pathological fracture     Postmenopause    Need for hepatitis C screening test    Cervical spondylosis with radiculopathy    Intradural extramedullary spinal tumor    Type 2 diabetes mellitus    Acquired hypothyroidism    Neck pain     Past Medical History:   Diagnosis Date    Allergic Penicillian    Asthma congestion    Breast cancer     Cataract     Cervical disc disorder     Cholelithiasis surgery  removal    DDD (degenerative disc disease), lumbosacral     Diabetes mellitus     Diverticulitis     Diverticulosis     Headache     History of medical problems 54 inches left of small intesting    Hyperlipidemia     Hypertension     Hypothyroidism     IBS (irritable bowel syndrome)     Inflammatory bowel disease     Liver disease fatty liver    Low back pain Sciatic    Lumbosacral disc disease     Neuromuscular disorder neuropathy    Obesity     PONV (postoperative nausea and vomiting)     Seasonal allergies     Sleep apnea      Past Surgical History:   Procedure Laterality Date    BREAST BIOPSY      BREAST SURGERY Left 02/2012    x2     CERVICAL SPINAL CORD TUMOR REMOVAL N/A 2024    Procedure: Cervical 2-4 laminectomy for resection of intradural tumor;   Surgeon: Mario Urrutia IV, MD;  Location: [facility] MAIN OR;  Service: Neurosurgery;  Laterality: N/A;    CHOLECYSTECTOMY  1996    COLON RESECTION SMALL BOWEL  05/25/2014    COLON SURGERY  small intestine repair    HYSTERECTOMY      LYMPH NODE BIOPSY      MASTECTOMY      OOPHORECTOMY  1965    POSTERIOR CERVICAL FUSION N/A 7/31/2024    Procedure: LUMBAR DRAIN INSERTION REPAIR OF CERVICAL DURA;  Surgeon: Mario Urrutia IV, MD;  Location: [facility] MAIN OR;  Service: Neurosurgery;  Laterality: N/A;    SIMPLE MASTECTOMY Right 02/26/2020    Procedure: Right simple mastectomy;  Surgeon: Mario Quezada DO;  Location: [facility] MAIN OR;  Service: General;  Laterality: Right;    SMALL INTESTINE SURGERY      SUBTOTAL HYSTERECTOMY  full hysterectomy      General Information       Row Name 08/05/24 1138          Physical Therapy Time and Intention    Document Type evaluation  -EJ     Mode of Treatment physical therapy  -       Row Name 08/05/24 1138          General Information    Patient Profile Reviewed yes  -EJ     Prior Level of Function independent:  Prior to recent surgeries/neck issues pt was independent at home with ADLs/mobility.  Owns a RW, but typically did not need it.  -EJ     Existing Precautions/Restrictions fall;oxygen therapy device and L/min  Currently on 4L O2 NC, but not on O2 at baseline.  -EJ     Barriers to Rehab medically complex  -       Row Name 08/05/24 1138          Living Environment    People in Home spouse  -EJ     Name(s) of People in Home Antelmo ()  -EJ       Row Name 08/05/24 1138          Home Main Entrance    Number of Stairs, Main Entrance four  -EJ     Stair Railings, Main Entrance railings safe and in good condition  -EJ       Row Name 08/05/24 1138          Stairs Within Home, Primary    Number of Stairs, Within Home, Primary none  -EJ       Row Name 08/05/24 1138          Cognition    Orientation Status (Cognition) oriented x 4  -EJ       Row Name 08/05/24 1138          Safety Issues,  Functional Mobility    Impairments Affecting Function (Mobility) balance;pain;strength;range of motion (ROM);endurance/activity tolerance  -EJ               User Key  (r) = Recorded By, (t) = Taken By, (c) = Cosigned By      Initials Name Provider Type    Mayte Guo, PT Physical Therapist                   Mobility       Row Name 08/05/24 1143          Bed Mobility    Bed Mobility bed mobility (all) activities  -EJ     All Activities, Randall (Bed Mobility) minimum assist (75% patient effort)  -EJ       Row Name 08/05/24 1143          Bed-Chair Transfer    Bed-Chair Randall (Transfers) minimum assist (75% patient effort)  -EJ     Assistive Device (Bed-Chair Transfers) walker, front-wheeled  -EJ       Row Name 08/05/24 1143          Sit-Stand Transfer    Sit-Stand Randall (Transfers) minimum assist (75% patient effort)  -EJ     Assistive Device (Sit-Stand Transfers) walker, front-wheeled  -EJ       Row Name 08/05/24 1143          Gait/Stairs (Locomotion)    Randall Level (Gait) minimum assist (75% patient effort)  -EJ     Assistive Device (Gait) walker, front-wheeled  -EJ     Patient was able to Ambulate yes  -EJ     Distance in Feet (Gait) 2  -EJ     Deviations/Abnormal Patterns (Gait) gait speed decreased;stride length decreased;paulino decreased  -EJ     Bilateral Gait Deviations forward flexed posture;heel strike decreased  -EJ     Comment, (Gait/Stairs) Pt required min A for maneuvering walker properly.  Had small steps this date, and required cues for step sequencing.  -EJ               User Key  (r) = Recorded By, (t) = Taken By, (c) = Cosigned By      Initials Name Provider Type    Mayte Guo, PT Physical Therapist                   Obj/Interventions       Row Name 08/05/24 1144          Range of Motion Comprehensive    Comment, General Range of Motion Pt with reduced left rotation and sidebending of cervical spine.  Has reduced cervical spine extension.  Hold head in  slightly forward flexed posture and sidebent/rotated slightly to the right.  Encouraged pt to perform gentle mild ROM to left as tolerated and to reduce forward head positioning.  BLE AROM grossly WFL.  See OT eval for UE assessment.  -[facility] Name 08/05/24 1144          Strength Comprehensive (MMT)    Comment, General Manual Muscle Testing (MMT) Assessment BLE strength grossly 3+/5.  See OT eval for UE assessment.  -[facility] Name 08/05/24 1144          Motor Skills    Motor Skills functional endurance  -EJ     Functional Endurance Fair  -[facility] Name 08/05/24 1144          Balance    Balance Assessment sitting static balance;sitting dynamic balance;standing static balance;sit to stand dynamic balance;standing dynamic balance  -EJ     Static Sitting Balance supervision  -EJ     Dynamic Sitting Balance contact guard  -EJ     Position, Sitting Balance sitting edge of bed  -EJ     Sit to Stand Dynamic Balance minimal assist  -EJ     Static Standing Balance contact guard  -EJ     Dynamic Standing Balance minimal assist  -EJ     Position/Device Used, Standing Balance walker, front-wheeled  -EJ       Row Name 08/05/24 1144          Sensory Assessment (Somatosensory)    Sensory Assessment (Somatosensory) not tested  -EJ               User Key  (r) = Recorded By, (t) = Taken By, (c) = Cosigned By      Initials Name Provider Type     Mayte Bell D, PT Physical Therapist                   Goals/Plan       Row Name 08/05/24 1352          Bed Mobility Goal 1 (PT)    Activity/Assistive Device (Bed Mobility Goal 1, PT) bed mobility activities, all  -EJ     [place] Level/Cues Needed (Bed Mobility Goal 1, PT) standby assist  -EJ     Time Frame (Bed Mobility Goal 1, PT) long term goal (LTG);2 weeks  -EJ       Row Name 08/05/24 1357          Transfer Goal 1 (PT)    Activity/Assistive Device (Transfer Goal 1, PT) transfers, all;walker, rolling  -EJ     [place] Level/Cues Needed (Transfer Goal 1, PT) standby  assist  -EJ     Time Frame (Transfer Goal 1, PT) long term goal (LTG);2 weeks  -EJ       Row Name 08/05/24 1352          Gait Training Goal 1 (PT)    Activity/Assistive Device (Gait Training Goal 1, PT) gait (walking locomotion);walker, rolling  -EJ     [place] Level (Gait Training Goal 1, PT) contact guard required  -EJ     Distance (Gait Training Goal 1, PT) 100  -EJ     Time Frame (Gait Training Goal 1, PT) long term goal (LTG);2 weeks  -EJ       Row Name 08/05/24 1356          Therapy Assessment/Plan (PT)    Planned Therapy Interventions (PT) balance training;bed mobility training;gait training;postural re-education;patient/family education;neuromuscular re-education;stair training;ROM (range of motion);strengthening;transfer training  -EJ               User Key  (r) = Recorded By, (t) = Taken By, (c) = Cosigned By      Initials Name Provider Type    EJ Mayte Bell, PT Physical Therapist                   Clinical Impression       Row Name 08/05/24 0679          Pain    Pretreatment Pain Rating 3/10  -EJ     Posttreatment Pain Rating 3/10  -EJ     Pain Location incisional  -EJ     Pain Location - neck;head  -EJ     Pre/Posttreatment Pain Comment Monitied pt's pain throughout and monitored headache occurrance.  Pt reported pain in neck/head 3/10 throughout treatment.  -EJ     Pain Intervention(s) Repositioned;Therapeutic presence;Emotional support  -EJ       Row Name 08/05/24 0851          Plan of Care Review    Plan of Care Reviewed With patient  -EJ     Outcome Evaluation Patient is a 79 y/o F who was admitted to [facility] on 7/29/24 with c/o worsening neck pain.  P had a C1-4 laminectomy ~2-3 weeks ago.  Since that procedure pt had been having worsening pain.  MRI performed showing large fluid collection noted within the posterior aspect of the neck at the site of C1, C2, C3 and C4 laminectomy.  This had grown since previous imaging and was concering for pseudomeningocele or CSF leak.  The expanding fluid  collection in the posterior neck caused mild mass effect on the posterior aspect of the spinal cord with moderate to severe spinal canal stenosis and possible mild cord compression noted at the level of C3-C4.  Neurosurgery was consulted and recommended pt have surgery, and on 8/1/24 pt had a lumbar drain insertion and repair of cerivcal dura (pt currently POD#5).  Lumbar drain was removed this date.  PMHx includes type 2 diabetes mellitus, hypertension, hyperlipidemia, hypothyroidism, obesity, breast cancer, uterine cancer w/recent fistula, and a recent neck surgery for meningioma of spinal cord.  At baseline, pt reports she is typically independent with all ADLs/mobility and was driving.  She lives at home with her spouse who is able to assist her some as needed.  During today's evaluation, pt required min A with supine to sit, sit to stand, and with short distance gait bed to chair.  She required cues and min A with maneuvering RW safely this date.  Mild balance deficit present, and notable weakness.  She has reduced AROM of cervical spine and hold head in slightly flexed and sidebent/rotated position to the right. Encouraged pt to gentle perform cervical rotation and sidebending to the left as tolerated and gently.  Also education provided on reducing forward head posture.  At this time, based on today's findings, PT recommends pt d/c to IRF to improve her strength, balance, and overall mobility/ROM.  PT will continue to follow pt in acute setting.  -       Row Name 08/05/24 1145          Therapy Assessment/Plan (PT)    Criteria for Skilled Interventions Met (PT) yes;skilled treatment is necessary  -     Therapy Frequency (PT) 5 times/wk  -     Predicted Duration of Therapy Intervention (PT) until discharge  -       Row Name 08/05/24 1148          Vital Signs    Recovery Time VSS throughout session.  -       Row Name 08/05/24 1147          Positioning and Restraints    Pre-Treatment Position in bed   -EJ     Post Treatment Position chair  -EJ     In Chair reclined;call light within reach;encouraged to call for assist;exit alarm on  -EJ               User Key  (r) = Recorded By, (t) = Taken By, (c) = Cosigned By      Initials Name Provider Type    Mayte Guo, PT Physical Therapist                   Outcome Measures       Row Name 08/05/24 1400 08/05/24 0405       How much help from another person do you currently need...    Turning from your back to your side while in flat bed without using bedrails? 3  -EJ 3  -RH    Moving from lying on back to sitting on the side of a flat bed without bedrails? 3  -EJ 2  -RH    Moving to and from a bed to a chair (including a wheelchair)? 3  -EJ 1  -RH    Standing up from a chair using your arms (e.g., wheelchair, bedside chair)? 3  -EJ 1  -RH    Climbing 3-5 steps with a railing? 2  -EJ 1  -RH    To walk in hospital room? 3  -EJ 1  -RH    AM-PAC 6 Clicks Score (PT) 17  -EJ 9  -RH    Highest Level of Mobility Goal 5 --> Static standing  -EJ 3 --> Sit at edge of bed  -RH      Row Name 08/05/24 1400          Functional Assessment    Outcome Measure Options AM-PAC 6 Clicks Basic Mobility (PT)  -EJ               User Key  (r) = Recorded By, (t) = Taken By, (c) = Cosigned By      Initials Name Provider Type    Mayte Guo, PT Physical Therapist     Marco Antonio Chau, RN Registered Nurse                                 Physical Therapy Education       Title: PT OT SLP Therapies (In Progress)       Topic: Physical Therapy (In Progress)       Point: Mobility training (Done)       Learning Progress Summary             Patient Acceptance, E, VU by PAM at 8/5/2024 1400    Acceptance, E,TB, VU by DASHAWN at 8/2/2024 1335    Acceptance, E,TB, VU by PG at 7/30/2024 1837    Acceptance, E, VU by AE at 7/30/2024 0101                         Point: Home exercise program (Not Started)       Learner Progress:  Not documented in this visit.              Point: Body mechanics (Done)        Learning Progress Summary             Patient Acceptance, E, VU by EJ at 8/5/2024 1400    Acceptance, E,TB, VU by MC at 8/2/2024 1335    Acceptance, E,TB, VU by PG at 7/30/2024 1837    Acceptance, E, VU by AE at 7/30/2024 0101                         Point: Precautions (Done)       Learning Progress Summary             Patient Acceptance, E, VU by EJ at 8/5/2024 1400    Acceptance, E,TB, VU by MC at 8/2/2024 1335    Acceptance, E,TB, VU by PG at 7/30/2024 1837    Acceptance, E, VU by AE at 7/30/2024 0101                                         User Key       Initials Effective Dates Name Provider Type Discipline     06/03/24 -  Mayte Bell, PT Physical Therapist PT    PG 11/10/21 -  Carolyn Reilly, RN Registered Nurse Nurse     01/25/24 -  Diogenes Johnson, RN Registered Nurse Nurse     11/28/23 -  Wally Cat, RN Registered Nurse Nurse                  PT Recommendation and Plan  Planned Therapy Interventions (PT): balance training, bed mobility training, gait training, postural re-education, patient/family education, neuromuscular re-education, stair training, ROM (range of motion), strengthening, transfer training  Plan of Care Reviewed With: patient  Outcome Evaluation: Patient is a 79 y/o F who was admitted to [facility] on 7/29/24 with c/o worsening neck pain.  P had a C1-4 laminectomy ~2-3 weeks ago.  Since that procedure pt had been having worsening pain.  MRI performed showing large fluid collection noted within the posterior aspect of the neck at the site of C1, C2, C3 and C4 laminectomy.  This had grown since previous imaging and was concering for pseudomeningocele or CSF leak.  The expanding fluid collection in the posterior neck caused mild mass effect on the posterior aspect of the spinal cord with moderate to severe spinal canal stenosis and possible mild cord compression noted at the level of C3-C4.  Neurosurgery was consulted and recommended pt have surgery, and on 8/1/24 pt had a lumbar  drain insertion and repair of cerivcal dura (pt currently POD#5).  Lumbar drain was removed this date.  PMHx includes type 2 diabetes mellitus, hypertension, hyperlipidemia, hypothyroidism, obesity, breast cancer, uterine cancer w/recent fistula, and a recent neck surgery for meningioma of spinal cord.  At baseline, pt reports she is typically independent with all ADLs/mobility and was driving.  She lives at home with her spouse who is able to assist her some as needed.  During today's evaluation, pt required min A with supine to sit, sit to stand, and with short distance gait bed to chair.  She required cues and min A with maneuvering RW safely this date.  Mild balance deficit present, and notable weakness.  She has reduced AROM of cervical spine and hold head in slightly flexed and sidebent/rotated position to the right. Encouraged pt to gentle perform cervical rotation and sidebending to the left as tolerated and gently.  Also education provided on reducing forward head posture.  At this time, based on today's findings, PT recommends pt d/c to IRF to improve her strength, balance, and overall mobility/ROM.  PT will continue to follow pt in acute setting.     Time Calculation:         PT Charges       Row Name 08/05/24 1404             Time Calculation    Start Time 0920  -EJ      Stop Time 0947  -EJ      Time Calculation (min) 27 min  -EJ      PT Received On 08/05/24  -EJ      PT - Next Appointment 08/06/24  -EJ      PT Goal Re-Cert Due Date 08/19/24  -EJ         Time Calculation- PT    Total Timed Code Minutes- PT 10 minute(s)  -EJ                User Key  (r) = Recorded By, (t) = Taken By, (c) = Cosigned By      Initials Name Provider Type     Mayte Bell, PT Physical Therapist                  Therapy Charges for Today       Code Description Service Date Service Provider Modifiers Qty    27976789681  PT EVAL MOD COMPLEXITY 3 8/5/2024 Mayte Bell, PT GP 1    [id number]  PT THERAPEUTIC ACT EA 15  MIN 8/5/2024 Mayte Bell, PT GP 1            PT G-Codes  Outcome Measure Options: AM-PAC 6 Clicks Basic Mobility (PT)  AM-PAC 6 Clicks Score (PT): 17  PT Discharge Summary  Anticipated Discharge Disposition (PT): inpatient rehabilitation facility    Mayte Bell, PT  8/5/2024

## 2024-08-05 NOTE — PROGRESS NOTES
Nutrition Services    Patient Name: Esperanza Smith  YOB: 1945  MRN: [id number]  Admission date: 7/29/2024    PROGRESS NOTE      Encounter Information: Check on for PO intakes.  Lumbar drain removed 8/5 per neurosurgery.  Patient downgraded to [facility] level of care.  Surgery consulted for leaking umbilical fistula.         PO Diet: Diet: Diabetic; Consistent Carbohydrate; Fluid Consistency: Thin (IDDSI 0)   PO Supplements: None ordered    PO Intake:  58% average PO intakes x 6 documented meals since admission       Current nutrition support:    Nutrition support review:        Labs (reviewed below): Reviewed, management per attending        GI Function:  Last documented BN 8/2 (x 3 days ago)       Nutrition Intervention Updates: Continue current diet and encourage good PO intakes.        Results from last 7 days   Lab Units 08/05/24 0408 08/04/24 0315 08/03/24  0419   SODIUM mmol/L 136 138 137   POTASSIUM mmol/L 4.1 3.6 3.7   CHLORIDE mmol/L 98 98 95*   CO2 mmol/L 31.9* 33.2* 30.5*   BUN mg/dL 16 18 14   CREATININE mg/dL 0.47* 0.47* 0.47*   CALCIUM mg/dL 10.1 10.0 10.7*   BILIRUBIN mg/dL 0.6 0.6 0.7   ALK PHOS U/L 67 67 75   ALT (SGPT) U/L 35* 30 31   AST (SGOT) U/L 28 39* 31   GLUCOSE mg/dL 95 120* 140*     Results from last 7 days   Lab Units 08/05/24  0457 08/05/24 0408 08/04/24 0315 08/03/24  0419   MAGNESIUM mg/dL  --  1.6 1.8 1.8   PHOSPHORUS mg/dL  --  3.4 3.5 3.9   HEMOGLOBIN g/dL 12.0  --  12.9 14.0   HEMATOCRIT % 36.5  --  38.9 41.6     COVID19   Date Value Ref Range Status   01/05/2024 Not Detected Not Detected - Ref. Range Final     Lab Results   Component Value Date    HGBA1C 6.10 (H) 07/08/2024       RD to follow up per protocol.    Electronically signed by:  Luma Wing RD  08/05/24 14:33 EDT

## 2024-08-05 NOTE — PLAN OF CARE
Goal Outcome Evaluation:  Plan of Care Reviewed With: patient           Outcome Evaluation: Patient is a 77 y/o F who was admitted to [facility] on 7/29/24 with c/o worsening neck pain.  P had a C1-4 laminectomy ~2-3 weeks ago.  Since that procedure pt had been having worsening pain.  MRI performed showing large fluid collection noted within the posterior aspect of the neck at the site of C1, C2, C3 and C4 laminectomy.  This had grown since previous imaging and was concering for pseudomeningocele or CSF leak.  The expanding fluid collection in the posterior neck caused mild mass effect on the posterior aspect of the spinal cord with moderate to severe spinal canal stenosis and possible mild cord compression noted at the level of C3-C4.  Neurosurgery was consulted and recommended pt have surgery, and on 8/1/24 pt had a lumbar drain insertion and repair of cerivcal dura (pt currently POD#5).  Lumbar drain was removed this date.  PMHx includes type 2 diabetes mellitus, hypertension, hyperlipidemia, hypothyroidism, obesity, breast cancer, uterine cancer w/recent fistula, and a recent neck surgery for meningioma of spinal cord.  At baseline, pt reports she is typically independent with all ADLs/mobility and was driving.  She lives at home with her spouse who is able to assist her some as needed.  During today's evaluation, pt required min A with supine to sit, sit to stand, and with short distance gait bed to chair.  She required cues and min A with maneuvering RW safely this date.  Mild balance deficit present, and notable weakness.  She has reduced AROM of cervical spine and hold head in slightly flexed and sidebent/rotated position to the right. Encouraged pt to gentle perform cervical rotation and sidebending to the left as tolerated and gently.  Also education provided on reducing forward head posture.  At this time, based on today's findings, PT recommends pt d/c to IRF to improve her strength, balance, and overall  mobility/ROM.  PT will continue to follow pt in acute setting.      Anticipated Discharge Disposition (PT): inpatient rehabilitation facility

## 2024-08-05 NOTE — NURSING NOTE
Per phone conversation with Louise Santana NP, hold antibiotics until tomorrow when Dr. Khanna rounds. Does not know if blood cultures should be collected through PICC line. NP wants to discuss this with Dr. Khanna before getting blood cultures or antibiotics.

## 2024-08-05 NOTE — PLAN OF CARE
Goal Outcome Evaluation:              Outcome Evaluation: Patient is a 77 y/o F who was admitted to [facility] on 7/29/24 with c/o worsening neck pain.  P had a C1-4 laminectomy ~2-3 weeks ago.  Since that procedure pt had been having worsening pain.  MRI performed showing large fluid collection noted within the posterior aspect of the neck at the site of C1, C2, C3 and C4 laminectomy.  This had grown since previous imaging and was concering for pseudomeningocele or CSF leak.  The expanding fluid collection in the posterior neck caused mild mass effect on the posterior aspect of the spinal cord with moderate to severe spinal canal stenosis and possible mild cord compression noted at the level of C3-C4.  Neurosurgery was consulted and recommended pt have surgery, and on 8/1/24 pt had a lumbar drain insertion and repair of cerivcal dura (pt currently POD#5).  Lumbar drain was removed this date.  PMHx includes type 2 diabetes mellitus, hypertension, hyperlipidemia, hypothyroidism, obesity, breast cancer, uterine cancer w/recent fistula, and a recent neck surgery for meningioma of spinal cord.  At baseline, pt reports she is typically independent with all ADLs/mobility and was driving.  She lives at home with her spouse who is able to assist her some as needed.  This date pt requires min assist for bed mobility and transfer to chair with cues for posture. She demos R tilt in neck.  Demos good ROM in BUE (to 90* and reaching to back of head), though did not complete MMT due to recent cervical surgery.  Pt is not safe to return home due to general weakness.  Recommend SNF at discharge.      Anticipated Discharge Disposition (OT): skilled nursing facility

## 2024-08-05 NOTE — PROGRESS NOTES
"  Chief complaint : confusion     Subjective .     History of present illness:  The patient is a 78 y.o. female who was admitted secondary to neck pain. Recent cervical meningioma and laminectomy on 7/16/2024, admitted this admission for complications related to procedure.  PMH:  Breast cancer, HTN, HLD, hypothyroidism, cervical disc disorder, sleep apnea, asthma.  Psychiatry consulted secondary to agitation, and hallucinations.     Per RN pt has been intermittently agitated, paranoid, and observed to be responding to visual hallucinations. RN reported pt had sudden change in behavior and mood and worse in the evenings. Symptoms were interfering with treatment. Acute process ruled out with MOODY.   Pt seen today, pt has lumbar drain, must remain flat   Oriented to self, place, (hospital), situation. Disoriented to day but accurate year reported  Reports not sleeping well, generally uncomfortable but understood why she needed to be flat. Pt reported she has had multiple surgeries and hospitalizations since she was in her 20s. Reports her family has witnessed a decline in her cognition over the last few years or so. Pt reports she is sometimes forgetful or \"unlike myself and they seem to notice when I am\"  Pt reports she has had previous episodes of severe confusion while in the hospital. Psychiatric hx unremarkable , denied previous psychiatric hospitalizations, attempts. Pt believes she may have struggled with depression and anxiety before but believed this was related to previous breast cancer and hysterectomy , unable to have children as a result.   Pt denied feeling depressed or hopeless now   Endorsed feeling bored, and uncomfortable, unable to sleep due to this and environment. Reported having visual impairment, she was unable to view her tv, has glasses at the bedside but they do not help.   She enjoys reading but cannot hold a book in the position she is in. Expressed interest in audio books which she has on " her phone and music.  Unable to locate the pts phone.      Denied AVH/SI/HI  Denies substance abuse including alcohol and illicit and prescription drugs            Today   Pt is upright, alert watching tv   Calm, pleasant and smiling   Remembered seeing this provider yesterday   Pt reports sleeping well overnight, oriented to self place, situation. Expressed feeling better since she can sit up now.   Reports decreased appetite today   No family at bedside   No events reported overnight .    8/5/24: Patient seen this AM for the first time by this provider. She was alert and oriented. She reported not sleeping well due to a fire alarm in the hospital last night. Nursing reports she did well overnight, no hallucinations.       Review of Systems   Pertinent items are noted in HPI, all other systems reviewed and negative    The following portions of the patient's history were reviewed and updated as appropriate: allergies, current medications, past family history, past medical history, past social history, past surgical history and problem list.    History       Medications Prior to Admission   Medication Sig Dispense Refill Last Dose    acetaminophen (TYLENOL) 325 MG tablet Take 2 tablets by mouth Every 6 (Six) Hours As Needed for Mild Pain.   7/29/2024    amLODIPine (NORVASC) 5 MG tablet TAKE 1 TABLET BY MOUTH DAILY 90 tablet 2 7/29/2024    APPLE CIDER VINEGAR PO Take 1 tablet by mouth Daily.   Past Month    Ascorbic Acid (VITAMIN C) 250 MG chewable tablet Chew 1 tablet Daily.   Past Month    B Complex-Biotin-FA (SUPER B-COMPLEX) tablet Take 1 tablet by mouth Daily.   Past Month    Cholecalciferol (VITAMIN D3) 2000 units tablet Take 1 tablet by mouth Daily.   Past Month    HYDROcodone-acetaminophen (NORCO) 5-325 MG per tablet Take 1 tablet by mouth Every 6 (Six) Hours As Needed for Moderate Pain. 25 tablet 0 7/29/2024    Inositol 324 MG tablet Take 2 tablets by mouth Every Morning Before Breakfast.   Past Month     levothyroxine (SYNTHROID, LEVOTHROID) 50 MCG tablet TAKE 1 TABLET BY MOUTH DAILY 90 tablet 1 7/29/2024    metFORMIN (GLUCOPHAGE) 500 MG tablet TAKE 1 TABLET BY MOUTH TWICE DAILY WITH MEALS 180 tablet 2 7/29/2024    TURMERIC CURCUMIN PO Take 2 tablets by mouth Daily.   Past Month    cyclobenzaprine (FLEXERIL) 10 MG tablet Take 1 tablet by mouth 3 (Three) Times a Day As Needed for Muscle Spasms. 30 tablet 1     fluticasone (FLONASE) 50 MCG/ACT nasal spray 2 sprays into the nostril(s) as directed by provider Daily As Needed for Rhinitis.       nystatin-triamcinolone (MYCOLOG II) 725022-9.1 UNIT/GM-% cream 1 Application 2 (Two) Times a Day As Needed.       traMADol (ULTRAM) 50 MG tablet Take 1-2 tablets by mouth Every 6 (Six) Hours As Needed for Moderate Pain or Severe Pain (post op pain). 60 tablet 0         Scheduled Meds:  amLODIPine, 5 mg, Oral, Daily  ascorbic acid, 250 mg, Oral, Daily  cholecalciferol, 2,000 Units, Oral, Daily  insulin lispro, 2-7 Units, Subcutaneous, 4x Daily AC & at Bedtime  levothyroxine, 50 mcg, Oral, Q AM  melatonin, 10 mg, Oral, Nightly  QUEtiapine, 25 mg, Oral, Nightly  sodium chloride, 10 mL, Intravenous, Q12H  sodium chloride, 10 mL, Intravenous, Q12H         Continuous Infusions:       PRN Meds:    acetaminophen    senna-docusate sodium **AND** polyethylene glycol **AND** bisacodyl **AND** bisacodyl    cyclobenzaprine    dextrose    dextrose    fentaNYL citrate (PF)    glucagon (human recombinant)    HYDROcodone-acetaminophen    labetalol    Magnesium Standard Dose Replacement - Follow Nurse / BPA Driven Protocol    nystatin-triamcinolone    ondansetron    Phosphorus Replacement - Follow Nurse / BPA Driven Protocol    polyethyl glycol-propyl glycol    [COMPLETED] Insert Peripheral IV **AND** sodium chloride    sodium chloride    sodium chloride    sodium chloride    sodium chloride      Allergies:  Diphenhydramine, Morphine, Penicillins, and Latex      Objective     Vital Signs   BP  "127/75   Pulse 79   Temp 98.2 °F (36.8 °C) (Oral)   Resp 18   Ht 165.1 cm (65\")   Wt 78.4 kg (172 lb 13.5 oz)   SpO2 97%   BMI 28.76 kg/m²     Physical Exam:    Mental Status Exam:   Hygiene:   good  Cooperation:  Cooperative  Eye Contact:  Good  Psychomotor Behavior:  Appropriate  Affect:  Appropriate  Mood: normal  Hopelessness: Denies  Speech:  Normal  Thought Process:  Goal directed and Linear  Thought Content:  Normal and Mood congruent  Suicidal:  None  Homicidal:  None  Hallucinations:  Not demonstrated today  Delusion:  None  Memory:  Intact  Orientation:  Person, Place, and Time  Reliability:  good  Insight:  Fair  Judgement:  Good  Impulse Control:  Fair  Physical/Medical Issues:  Yes         Medications and allergies reviewed.    Result Review:  I have personally reviewed the results from the time of this admission to 8/5/2024 08:02 EDT and agree with these findings:  [x]  Laboratory  []  Microbiology  []  Radiology  [x]  EKG/Telemetry   []  Cardiology/Vascular   []  Pathology  []  Old records  []  Other:  Most notable findings include:     Assessment & Plan       Neck pain       Assessment: delirium due to multiple etiologies (recent anesthesia, sedating medications, admission to the ICU, visual impairment, previous delirium)  Treatment Plan: Patient had symptoms consistent with delirium. Patient previously experiencing hallucinations and paranoia. These symptoms have resolved currently.     Can continue quetiapine at night for delirium sleep. Monitor for QT prolongation.     Patient may have underlying mild cognitive impairment, based on reported difficulty with memory. May benefit from oupatient neuropsych eval after discharge.     Continue supportive treatment.     Will follow PRN.   Treatment Plan discussed with: Patient    I discussed the patients findings and my recommendations with patient and nursing staff    I have reviewed and approved the behavioral health treatment plans and " problem list. Yes     Referring MD has access to consult report and progress notes in EMR     DANNA Reagan  08/05/24  08:02 EDT

## 2024-08-05 NOTE — CASE MANAGEMENT/SOCIAL WORK
Continued Stay Note   Seth     Patient Name: Esperanza Smith  MRN: [id number]  Today's Date: 8/5/2024    Admit Date: 7/29/2024    Plan: Plan to dc to home with current [facility]PT, declines IRF at this time.   Discharge Plan       Row Name 08/05/24 1031       Plan    Plan Plan to dc to home with current [facility] SN[facility], declines IRF at this time.    Plan Comments met with patient/spouse at bedside, request to stay with current HH SN/PT - declines IRF, will follow.  DC Barriers:  PICC, FC, 4L NC, Gen Sx to eval ?fistula, Gen surg/Psych/Neuro following.               Expected Discharge Date and Time       Expected Discharge Date Expected Discharge Time    Aug 6, 2024               WALTER Welch RN  [facility]/ICU   O: [phone]  C: [phone]  [email or web address]

## 2024-08-05 NOTE — NURSING NOTE
Per Dr. Simmons: leave PICC line in place.     Patient has a history of bilateral lymph node and mastectomy removal. Patient is also a very hard stick.

## 2024-08-05 NOTE — CONSULTS
Inpatient General Surgery Consult  Consult performed by: Robert Greenfield MD  Consult ordered by: Josue Simmons DO  Reason for consult: enterocutaneous fistula          General Surgery Consult Note      Name: Esperanza Smith ADMIT: 2024   : 1945  PCP: Dom Estrada MD    MRN: [id number] LOS: 7 days   AGE/SEX: 78 y.o. female  ROOM: [address] [place] [address]      Patient Care Team:  Dom Estrada MD as PCP - General  Chief Complaint   Patient presents with    Post-op Problem     Pt had neck surgery on , pt started having increased pain since Friday       Subjective   78 lady who has been admitted after having a C2-4 laminectomy with resection of dural tumor who had to go back to the operating room a couple weeks later for a lumbar drain and repair of the dura who has developed a draining umbilical fistula.    She says that this history started about 10 years ago where she had a hysterectomy which is complicated by bowel injury requiring exploratory laparotomy with small bowel resection and subsequently leaving her with a residual fistula which was managed nonoperatively.  She says that she was on TPN for months.  Gradually close down but she has been dealing with the wound care and the drainage from this fistula for 5 years following that operation.  It did completely closed up and she has not had much drainage from it over the last several years.  She had some lower abdominal pain back in July and was diagnosed with acute diverticulitis.  CT scan at that time did not show any other significant abnormality.  At present she says that she is eating and drinking okay but does not have much of an appetite she is passing flatus her last bowel movement was a couple of days ago.  She says that she has never had a colonoscopy but there is no strong family history of inflammatory bowel disease or colon cancer.    Past Medical History:   Diagnosis Date    Allergic Penicillian    Asthma  congestion    Breast cancer     Cataract     Cervical disc disorder     Cholelithiasis surgery 1996 removal    DDD (degenerative disc disease), lumbosacral     Diabetes mellitus     Diverticulitis     Diverticulosis     Headache     History of medical problems 54 inches left of small intesting    Hyperlipidemia     Hypertension     Hypothyroidism     IBS (irritable bowel syndrome)     Inflammatory bowel disease     Liver disease fatty liver    Low back pain Sciatic    Lumbosacral disc disease     Neuromuscular disorder neuropathy    Obesity     PONV (postoperative nausea and vomiting)     Seasonal allergies     Sleep apnea      Past Surgical History:   Procedure Laterality Date    BREAST BIOPSY      BREAST SURGERY Left 02/2012    x2     CERVICAL SPINAL CORD TUMOR REMOVAL N/A 7/16/2024    Procedure: Cervical 2-4 laminectomy for resection of intradural tumor;  Surgeon: Mario Urrutia IV, MD;  Location: [facility] MAIN OR;  Service: Neurosurgery;  Laterality: N/A;    CHOLECYSTECTOMY  1996    COLON RESECTION SMALL BOWEL  05/25/2014    COLON SURGERY  small intestine repair    HYSTERECTOMY      LYMPH NODE BIOPSY      MASTECTOMY      OOPHORECTOMY  1965    POSTERIOR CERVICAL FUSION N/A 7/31/2024    Procedure: LUMBAR DRAIN INSERTION REPAIR OF CERVICAL DURA;  Surgeon: Mario Urrutia IV, MD;  Location: [facility] MAIN OR;  Service: Neurosurgery;  Laterality: N/A;    SIMPLE MASTECTOMY Right 02/26/2020    Procedure: Right simple mastectomy;  Surgeon: Mario Quezada DO;  Location: [facility] MAIN OR;  Service: General;  Laterality: Right;    SMALL INTESTINE SURGERY      SUBTOTAL HYSTERECTOMY  full hysterectomy     Family History   Problem Relation Age of Onset    Breast cancer Sister     Breast cancer Maternal Grandmother        Social History     Tobacco Use    Smoking status: Never     Passive exposure: Never    Smokeless tobacco: Never   Vaping Use    Vaping status: Never Used   Substance Use Topics    Alcohol use: Not Currently      Alcohol/week: 1.0 standard drink of alcohol     Types: 1 Glasses of wine per week     Comment: occ    Drug use: No     Medications Prior to Admission   Medication Sig Dispense Refill Last Dose    acetaminophen (TYLENOL) 325 MG tablet Take 2 tablets by mouth Every 6 (Six) Hours As Needed for Mild Pain.   7/29/2024    amLODIPine (NORVASC) 5 MG tablet TAKE 1 TABLET BY MOUTH DAILY 90 tablet 2 7/29/2024    APPLE CIDER VINEGAR PO Take 1 tablet by mouth Daily.   Past Month    Ascorbic Acid (VITAMIN C) 250 MG chewable tablet Chew 1 tablet Daily.   Past Month    B Complex-Biotin-FA (SUPER B-COMPLEX) tablet Take 1 tablet by mouth Daily.   Past Month    Cholecalciferol (VITAMIN D3) 2000 units tablet Take 1 tablet by mouth Daily.   Past Month    HYDROcodone-acetaminophen (NORCO) 5-325 MG per tablet Take 1 tablet by mouth Every 6 (Six) Hours As Needed for Moderate Pain. 25 tablet 0 7/29/2024    Inositol 324 MG tablet Take 2 tablets by mouth Every Morning Before Breakfast.   Past Month    levothyroxine (SYNTHROID, LEVOTHROID) 50 MCG tablet TAKE 1 TABLET BY MOUTH DAILY 90 tablet 1 7/29/2024    metFORMIN (GLUCOPHAGE) 500 MG tablet TAKE 1 TABLET BY MOUTH TWICE DAILY WITH MEALS 180 tablet 2 7/29/2024    TURMERIC CURCUMIN PO Take 2 tablets by mouth Daily.   Past Month    cyclobenzaprine (FLEXERIL) 10 MG tablet Take 1 tablet by mouth 3 (Three) Times a Day As Needed for Muscle Spasms. 30 tablet 1     fluticasone (FLONASE) 50 MCG/ACT nasal spray 2 sprays into the nostril(s) as directed by provider Daily As Needed for Rhinitis.       nystatin-triamcinolone (MYCOLOG II) 693799-8.1 UNIT/GM-% cream 1 Application 2 (Two) Times a Day As Needed.       traMADol (ULTRAM) 50 MG tablet Take 1-2 tablets by mouth Every 6 (Six) Hours As Needed for Moderate Pain or Severe Pain (post op pain). 60 tablet 0      amLODIPine, 5 mg, Oral, Daily  ascorbic acid, 250 mg, Oral, Daily  cholecalciferol, 2,000 Units, Oral, Daily  insulin lispro, 2-7 Units,  Subcutaneous, 4x Daily AC & at Bedtime  levothyroxine, 50 mcg, Oral, Q AM  melatonin, 10 mg, Oral, Nightly  QUEtiapine, 25 mg, Oral, Nightly  sodium chloride, 10 mL, Intravenous, Q12H  sodium chloride, 10 mL, Intravenous, Q12H           acetaminophen    senna-docusate sodium **AND** polyethylene glycol **AND** bisacodyl **AND** bisacodyl    cyclobenzaprine    dextrose    dextrose    fentaNYL citrate (PF)    glucagon (human recombinant)    HYDROcodone-acetaminophen    labetalol    Magnesium Standard Dose Replacement - Follow Nurse / BPA Driven Protocol    nystatin-triamcinolone    ondansetron    Phosphorus Replacement - Follow Nurse / BPA Driven Protocol    polyethyl glycol-propyl glycol    [COMPLETED] Insert Peripheral IV **AND** sodium chloride    sodium chloride    sodium chloride    sodium chloride    sodium chloride  Diphenhydramine, Morphine, Penicillins, and Latex    Review of Systems   Constitutional:  Negative for chills and fever.   Gastrointestinal:  Negative for abdominal distention, abdominal pain, blood in stool, constipation, diarrhea, nausea and vomiting.        Objective     Vital Signs and Labs:  Vital Signs Patient Vitals for the past 24 hrs:   BP Temp Temp src Pulse SpO2   08/05/24 1200 -- 97.3 °F (36.3 °C) Oral -- --   08/05/24 0829 109/68 -- -- 90 --   08/05/24 0800 -- 98 °F (36.7 °C) Oral -- --   08/05/24 0600 127/75 -- -- 79 97 %   08/05/24 0500 125/72 98.2 °F (36.8 °C) Oral 89 97 %   08/05/24 0400 106/59 -- -- 84 95 %   08/05/24 0300 101/61 -- -- 79 97 %   08/05/24 0200 115/71 -- -- 98 93 %   08/05/24 0100 90/61 -- -- 91 95 %   08/05/24 0005 -- 98.7 °F (37.1 °C) Oral -- --   08/05/24 0000 106/75 -- -- 91 95 %   08/04/24 2300 102/64 -- -- 87 94 %   08/04/24 2200 107/70 -- -- 83 96 %   08/04/24 2100 114/69 -- -- 88 96 %   08/04/24 2000 -- 97.8 °F (36.6 °C) Oral 119 --   08/04/24 1903 -- -- -- -- 93 %   08/04/24 1901 121/75 -- -- 95 --   08/04/24 1900 -- -- -- 92 (!) 86 %   08/04/24 1800 129/79  -- -- 88 96 %   08/04/24 1700 121/87 -- -- 95 94 %   08/04/24 1615 -- 98.3 °F (36.8 °C) Oral -- --   08/04/24 1600 130/78 -- -- 89 96 %   08/04/24 1500 120/69 -- -- 103 94 %   08/04/24 1400 120/68 -- -- 105 95 %       Physical Exam:  Physical Exam  Constitutional:       Appearance: Normal appearance.   HENT:      Head: Normocephalic and atraumatic.   Cardiovascular:      Rate and Rhythm: Normal rate.   Pulmonary:      Effort: Pulmonary effort is normal. No respiratory distress.   Abdominal:      Comments: Abdomen is soft is nondistended minimal tenderness to palpation at her previous midline closure there is a small wound that is draining feculent output.   Neurological:      Mental Status: She is alert.         CBC    Results from last 7 days   Lab Units 08/05/24  0457 08/04/24  0315 08/03/24  0419 08/02/24  0354 08/01/24  0517 07/31/24  0054 07/29/24  1752   WBC 10*3/mm3 11.46* 12.33* 13.56* 14.45* 12.52* 16.33* 20.73*   HEMOGLOBIN g/dL 12.0 12.9 14.0 13.9 11.9* 13.8 14.8   PLATELETS 10*3/mm3 272 258 302 307 279 264 311     CMP   Results from last 7 days   Lab Units 08/05/24  0408 08/04/24  0315 08/03/24  0419 08/02/24  0354 08/01/24  1727 08/01/24  0517 07/31/24  0054 07/29/24  1752   SODIUM mmol/L 136 138 137 136  --  135* 132* 132*   POTASSIUM mmol/L 4.1 3.6 3.7 4.3 4.2 3.4* 3.6 3.7   CHLORIDE mmol/L 98 98 95* 94*  --  96* 93* 94*   CO2 mmol/L 31.9* 33.2* 30.5* 30.3*  --  30.6* 27.1 24.4   BUN mg/dL 16 18 14 11  --  11 9 10   CREATININE mg/dL 0.47* 0.47* 0.47* 0.51*  --  0.55* 0.53* 0.44*   GLUCOSE mg/dL 95 120* 140* 142*  --  135* 152* 150*   ALBUMIN g/dL 3.2* 3.4* 3.6 3.7  --  3.2* 3.5  --    BILIRUBIN mg/dL 0.6 0.6 0.7 0.5  --  0.3 0.4  --    ALK PHOS U/L 67 67 75 72  --  63 73  --    AST (SGOT) U/L 28 39* 31 29  --  20 21  --    ALT (SGPT) U/L 35* 30 31 26  --  20 25  --      Radiology(recent) No radiology results for the last day    I reviewed the patient's new clinical results.  I reviewed the patient's  new imaging results and agree with the interpretation.    Assessment & Plan       Neck pain  Enterocutaneous fistula    78 y.o. female with recurrence of a previous enterocutaneous fistula.  Unsure disease exact etiology for why this opened up after so many years.  She did have a bout of diverticulitis back in July and in some ways these could be related.  She has an ostomy appliance that is been placed over the fistula tract to help quantify.  Based on the quantity will help determine what the next step would be.  She is quite opposed to having exploratory laparotomy and attempt at repair of this fistula unless this absolutely necessary.  I reviewed her CT scan from back in July it looks like a small bowel anastomosis is directly posterior to this region.  I will see what the output is and to check on her tomorrow.  For my standpoint for now it is okay for her to have a diet as tolerated.    Also would recommend endoscopic evaluation of the colon.     This note was created using Dragon Voice Recognition software.    Robert Greenfield MD  08/05/24  13:22 EDT

## 2024-08-05 NOTE — CONSULTS
[facility] MEDICINE SERVICE  TRANSFER OF CARE/ACCEPTANCE NOTE    PATIENT NAME: Esperanza Smith  : 1945  MRN: [id number]     Active Hospital Problems    Diagnosis  POA    **Neck pain [M54.2]  Yes      Resolved Hospital Problems   No resolved problems to display.     This is not a billable encounter, please see Intensivist's note.     Patient seen and examined by me on 24 at 1100  Interim History:    : Presented to the ED with complaints of neck pain. Patient had a cervical spinal cord tumor removal on 2024.  MRI of cervical spine showed large fluid collection within the posterior aspect of the neck, which was increased since prior study. Highly suspicious for CSF leak or pseudomeningocele. The fluid collection caused a mass effect, which lead to spinal canal stenosis and possible cord compression of C3- C4. Neurosurgery was consulted. Patient went to OR for evacuation of pseudomeningocele and dura repair and lumbar drain placement.     : Patient sent to ICU with lumbar drain, s/p posterior cervical evacuation of pseudomeningocele and dural repair. Patient was having visual hallucinations, which could be attributed to not sleeping well and/or delirium. Patient to remain in ICU while lumbar drain is in place.      - : Patient continuing to have hallucinations, and lumbar drain is in place. Psychiatry was consulted. UA was ordered to rule out UTI and it was negative for infection. Seroquel started    : Per neurosurgery, lumbar drain was clamped, and patient will be ambulating today. Psychiatry saw patient for hallucinations and attributed her symptoms to delirium, seroquel was recommended to be continued.     : Lumbar drain was removed by neurosurgery today. Patient started having drainage from her umbilicus and a dressing was placed. She reports a prior history of a fistula, and surgery was consulted to evaluate. Plan to downgrade from ICU today.     I have  reviewed the H&P, diagnostic data and plan of care for Esperanza Smith.  The Hospitalist Team will be taking over care of this patient during the current hospitalization.        Signature: Electronically signed by DANNA Awan, 08/05/24, 12:02 EDT.  [facility] Hospitalist Team

## 2024-08-05 NOTE — PROGRESS NOTES
"Critical Care Progress Note     Esperanza Smith : 1945 MRN:[id number] LOS:7  Rm: 2316/1     Principal Problem: Neck pain     Reason for follow up: All the medical problems listed below    Summary     A 78 y.o. old female patient with PMH of hypertension, hypothyroidism, diabetes mellitus type 2, history of breast and uterine cancer, hyperlipidemia, and known recent laminectomy of C2-C4 for resection of intradural tumor/cervical meningioma on 2024, and presented on 2024 to the hospital with complaints of neck pain. Initial outpatient postoperative MRI cervical spine on 2024 revealed a 5.9 x 3.4 x 4 cm fluid collection posterior to the laminectomy site which was felt to be a postoperative seroma, and abscess, or pseudomeningocele being less favored; additionally there was noted mild canal stenosis at C3-4 secondary to broad-based disc bulge. Repeat MRI with and without contrast of the cervical spine on 2024 revealed: \"Large fluid collection noted within the posterior aspect of the neck at the site of C1, C2, C3 and C4 laminectomy, has increased in size since prior study, now measuring up to 8.1 cm in the largest dimension. While this may represent a seroma, given its increase in size, this is highly concerning for a pseudomeningocele or CSF leak. Neurosurgery was consulted. Patient was started on empiric antimicrobial therapy with Rocephin and vancomycin, though it was felt that this was unlikely to be infectious. Patient underwent cervical evacuation of pseudomeningocele and dural repair with insertion of lumbar drain on 2024.     Significant Events     24 :   Patient remains afebrile, all vital signs are within normal limits.  She has had 1.1 L of urine output over the last 24 hours and is net -5.2 L for the admission.  Chemistry panel unremarkable.  CBC shows WBC minimally elevated.  Lumbar drain removed by NSR today.  Pt needs to work with PT/OT.  Of note, she has a history " "of a fistula from approximately 5 years ago, which was reportedly \"fixed\".  Overnight, she has started having what looks to be feculent drainage from her umbilicus.  Dressing has been placed.   Given the drainage from the umbilicus, will consult surgery to evaluate.  Patient is stable for downgrade to MHU at this time.    Assessment / Plan     Post-operative pseudomeningocele  Recent cervical meningioma with recent laminectomy of C2-C4 for resection of intradural tumor on 7/16/2024, now complicated by large fluid collection at operative site.  MRI Cervical Spine (7/29): Large fluid collection within posterior aspect of neck at the site of C1, C2, C3, and C4 laminectomy, increased since prior study, measuring 8.1 cm in the largest dimension with mild mass effect on the posterior aspect of the spinal cord with now moderate to severe spinal canal stenosis and possible mild cord compression noted at level C3-C4.  Neurosurgery previously consulted and continues to follow.  Previously on Rocephin and Vancomycin, per MOODY, not felt to be infectious, this was discontinued post-operatively with continuation of surgical prophylaxis with cefazolin x 3 doses.  Follow surgical wound cultures.  MRSA Screen negative.  Status post cervical evacuation of pseudomeningocele and dural repair with insertion of lumbar drain.  Hourly neuro checks, adjusted per neurosurgery recommendations.  Lumbar drain management per MOODY: Drain 5 mL/hr; if unable to drain 3 mL/hr for 3 consecutive hours, notify neurosurgery.  Analgesia as ordered by MOODY.  Lumbar drain removed 8/5     Umbilical drainage/Hx of fistula repair  Consult general surgery    Visual hallucinations/Delerium; resolved  Started earlier in the day, prior to surgery.  Not been sleeping well, possible etiology, versus delirium.  Patient realizes that she is hallucinating.  Encourage cluster care to promote resting between neuro checks.  Increased melatonin to 10 mg.  Psychiatry " consulted 8/3 & pt started on seroquel qhs.     Essential hypertension: well controlled.   Continue amlodipine .   Titrate medications as needed.     Diabetes mellitus Type 2, not insulin-dependent : well controlled.   Diet controlled, on no home regimen.  A1c on 7/8/2024 was 6.10.  Accu checks ACHS or Every 6 Hours, based on diet, with sliding scale admelog coverage as needed.      Hypothyroidism: Continue levothyroxine.  History of breast and uterine cancer  Hyperlipidemia: Intolerant of statins.    Disposition:  [facility]    Code status:   Code Status (Patient has no pulse and is not breathing): CPR (Attempt to Resuscitate)  Medical Interventions (Patient has pulse or is breathing): Full Support       Nutrition:   Diet: Diabetic; Consistent Carbohydrate; Fluid Consistency: Thin (IDDSI 0)   Patient isn't on Tube Feeding     VTE Prophylaxis:  Mechanical VTE prophylaxis orders are present.         Subjective / Review of systems     She denies any shortness of breath or chest pain.  She denies any abdominal pain or dysuria.  Denies any fevers, chills, sweats, nausea, or vomiting.  Denies have any neck pain. No dizziness today.  Slept well.    Objective / Physical Exam     Vital signs:  Temp: 98 °F (36.7 °C)  BP: 109/68  Heart Rate: 90  Resp: 18  SpO2: 97 %  Weight: 78.4 kg (172 lb 13.5 oz)    Admission Weight: Weight: 83.9 kg (185 lb)  Current Weight: Weight: 78.4 kg (172 lb 13.5 oz)    Input/Output in last 24 hours:    Intake/Output Summary (Last 24 hours) at 8/5/2024 1059  Last data filed at 8/5/2024 0800  Gross per 24 hour   Intake 760 ml   Output 1100 ml   Net -340 ml      Net IO Since Admission: -5,185 mL [08/05/24 1059]     Physical Exam  Vitals and nursing note reviewed.   Constitutional:       General: She is not in acute distress.     Appearance: Normal appearance. She is obese. She is not ill-appearing, toxic-appearing or diaphoretic.   HENT:      Head: Normocephalic and atraumatic.      Nose: Nose normal. No  congestion.      Mouth/Throat:      Mouth: Mucous membranes are moist.      Pharynx: Oropharynx is clear. No oropharyngeal exudate.   Eyes:      Extraocular Movements: Extraocular movements intact.      Conjunctiva/sclera: Conjunctivae normal.      Pupils: Pupils are equal, round, and reactive to light.   Cardiovascular:      Rate and Rhythm: Normal rate and regular rhythm.      Pulses: Normal pulses.      Heart sounds: Normal heart sounds.   Pulmonary:      Effort: Pulmonary effort is normal.      Breath sounds: Normal breath sounds.   Abdominal:      General: Bowel sounds are normal.      Palpations: Abdomen is soft.      Comments: Feculent appearing drainage from umbilicus   Musculoskeletal:      Right lower leg: No edema.      Left lower leg: No edema.   Skin:     General: Skin is warm and dry.      Findings: No rash.   Neurological:      Mental Status: She is alert and oriented to person, place, and time.   Psychiatric:         Mood and Affect: Mood normal.         Behavior: Behavior normal.          Radiology and Labs     Results from last 7 days   Lab Units 08/05/24  0457 08/04/24  0315 08/03/24  0419 08/02/24  0354 08/01/24  0517   WBC 10*3/mm3 11.46* 12.33* 13.56* 14.45* 12.52*   HEMOGLOBIN g/dL 12.0 12.9 14.0 13.9 11.9*   HEMATOCRIT % 36.5 38.9 41.6 41.1 34.9   PLATELETS 10*3/mm3 272 258 302 307 279      Results from last 7 days   Lab Units 07/31/24  0054   PROTIME Seconds 11.9*   INR  1.10      Results from last 7 days   Lab Units 08/05/24  0408 08/04/24  0315 08/03/24  0419 08/02/24  0354 08/01/24  1727 08/01/24  0517   SODIUM mmol/L 136 138 137 136  --  135*   POTASSIUM mmol/L 4.1 3.6 3.7 4.3 4.2 3.4*   CHLORIDE mmol/L 98 98 95* 94*  --  96*   CO2 mmol/L 31.9* 33.2* 30.5* 30.3*  --  30.6*   BUN mg/dL 16 18 14 11  --  11   CREATININE mg/dL 0.47* 0.47* 0.47* 0.51*  --  0.55*   GLUCOSE mg/dL 95 120* 140* 142*  --  135*   MAGNESIUM mg/dL 1.6 1.8 1.8 1.6  --  1.6   PHOSPHORUS mg/dL 3.4 3.5 3.9 3.0  --  3.7       Results from last 7 days   Lab Units 08/05/24  0408 08/04/24  0315 08/03/24  0419 08/02/24  0354 08/01/24  0517   ALK PHOS U/L 67 67 75 72 63   AST (SGOT) U/L 28 39* 31 29 20   ALT (SGPT) U/L 35* 30 31 26 20           No radiology results for the last day      Current medications     Scheduled Meds:   amLODIPine, 5 mg, Oral, Daily  ascorbic acid, 250 mg, Oral, Daily  cholecalciferol, 2,000 Units, Oral, Daily  insulin lispro, 2-7 Units, Subcutaneous, 4x Daily AC & at Bedtime  levothyroxine, 50 mcg, Oral, Q AM  melatonin, 10 mg, Oral, Nightly  QUEtiapine, 25 mg, Oral, Nightly  sodium chloride, 10 mL, Intravenous, Q12H  sodium chloride, 10 mL, Intravenous, Q12H        Continuous Infusions:            Plan discussed with RN. Reviewed all other data in the last 24 hours, including but not limited to vitals, labs, microbiology, imaging and pertinent notes from other providers.  Plan also discussed with patient at the bedside.      Josue Simmons,    Critical Care  08/05/24   10:59 EDT

## 2024-08-05 NOTE — PROGRESS NOTES
Neurosurgery Progress Note    Date: 24     Patient: Esperanza Smith   Sex: female   : 1945      SUBJECTIVE    CC: Post op day 5 LUMBAR DRAIN INSERTION REPAIR OF CERVICAL DURA     Interval history:  Patient doing well this morning. Denies any headaches or neck pain. Lumbar drain fluid is clear with yellow tinge.     Current Medications:  Scheduled Meds:amLODIPine, 5 mg, Oral, Daily  ascorbic acid, 250 mg, Oral, Daily  cholecalciferol, 2,000 Units, Oral, Daily  insulin lispro, 2-7 Units, Subcutaneous, 4x Daily AC & at Bedtime  levothyroxine, 50 mcg, Oral, Q AM  melatonin, 10 mg, Oral, Nightly  QUEtiapine, 25 mg, Oral, Nightly  sodium chloride, 10 mL, Intravenous, Q12H  sodium chloride, 10 mL, Intravenous, Q12H      Continuous Infusions:   PRN Meds:   acetaminophen    senna-docusate sodium **AND** polyethylene glycol **AND** bisacodyl **AND** bisacodyl    cyclobenzaprine    dextrose    dextrose    fentaNYL citrate (PF)    glucagon (human recombinant)    HYDROcodone-acetaminophen    labetalol    Magnesium Standard Dose Replacement - Follow Nurse / BPA Driven Protocol    nystatin-triamcinolone    ondansetron    Phosphorus Replacement - Follow Nurse / BPA Driven Protocol    polyethyl glycol-propyl glycol    [COMPLETED] Insert Peripheral IV **AND** sodium chloride    sodium chloride    sodium chloride    sodium chloride    sodium chloride      OBJECTIVE  Vitals:    24 0500 24 0600 24 0800 24 0829   BP: 125/72 127/75  109/68   Pulse: 89 79  90   Resp:       Temp: 98.2 °F (36.8 °C)  98 °F (36.7 °C)    TempSrc: Oral  Oral    SpO2: 97% 97%     Weight:       Height:           Physical exam    General  - WD/WN female, appears their stated age  - Awake, cooperative, in no acute distress  HEENT  - Normocephalic, atraumatic  - PERRLA, EOM intact  Respiratory  - Normal respiratory rate and effort  Musculoskeletal  - No joint redness, swelling, or tenderness  Skin  - Warm and dry, no bleeding,  bruising, or rash. Lumbar drain in place.   NEUROLOGIC  - A/O x3, GCS 4-6-5  - CN II-XII grossly intact  - Moves all extremities symmetrically and w/ 5/5 strength  - Sensation intact throughout  - Negative pronator drift  - Normal finger-nose coordination      Results review  CBC          8/3/2024    04:19 8/4/2024    03:15 8/5/2024    04:57   CBC   WBC 13.56  12.33  11.46    RBC 4.38  4.04  3.75    Hemoglobin 14.0  12.9  12.0    Hematocrit 41.6  38.9  36.5    MCV 95.0  96.3  97.3    MCH 32.0  31.9  32.0    MCHC 33.7  33.2  32.9    RDW 12.3  12.3  12.3    Platelets 302  258  272        BMP          8/3/2024    04:19 8/4/2024    03:15 8/5/2024    04:08   BMP   BUN 14  18  16    Creatinine 0.47  0.47  0.47    Sodium 137  138  136    Potassium 3.7  3.6  4.1    Chloride 95  98  98    CO2 30.5  33.2  31.9    Calcium 10.7  10.0  10.1                MRI Cervical Spine With & Without Contrast    Result Date: 7/29/2024  MRI CERVICAL SPINE W WO CONTRAST Date of Exam: 7/29/2024 6:27 PM EDT Indication: Postop neck pain.  Comparison: 7/19/2024 Technique:  Routine multiplanar/multisequence sequence images of the cervical spine were obtained before and after the uneventful administration of Prohance.  Findings: The visualized intracranial structures are unremarkable. The craniocervical junction is unremarkable. Subtle T2/FLAIR hyperintensity is noted within the spinal cord at the level of C2-C3, most likely representing myelomalacia from prior cord compression. Alternately this may represent mild cord edema, although this is unchanged. Otherwise the spinal cord is normal in signal and caliber. There is mild enhancement of the posterior epidural space at the level of the craniocervical junction and at the level of C4-C5, most likely representing postsurgical changes. No evidence of definite intraspinal canal fluid collection is noted. Extensive postsurgical changes noted within the posterior aspect of the upper neck secondary to  laminectomy at C1, C2, C3 and C4. Once again a large fluid collection is noted within the posterior aspect of the neck measuring approximately 4.2 x 8.1 x 5.6 cm. This has increased in size since most recent comparison. Additionally there is also mildly worsened mass effect, from the fluid collection, anteriorly onto the spinal cord when compared to most recent study. There is mild peripheral enhancement. The visualized vertebral body heights are maintained. Overall alignment is maintained. Loss of intervertebral disc base height, similar to prior study. There is mild worsening of spinal canal stenosis noted at C3-C4 and C4-C5 secondary to the mildly expanded posterior fluid collection as characterized above. There is associated mass effect  with possible mild cord compression at the level of C3-C4. The remainder of the visualized degenerative changes are unchanged from most recent MRI. The remainder of the visualized soft tissues of the neck are unremarkable.     Impression: Impression: Large fluid collection noted within the posterior aspect of the neck at the site of C1, C2, C3 and C4 laminectomy, has increased in size since prior study, now measuring up to 8.1 cm in the largest dimension. While this may represent a seroma, given its increase in size, this is highly concerning for a pseudomeningocele or CSF leak. Given the level of peripheral enhancement, this is less likely to represent an abscess, although it is not completely excluded. The expanding fluid collection in the posterior neck causes mild mass effect on the posterior aspect of the spinal cord with now moderate to severe spinal canal stenosis and possible mild cord compression noted at the level of C3-C4. This has mildly worsened since prior study. Additional findings as characterized above, not significantly changed since prior study Electronically Signed: Uri Birch DO  7/29/2024 7:40 PM EDT  Workstation ID: [id number]    MRI Cervical Spine  Without Contrast    Result Date: 7/24/2024  MRI CERVICAL SPINE WO CONTRAST Date of Exam: 7/19/2024 9:20 AM EDT Indication: Postop C2-C4 laminectomy for meningioma..  Comparison: 7/12/2024 Technique:  Routine multiplanar/multisequence sequence images of the cervical spine were obtained without contrast administration.  Findings: No abnormal cord signal is identified. The previously identified meningioma is no longer present. The patient is status post C2-C4 laminectomy. There is a 5.9 x 3.4 x 4 cm fluid signal lesion posterior to the laminectomy site, likely representing a postoperative seroma, a pseudomeningocele is less favored but not excluded. An infection is also much less favored, though difficult to exclude without contrast. There is a mild C3-4 anterior disc bulge with effacement of the ventral CSF space resulting in mild canal stenosis but no significant foraminal narrowing. Minimal multilevel disc bulges are identified, but no other areas of canal stenosis. The visualized  posterior fossa is unremarkable.     Impression: Impression: Status post C2-C4 laminectomy. There is a 5.9 x 3.4 x 4 cm fluid collection posterior to the laminectomy site which is likely a postoperative seroma, an abscess or pseudomeningocele is less favored. Mild canal stenosis at C3-4 secondary to broad-based disc bulge. Electronically Signed: Robert Caballero MD  7/24/2024 4:43 PM EDT  Workstation ID: [id number]   CT Head Without Contrast    Result Date: 8/3/2024  CT HEAD WO CONTRAST Date of Exam: 8/3/2024 3:52 AM EDT Indication: [facility]. Comparison: 2/26/2019. Technique: Axial CT images were obtained of the head without contrast administration.  Coronal reconstructions were performed.  Automated exposure control and iterative reconstruction methods were used. Findings: There is no evidence of hemorrhage. There is no mass effect or midline shift. There is no extracerebral collection. Ventricles are normal in size and configuration for  patient's stated age.  Posterior fossa is within normal limits. Calvarium and skull base appear intact.   Visualized sinuses show no air fluid levels. Visualized orbits are unremarkable.     Impression: Impression: No evidence of hemorrhage, mass effect or midline shift. No acute intracranial process identified. Electronically Signed: Jeni Patterson MD  8/3/2024 4:04 AM EDT  Workstation ID: [id number]          ASSESSMENT/PLAN  This is a 78 y.o. female who underwent surgery. Patient is doing well overall, and denies any neck pain or headaches this morning. Patient had her lumbar drain removed this morning and had no residual CSF fluid come out of drain. Should continue to monitor back dressing for potential leakage. Patient is to work with physical therapy today. I told the patient that if she starts to experience postural headaches, she should let PT know and only tolerate what she can.     With the lumbar drain removed and if she is able to tolerate physical therapy, patient is good to downgrade to the floor. Patient is neurologically intact and has no new complaints. Please reach out with any questions or concerns.     Plan:  S/P  LUMBAR DRAIN INSERTION REPAIR OF CERVICAL DURA   - Lumbar drain removed  - Work with PT/OT today  - Change dressing as needed  - Can downgrade to floor if patient has no postural headaches    I discussed my assessment and recommendations with Dr. Renan Carrera PA-C  08/05/24  09:04 EDT      Part of this note may be an electronic transcription/translation of spoken language to printed text using the Dragon Dictation System.

## 2024-08-05 NOTE — NURSING NOTE
"Per Dr. Rocha: (on call for GI)  He is unsure why Dr. Khanna placed order for blood cultures and/or how this RN is supposed to collect them given her bilateral upper restricted extremities. Dr. Rocha said to either, \"ask the hospitalist or wait until Dr. Khanna rounds tomorrow to ask him.\" Also stated he was, \"unsure if this RN should hold off on antibiotic until after collection of blood cultures.\"       "

## 2024-08-06 ENCOUNTER — APPOINTMENT (OUTPATIENT)
Dept: CT IMAGING | Facility: HOSPITAL | Age: 79
End: 2024-08-06
Payer: MEDICARE

## 2024-08-06 ENCOUNTER — INPATIENT HOSPITAL (OUTPATIENT)
Dept: URBAN - METROPOLITAN AREA HOSPITAL 84 | Facility: HOSPITAL | Age: 79
End: 2024-08-06
Payer: MEDICARE

## 2024-08-06 DIAGNOSIS — K63.2 FISTULA OF INTESTINE: ICD-10-CM

## 2024-08-06 DIAGNOSIS — R94.5 ABNORMAL RESULTS OF LIVER FUNCTION STUDIES: ICD-10-CM

## 2024-08-06 DIAGNOSIS — D72.829 ELEVATED WHITE BLOOD CELL COUNT, UNSPECIFIED: ICD-10-CM

## 2024-08-06 DIAGNOSIS — Z90.49 ACQUIRED ABSENCE OF OTHER SPECIFIED PARTS OF DIGESTIVE TRACT: ICD-10-CM

## 2024-08-06 LAB
ALBUMIN SERPL-MCNC: 3.3 G/DL (ref 3.5–5.2)
ALBUMIN/GLOB SERPL: 1.2 G/DL
ALP SERPL-CCNC: 62 U/L (ref 39–117)
ALT SERPL W P-5'-P-CCNC: 34 U/L (ref 1–33)
ANION GAP SERPL CALCULATED.3IONS-SCNC: 7.8 MMOL/L (ref 5–15)
AST SERPL-CCNC: 22 U/L (ref 1–32)
BASOPHILS # BLD AUTO: 0.04 10*3/MM3 (ref 0–0.2)
BASOPHILS NFR BLD AUTO: 0.4 % (ref 0–1.5)
BILIRUB SERPL-MCNC: 0.5 MG/DL (ref 0–1.2)
BUN SERPL-MCNC: 13 MG/DL (ref 8–23)
BUN/CREAT SERPL: 28.9 (ref 7–25)
CALCIUM SPEC-SCNC: 10.3 MG/DL (ref 8.6–10.5)
CHLORIDE SERPL-SCNC: 97 MMOL/L (ref 98–107)
CO2 SERPL-SCNC: 32.2 MMOL/L (ref 22–29)
CREAT SERPL-MCNC: 0.45 MG/DL (ref 0.57–1)
DEPRECATED RDW RBC AUTO: 43.9 FL (ref 37–54)
EGFRCR SERPLBLD CKD-EPI 2021: 99.8 ML/MIN/1.73
EOSINOPHIL # BLD AUTO: 0.31 10*3/MM3 (ref 0–0.4)
EOSINOPHIL NFR BLD AUTO: 3.2 % (ref 0.3–6.2)
ERYTHROCYTE [DISTWIDTH] IN BLOOD BY AUTOMATED COUNT: 12.4 % (ref 12.3–15.4)
GLOBULIN UR ELPH-MCNC: 2.8 GM/DL
GLUCOSE BLDC GLUCOMTR-MCNC: 108 MG/DL (ref 70–105)
GLUCOSE BLDC GLUCOMTR-MCNC: 124 MG/DL (ref 70–105)
GLUCOSE BLDC GLUCOMTR-MCNC: 134 MG/DL (ref 70–105)
GLUCOSE BLDC GLUCOMTR-MCNC: 144 MG/DL (ref 70–105)
GLUCOSE SERPL-MCNC: 113 MG/DL (ref 65–99)
HCT VFR BLD AUTO: 35.7 % (ref 34–46.6)
HGB BLD-MCNC: 11.9 G/DL (ref 12–15.9)
IMM GRANULOCYTES # BLD AUTO: 0.04 10*3/MM3 (ref 0–0.05)
IMM GRANULOCYTES NFR BLD AUTO: 0.4 % (ref 0–0.5)
LYMPHOCYTES # BLD AUTO: 2.36 10*3/MM3 (ref 0.7–3.1)
LYMPHOCYTES NFR BLD AUTO: 24.5 % (ref 19.6–45.3)
MAGNESIUM SERPL-MCNC: 1.6 MG/DL (ref 1.6–2.4)
MCH RBC QN AUTO: 32.2 PG (ref 26.6–33)
MCHC RBC AUTO-ENTMCNC: 33.3 G/DL (ref 31.5–35.7)
MCV RBC AUTO: 96.5 FL (ref 79–97)
MONOCYTES # BLD AUTO: 1.1 10*3/MM3 (ref 0.1–0.9)
MONOCYTES NFR BLD AUTO: 11.4 % (ref 5–12)
NEUTROPHILS NFR BLD AUTO: 5.78 10*3/MM3 (ref 1.7–7)
NEUTROPHILS NFR BLD AUTO: 60.1 % (ref 42.7–76)
NRBC BLD AUTO-RTO: 0 /100 WBC (ref 0–0.2)
PHOSPHATE SERPL-MCNC: 3.8 MG/DL (ref 2.5–4.5)
PLATELET # BLD AUTO: 262 10*3/MM3 (ref 140–450)
PMV BLD AUTO: 9.5 FL (ref 6–12)
POTASSIUM SERPL-SCNC: 3.9 MMOL/L (ref 3.5–5.2)
PROT SERPL-MCNC: 6.1 G/DL (ref 6–8.5)
RBC # BLD AUTO: 3.7 10*6/MM3 (ref 3.77–5.28)
SODIUM SERPL-SCNC: 137 MMOL/L (ref 136–145)
WBC NRBC COR # BLD AUTO: 9.63 10*3/MM3 (ref 3.4–10.8)

## 2024-08-06 PROCEDURE — 25510000001 IOPAMIDOL PER 1 ML: Performed by: INTERNAL MEDICINE

## 2024-08-06 PROCEDURE — 97116 GAIT TRAINING THERAPY: CPT

## 2024-08-06 PROCEDURE — 82948 REAGENT STRIP/BLOOD GLUCOSE: CPT | Performed by: NURSE PRACTITIONER

## 2024-08-06 PROCEDURE — 84100 ASSAY OF PHOSPHORUS: CPT

## 2024-08-06 PROCEDURE — 85025 COMPLETE CBC W/AUTO DIFF WBC: CPT

## 2024-08-06 PROCEDURE — 97110 THERAPEUTIC EXERCISES: CPT

## 2024-08-06 PROCEDURE — 74177 CT ABD & PELVIS W/CONTRAST: CPT

## 2024-08-06 PROCEDURE — 80053 COMPREHEN METABOLIC PANEL: CPT

## 2024-08-06 PROCEDURE — 25010000002 METRONIDAZOLE 500 MG/100ML SOLUTION: Performed by: NURSE PRACTITIONER

## 2024-08-06 PROCEDURE — 99024 POSTOP FOLLOW-UP VISIT: CPT

## 2024-08-06 PROCEDURE — 99232 SBSQ HOSP IP/OBS MODERATE 35: CPT | Performed by: SURGERY

## 2024-08-06 PROCEDURE — 83735 ASSAY OF MAGNESIUM: CPT

## 2024-08-06 PROCEDURE — 82948 REAGENT STRIP/BLOOD GLUCOSE: CPT

## 2024-08-06 PROCEDURE — 99232 SBSQ HOSP IP/OBS MODERATE 35: CPT | Mod: FS | Performed by: NURSE PRACTITIONER

## 2024-08-06 PROCEDURE — 25010000002 CEFTRIAXONE PER 250 MG: Performed by: INTERNAL MEDICINE

## 2024-08-06 RX ORDER — POLYETHYLENE GLYCOL 3350 17 G/17G
17 POWDER, FOR SOLUTION ORAL DAILY
Status: DISCONTINUED | OUTPATIENT
Start: 2024-08-06 | End: 2024-08-07 | Stop reason: HOSPADM

## 2024-08-06 RX ORDER — TAMSULOSIN HYDROCHLORIDE 0.4 MG/1
0.4 CAPSULE ORAL DAILY
Status: DISCONTINUED | OUTPATIENT
Start: 2024-08-06 | End: 2024-08-07 | Stop reason: HOSPADM

## 2024-08-06 RX ORDER — METRONIDAZOLE 500 MG/100ML
500 INJECTION, SOLUTION INTRAVENOUS EVERY 8 HOURS
Status: DISCONTINUED | OUTPATIENT
Start: 2024-08-06 | End: 2024-08-07 | Stop reason: HOSPADM

## 2024-08-06 RX ORDER — AMOXICILLIN 250 MG
2 CAPSULE ORAL 2 TIMES DAILY
Status: DISCONTINUED | OUTPATIENT
Start: 2024-08-06 | End: 2024-08-07 | Stop reason: HOSPADM

## 2024-08-06 RX ADMIN — Medication 10 ML: at 20:13

## 2024-08-06 RX ADMIN — METRONIDAZOLE 500 MG: 500 INJECTION, SOLUTION INTRAVENOUS at 20:11

## 2024-08-06 RX ADMIN — METRONIDAZOLE 500 MG: 500 INJECTION, SOLUTION INTRAVENOUS at 15:21

## 2024-08-06 RX ADMIN — CYCLOBENZAPRINE 10 MG: 10 TABLET, FILM COATED ORAL at 04:04

## 2024-08-06 RX ADMIN — Medication 2000 UNITS: at 08:43

## 2024-08-06 RX ADMIN — AMLODIPINE BESYLATE 5 MG: 5 TABLET ORAL at 08:43

## 2024-08-06 RX ADMIN — Medication 10 ML: at 08:44

## 2024-08-06 RX ADMIN — HYDROCODONE BITARTRATE AND ACETAMINOPHEN 1 TABLET: 5; 325 TABLET ORAL at 01:38

## 2024-08-06 RX ADMIN — IOPAMIDOL 100 ML: 755 INJECTION, SOLUTION INTRAVENOUS at 11:14

## 2024-08-06 RX ADMIN — CYCLOBENZAPRINE 10 MG: 10 TABLET, FILM COATED ORAL at 22:20

## 2024-08-06 RX ADMIN — POLYETHYLENE GLYCOL 3350 17 G: 17 POWDER, FOR SOLUTION ORAL at 15:21

## 2024-08-06 RX ADMIN — SENNOSIDES AND DOCUSATE SODIUM 2 TABLET: 50; 8.6 TABLET ORAL at 15:20

## 2024-08-06 RX ADMIN — SENNOSIDES AND DOCUSATE SODIUM 2 TABLET: 50; 8.6 TABLET ORAL at 22:07

## 2024-08-06 RX ADMIN — TAMSULOSIN HYDROCHLORIDE 0.4 MG: 0.4 CAPSULE ORAL at 15:20

## 2024-08-06 RX ADMIN — OXYCODONE HYDROCHLORIDE AND ACETAMINOPHEN 250 MG: 500 TABLET ORAL at 08:43

## 2024-08-06 RX ADMIN — LEVOTHYROXINE SODIUM 50 MCG: 0.05 TABLET ORAL at 05:55

## 2024-08-06 RX ADMIN — QUETIAPINE FUMARATE 25 MG: 25 TABLET ORAL at 20:10

## 2024-08-06 RX ADMIN — CEFTRIAXONE 1000 MG: 1 INJECTION, POWDER, FOR SOLUTION INTRAMUSCULAR; INTRAVENOUS at 18:25

## 2024-08-06 RX ADMIN — Medication 10 ML: at 08:43

## 2024-08-06 RX ADMIN — Medication 10 MG: at 20:10
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
## 2024-08-06 NOTE — CASE MANAGEMENT/SOCIAL WORK
Continued Stay Note  POLO Ann     Patient Name: Esperanza Smith  MRN: [id number]  Today's Date: 8/6/2024    Admit Date: 7/29/2024    Plan: Plan to dc to home with current [facility] SN[facility]PT, declines IRF at this time.   Discharge Plan       Row Name 08/06/24 1045       Plan    Plan Plan to dc to home with current [facility] SN[facility]PT, declines IRF at this time.    Plan Comments Patient/spouse request to stay with current HH SN/PT - declines IRF, will follow. DC Barriers: PICC, diet ok for now-monitor fistula output, IV ABxs, wound cx pending, Gen surg/Psych/Neuro following.               Expected Discharge Date and Time       Expected Discharge Date Expected Discharge Time    Aug 8, 2024               WALTER Welch RN  SIPS/ICU   O: [phone]  C: [phone]  [email or web address]

## 2024-08-06 NOTE — PROGRESS NOTES
[facility] MEDICINE SERVICE  DAILY PROGRESS NOTE    NAME: Esperanza Smith  : 1945  MRN: [id number]      LOS: 8 days     PROVIDER OF SERVICE: Gaetano Friedman MD    Chief Complaint: Neck pain    Subjective:     Interval History:  Patient complains of difficulty with BM and has been having some bloating and gas build up today. She also has increased umbilical fistula drainage.    Review of Systems:   Review of Systems    Objective:     Vital Signs  Temp:  [97.5 °F (36.4 °C)-98.3 °F (36.8 °C)] 98.1 °F (36.7 °C)  Heart Rate:  [72-99] 82  Resp:  [21-25] 21  BP: (100-136)/(60-85) 134/75  Flow (L/min):  [2] 2   Body mass index is 28.76 kg/m².    Physical Exam  Physical Exam  General Appearance:  Alert, cooperative, no distress, appears stated age  Head:  Normocephalic, without obvious abnormality, atraumatic  Eyes:  PERRL, conjunctiva/corneas clear, EOM's intact, fundi benign, both eyes  Ears:  Normal TM's and external ear canals, both ears  Nose: Nares normal, septum midline, mucosa normal, no drainage or sinus tenderness  Throat: Lips, mucosa, and tongue normal; teeth and gums normal  Neck: Supple, symmetrical, trachea midline, no adenopathy, thyroid: not enlarged, symmetric, no tenderness/mass/nodules, no carotid bruit or JVD  Lungs:   Clear to auscultation bilaterally, respirations unlabored  Heart:  Regular rate and rhythm, S1, S2 normal, no murmur, rub or gallop  Abdomen:  Soft, non-tender, bowel sounds active all four quadrants,  no masses, no organomegaly, enterocutaneous fistula with drainage into ostomy pouch  Extremities: Extremities normal, atraumatic, no cyanosis or edema  Pulses: 2+ and symmetric  Skin: Skin color, texture, turgor normal, no rashes or lesions  Neurologic: Normal      Scheduled Meds   amLODIPine, 5 mg, Oral, Daily  ascorbic acid, 250 mg, Oral, Daily  cefTRIAXone, 1,000 mg, Intravenous, Q24H  cholecalciferol, 2,000 Units, Oral, Daily  insulin lispro, 2-7 Units, Subcutaneous, 4x  Daily AC & at Bedtime  levothyroxine, 50 mcg, Oral, Q AM  melatonin, 10 mg, Oral, Nightly  metroNIDAZOLE, 500 mg, Intravenous, Q8H  polyethylene glycol, 17 g, Oral, Daily  QUEtiapine, 25 mg, Oral, Nightly  senna-docusate sodium, 2 tablet, Oral, BID  sodium chloride, 10 mL, Intravenous, Q12H  sodium chloride, 10 mL, Intravenous, Q12H  tamsulosin, 0.4 mg, Oral, Daily       PRN Meds     acetaminophen    senna-docusate sodium **AND** polyethylene glycol **AND** bisacodyl **AND** bisacodyl    cyclobenzaprine    dextrose    dextrose    glucagon (human recombinant)    HYDROcodone-acetaminophen    labetalol    Magnesium Standard Dose Replacement - Follow Nurse / BPA Driven Protocol    nystatin-triamcinolone    ondansetron    Phosphorus Replacement - Follow Nurse / BPA Driven Protocol    polyethyl glycol-propyl glycol    [COMPLETED] Insert Peripheral IV **AND** sodium chloride    sodium chloride    sodium chloride    sodium chloride    sodium chloride   Infusions         Diagnostic Data    Results from last 7 days   Lab Units 08/06/24  0554   WBC 10*3/mm3 9.63   HEMOGLOBIN g/dL 11.9*   HEMATOCRIT % 35.7   PLATELETS 10*3/mm3 262   GLUCOSE mg/dL 113*   CREATININE mg/dL 0.45*   BUN mg/dL 13   SODIUM mmol/L 137   POTASSIUM mmol/L 3.9   AST (SGOT) U/L 22   ALT (SGPT) U/L 34*   ALK PHOS U/L 62   BILIRUBIN mg/dL 0.5   ANION GAP mmol/L 7.8       CT Abdomen Pelvis With Contrast    Result Date: 8/6/2024  1.Postoperative changes are again noted. A residual wound is noted within the midline in the anterior abdominal wall within the umbilical region. There is fluid within the wound. Multiple bowel loops are again seen within the anterior aspect of the intraperitoneal cavity underlying the region of the anterior abdominal wall wound. Surgical suture material is noted along the base of the wound and along the anterior margin of the underlying bowel within this region. There is no definitive extravasation of contrast at this time. No  well-defined perforation is seen. Residual fluid is seen within the wound. 2.There is no evidence for significant free fluid or abnormal fluid collection within the intraperitoneal space. There is no abnormal extravasation of oral contrast into the intraperitoneal space. No free air is observed. Electronically Signed: Moe Weston MD  8/6/2024 11:56 AM EDT  Workstation ID: [id number]       I reviewed the patient's new clinical results.    Assessment/Plan:     Active and Resolved Problems  Active Hospital Problems    Diagnosis  POA    **Neck pain [M54.2]  Yes      Resolved Hospital Problems   No resolved problems to display.       Post-operative pseudomeningocele  Recent cervical meningioma with recent laminectomy of C2-C4 for resection of intradural tumor on 7/16/2024, now complicated by large fluid collection at operative site.  MRI Cervical Spine (7/29): Large fluid collection within posterior aspect of neck at the site of C1, C2, C3, and C4 laminectomy, increased since prior study, measuring 8.1 cm in the largest dimension with mild mass effect on the posterior aspect of the spinal cord with now moderate to severe spinal canal stenosis and possible mild cord compression noted at level C3-C4.  Underwent posterior cervical evacuation of pseudomeningocele with dural repair and lumbar drain placement on 7/31  Previously on Rocephin and Vancomycin, per MOODY, not felt to be infectious, this was discontinued post-operatively with continuation of surgical prophylaxis with cefazolin x 3 doses.  Follow surgical wound cultures.  MRSA Screen negative.  Status post cervical evacuation of pseudomeningocele and dural repair with insertion of lumbar drain.  Hourly neuro checks, adjusted per neurosurgery recommendations.  Lumbar drain management per MOODY: Drain 5 mL/hr; if unable to drain 3 mL/hr for 3 consecutive hours, notify neurosurgery.  Analgesia as ordered by MOODY.  Lumbar drain removed 8/5     Umbilical drainage/Hx of  fistula repair  Recurrence of enterocutaneous fistula possibly related to recent diverticulitis flare  General surgery following; no surgical intervention planned  Started on IV antibiotics with Rocephin, Flagyl  GI not planning on endoscopic evaluation  CT abdomen with a fluid collection at fistula site but no evidence of leak     Visual hallucinations/Delerium; resolved  Started earlier in the day, prior to surgery.  Not been sleeping well, possible etiology, versus delirium.  Patient realizes that she is hallucinating.  Encourage cluster care to promote resting between neuro checks.  Increased melatonin to 10 mg.  Psychiatry consulted 8/3 & pt started on seroquel qhs.     Essential hypertension: well controlled.   Continue amlodipine .   Titrate medications as needed.     Diabetes mellitus Type 2, not insulin-dependent : well controlled.   Diet controlled, on no home regimen.  A1c on 7/8/2024 was 6.10.  Accu checks ACHS or Every 6 Hours, based on diet, with sliding scale admelog coverage as needed.      Hypothyroidism: Continue levothyroxine.  History of breast and uterine cancer  Hyperlipidemia: Intolerant of statins.    VTE Prophylaxis:  Mechanical VTE prophylaxis orders are present.         Code status is   Code Status and Medical Interventions: CPR (Attempt to Resuscitate); Full Support   Ordered at: 07/31/24 1702     Code Status (Patient has no pulse and is not breathing):    CPR (Attempt to Resuscitate)     Medical Interventions (Patient has pulse or is breathing):    Full Support       Plan for disposition: Pending clinical course    Time: 30 minutes    Signature: Electronically signed by Gaetano Friedman MD, 08/06/24, 18:15 EDT.  [facility] Hospitalist Team

## 2024-08-06 NOTE — NURSING NOTE
78-year-old female with a history of a chronic enterocutaneous fistula.  Patient states that it took 5 years for the fistula to close it has spontaneously reopened.  It is draining a moderate to large amount of feculent brown exudate.  The pouch that is in place is leaking.  The fistula sits within a lot of scar tissue and deep within a abdominal fold.  I did remove the pouch.  I cleansed her skin and dried it well.  I applied No Sting barrier spray.  I applied an Adaptic ring.  I applied extra paste to some of the deeper folds.  I then cut darts within the pouch and placed periwound skin at this time is intact.

## 2024-08-06 NOTE — PROGRESS NOTES
General Surgery Progress Note    Name: Esperanza Smith ADMIT: 2024   : 1945  PCP: Dom Estrada MD    MRN: [id number] LOS: 8 days   AGE/SEX: 78 y.o. female  ROOM: [address] [place] [address]    Chief Complaint   Patient presents with    Post-op Problem     Pt had neck surgery on , pt started having increased pain since Friday     Subjective     78 y.o. female I been asked to see for recurrence of a longstanding enterocutaneous fistula    General says she is doing pretty well.  Minimal abdominal pain.  No nausea or vomiting.  No fevers or chills.    Objective     Scheduled Medications:   amLODIPine, 5 mg, Oral, Daily  ascorbic acid, 250 mg, Oral, Daily  cefTRIAXone, 1,000 mg, Intravenous, Q24H  cholecalciferol, 2,000 Units, Oral, Daily  insulin lispro, 2-7 Units, Subcutaneous, 4x Daily AC & at Bedtime  levothyroxine, 50 mcg, Oral, Q AM  melatonin, 10 mg, Oral, Nightly  metroNIDAZOLE, 500 mg, Intravenous, Q8H  QUEtiapine, 25 mg, Oral, Nightly  senna-docusate sodium, 2 tablet, Oral, BID  sodium chloride, 10 mL, Intravenous, Q12H  sodium chloride, 10 mL, Intravenous, Q12H  tamsulosin, 0.4 mg, Oral, Daily        Active Infusions:       As Needed Medications:    acetaminophen    senna-docusate sodium **AND** polyethylene glycol **AND** bisacodyl **AND** bisacodyl    cyclobenzaprine    dextrose    dextrose    glucagon (human recombinant)    HYDROcodone-acetaminophen    labetalol    Magnesium Standard Dose Replacement - Follow Nurse / BPA Driven Protocol    nystatin-triamcinolone    ondansetron    Phosphorus Replacement - Follow Nurse / BPA Driven Protocol    polyethyl glycol-propyl glycol    [COMPLETED] Insert Peripheral IV **AND** sodium chloride    sodium chloride    sodium chloride    sodium chloride    sodium chloride    Vital Signs  Vital Signs Patient Vitals for the past 24 hrs:   BP Temp Temp src Pulse Resp SpO2   24 1200 -- 98.1 °F (36.7 °C) Oral -- -- --   24 0800 134/75 -- -- 82 --  99 %   08/06/24 0740 -- 97.5 °F (36.4 °C) Oral -- -- --   08/06/24 0600 126/71 -- -- 73 -- 98 %   08/06/24 0500 112/60 -- -- 72 -- 98 %   08/06/24 0406 100/61 97.6 °F (36.4 °C) Oral 86 21 97 %   08/06/24 0400 100/61 -- -- 77 -- 98 %   08/06/24 0300 123/69 -- -- 84 -- 97 %   08/06/24 0200 125/83 -- -- 83 -- 97 %   08/06/24 0100 122/72 -- -- 89 -- 98 %   08/06/24 0000 118/67 98.1 °F (36.7 °C) Oral 88 25 97 %   08/05/24 2300 128/68 -- -- 86 -- 97 %   08/05/24 2200 125/76 -- -- 90 -- 98 %   08/05/24 2100 -- -- -- 99 -- 97 %   08/05/24 2000 136/85 98.3 °F (36.8 °C) Oral 99 -- 97 %   08/05/24 1900 127/79 -- -- 93 -- 98 %   08/05/24 1800 118/75 -- -- 96 -- 99 %   08/05/24 1700 113/70 -- -- 95 -- 99 %   08/05/24 1600 123/74 98.2 °F (36.8 °C) Oral 84 27 99 %   08/05/24 1500 130/81 -- -- 87 -- 99 %   08/05/24 1400 126/76 -- -- 96 -- 96 %       Physical Exam:  Physical Exam  Constitutional:       Appearance: Normal appearance.   HENT:      Head: Normocephalic and atraumatic.   Pulmonary:      Effort: Pulmonary effort is normal. No respiratory distress.   Abdominal:      Comments: Abdomen is soft nondistended mild tenderness to palpation the ostomy appliance is over the fistula with some feculent output   Neurological:      Mental Status: She is alert.         Results Review:     CBC    Results from last 7 days   Lab Units 08/06/24  0554 08/05/24  0457 08/04/24  0315 08/03/24  0419 08/02/24  0354 08/01/24  0517 07/31/24  0054   WBC 10*3/mm3 9.63 11.46* 12.33* 13.56* 14.45* 12.52* 16.33*   HEMOGLOBIN g/dL 11.9* 12.0 12.9 14.0 13.9 11.9* 13.8   PLATELETS 10*3/mm3 262 272 258 302 307 279 264     BMP   Results from last 7 days   Lab Units 08/06/24  0554 08/05/24  0408 08/04/24  0315 08/03/24  0419 08/02/24  0354 08/01/24  1727 08/01/24  0517 07/31/24  0054   SODIUM mmol/L 137 136 138 137 136  --  135* 132*   POTASSIUM mmol/L 3.9 4.1 3.6 3.7 4.3 4.2 3.4* 3.6   CHLORIDE mmol/L 97* 98 98 95* 94*  --  96* 93*   CO2 mmol/L 32.2* 31.9*  33.2* 30.5* 30.3*  --  30.6* 27.1   BUN mg/dL 13 16 18 14 11  --  11 9   CREATININE mg/dL 0.45* 0.47* 0.47* 0.47* 0.51*  --  0.55* 0.53*   GLUCOSE mg/dL 113* 95 120* 140* 142*  --  135* 152*   MAGNESIUM mg/dL 1.6 1.6 1.8 1.8 1.6  --  1.6 1.6   PHOSPHORUS mg/dL 3.8 3.4 3.5 3.9 3.0  --  3.7 3.3     Radiology(recent) CT Abdomen Pelvis With Contrast    Result Date: 8/6/2024  1.Postoperative changes are again noted. A residual wound is noted within the midline in the anterior abdominal wall within the umbilical region. There is fluid within the wound. Multiple bowel loops are again seen within the anterior aspect of the intraperitoneal cavity underlying the region of the anterior abdominal wall wound. Surgical suture material is noted along the base of the wound and along the anterior margin of the underlying bowel within this region. There is no definitive extravasation of contrast at this time. No well-defined perforation is seen. Residual fluid is seen within the wound. 2.There is no evidence for significant free fluid or abnormal fluid collection within the intraperitoneal space. There is no abnormal extravasation of oral contrast into the intraperitoneal space. No free air is observed. Electronically Signed: Moe Weston MD  8/6/2024 11:56 AM EDT  Workstation ID: [id number]     I reviewed the patient's new clinical results.  I reviewed the patient's new imaging results and agree with the interpretation.    Assessment & Plan       Neck pain  Enterocutaneous fistula    78 y.o. female with a recurrence of hernia cutaneous fistula.    I have reviewed the CT images and read.  Appreciate gastroenterology involvement.    My standpoint this is a low output fistula.  Okay to eat and drink.  Will ask wound care to see to help with supplies for pouching.  Can follow-up in the future if she ever wants to talk more about surgical intervention which could be offered.        This note was created using Dragon Voice Recognition  software.    Robert Greenfield MD  08/06/24  13:49 EDT

## 2024-08-06 NOTE — PROGRESS NOTES
LOS: 8 days   Patient Care Team:  Dom Estrada MD as PCP - General      Subjective   Subjective: No major change.  Still feculent material draining from umbilicus with an ostomy bag intact.  She has some minimal left-sided discomfort.  No nausea or vomiting.  No blood in stool.      ROS:   Review of Systems   Constitutional:  Negative for chills and fever.   Respiratory:  Negative for cough and shortness of breath.    Cardiovascular:  Negative for chest pain.   Gastrointestinal:  Positive for abdominal pain. Negative for blood in stool, nausea and vomiting.   Psychiatric/Behavioral:  Negative for agitation and confusion.         Medication Review:   Scheduled Meds:amLODIPine, 5 mg, Oral, Daily  ascorbic acid, 250 mg, Oral, Daily  cefTRIAXone, 1,000 mg, Intravenous, Q24H  cholecalciferol, 2,000 Units, Oral, Daily  insulin lispro, 2-7 Units, Subcutaneous, 4x Daily AC & at Bedtime  levothyroxine, 50 mcg, Oral, Q AM  melatonin, 10 mg, Oral, Nightly  QUEtiapine, 25 mg, Oral, Nightly  sodium chloride, 10 mL, Intravenous, Q12H  sodium chloride, 10 mL, Intravenous, Q12H      Continuous Infusions:   PRN Meds:.  acetaminophen    senna-docusate sodium **AND** polyethylene glycol **AND** bisacodyl **AND** bisacodyl    cyclobenzaprine    dextrose    dextrose    glucagon (human recombinant)    HYDROcodone-acetaminophen    labetalol    Magnesium Standard Dose Replacement - Follow Nurse / BPA Driven Protocol    nystatin-triamcinolone    ondansetron    Phosphorus Replacement - Follow Nurse / BPA Driven Protocol    polyethyl glycol-propyl glycol    [COMPLETED] Insert Peripheral IV **AND** sodium chloride    sodium chloride    sodium chloride    sodium chloride    sodium chloride      Objective     Vital Signs  Temp:  [97.3 °F (36.3 °C)-98.3 °F (36.8 °C)] 97.5 °F (36.4 °C)  Heart Rate:  [] 82  Resp:  [21-27] 21  BP: (100-136)/(60-85) 134/75    Physical Exam:    General Appearance:    Awake and alert, in no acute distress    Head:    Normocephalic, without obvious abnormality   Eyes:          Conjunctivae normal, anicteric sclera   Ears:    Hearing intact   Throat:   No oral lesions, no thrush, oral mucosa moist       Lungs:     Respirations regular, even and unlabored       Abdomen:     Soft, left-sided tenderness, fistula at umbilicus with feculent material in drainage bag, no rebound or guarding, non-distended   Rectal:     Deferred   Extremities:   No edema, no cyanosis, no redness   Skin:   No bleeding, bruising or rash, no jaundice   Neurologic:   Sensation intact        Results Review:    CBC  Results from last 7 days   Lab Units 08/06/24  0554 08/05/24  0457 08/04/24  0315 08/03/24  0419 08/02/24  0354 08/01/24  0517 07/31/24  0054   RBC 10*6/mm3 3.70* 3.75* 4.04 4.38 4.34 3.73* 4.29   WBC 10*3/mm3 9.63 11.46* 12.33* 13.56* 14.45* 12.52* 16.33*   HEMOGLOBIN g/dL 11.9* 12.0 12.9 14.0 13.9 11.9* 13.8   PLATELETS 10*3/mm3 262 272 258 302 307 279 264       CMP  Results from last 7 days   Lab Units 08/06/24  0554 08/05/24  0408 08/04/24  0315 08/03/24  0419 08/02/24  0354 08/01/24  1727 08/01/24  0517 07/31/24  0054   SODIUM mmol/L 137 136 138 137 136  --  135* 132*   POTASSIUM mmol/L 3.9 4.1 3.6 3.7 4.3 4.2 3.4* 3.6   CHLORIDE mmol/L 97* 98 98 95* 94*  --  96* 93*   CO2 mmol/L 32.2* 31.9* 33.2* 30.5* 30.3*  --  30.6* 27.1   BUN mg/dL 13 16 18 14 11  --  11 9   CREATININE mg/dL 0.45* 0.47* 0.47* 0.47* 0.51*  --  0.55* 0.53*   GLUCOSE mg/dL 113* 95 120* 140* 142*  --  135* 152*   ALBUMIN g/dL 3.3* 3.2* 3.4* 3.6 3.7  --  3.2* 3.5   BILIRUBIN mg/dL 0.5 0.6 0.6 0.7 0.5  --  0.3 0.4   ALK PHOS U/L 62 67 67 75 72  --  63 73   AST (SGOT) U/L 22 28 39* 31 29  --  20 21   ALT (SGPT) U/L 34* 35* 30 31 26  --  20 25       Amylase and Lipase        CRP         Imaging Results (Last 24 Hours)       Procedure Component Value Units Date/Time    CT Abdomen Pelvis With Contrast [[id number]] Resulted: 08/06/24 1122     Updated: 08/06/24 1115               Assessment & Plan   -Drainage from umbilical fistula with fluid and feculent material  -Recent diverticulitis 7/2/2024  -Cervical meningioma status post surgery 7/16  -Status post evacuation of pseudomeningocele and lumbar drain insertion -removal of drain 8/5  -History of hysterectomy 10 years ago complicated by small bowel injury with small bowel resection and reported fistula  -Leukocytosis  -Minimally elevated ALT  -Diabetes  -History of cholecystectomy  -History of breast and uterine cancer    Plan:  78-year-old female admitted 7/29/2024 with neck pain.  She had recent cervical meningioma surgery 7/16/2024.  Found to have large fluid collection and had post cervical evacuation of pseudomeningocele and lumbar drain insertion on 7/31/2024.  Today there was noticeable drainage from umbilicus and GI asked to consult due to fistula.     Patient had diverticulitis diagnosed on 7/2/2024.  She was treated with antibiotics.  Now having the feculent material draining from the umbilicus which has continued overnight.  Ostomy bag intact.  No blood in the stool.  WBC normal at 9.63.  Hemoglobin 11.9.  Awaiting CT abdomen/pelvis which is to be performed today.  General surgery also following.  Continue antibiotics and supportive care.     Avril Adrian, APRN  08/06/24  11:30 EDT
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
## 2024-08-06 NOTE — PROGRESS NOTES
Neurosurgery Progress Note    Date: 24     Patient: Esperanza Smith   Sex: female   : 1945      SUBJECTIVE    CC: Post op day 6 LUMBAR DRAIN INSERTION REPAIR OF CERVICAL DURA     Interval history:  Patient's neck pain has improved this morning. Continues to deny any postural headaches. Had a headache last night but is gone this morning.     Current Medications:  Scheduled Meds:amLODIPine, 5 mg, Oral, Daily  ascorbic acid, 250 mg, Oral, Daily  cefTRIAXone, 1,000 mg, Intravenous, Q24H  cholecalciferol, 2,000 Units, Oral, Daily  insulin lispro, 2-7 Units, Subcutaneous, 4x Daily AC & at Bedtime  levothyroxine, 50 mcg, Oral, Q AM  melatonin, 10 mg, Oral, Nightly  QUEtiapine, 25 mg, Oral, Nightly  sodium chloride, 10 mL, Intravenous, Q12H  sodium chloride, 10 mL, Intravenous, Q12H      Continuous Infusions:   PRN Meds:   acetaminophen    senna-docusate sodium **AND** polyethylene glycol **AND** bisacodyl **AND** bisacodyl    cyclobenzaprine    dextrose    dextrose    glucagon (human recombinant)    HYDROcodone-acetaminophen    labetalol    Magnesium Standard Dose Replacement - Follow Nurse / BPA Driven Protocol    nystatin-triamcinolone    ondansetron    Phosphorus Replacement - Follow Nurse / BPA Driven Protocol    polyethyl glycol-propyl glycol    [COMPLETED] Insert Peripheral IV **AND** sodium chloride    sodium chloride    sodium chloride    sodium chloride    sodium chloride      OBJECTIVE  Vitals:    24 0406 24 0500 24 0600 24 0740   BP: 100/61 112/60 126/71    BP Location: Left arm      Patient Position: Lying      Pulse: 86 72 73    Resp: 21      Temp: 97.6 °F (36.4 °C)   97.5 °F (36.4 °C)   TempSrc: Oral   Oral   SpO2: 97% 98% 98%    Weight:       Height:           Physical exam    General  - WD/WN female, appears their stated age  - Awake, cooperative, in no acute distress  HEENT  - Normocephalic, atraumatic  - PERRLA, EOM intact  Respiratory  - Normal respiratory rate and  effort  Musculoskeletal  - No joint redness, swelling, or tenderness  Skin  - Warm and dry, no bleeding, bruising, or rash. Posterior neck incision covered with bandage and low back drain stitch covered.   NEUROLOGIC  - A/O x3, GCS 4-6-5  - CN II-XII grossly intact  - Moves all extremities symmetrically and w/ 5/5 strength  - Sensation intact throughout        Results review  CBC          8/4/2024    03:15 8/5/2024    04:57 8/6/2024    05:54   CBC   WBC 12.33  11.46  9.63    RBC 4.04  3.75  3.70    Hemoglobin 12.9  12.0  11.9    Hematocrit 38.9  36.5  35.7    MCV 96.3  97.3  96.5    MCH 31.9  32.0  32.2    MCHC 33.2  32.9  33.3    RDW 12.3  12.3  12.4    Platelets 258  272  262        BMP          8/4/2024    03:15 8/5/2024    04:08 8/6/2024    05:54   BMP   BUN 18  16  13    Creatinine 0.47  0.47  0.45    Sodium 138  136  137    Potassium 3.6  4.1  3.9    Chloride 98  98  97    CO2 33.2  31.9  32.2    Calcium 10.0  10.1  10.3                MRI Cervical Spine With & Without Contrast    Result Date: 7/29/2024  MRI CERVICAL SPINE W WO CONTRAST Date of Exam: 7/29/2024 6:27 PM EDT Indication: Postop neck pain.  Comparison: 7/19/2024 Technique:  Routine multiplanar/multisequence sequence images of the cervical spine were obtained before and after the uneventful administration of Prohance.  Findings: The visualized intracranial structures are unremarkable. The craniocervical junction is unremarkable. Subtle T2/FLAIR hyperintensity is noted within the spinal cord at the level of C2-C3, most likely representing myelomalacia from prior cord compression. Alternately this may represent mild cord edema, although this is unchanged. Otherwise the spinal cord is normal in signal and caliber. There is mild enhancement of the posterior epidural space at the level of the craniocervical junction and at the level of C4-C5, most likely representing postsurgical changes. No evidence of definite intraspinal canal fluid collection is  noted. Extensive postsurgical changes noted within the posterior aspect of the upper neck secondary to laminectomy at C1, C2, C3 and C4. Once again a large fluid collection is noted within the posterior aspect of the neck measuring approximately 4.2 x 8.1 x 5.6 cm. This has increased in size since most recent comparison. Additionally there is also mildly worsened mass effect, from the fluid collection, anteriorly onto the spinal cord when compared to most recent study. There is mild peripheral enhancement. The visualized vertebral body heights are maintained. Overall alignment is maintained. Loss of intervertebral disc base height, similar to prior study. There is mild worsening of spinal canal stenosis noted at C3-C4 and C4-C5 secondary to the mildly expanded posterior fluid collection as characterized above. There is associated mass effect  with possible mild cord compression at the level of C3-C4. The remainder of the visualized degenerative changes are unchanged from most recent MRI. The remainder of the visualized soft tissues of the neck are unremarkable.     Impression: Impression: Large fluid collection noted within the posterior aspect of the neck at the site of C1, C2, C3 and C4 laminectomy, has increased in size since prior study, now measuring up to 8.1 cm in the largest dimension. While this may represent a seroma, given its increase in size, this is highly concerning for a pseudomeningocele or CSF leak. Given the level of peripheral enhancement, this is less likely to represent an abscess, although it is not completely excluded. The expanding fluid collection in the posterior neck causes mild mass effect on the posterior aspect of the spinal cord with now moderate to severe spinal canal stenosis and possible mild cord compression noted at the level of C3-C4. This has mildly worsened since prior study. Additional findings as characterized above, not significantly changed since prior study Electronically  Signed: Uri BirchDO  7/29/2024 7:40 PM EDT  Workstation ID: [id number]    MRI Cervical Spine Without Contrast    Result Date: 7/24/2024  MRI CERVICAL SPINE WO CONTRAST Date of Exam: 7/19/2024 9:20 AM EDT Indication: Postop C2-C4 laminectomy for meningioma..  Comparison: 7/12/2024 Technique:  Routine multiplanar/multisequence sequence images of the cervical spine were obtained without contrast administration.  Findings: No abnormal cord signal is identified. The previously identified meningioma is no longer present. The patient is status post C2-C4 laminectomy. There is a 5.9 x 3.4 x 4 cm fluid signal lesion posterior to the laminectomy site, likely representing a postoperative seroma, a pseudomeningocele is less favored but not excluded. An infection is also much less favored, though difficult to exclude without contrast. There is a mild C3-4 anterior disc bulge with effacement of the ventral CSF space resulting in mild canal stenosis but no significant foraminal narrowing. Minimal multilevel disc bulges are identified, but no other areas of canal stenosis. The visualized  posterior fossa is unremarkable.     Impression: Impression: Status post C2-C4 laminectomy. There is a 5.9 x 3.4 x 4 cm fluid collection posterior to the laminectomy site which is likely a postoperative seroma, an abscess or pseudomeningocele is less favored. Mild canal stenosis at C3-4 secondary to broad-based disc bulge. Electronically Signed: Robert Caballero MD  7/24/2024 4:43 PM EDT  Workstation ID: [id number]   CT Head Without Contrast    Result Date: 8/3/2024  CT HEAD WO CONTRAST Date of Exam: 8/3/2024 3:52 AM EDT Indication: [facility]. Comparison: 2/26/2019. Technique: Axial CT images were obtained of the head without contrast administration.  Coronal reconstructions were performed.  Automated exposure control and iterative reconstruction methods were used. Findings: There is no evidence of hemorrhage. There is no mass effect or  midline shift. There is no extracerebral collection. Ventricles are normal in size and configuration for patient's stated age.  Posterior fossa is within normal limits. Calvarium and skull base appear intact.   Visualized sinuses show no air fluid levels. Visualized orbits are unremarkable.     Impression: Impression: No evidence of hemorrhage, mass effect or midline shift. No acute intracranial process identified. Electronically Signed: Jeni Patterson MD  8/3/2024 4:04 AM EDT  Workstation ID: [id number]          ASSESSMENT/PLAN  This is a 78 y.o. female who underwent surgery. Patient was having some neck pain last night, but is better this morning. I explained to the patient that this is most likely post-operative pain and this is normal. Patient continues to deny any postural headaches. She admits to a headache last night, but states that it is better this morning. Patient has no new complaints this morning.     Patient worked with PT yesterday and was recommended that she go to inpatient rehab. Patient would like to go home. I voiced my opinion that I think inpatient rehab would be a great option for her. She said she would think about it and discuss with her .     Patient should continue to work with PT while she is here. Please reach out with any questions or concerns.     Plan:   LUMBAR DRAIN INSERTION REPAIR OF CERVICAL DURA   - Continue PT   - Change bandages daily   - Monitor for postural headaches   - pain management per primary     I discussed my assessment and recommendations with Dr. Renan Carrera PA-C  08/06/24  08:59 EDT      Part of this note may be an electronic transcription/translation of spoken language to printed text using the Dragon Dictation System.

## 2024-08-06 NOTE — PLAN OF CARE
Goal Outcome Evaluation:  Pt. VSS overnight. Pt. Having increasing levels of pain in the neck and shoulder area. Neurosurgery was contacted and stated it was post op pain. Pt. Resting comfortably at this time.

## 2024-08-06 NOTE — CASE MANAGEMENT/SOCIAL WORK
Case Management/Social Work    Patient Name:  Esperanza Smith  YOB: 1945  MRN: [id number]  Admit Date:  7/29/2024        CM placed request in patient's chart to remove scanned in [facility] Waiver since the form is inaccurate. Form will be removed and rescinded. CM called spouse to discuss, spouse verbalized understanding.     DIANA Richmond, RN, CCM    [address] [address]  Phone: [phone]  Fax: [phone]

## 2024-08-07 ENCOUNTER — INPATIENT HOSPITAL (OUTPATIENT)
Dept: URBAN - METROPOLITAN AREA HOSPITAL 84 | Facility: HOSPITAL | Age: 79
End: 2024-08-07
Payer: MEDICARE

## 2024-08-07 VITALS
HEART RATE: 91 BPM | HEIGHT: 65 IN | BODY MASS INDEX: 31.4 KG/M2 | SYSTOLIC BLOOD PRESSURE: 129 MMHG | WEIGHT: 188.49 LBS | RESPIRATION RATE: 21 BRPM | OXYGEN SATURATION: 90 % | TEMPERATURE: 98.3 F | DIASTOLIC BLOOD PRESSURE: 76 MMHG

## 2024-08-07 DIAGNOSIS — R74.01 ELEVATION OF LEVELS OF LIVER TRANSAMINASE LEVELS: ICD-10-CM

## 2024-08-07 DIAGNOSIS — K63.2 FISTULA OF INTESTINE: ICD-10-CM

## 2024-08-07 DIAGNOSIS — D72.829 ELEVATED WHITE BLOOD CELL COUNT, UNSPECIFIED: ICD-10-CM

## 2024-08-07 DIAGNOSIS — Z90.49 ACQUIRED ABSENCE OF OTHER SPECIFIED PARTS OF DIGESTIVE TRACT: ICD-10-CM

## 2024-08-07 LAB
ALBUMIN SERPL-MCNC: 3.3 G/DL (ref 3.5–5.2)
ALBUMIN/GLOB SERPL: 1.4 G/DL
ALP SERPL-CCNC: 60 U/L (ref 39–117)
ALT SERPL W P-5'-P-CCNC: 34 U/L (ref 1–33)
ANION GAP SERPL CALCULATED.3IONS-SCNC: 9.8 MMOL/L (ref 5–15)
AST SERPL-CCNC: 23 U/L (ref 1–32)
BASOPHILS # BLD AUTO: 0.04 10*3/MM3 (ref 0–0.2)
BASOPHILS NFR BLD AUTO: 0.5 % (ref 0–1.5)
BILIRUB SERPL-MCNC: 0.4 MG/DL (ref 0–1.2)
BUN SERPL-MCNC: 11 MG/DL (ref 8–23)
BUN/CREAT SERPL: 26.8 (ref 7–25)
CALCIUM SPEC-SCNC: 10.1 MG/DL (ref 8.6–10.5)
CHLORIDE SERPL-SCNC: 96 MMOL/L (ref 98–107)
CO2 SERPL-SCNC: 30.2 MMOL/L (ref 22–29)
CREAT SERPL-MCNC: 0.41 MG/DL (ref 0.57–1)
DEPRECATED RDW RBC AUTO: 43.1 FL (ref 37–54)
EGFRCR SERPLBLD CKD-EPI 2021: 102.1 ML/MIN/1.73
EOSINOPHIL # BLD AUTO: 0.28 10*3/MM3 (ref 0–0.4)
EOSINOPHIL NFR BLD AUTO: 3.8 % (ref 0.3–6.2)
ERYTHROCYTE [DISTWIDTH] IN BLOOD BY AUTOMATED COUNT: 12.1 % (ref 12.3–15.4)
GLOBULIN UR ELPH-MCNC: 2.4 GM/DL
GLUCOSE BLDC GLUCOMTR-MCNC: 134 MG/DL (ref 70–105)
GLUCOSE BLDC GLUCOMTR-MCNC: 191 MG/DL (ref 70–105)
GLUCOSE SERPL-MCNC: 96 MG/DL (ref 65–99)
HCT VFR BLD AUTO: 34.9 % (ref 34–46.6)
HGB BLD-MCNC: 11.5 G/DL (ref 12–15.9)
IMM GRANULOCYTES # BLD AUTO: 0.04 10*3/MM3 (ref 0–0.05)
IMM GRANULOCYTES NFR BLD AUTO: 0.5 % (ref 0–0.5)
LYMPHOCYTES # BLD AUTO: 1.95 10*3/MM3 (ref 0.7–3.1)
LYMPHOCYTES NFR BLD AUTO: 26.6 % (ref 19.6–45.3)
MAGNESIUM SERPL-MCNC: 1.6 MG/DL (ref 1.6–2.4)
MCH RBC QN AUTO: 31.8 PG (ref 26.6–33)
MCHC RBC AUTO-ENTMCNC: 33 G/DL (ref 31.5–35.7)
MCV RBC AUTO: 96.4 FL (ref 79–97)
MONOCYTES # BLD AUTO: 0.87 10*3/MM3 (ref 0.1–0.9)
MONOCYTES NFR BLD AUTO: 11.9 % (ref 5–12)
NEUTROPHILS NFR BLD AUTO: 4.15 10*3/MM3 (ref 1.7–7)
NEUTROPHILS NFR BLD AUTO: 56.7 % (ref 42.7–76)
NRBC BLD AUTO-RTO: 0 /100 WBC (ref 0–0.2)
PHOSPHATE SERPL-MCNC: 3.9 MG/DL (ref 2.5–4.5)
PLATELET # BLD AUTO: 278 10*3/MM3 (ref 140–450)
PMV BLD AUTO: 9.7 FL (ref 6–12)
POTASSIUM SERPL-SCNC: 3.9 MMOL/L (ref 3.5–5.2)
PROT SERPL-MCNC: 5.7 G/DL (ref 6–8.5)
RBC # BLD AUTO: 3.62 10*6/MM3 (ref 3.77–5.28)
SODIUM SERPL-SCNC: 136 MMOL/L (ref 136–145)
WBC NRBC COR # BLD AUTO: 7.33 10*3/MM3 (ref 3.4–10.8)

## 2024-08-07 PROCEDURE — 97110 THERAPEUTIC EXERCISES: CPT

## 2024-08-07 PROCEDURE — 87070 CULTURE OTHR SPECIMN AEROBIC: CPT | Performed by: INTERNAL MEDICINE

## 2024-08-07 PROCEDURE — 97116 GAIT TRAINING THERAPY: CPT

## 2024-08-07 PROCEDURE — 87040 BLOOD CULTURE FOR BACTERIA: CPT | Performed by: INTERNAL MEDICINE

## 2024-08-07 PROCEDURE — 87077 CULTURE AEROBIC IDENTIFY: CPT | Performed by: INTERNAL MEDICINE

## 2024-08-07 PROCEDURE — 82948 REAGENT STRIP/BLOOD GLUCOSE: CPT | Performed by: NURSE PRACTITIONER

## 2024-08-07 PROCEDURE — 83735 ASSAY OF MAGNESIUM: CPT

## 2024-08-07 PROCEDURE — 99024 POSTOP FOLLOW-UP VISIT: CPT

## 2024-08-07 PROCEDURE — 84100 ASSAY OF PHOSPHORUS: CPT

## 2024-08-07 PROCEDURE — 97112 NEUROMUSCULAR REEDUCATION: CPT

## 2024-08-07 PROCEDURE — 99232 SBSQ HOSP IP/OBS MODERATE 35: CPT | Performed by: NURSE PRACTITIONER

## 2024-08-07 PROCEDURE — 85025 COMPLETE CBC W/AUTO DIFF WBC: CPT

## 2024-08-07 PROCEDURE — 87205 SMEAR GRAM STAIN: CPT | Performed by: INTERNAL MEDICINE

## 2024-08-07 PROCEDURE — 80053 COMPREHEN METABOLIC PANEL: CPT

## 2024-08-07 PROCEDURE — 97535 SELF CARE MNGMENT TRAINING: CPT

## 2024-08-07 PROCEDURE — 87186 SC STD MICRODIL/AGAR DIL: CPT | Performed by: INTERNAL MEDICINE

## 2024-08-07 PROCEDURE — 97530 THERAPEUTIC ACTIVITIES: CPT

## 2024-08-07 PROCEDURE — 25010000002 METRONIDAZOLE 500 MG/100ML SOLUTION: Performed by: NURSE PRACTITIONER

## 2024-08-07 PROCEDURE — 63710000001 INSULIN LISPRO (HUMAN) PER 5 UNITS: Performed by: NURSE PRACTITIONER

## 2024-08-07 RX ORDER — QUETIAPINE FUMARATE 25 MG/1
25 TABLET, FILM COATED ORAL NIGHTLY
Start: 2024-08-07 | End: 2024-08-14

## 2024-08-07 RX ORDER — CEFDINIR 300 MG/1
300 CAPSULE ORAL 2 TIMES DAILY
Start: 2024-08-07 | End: 2024-08-11

## 2024-08-07 RX ORDER — TAMSULOSIN HYDROCHLORIDE 0.4 MG/1
0.4 CAPSULE ORAL DAILY
Start: 2024-08-08

## 2024-08-07 RX ORDER — HYDROXYZINE HYDROCHLORIDE 25 MG/1
25 TABLET, FILM COATED ORAL 3 TIMES DAILY PRN
Status: DISCONTINUED | OUTPATIENT
Start: 2024-08-07 | End: 2024-08-07 | Stop reason: HOSPADM

## 2024-08-07 RX ORDER — METRONIDAZOLE 500 MG/1
500 TABLET ORAL 3 TIMES DAILY
Start: 2024-08-07 | End: 2024-08-11

## 2024-08-07 RX ADMIN — AMLODIPINE BESYLATE 5 MG: 5 TABLET ORAL at 08:52

## 2024-08-07 RX ADMIN — SENNOSIDES AND DOCUSATE SODIUM 2 TABLET: 50; 8.6 TABLET ORAL at 08:53

## 2024-08-07 RX ADMIN — Medication 2000 UNITS: at 08:53

## 2024-08-07 RX ADMIN — INSULIN LISPRO 2 UNITS: 100 INJECTION, SOLUTION INTRAVENOUS; SUBCUTANEOUS at 11:50

## 2024-08-07 RX ADMIN — METRONIDAZOLE 500 MG: 500 INJECTION, SOLUTION INTRAVENOUS at 05:03

## 2024-08-07 RX ADMIN — Medication 10 ML: at 08:53

## 2024-08-07 RX ADMIN — METRONIDAZOLE 500 MG: 500 INJECTION, SOLUTION INTRAVENOUS at 12:55

## 2024-08-07 RX ADMIN — POLYETHYLENE GLYCOL 3350 17 G: 17 POWDER, FOR SOLUTION ORAL at 08:51

## 2024-08-07 RX ADMIN — HYDROXYZINE HYDROCHLORIDE 25 MG: 25 TABLET ORAL at 03:58

## 2024-08-07 RX ADMIN — HYDROCODONE BITARTRATE AND ACETAMINOPHEN 1 TABLET: 5; 325 TABLET ORAL at 02:29

## 2024-08-07 RX ADMIN — LEVOTHYROXINE SODIUM 50 MCG: 0.05 TABLET ORAL at 05:03

## 2024-08-07 RX ADMIN — OXYCODONE HYDROCHLORIDE AND ACETAMINOPHEN 250 MG: 500 TABLET ORAL at 08:51

## 2024-08-07 RX ADMIN — TAMSULOSIN HYDROCHLORIDE 0.4 MG: 0.4 CAPSULE ORAL at 08:52

## 2024-08-07 RX ADMIN — METHYLNALTREXONE BROMIDE 150 MG: 150 TABLET ORAL at 10:45

## 2024-08-07 NOTE — PLAN OF CARE
Goal Outcome Evaluation:      Esperanza HERNANDEZ Walker presents with functional mobility impairments which indicate the need for skilled intervention. PT is making progress toward goals.  She has limited cervical ROM and standing balance deficits with posterior lean and frequent posterior LOB requiring min A to regain.  Pt is at high risk for falls and significantly below her baseline functional level.  Will benefit from daily PT and IP rehab at d/c. Tolerating session today without incident. Will continue to follow and progress as tolerated.

## 2024-08-07 NOTE — PLAN OF CARE
Esperanza HERNANDEZ Walker presents with ADL impairments affecting function including balance, coordination, endurance / activity tolerance, pain, postural / trunk control, range of motion (ROM), and strength. Pt completes sit<>stand exercise and BUE shoulder ROM while simultaneously completing visual scanning and neck ROM exercises. Pt limited with c-spine ROM, with head in a flexed and right-rotated position. Able to reach midline, but difficulty putting neck into extension / neutral position. Limited with RUE shoulder ROM. Completes cross-body reaching task requiring A for balance. Demonstrated functioning below baseline abilities indicate the need for continued skilled intervention while inpatient. Tolerating session today without incident. Will continue to follow and progress as tolerated.

## 2024-08-07 NOTE — PLAN OF CARE
Goal Outcome Evaluation:   Pt. VSS overnight. Had issues with pain again in the shoulders and neck. Pt. Was up to BSC twice with success. No straight cath done overnight. Pt. Resting comfortably at this time

## 2024-08-07 NOTE — THERAPY TREATMENT NOTE
Subjective: Pt agreeable to therapeutic plan of care.    Objective:   Pt resting in bed.     Precautions - spinal precautions, fall    Bed mobility - CGA, supine to sit, HOB elevated.     Transfers - CGA and Min-A, on/off bedside commode, stand from EOB, cues for hand placement and forward wt shift. Required A for pericare after toileting    Ambulation - 40 feet Min-A and with rolling walker, slow gait speed, forward flexed posture, still with frequent posterior lean/LOB especially when turning and backing up.     Cervical ROM:  significant limitation in L rotation and extension.  Pt keeps neck in resting position of R rotation and slight flexion, when attempting to extend neck she compensates with trunk extension.     Therapeutic Exercise - 10 Reps B UE and B LE AROM supported sitting / chair    Vitals:  ambulated on 2L today with sats 92-93%,  with short walk and seated exercises.     Pain: 2 VAS   Location: neck/shoulders  Intervention for pain: Repositioned and Therapeutic Presence    Education: Gait Training and Post-Op Precautions    Assessment: Esperanza HERNANDEZ Walker presents with functional mobility impairments which indicate the need for skilled intervention. PT is making progress toward goals.  She has limited cervical ROM and standing balance deficits with posterior lean and frequent posterior LOB requiring min A to regain.  Pt is at high risk for falls and significantly below her baseline functional level.  Will benefit from daily PT and IP rehab at d/c. Tolerating session today without incident. Will continue to follow and progress as tolerated.     Plan/Recommendations:   If medically appropriate, High Intensity Therapy recommended post-acute care. This is recommended as therapy feels the patient would require 5-6 days per week, 2-3 hours per day. At this time, inpatient rehabilitation (acute rehab) would be the first choice and SNF would be second. Pt requires no DME at discharge.     Pt desires  Inpatient Rehabilitation placement at discharge. Pt cooperative; agreeable to therapeutic recommendations and plan of care.         Basic Mobility 6-click:  Rollin = Total, A lot = 2, A little = 3; 4 = None  Supine>Sit:   1 = Total, A lot = 2, A little = 3; 4 = None   Sit>Stand with arms:  1 = Total, A lot = 2, A little = 3; 4 = None  Bed>Chair:   1 = Total, A lot = 2, A little = 3; 4 = None  Ambulate in room:  1 = Total, A lot = 2, A little = 3; 4 = None  3-5 Steps with railin = Total, A lot = 2, A little = 3; 4 = None  Score: 18    Modified Mila: N/A = No pre-op stroke/TIA    Post-Tx Position: Up in Chair, Alarms activated, and Call light and personal items within reach  PPE: gloves and surgical mask

## 2024-08-07 NOTE — PROGRESS NOTES
Neurosurgery Progress Note    Date: 24     Patient: Esperanza Smith   Sex: female   : 1945      SUBJECTIVE    CC: Post op day 7 LUMBAR DRAIN INSERTION REPAIR OF CERVICAL DURA     Interval history:  Patient doing well this morning. Patient has a slight headache this morning in addition to some neck pain, but no other complaints.       Current Medications:  Scheduled Meds:amLODIPine, 5 mg, Oral, Daily  ascorbic acid, 250 mg, Oral, Daily  cefTRIAXone, 1,000 mg, Intravenous, Q24H  cholecalciferol, 2,000 Units, Oral, Daily  insulin lispro, 2-7 Units, Subcutaneous, 4x Daily AC & at Bedtime  levothyroxine, 50 mcg, Oral, Q AM  melatonin, 10 mg, Oral, Nightly  metroNIDAZOLE, 500 mg, Intravenous, Q8H  polyethylene glycol, 17 g, Oral, Daily  QUEtiapine, 25 mg, Oral, Nightly  senna-docusate sodium, 2 tablet, Oral, BID  sodium chloride, 10 mL, Intravenous, Q12H  sodium chloride, 10 mL, Intravenous, Q12H  tamsulosin, 0.4 mg, Oral, Daily      Continuous Infusions:   PRN Meds:   acetaminophen    senna-docusate sodium **AND** polyethylene glycol **AND** bisacodyl **AND** bisacodyl    cyclobenzaprine    dextrose    dextrose    glucagon (human recombinant)    HYDROcodone-acetaminophen    hydrOXYzine    labetalol    Magnesium Standard Dose Replacement - Follow Nurse / BPA Driven Protocol    nystatin-triamcinolone    ondansetron    Phosphorus Replacement - Follow Nurse / BPA Driven Protocol    polyethyl glycol-propyl glycol    [COMPLETED] Insert Peripheral IV **AND** sodium chloride    sodium chloride    sodium chloride    sodium chloride    sodium chloride      OBJECTIVE  Vitals:    24 0300 24 0400 24 0500 24 0600   BP: 109/61 106/60 101/59    Pulse: 85 93 82 87   Resp:       Temp:  97.9 °F (36.6 °C)     TempSrc:  Oral     SpO2: 94% 95% 96% 96%   Weight:  85.5 kg (188 lb 7.9 oz)     Height:           Physical exam    General  - WD/WN female, appears their stated age  - Awake, cooperative, in no  acute distress  HEENT  - Normocephalic, atraumatic  - PERRLA, EOM intact  Respiratory  - Normal respiratory rate and effort  Musculoskeletal  - No joint redness, swelling, or tenderness  Skin  - Warm and dry, no bleeding, bruising, or rash. Posterior neck incision covered with bandage and lumbar stitch from drain covered with dressing  NEUROLOGIC  - A/O x3, GCS 4-6-5  - CN II-XII grossly intact  - Moves all extremities symmetrically and w/ 5/5 strength  - Sensation intact throughout        Results review  CBC          8/5/2024    04:57 8/6/2024    05:54 8/7/2024    05:42   CBC   WBC 11.46  9.63  7.33    RBC 3.75  3.70  3.62    Hemoglobin 12.0  11.9  11.5    Hematocrit 36.5  35.7  34.9    MCV 97.3  96.5  96.4    MCH 32.0  32.2  31.8    MCHC 32.9  33.3  33.0    RDW 12.3  12.4  12.1    Platelets 272  262  278        BMP          8/5/2024    04:08 8/6/2024    05:54 8/7/2024    05:03   BMP   BUN 16  13  11    Creatinine 0.47  0.45  0.41    Sodium 136  137  136    Potassium 4.1  3.9  3.9    Chloride 98  97  96    CO2 31.9  32.2  30.2    Calcium 10.1  10.3  10.1                MRI Cervical Spine With & Without Contrast    Result Date: 7/29/2024  MRI CERVICAL SPINE W WO CONTRAST Date of Exam: 7/29/2024 6:27 PM EDT Indication: Postop neck pain.  Comparison: 7/19/2024 Technique:  Routine multiplanar/multisequence sequence images of the cervical spine were obtained before and after the uneventful administration of Prohance.  Findings: The visualized intracranial structures are unremarkable. The craniocervical junction is unremarkable. Subtle T2/FLAIR hyperintensity is noted within the spinal cord at the level of C2-C3, most likely representing myelomalacia from prior cord compression. Alternately this may represent mild cord edema, although this is unchanged. Otherwise the spinal cord is normal in signal and caliber. There is mild enhancement of the posterior epidural space at the level of the craniocervical junction and at  the level of C4-C5, most likely representing postsurgical changes. No evidence of definite intraspinal canal fluid collection is noted. Extensive postsurgical changes noted within the posterior aspect of the upper neck secondary to laminectomy at C1, C2, C3 and C4. Once again a large fluid collection is noted within the posterior aspect of the neck measuring approximately 4.2 x 8.1 x 5.6 cm. This has increased in size since most recent comparison. Additionally there is also mildly worsened mass effect, from the fluid collection, anteriorly onto the spinal cord when compared to most recent study. There is mild peripheral enhancement. The visualized vertebral body heights are maintained. Overall alignment is maintained. Loss of intervertebral disc base height, similar to prior study. There is mild worsening of spinal canal stenosis noted at C3-C4 and C4-C5 secondary to the mildly expanded posterior fluid collection as characterized above. There is associated mass effect  with possible mild cord compression at the level of C3-C4. The remainder of the visualized degenerative changes are unchanged from most recent MRI. The remainder of the visualized soft tissues of the neck are unremarkable.     Impression: Impression: Large fluid collection noted within the posterior aspect of the neck at the site of C1, C2, C3 and C4 laminectomy, has increased in size since prior study, now measuring up to 8.1 cm in the largest dimension. While this may represent a seroma, given its increase in size, this is highly concerning for a pseudomeningocele or CSF leak. Given the level of peripheral enhancement, this is less likely to represent an abscess, although it is not completely excluded. The expanding fluid collection in the posterior neck causes mild mass effect on the posterior aspect of the spinal cord with now moderate to severe spinal canal stenosis and possible mild cord compression noted at the level of C3-C4. This has mildly  worsened since prior study. Additional findings as characterized above, not significantly changed since prior study Electronically Signed: Uri Birch DO  7/29/2024 7:40 PM EDT  Workstation ID: [id number]    MRI Cervical Spine Without Contrast    Result Date: 7/24/2024  MRI CERVICAL SPINE WO CONTRAST Date of Exam: 7/19/2024 9:20 AM EDT Indication: Postop C2-C4 laminectomy for meningioma..  Comparison: 7/12/2024 Technique:  Routine multiplanar/multisequence sequence images of the cervical spine were obtained without contrast administration.  Findings: No abnormal cord signal is identified. The previously identified meningioma is no longer present. The patient is status post C2-C4 laminectomy. There is a 5.9 x 3.4 x 4 cm fluid signal lesion posterior to the laminectomy site, likely representing a postoperative seroma, a pseudomeningocele is less favored but not excluded. An infection is also much less favored, though difficult to exclude without contrast. There is a mild C3-4 anterior disc bulge with effacement of the ventral CSF space resulting in mild canal stenosis but no significant foraminal narrowing. Minimal multilevel disc bulges are identified, but no other areas of canal stenosis. The visualized  posterior fossa is unremarkable.     Impression: Impression: Status post C2-C4 laminectomy. There is a 5.9 x 3.4 x 4 cm fluid collection posterior to the laminectomy site which is likely a postoperative seroma, an abscess or pseudomeningocele is less favored. Mild canal stenosis at C3-4 secondary to broad-based disc bulge. Electronically Signed: Robert Caballero MD  7/24/2024 4:43 PM EDT  Workstation ID: [id number]   CT Head Without Contrast    Result Date: 8/3/2024  CT HEAD WO CONTRAST Date of Exam: 8/3/2024 3:52 AM EDT Indication: [facility]. Comparison: 2/26/2019. Technique: Axial CT images were obtained of the head without contrast administration.  Coronal reconstructions were performed.  Automated exposure  control and iterative reconstruction methods were used. Findings: There is no evidence of hemorrhage. There is no mass effect or midline shift. There is no extracerebral collection. Ventricles are normal in size and configuration for patient's stated age.  Posterior fossa is within normal limits. Calvarium and skull base appear intact.   Visualized sinuses show no air fluid levels. Visualized orbits are unremarkable.     Impression: Impression: No evidence of hemorrhage, mass effect or midline shift. No acute intracranial process identified. Electronically Signed: Jeni Patterson MD  8/3/2024 4:04 AM EDT  Workstation ID: [id number]          ASSESSMENT/PLAN  This is a 78 y.o. female who underwent surgery. Patient continues to do well. This morning she has a slight headache and has some posterior neck pain as well. Patient has no other concerns.     Patient spoke with her  yesterday and they both decided it would be a good idea to go to rehab after discharge. I told her I thought it was a good idea as well. At this time we are waiting for the patient to be accepted to rehab.     We will continue to monitor while in hospital. She can be downgraded out of the ICU if possible. Please reach out with any questions or concerns.     Plan:   LUMBAR DRAIN INSERTION REPAIR OF CERVICAL DURA   - Continue to monitor  - Waiting on rehab accpetance        I discussed my assessment and recommendations with Dr. Renan Carrera PA-C  08/07/24  07:51 EDT      Part of this note may be an electronic transcription/translation of spoken language to printed text using the Dragon Dictation System.

## 2024-08-07 NOTE — CASE MANAGEMENT/SOCIAL WORK
Continued Stay Note   Seth     Patient Name: Esperanza Smith  MRN: [id number]  Today's Date: 8/7/2024    Admit Date: 7/29/2024    Plan: Plan to dc to [facility].  No Precert required. No PASRR required.   Discharge Plan       Row Name 08/07/24 1252       Plan    Plan Plan to dc to [facility].  No Precert required. No PASRR required.    Plan Comments Patient/spouse agreeable to [facility], referral in basket, liaison notified, accepted.  DC Barriers: PICC, diet ok for now-monitor fistula output, needs to have BM, IV ABxs, wound cx pending, Gen surg/Psych/Neuro/GI following.                 Expected Discharge Date and Time       Expected Discharge Date Expected Discharge Time    Aug 9, 2024               WALTER Welch RN  SIPS/ICU   O: [phone]  C: [phone]  [email or web address]

## 2024-08-07 NOTE — DISCHARGE PLACEMENT REQUEST
"Esperanza Moore (78 y.o. Female)       Date of Birth   1945    Social Security Number       Address   [address] [place] [address]    Home Phone   [phone]    MRN   [id number]       Religious   Alevism    Marital Status                               Admission Date   7/29/24    Admission Type   Emergency    Admitting Provider   Luly Fitzgerald MD    Attending Provider   Kumar Franco MD    Department, Room/Bed   [facility] INTENSIVE CARE UNIT, 2316/1       Discharge Date       Discharge Disposition       Discharge Destination                                 Attending Provider: Kumar Franco MD    Allergies: Diphenhydramine, Morphine, Penicillins, Latex    Isolation: None   Infection: None   Code Status: CPR    Ht: 165.1 cm (65\")   Wt: 85.5 kg (188 lb 7.9 oz)    Admission Cmt: None   Principal Problem: Neck pain [M54.2]                   Active Insurance as of 7/29/2024       Primary Coverage       Payor Plan Insurance Group Employer/Plan Group    MEDICARE MEDICARE A & B        Payor Plan Address Payor Plan Phone Number Payor Plan Fax Number Effective Dates    [address] [phone]  9/1/2012 - None Entered    [facility] [id number]         Subscriber Name Subscriber Birth Date Member ID       ESPERANZA MOORE 1945 [id number]               Secondary Coverage       Payor Plan Insurance Group Employer/Plan Group     FOR LIFE  FOR LIFE MC SUP  NGN       Payor Plan Address Payor Plan Phone Number Payor Plan Fax Number Effective Dates    [address] [phone]  4/7/2020 - None Entered    [facility] [address]         Subscriber Name Subscriber Birth Date Member ID       ESPERANZA MOORE 1945 [id number]                     Emergency Contacts        (Rel.) Home Phone Work Phone Mobile Phone    JOEL MOORE (ANNMARIE) (Spouse) [phone] -- [phone]                "
"Esperanza Moore (78 y.o. Female)       Date of Birth   1945    Social Security Number       Address   [address] [place] [address]    Home Phone   [phone]    MRN   [id number]       Mandaeism   Hoahaoism    Marital Status                               Admission Date   7/29/24    Admission Type   Emergency    Admitting Provider   Luly Fitzgerald MD    Attending Provider   Kumar Franco MD    Department, Room/Bed   [facility] INTENSIVE CARE UNIT, 2316/1       Discharge Date       Discharge Disposition       Discharge Destination                                 Attending Provider: Kumar Franco MD    Allergies: Diphenhydramine, Morphine, Penicillins, Latex    Isolation: None   Infection: None   Code Status: Prior    Ht: 165.1 cm (65\")   Wt: 80.7 kg (177 lb 14.6 oz)    Admission Cmt: None   Principal Problem: Neck pain [M54.2]                   Active Insurance as of 7/29/2024       Primary Coverage       Payor Plan Insurance Group Employer/Plan Group    MEDICARE MEDICARE A & B        Payor Plan Address Payor Plan Phone Number Payor Plan Fax Number Effective Dates    [address] [phone]  9/1/2012 - None Entered    [facility] [id number]         Subscriber Name Subscriber Birth Date Member ID       ESPERANZA MOORE 1945 [id number]               Secondary Coverage       Payor Plan Insurance Group Employer/Plan Group     FOR LIFE  FOR LIFE MC SUP  NGN       Payor Plan Address Payor Plan Phone Number Payor Plan Fax Number Effective Dates    [address] [phone]  4/7/2020 - None Entered    [facility] [address]         Subscriber Name Subscriber Birth Date Member ID       ESPERANZA MOORE 1945 [id number]                     Emergency Contacts        (Rel.) Home Phone Work Phone Mobile Phone    JOEL MOORE (ANNMARIE) (Spouse) [phone] -- [phone]                "
WDL

## 2024-08-07 NOTE — THERAPY TREATMENT NOTE
Subjective: Pt agreeable to therapeutic plan of care.  Cognition: oriented to Person, Place, Time, and Situation    Objective:     Precautions - neck sx with limited ROM, falls risk.     Bed Mobility: N/A or Not attempted.   Functional Transfers: Mod-A     Balance: supported, static, and standing Min-A Stands 8 minutes with Min A.   Functional Ambulation: N/A or Not attempted.    Lower Body Dressing: Max-A  ADL Position: supported sitting  ADL Comments: chair    Upper Body Dressing: Max-A  ADL Position: supported sitting  ADL Comments: limited with impaired neck ROM and R shoulder ROM    Therapeutic Exercise - Pt completes sit<>stand exercise and BUE shoulder ROM while simultaneously completing visual scanning and neck ROM exercises.    Vitals: WNL. HR increases to 118 with sit<>stand exercises.     Pain: 6 VAS  Location: low back and neck  Interventions for pain: Repositioned and Therapeutic Presence  Education: Provided education on the importance of mobility in the acute care setting, ADL training, and Transfer Training      Assessment: Esperanza HERNANDEZ Walker presents with ADL impairments affecting function including balance, coordination, endurance / activity tolerance, pain, postural / trunk control, range of motion (ROM), and strength. Pt completes sit<>stand exercise and BUE shoulder ROM while simultaneously completing visual scanning and neck ROM exercises. Pt limited with c-spine ROM, with head in a flexed and right-rotated position. Able to reach midline, but difficulty putting neck into extension / neutral position. Limited with RUE shoulder ROM. Completes cross-body reaching task requiring A for balance. Demonstrated functioning below baseline abilities indicate the need for continued skilled intervention while inpatient. Tolerating session today without incident. Will continue to follow and progress as tolerated.     Plan/Recommendations:   High Intensity Therapy recommended post-acute care. This is  recommended as therapy feels the patient would require 5-6 days per week, 2-3 hours per day. At this time, inpatient rehabilitation (acute rehab) would be the first choice and SNF would be second.. Pt requires no DME at discharge.     Pt desires Inpatient Rehabilitation placement at discharge. Pt cooperative; agreeable to therapeutic recommendations and plan of care.     Post-Tx Position: Supine with HOB Elevated, Staff Present, and Alarms activated  PPE: gloves

## 2024-08-07 NOTE — DISCHARGE SUMMARY
[facility] Medicine Services  Discharge Summary    Date of Service: 24  Patient Name: Esperanza Smith  : 1945  MRN: [id number]    Date of Admission: 2024  Discharge Diagnosis: #Post-operative pseudomeningocele  #Umbilical drainage/Hx of fistula repair  Date of Discharge:  24  Primary Care Physician: Dom Estrada MD      Presenting Problem:   Neck pain [M54.2]    Active and Resolved Hospital Problems:  Active Hospital Problems    Diagnosis POA    **Neck pain [M54.2] Yes      Resolved Hospital Problems   No resolved problems to display.         Hospital Course     HPI:  Admitting team HPI   This is a 78-year-old female with a past medical history of type 2 diabetes mellitus, hypertension, hyperlipidemia, hypothyroidism, obesity, breast cancer, uterine cancer and a recent neck surgery for meningioma of spinal cord who presents to the ED with complaints of neck pain. She states that after her studies. She had mild pain in her neck which she was able to manage with hydrocortisone and tramadol however the pain later became worse and she decided come to the ED for further workup and management.     Hospital Course:  #Post-operative pseudomeningocele  Recent cervical meningioma with recent laminectomy of C2-C4 for resection of intradural tumor on 2024, now complicated by large fluid collection at operative site.  MRI Cervical Spine (): Large fluid collection within posterior aspect of neck at the site of C1, C2, C3, and C4 laminectomy, increased since prior study, measuring 8.1 cm in the largest dimension with mild mass effect on the posterior aspect of the spinal cord with now moderate to severe spinal canal stenosis and possible mild cord compression noted at level C3-C4.  Underwent posterior cervical evacuation of pseudomeningocele with dural repair and lumbar drain placement on   Previously on Rocephin and Vancomycin, per MOODY, not felt to be infectious, this was  discontinued   Follow surgical wound cultures.  MRSA Screen negative.  tatus post cervical evacuation of pseudomeningocele and dural repair with insertion of lumbar drain and then removal on 8/5     #Umbilical drainage/Hx of fistula repair  Recurrence of enterocutaneous fistula possibly related to recent diverticulitis flare  General surgery following; no surgical intervention planned  Started on IV antibiotics with Rocephin, Flagyl per GI will transition to po on discharge to finish course   GI following plan to give relistor today   GS following no surgical intervention planned         #Visual hallucinations/Delerium; resolved  Psychiatry consulted 8/3 & pt started on seroquel qhs.     #Essential hypertension: well controlled.   Continue amlodipine .      #Diabetes mellitus Type 2, not insulin-dependent : well controlled.      #Hypothyroidism: Continue levothyroxine.  #History of breast and uterine cancer  #Hyperlipidemia: Intolerant of statins.        DISCHARGE Follow Up Recommendations for labs and diagnostics: Outpatient follow-up with neurosurgery and gastroenterology      Reasons For Change In Medications and Indications for New Medications:      Day of Discharge     Vital Signs:  Temp:  [97.9 °F (36.6 °C)-99.4 °F (37.4 °C)] 98.3 °F (36.8 °C)  Heart Rate:  [] 104  BP: ()/() 118/59  Flow (L/min):  [2] 2    Physical Exam:  Physical Exam   AOx3 NAD  RRR S1 and S2 normal  Lungs with fair air entry  Abdomen soft nontender nondistended        Pertinent  and/or Most Recent Results     LAB RESULTS:      Lab 08/07/24  0542 08/06/24  0554 08/05/24  1804 08/05/24  0457 08/04/24  0315 08/03/24  0419   WBC 7.33 9.63  --  11.46* 12.33* 13.56*   HEMOGLOBIN 11.5* 11.9*  --  12.0 12.9 14.0   HEMATOCRIT 34.9 35.7  --  36.5 38.9 41.6   PLATELETS 278 262  --  272 258 302   NEUTROS ABS 4.15 5.78  --  7.44* 8.36* 9.75*   IMMATURE GRANS (ABS) 0.04 0.04  --  0.05 0.04 0.07*   LYMPHS ABS 1.95 2.36  --  2.40 2.30  2.05   MONOS ABS 0.87 1.10*  --  1.22* 1.22* 1.39*   EOS ABS 0.28 0.31  --  0.31 0.35 0.26   MCV 96.4 96.5  --  97.3* 96.3 95.0   LACTATE  --   --  0.9  --   --   --          Lab 08/07/24  0503 08/06/24  0554 08/05/24  0408 08/04/24  0315 08/03/24 0419   SODIUM 136 137 136 138 137   POTASSIUM 3.9 3.9 4.1 3.6 3.7   CHLORIDE 96* 97* 98 98 95*   CO2 30.2* 32.2* 31.9* 33.2* 30.5*   ANION GAP 9.8 7.8 6.1 6.8 11.5   BUN 11 13 16 18 14   CREATININE 0.41* 0.45* 0.47* 0.47* 0.47*   EGFR 102.1 99.8 98.8 98.8 98.8   GLUCOSE 96 113* 95 120* 140*   CALCIUM 10.1 10.3 10.1 10.0 10.7*   MAGNESIUM 1.6 1.6 1.6 1.8 1.8   PHOSPHORUS 3.9 3.8 3.4 3.5 3.9         Lab 08/07/24  0503 08/06/24  0554 08/05/24  0408 08/04/24 0315 08/03/24  0419   TOTAL PROTEIN 5.7* 6.1 5.9* 6.3 6.9   ALBUMIN 3.3* 3.3* 3.2* 3.4* 3.6   GLOBULIN 2.4 2.8 2.7 2.9 3.3   ALT (SGPT) 34* 34* 35* 30 31   AST (SGOT) 23 22 28 39* 31   BILIRUBIN 0.4 0.5 0.6 0.6 0.7   ALK PHOS 60 62 67 67 75                     Brief Urine Lab Results  (Last result in the past 365 days)        Color   Clarity   Blood   Leuk Est   Nitrite   Protein   CREAT   Urine HCG        08/03/24 1118 Yellow   Slightly Cloudy   Negative   Negative   Negative   Negative                 Microbiology Results (last 10 days)       Procedure Component Value - Date/Time    Anaerobic Culture - Surgical Site, Back, Upper [[id number]]  (Normal) Collected: 07/31/24 1202    Lab Status: Final result Specimen: Surgical Site from Back, Upper Updated: 08/05/24 0703     Anaerobic Culture No anaerobes isolated at 5 days    Wound Culture - Surgical Site, Back, Upper [[id number]] Collected: 07/31/24 1202    Lab Status: Final result Specimen: Surgical Site from Back, Upper Updated: 08/03/24 0844     Wound Culture No growth at 3 days     Gram Stain Many (4+) WBCs per low power field      No organisms seen    MRSA Screen, PCR (Inpatient) - Swab, Nares [[id number]]  (Normal) Collected: 07/30/24 0612    Lab Status: Final result  Specimen: Swab from Nares Updated: 07/30/24 0812     MRSA PCR No MRSA Detected    Narrative:      The negative predictive value of this diagnostic test is high and should only be used to consider de-escalating anti-MRSA therapy. A positive result may indicate colonization with MRSA and must be correlated clinically.            CT Abdomen Pelvis With Contrast    Result Date: 8/6/2024  Impression: 1.Postoperative changes are again noted. A residual wound is noted within the midline in the anterior abdominal wall within the umbilical region. There is fluid within the wound. Multiple bowel loops are again seen within the anterior aspect of the intraperitoneal cavity underlying the region of the anterior abdominal wall wound. Surgical suture material is noted along the base of the wound and along the anterior margin of the underlying bowel within this region. There is no definitive extravasation of contrast at this time. No well-defined perforation is seen. Residual fluid is seen within the wound. 2.There is no evidence for significant free fluid or abnormal fluid collection within the intraperitoneal space. There is no abnormal extravasation of oral contrast into the intraperitoneal space. No free air is observed. Electronically Signed: Moe Weston MD  8/6/2024 11:56 AM EDT  Workstation ID: [id number]    CT Head Without Contrast    Result Date: 8/3/2024  Impression: Impression: No evidence of hemorrhage, mass effect or midline shift. No acute intracranial process identified. Electronically Signed: Jeni Patterson MD  8/3/2024 4:04 AM EDT  Workstation ID: [id number]    MRI Cervical Spine With & Without Contrast    Result Date: 7/29/2024  Impression: Impression: Large fluid collection noted within the posterior aspect of the neck at the site of C1, C2, C3 and C4 laminectomy, has increased in size since prior study, now measuring up to 8.1 cm in the largest dimension. While this may represent a seroma, given its increase in  size, this is highly concerning for a pseudomeningocele or CSF leak. Given the level of peripheral enhancement, this is less likely to represent an abscess, although it is not completely excluded. The expanding fluid collection in the posterior neck causes mild mass effect on the posterior aspect of the spinal cord with now moderate to severe spinal canal stenosis and possible mild cord compression noted at the level of C3-C4. This has mildly worsened since prior study. Additional findings as characterized above, not significantly changed since prior study Electronically Signed: Uri Birch DO  7/29/2024 7:40 PM EDT  Workstation ID: [id number]    MRI Cervical Spine Without Contrast    Result Date: 7/24/2024  Impression: Impression: Status post C2-C4 laminectomy. There is a 5.9 x 3.4 x 4 cm fluid collection posterior to the laminectomy site which is likely a postoperative seroma, an abscess or pseudomeningocele is less favored. Mild canal stenosis at C3-4 secondary to broad-based disc bulge. Electronically Signed: Robert Caballero MD  7/24/2024 4:43 PM EDT  Workstation ID: [id number]    MRI Cervical Spine With Contrast    Result Date: 7/12/2024  Impression: Impression: 3 cm enhancing intradural extramedullary mass at C2-3, which demonstrates some central nonenhancement. This is favored to represent a meningioma with possible partial central cystic changes or necrosis. Electronically Signed: Ruperto Wakefield MD  7/12/2024 6:25 PM EDT  Workstation ID: [id number]                 Labs Pending at Discharge:  Pending Labs       Order Current Status    Blood Culture - Blood, Blood, PICC Line In process    Wound Culture - Wound, Abdominal Wall In process            Procedures Performed  Procedure(s):  LUMBAR DRAIN INSERTION REPAIR OF CERVICAL DURA         Consults:   Consults       Date and Time Order Name Status Description    8/5/2024  1:49 PM Inpatient Gastroenterology Consult Completed     8/5/2024 11:11 AM Inpatient  General Surgery Consult Completed     8/5/2024 10:07 AM Inpatient Hospitalist Consult Completed     8/3/2024  9:01 AM Inpatient Psychiatrist Consult Completed     7/31/2024  1:06 PM Inpatient Intensivist Consult Completed     7/29/2024  8:27 PM Neurosurgery (on-call MD unless specified) Completed     7/16/2024  2:02 PM Inpatient Hospitalist Consult Completed               Discharge Details        Discharge Medications        Continue These Medications        Instructions Start Date   amLODIPine 5 MG tablet  Commonly known as: NORVASC   5 mg, Oral, Daily      APPLE CIDER VINEGAR PO   1 tablet, Oral, Daily      cyclobenzaprine 10 MG tablet  Commonly known as: FLEXERIL   10 mg, Oral, 3 Times Daily PRN      fluticasone 50 MCG/ACT nasal spray  Commonly known as: FLONASE   2 sprays, Nasal, Daily PRN      HYDROcodone-acetaminophen 5-325 MG per tablet  Commonly known as: NORCO   1 tablet, Oral, Every 6 Hours PRN      Inositol 324 MG tablet   2 tablets, Oral, Every Morning Before Breakfast      levothyroxine 50 MCG tablet  Commonly known as: SYNTHROID, LEVOTHROID   50 mcg, Oral, Daily      metFORMIN 500 MG tablet  Commonly known as: GLUCOPHAGE   500 mg, Oral, 2 Times Daily With Meals      nystatin-triamcinolone 818431-9.1 UNIT/GM-% cream  Commonly known as: MYCOLOG II   1 Application 2 (Two) Times a Day As Needed.      Super B-Complex tablet   1 tablet, Oral, Daily      traMADol 50 MG tablet  Commonly known as: ULTRAM    mg, Oral, Every 6 Hours PRN      TURMERIC CURCUMIN PO   2 tablets, Oral, Daily      Vitamin C 250 MG chewable tablet   1 tablet, Oral, Daily      Vitamin D3 50 MCG (2000 UT) tablet   1 tablet, Oral, Daily             ASK your doctor about these medications        Instructions Start Date   acetaminophen 325 MG tablet  Commonly known as: TYLENOL   650 mg, Oral, Every 6 Hours PRN               Allergies   Allergen Reactions    Diphenhydramine Rash    Morphine Other (See Comments)     nightmares     Penicillins Other (See Comments)     told not to take any more    Latex Rash         Discharge Disposition: Rehab      Diet:  Hospital:  Diet Order   Procedures    Diet: Diabetic; Consistent Carbohydrate; Fluid Consistency: Thin (IDDSI 0)         Discharge Activity:         CODE STATUS:  Code Status and Medical Interventions: CPR (Attempt to Resuscitate); Full Support   Ordered at: 07/31/24 1702     Code Status (Patient has no pulse and is not breathing):    CPR (Attempt to Resuscitate)     Medical Interventions (Patient has pulse or is breathing):    Full Support         Future Appointments   Date Time Provider Department Center   12/2/2024  9:50 AM LAB [facility]   12/9/2024  1:30 PM Dom Estrada MD [facility]           Time spent on Discharge including face to face service:  >30 minutes    Signature: Electronically signed by Kumar Franco MD, 08/07/24, 13:54 EDT.  [facility] Hospitalist Team

## 2024-08-07 NOTE — NURSING NOTE
Spoke with MD martinez regarding wound culture due of the fistula that has been present for over 5 years and looks the same according to  at bedside. CT obtained instead of ostomy bag being removed wound consult placed.     Blood cultures not drawn prior to antibiotic being given due to double mastectomy and no order for BC to be drawn from PICC line

## 2024-08-07 NOTE — PROGRESS NOTES
LOS: 9 days   Patient Care Team:  Dom Estrada MD as PCP - General      Subjective     Interval History: Patient with no bowel movement.  Minimal output from the fistula at the umbilicus.    Subjective: Patient denies abdominal pain or nausea/vomiting.  Able to eat.      ROS:   Review of Systems   Respiratory:  Negative for cough and shortness of breath.    Cardiovascular:  Negative for chest pain.   Gastrointestinal:  Positive for constipation. Negative for abdominal pain, nausea and vomiting.   Neurological:  Positive for weakness. Negative for dizziness.   Psychiatric/Behavioral:  Negative for agitation and confusion.         Medication Review:   Scheduled Meds:amLODIPine, 5 mg, Oral, Daily  ascorbic acid, 250 mg, Oral, Daily  cefTRIAXone, 1,000 mg, Intravenous, Q24H  cholecalciferol, 2,000 Units, Oral, Daily  insulin lispro, 2-7 Units, Subcutaneous, 4x Daily AC & at Bedtime  levothyroxine, 50 mcg, Oral, Q AM  melatonin, 10 mg, Oral, Nightly  Methylnaltrexone Bromide, 150 mg, Oral, Once  metroNIDAZOLE, 500 mg, Intravenous, Q8H  polyethylene glycol, 17 g, Oral, Daily  QUEtiapine, 25 mg, Oral, Nightly  senna-docusate sodium, 2 tablet, Oral, BID  sodium chloride, 10 mL, Intravenous, Q12H  sodium chloride, 10 mL, Intravenous, Q12H  tamsulosin, 0.4 mg, Oral, Daily      Continuous Infusions:   PRN Meds:.  acetaminophen    senna-docusate sodium **AND** polyethylene glycol **AND** bisacodyl **AND** bisacodyl    cyclobenzaprine    dextrose    dextrose    glucagon (human recombinant)    HYDROcodone-acetaminophen    hydrOXYzine    labetalol    Magnesium Standard Dose Replacement - Follow Nurse / BPA Driven Protocol    nystatin-triamcinolone    ondansetron    Phosphorus Replacement - Follow Nurse / BPA Driven Protocol    polyethyl glycol-propyl glycol    [COMPLETED] Insert Peripheral IV **AND** sodium chloride    sodium chloride    sodium chloride    sodium chloride    sodium chloride      Objective     Vital  Signs  Temp:  [97.9 °F (36.6 °C)-99.4 °F (37.4 °C)] 98.1 °F (36.7 °C)  Heart Rate:  [] 94  BP: (101-139)/() 137/76    Physical Exam:    General Appearance:    Awake and alert, in no acute distress   Head:    Normocephalic, without obvious abnormality   Eyes:          Conjunctivae normal, anicteric sclera   Ears:    Hearing intact   Throat:   No oral lesions, no thrush, oral mucosa moist   Neck:   No adenopathy, supple, no JVD   Lungs:     Respirations regular, even and unlabored       Abdomen:     Soft, non-tender, fistula at umbilicus with small amount of feculent liquid output in drainage bag, no rebound or guarding, non-distended, no hepatosplenomegaly   Rectal:     Deferred   Extremities:   No edema, no cyanosis, no redness   Skin:   No bleeding, bruising or rash, no jaundice   Neurologic:  Sensation intact        Results Review:    CBC  Results from last 7 days   Lab Units 08/07/24  0542 08/06/24  0554 08/05/24  0457 08/04/24  0315 08/03/24  0419 08/02/24  0354 08/01/24  0517   RBC 10*6/mm3 3.62* 3.70* 3.75* 4.04 4.38 4.34 3.73*   WBC 10*3/mm3 7.33 9.63 11.46* 12.33* 13.56* 14.45* 12.52*   HEMOGLOBIN g/dL 11.5* 11.9* 12.0 12.9 14.0 13.9 11.9*   PLATELETS 10*3/mm3 278 262 272 258 302 307 279       CMP  Results from last 7 days   Lab Units 08/07/24  0503 08/06/24  0554 08/05/24  0408 08/04/24  0315 08/03/24  0419 08/02/24  0354 08/01/24  1727 08/01/24  0517   SODIUM mmol/L 136 137 136 138 137 136  --  135*   POTASSIUM mmol/L 3.9 3.9 4.1 3.6 3.7 4.3 4.2 3.4*   CHLORIDE mmol/L 96* 97* 98 98 95* 94*  --  96*   CO2 mmol/L 30.2* 32.2* 31.9* 33.2* 30.5* 30.3*  --  30.6*   BUN mg/dL 11 13 16 18 14 11  --  11   CREATININE mg/dL 0.41* 0.45* 0.47* 0.47* 0.47* 0.51*  --  0.55*   GLUCOSE mg/dL 96 113* 95 120* 140* 142*  --  135*   ALBUMIN g/dL 3.3* 3.3* 3.2* 3.4* 3.6 3.7  --  3.2*   BILIRUBIN mg/dL 0.4 0.5 0.6 0.6 0.7 0.5  --  0.3   ALK PHOS U/L 60 62 67 67 75 72  --  63   AST (SGOT) U/L 23 22 28 39* 31 29  --   20   ALT (SGPT) U/L 34* 34* 35* 30 31 26  --  20       Amylase and Lipase        CRP         Imaging Results (Last 24 Hours)       Procedure Component Value Units Date/Time    CT Abdomen Pelvis With Contrast [[id number]] Collected: 08/06/24 1122     Updated: 08/06/24 1159    Narrative:      CT ABDOMEN PELVIS W CONTRAST    Date of Exam: 8/6/2024 11:14 AM EDT    Indication: umbilical fistula with feculent material.    Comparison: 7/2/2024 at 2150 hours    Technique: Axial CT images were obtained of the abdomen and pelvis following the uneventful intravenous administration of iodinated contrast. Sagittal and coronal reconstructions were performed.  Automated exposure control and iterative reconstruction   methods were used.        FINDINGS:  Atelectasis versus infiltrate are noted in the lung bases.     The liver, spleen, and pancreas are unremarkable. The gallbladder and bile ducts are unremarkable. The patient is status post prior cholecystectomy. There is evidence for small bilateral renal cysts. The kidneys are otherwise unremarkable.    Postoperative changes are again noted involving the midline of the anterior abdominal wall. There is evidence for a wound involving the umbilical region of the anterior abdominal wall. Fluid is seen within the wound. There are multiple small bowel loops   which abut the anterior abdominal wall within the anterior aspect of the peritoneal cavity in this region. Surgical suture material is noted along the base of the wound in this region and along the anterior margin of the bowel loops. A well-defined focal   bowel perforation is not observed. There does not appear to be extravasation of contrast at this time.    Otherwise the bowel is stable in appearance. There is no significant free fluid throughout the intraperitoneal cavity. No free air is observed. No abnormal fluid collections are identified. There is no extravasation of contrast into the intraperitoneal   space.    No  significant lymphadenopathy is seen throughout the abdomen or pelvis. The bladder is partially decompressed. Air is seen in the bladder likely due to recent catheterization. The celiac and superior mesenteric arterial distributions are opacified   without evidence for occlusion.    No acute osseous abnormalities are observed.      Impression:      1.Postoperative changes are again noted. A residual wound is noted within the midline in the anterior abdominal wall within the umbilical region. There is fluid within the wound. Multiple bowel loops are again seen within the anterior aspect of the   intraperitoneal cavity underlying the region of the anterior abdominal wall wound. Surgical suture material is noted along the base of the wound and along the anterior margin of the underlying bowel within this region. There is no definitive   extravasation of contrast at this time. No well-defined perforation is seen. Residual fluid is seen within the wound.  2.There is no evidence for significant free fluid or abnormal fluid collection within the intraperitoneal space. There is no abnormal extravasation of oral contrast into the intraperitoneal space. No free air is observed.        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  She had recent cervical meningioma surgery 7/16/2024.  Found to have large  fluid collection and had post cervical evacuation of pseudomeningocele and lumbar drain insertion on 7/31/2024.  Today there was noticeable drainage from umbilicus and GI asked to consult due to fistula.     Patient had diverticulitis diagnosed on 7/2/2024.  She was treated with antibiotics.  Now having the feculent material draining from the umbilicus but in very small amount.  CT was performed on 8/6/2024 and showed residual wound noted at the midline likely forming the small fistula.  WBC is normal.  Hemoglobin 11.5.  There has been only minimal output from the fistula overnight.  Continue to monitor output.  She remains on antibiotics.  Still no bowel movement.  She is receiving MiraLAX and stool softener.  We will give Relistor today.  Continue supportive care.  Recommend she follow-up with GI as outpatient as we will plan colonoscopy in a couple months.  Continue diet as tolerated.    Avril Adrian, DANNA  08/07/24  09:43 EDT

## 2024-08-08 NOTE — CASE MANAGEMENT/SOCIAL WORK
Case Management Discharge Note      Final Note: IRF - [facility]         Selected Continued Care - Discharged on 8/7/2024 Admission date: 7/29/2024 - Discharge disposition: Skilled Nursing Facility (DC - External)      Destination Coordination complete.      Service Provider Selected Services Address Phone Fax Patient Preferred    [facility] [address] [place] [address] [phone] --                 Transportation Services  Private: Car    Final Discharge Disposition Code: 62 - inpatient rehab facility      WALTER Welch RN  SIPS/ICU   O: [phone]  C: [phone]  [email or web address]

## 2024-08-10 LAB
BACTERIA SPEC AEROBE CULT: ABNORMAL
BACTERIA SPEC AEROBE CULT: ABNORMAL
GRAM STN SPEC: ABNORMAL
GRAM STN SPEC: ABNORMAL
QT INTERVAL: 364 MS
QTC INTERVAL: 439 MS

## 2024-08-12 LAB — BACTERIA SPEC AEROBE CULT: NORMAL

## 2024-08-14 ENCOUNTER — APPOINTMENT (OUTPATIENT)
Dept: GENERAL RADIOLOGY | Facility: HOSPITAL | Age: 79
DRG: 871 | End: 2024-08-14
Payer: MEDICARE

## 2024-08-14 ENCOUNTER — HOSPITAL ENCOUNTER (INPATIENT)
Facility: HOSPITAL | Age: 79
LOS: 4 days | Discharge: NURSING, REHAB OR LONG-TERM CARE | DRG: 871 | End: 2024-08-18
Attending: INTERNAL MEDICINE | Admitting: INTERNAL MEDICINE
Payer: MEDICARE

## 2024-08-14 ENCOUNTER — APPOINTMENT (OUTPATIENT)
Dept: MRI IMAGING | Facility: HOSPITAL | Age: 79
DRG: 871 | End: 2024-08-14
Payer: MEDICARE

## 2024-08-14 DIAGNOSIS — R50.9 FEVER, UNSPECIFIED FEVER CAUSE: Primary | ICD-10-CM

## 2024-08-14 DIAGNOSIS — R51.9 NONINTRACTABLE HEADACHE, UNSPECIFIED CHRONICITY PATTERN, UNSPECIFIED HEADACHE TYPE: ICD-10-CM

## 2024-08-14 DIAGNOSIS — M54.2 NECK PAIN: ICD-10-CM

## 2024-08-14 PROBLEM — A41.9 SEPSIS: Status: ACTIVE | Noted: 2024-08-14

## 2024-08-14 LAB
ANION GAP SERPL CALCULATED.3IONS-SCNC: 13.6 MMOL/L (ref 5–15)
BACTERIA UR QL AUTO: NORMAL /HPF
BASOPHILS # BLD AUTO: 0.03 10*3/MM3 (ref 0–0.2)
BASOPHILS NFR BLD AUTO: 0.2 % (ref 0–1.5)
BILIRUB UR QL STRIP: NEGATIVE
BUN SERPL-MCNC: 6 MG/DL (ref 8–23)
BUN/CREAT SERPL: 12.5 (ref 7–25)
CALCIUM SPEC-SCNC: 9.6 MG/DL (ref 8.6–10.5)
CHLORIDE SERPL-SCNC: 95 MMOL/L (ref 98–107)
CLARITY UR: CLEAR
CO2 SERPL-SCNC: 24.4 MMOL/L (ref 22–29)
COLOR UR: YELLOW
CREAT SERPL-MCNC: 0.48 MG/DL (ref 0.57–1)
D-LACTATE SERPL-SCNC: 0.9 MMOL/L (ref 0.3–2)
DEPRECATED RDW RBC AUTO: 46.3 FL (ref 37–54)
EGFRCR SERPLBLD CKD-EPI 2021: 98.3 ML/MIN/1.73
EOSINOPHIL # BLD AUTO: 0.03 10*3/MM3 (ref 0–0.4)
EOSINOPHIL NFR BLD AUTO: 0.2 % (ref 0.3–6.2)
ERYTHROCYTE [DISTWIDTH] IN BLOOD BY AUTOMATED COUNT: 13 % (ref 12.3–15.4)
FLUAV RNA RESP QL NAA+PROBE: NOT DETECTED
FLUBV RNA RESP QL NAA+PROBE: NOT DETECTED
GLUCOSE BLDC GLUCOMTR-MCNC: 125 MG/DL (ref 70–105)
GLUCOSE SERPL-MCNC: 149 MG/DL (ref 65–99)
GLUCOSE UR STRIP-MCNC: NEGATIVE MG/DL
HCT VFR BLD AUTO: 39.4 % (ref 34–46.6)
HGB BLD-MCNC: 12.9 G/DL (ref 12–15.9)
HGB UR QL STRIP.AUTO: NEGATIVE
HYALINE CASTS UR QL AUTO: NORMAL /LPF
IMM GRANULOCYTES # BLD AUTO: 0.1 10*3/MM3 (ref 0–0.05)
IMM GRANULOCYTES NFR BLD AUTO: 0.7 % (ref 0–0.5)
KETONES UR QL STRIP: ABNORMAL
LEUKOCYTE ESTERASE UR QL STRIP.AUTO: ABNORMAL
LYMPHOCYTES # BLD AUTO: 1.08 10*3/MM3 (ref 0.7–3.1)
LYMPHOCYTES NFR BLD AUTO: 7.7 % (ref 19.6–45.3)
MCH RBC QN AUTO: 31.7 PG (ref 26.6–33)
MCHC RBC AUTO-ENTMCNC: 32.7 G/DL (ref 31.5–35.7)
MCV RBC AUTO: 96.8 FL (ref 79–97)
MONOCYTES # BLD AUTO: 1.29 10*3/MM3 (ref 0.1–0.9)
MONOCYTES NFR BLD AUTO: 9.2 % (ref 5–12)
NEUTROPHILS NFR BLD AUTO: 11.56 10*3/MM3 (ref 1.7–7)
NEUTROPHILS NFR BLD AUTO: 82 % (ref 42.7–76)
NITRITE UR QL STRIP: NEGATIVE
NRBC BLD AUTO-RTO: 0 /100 WBC (ref 0–0.2)
PH UR STRIP.AUTO: 6.5 [PH] (ref 5–8)
PLATELET # BLD AUTO: 304 10*3/MM3 (ref 140–450)
PMV BLD AUTO: 9.9 FL (ref 6–12)
POTASSIUM SERPL-SCNC: 3.4 MMOL/L (ref 3.5–5.2)
PROT UR QL STRIP: NEGATIVE
RBC # BLD AUTO: 4.07 10*6/MM3 (ref 3.77–5.28)
RBC # UR STRIP: NORMAL /HPF
REF LAB TEST METHOD: NORMAL
SARS-COV-2 RNA RESP QL NAA+PROBE: NOT DETECTED
SODIUM SERPL-SCNC: 133 MMOL/L (ref 136–145)
SP GR UR STRIP: 1.01 (ref 1–1.03)
SQUAMOUS #/AREA URNS HPF: NORMAL /HPF
UROBILINOGEN UR QL STRIP: ABNORMAL
WBC # UR STRIP: NORMAL /HPF
WBC NRBC COR # BLD AUTO: 14.09 10*3/MM3 (ref 3.4–10.8)

## 2024-08-14 PROCEDURE — 25010000002 HYDROMORPHONE 1 MG/ML SOLUTION: Performed by: EMERGENCY MEDICINE

## 2024-08-14 PROCEDURE — 87899 AGENT NOS ASSAY W/OPTIC: CPT | Performed by: INTERNAL MEDICINE

## 2024-08-14 PROCEDURE — 81001 URINALYSIS AUTO W/SCOPE: CPT | Performed by: EMERGENCY MEDICINE

## 2024-08-14 PROCEDURE — 85025 COMPLETE CBC W/AUTO DIFF WBC: CPT | Performed by: EMERGENCY MEDICINE

## 2024-08-14 PROCEDURE — 99285 EMERGENCY DEPT VISIT HI MDM: CPT

## 2024-08-14 PROCEDURE — 87040 BLOOD CULTURE FOR BACTERIA: CPT | Performed by: NURSE PRACTITIONER

## 2024-08-14 PROCEDURE — 25010000002 GADOTERIDOL PER 1 ML: Performed by: EMERGENCY MEDICINE

## 2024-08-14 PROCEDURE — 83605 ASSAY OF LACTIC ACID: CPT | Performed by: NURSE PRACTITIONER

## 2024-08-14 PROCEDURE — 25010000002 HYDROMORPHONE 1 MG/ML SOLUTION: Performed by: STUDENT IN AN ORGANIZED HEALTH CARE EDUCATION/TRAINING PROGRAM

## 2024-08-14 PROCEDURE — 25010000002 HYDROMORPHONE 1 MG/ML SOLUTION: Performed by: NURSE PRACTITIONER

## 2024-08-14 PROCEDURE — 71045 X-RAY EXAM CHEST 1 VIEW: CPT

## 2024-08-14 PROCEDURE — 87449 NOS EACH ORGANISM AG IA: CPT | Performed by: INTERNAL MEDICINE

## 2024-08-14 PROCEDURE — 25010000002 VANCOMYCIN HCL IN NACL 1.75-0.9 GM/500ML-% SOLUTION: Performed by: NURSE PRACTITIONER

## 2024-08-14 PROCEDURE — A9579 GAD-BASE MR CONTRAST NOS,1ML: HCPCS | Performed by: EMERGENCY MEDICINE

## 2024-08-14 PROCEDURE — 25010000002 CEFEPIME PER 500 MG: Performed by: NURSE PRACTITIONER

## 2024-08-14 PROCEDURE — 87636 SARSCOV2 & INF A&B AMP PRB: CPT | Performed by: EMERGENCY MEDICINE

## 2024-08-14 PROCEDURE — 72156 MRI NECK SPINE W/O & W/DYE: CPT

## 2024-08-14 PROCEDURE — 25810000003 SODIUM CHLORIDE 0.9 % SOLUTION: Performed by: STUDENT IN AN ORGANIZED HEALTH CARE EDUCATION/TRAINING PROGRAM

## 2024-08-14 PROCEDURE — 25810000003 SODIUM CHLORIDE 0.9 % SOLUTION: Performed by: NURSE PRACTITIONER

## 2024-08-14 PROCEDURE — 36415 COLL VENOUS BLD VENIPUNCTURE: CPT

## 2024-08-14 PROCEDURE — 80048 BASIC METABOLIC PNL TOTAL CA: CPT | Performed by: EMERGENCY MEDICINE

## 2024-08-14 PROCEDURE — 84145 PROCALCITONIN (PCT): CPT | Performed by: INTERNAL MEDICINE

## 2024-08-14 PROCEDURE — 82948 REAGENT STRIP/BLOOD GLUCOSE: CPT

## 2024-08-14 RX ORDER — BISACODYL 5 MG/1
5 TABLET, DELAYED RELEASE ORAL DAILY PRN
COMMUNITY

## 2024-08-14 RX ORDER — ONDANSETRON 4 MG/1
4 TABLET, ORALLY DISINTEGRATING ORAL EVERY 6 HOURS PRN
Status: DISCONTINUED | OUTPATIENT
Start: 2024-08-14 | End: 2024-08-18 | Stop reason: HOSPADM

## 2024-08-14 RX ORDER — SODIUM CHLORIDE 9 MG/ML
75 INJECTION, SOLUTION INTRAVENOUS CONTINUOUS
Status: DISCONTINUED | OUTPATIENT
Start: 2024-08-14 | End: 2024-08-18 | Stop reason: HOSPADM

## 2024-08-14 RX ORDER — NICOTINE POLACRILEX 4 MG
15 LOZENGE BUCCAL
Status: DISCONTINUED | OUTPATIENT
Start: 2024-08-14 | End: 2024-08-18 | Stop reason: HOSPADM

## 2024-08-14 RX ORDER — CYCLOBENZAPRINE HCL 10 MG
10 TABLET ORAL 3 TIMES DAILY PRN
Status: DISCONTINUED | OUTPATIENT
Start: 2024-08-14 | End: 2024-08-18 | Stop reason: HOSPADM

## 2024-08-14 RX ORDER — POLYETHYLENE GLYCOL 3350 17 G/17G
17 POWDER, FOR SOLUTION ORAL DAILY PRN
COMMUNITY

## 2024-08-14 RX ORDER — DEXTROSE MONOHYDRATE 25 G/50ML
25 INJECTION, SOLUTION INTRAVENOUS
Status: DISCONTINUED | OUTPATIENT
Start: 2024-08-14 | End: 2024-08-18 | Stop reason: HOSPADM

## 2024-08-14 RX ORDER — ONDANSETRON 2 MG/ML
4 INJECTION INTRAMUSCULAR; INTRAVENOUS EVERY 4 HOURS PRN
COMMUNITY

## 2024-08-14 RX ORDER — CALCIUM CARBONATE 500 MG/1
1 TABLET, CHEWABLE ORAL 3 TIMES DAILY PRN
Status: DISCONTINUED | OUTPATIENT
Start: 2024-08-14 | End: 2024-08-18 | Stop reason: HOSPADM

## 2024-08-14 RX ORDER — METOPROLOL TARTRATE 25 MG/1
25 TABLET, FILM COATED ORAL DAILY
COMMUNITY

## 2024-08-14 RX ORDER — CEFDINIR 300 MG/1
300 CAPSULE ORAL 2 TIMES DAILY
COMMUNITY
Start: 2024-08-07 | End: 2024-08-12

## 2024-08-14 RX ORDER — BISACODYL 5 MG/1
5 TABLET, DELAYED RELEASE ORAL DAILY PRN
Status: DISCONTINUED | OUTPATIENT
Start: 2024-08-14 | End: 2024-08-18 | Stop reason: HOSPADM

## 2024-08-14 RX ORDER — POTASSIUM CHLORIDE 1500 MG/1
40 TABLET, EXTENDED RELEASE ORAL EVERY 4 HOURS
Status: COMPLETED | OUTPATIENT
Start: 2024-08-14 | End: 2024-08-15

## 2024-08-14 RX ORDER — ACETAMINOPHEN 325 MG/1
650 TABLET ORAL EVERY 6 HOURS PRN
Status: DISCONTINUED | OUTPATIENT
Start: 2024-08-14 | End: 2024-08-18 | Stop reason: HOSPADM

## 2024-08-14 RX ORDER — METOPROLOL TARTRATE 25 MG/1
25 TABLET, FILM COATED ORAL DAILY
Status: DISCONTINUED | OUTPATIENT
Start: 2024-08-15 | End: 2024-08-18 | Stop reason: HOSPADM

## 2024-08-14 RX ORDER — SIMETHICONE 80 MG
80 TABLET,CHEWABLE ORAL 3 TIMES DAILY PRN
COMMUNITY

## 2024-08-14 RX ORDER — SODIUM CHLORIDE 9 MG/ML
40 INJECTION, SOLUTION INTRAVENOUS AS NEEDED
Status: DISCONTINUED | OUTPATIENT
Start: 2024-08-14 | End: 2024-08-18 | Stop reason: HOSPADM

## 2024-08-14 RX ORDER — VANCOMYCIN 1.75 GRAM/500 ML IN 0.9 % SODIUM CHLORIDE INTRAVENOUS
20 ONCE
Status: COMPLETED | OUTPATIENT
Start: 2024-08-14 | End: 2024-08-14

## 2024-08-14 RX ORDER — ONDANSETRON 2 MG/ML
4 INJECTION INTRAMUSCULAR; INTRAVENOUS EVERY 6 HOURS PRN
Status: DISCONTINUED | OUTPATIENT
Start: 2024-08-14 | End: 2024-08-18 | Stop reason: HOSPADM

## 2024-08-14 RX ORDER — MULTIVIT WITH MINERALS/LUTEIN
250 TABLET ORAL DAILY
COMMUNITY

## 2024-08-14 RX ORDER — CYCLOBENZAPRINE HCL 10 MG
10 TABLET ORAL
COMMUNITY

## 2024-08-14 RX ORDER — INSULIN LISPRO 100 [IU]/ML
2-7 INJECTION, SOLUTION INTRAVENOUS; SUBCUTANEOUS
Status: DISCONTINUED | OUTPATIENT
Start: 2024-08-14 | End: 2024-08-18 | Stop reason: HOSPADM

## 2024-08-14 RX ORDER — SODIUM CHLORIDE 0.9 % (FLUSH) 0.9 %
10 SYRINGE (ML) INJECTION AS NEEDED
Status: DISCONTINUED | OUTPATIENT
Start: 2024-08-14 | End: 2024-08-18 | Stop reason: HOSPADM

## 2024-08-14 RX ORDER — IBUPROFEN 600 MG/1
1 TABLET ORAL
Status: DISCONTINUED | OUTPATIENT
Start: 2024-08-14 | End: 2024-08-18 | Stop reason: HOSPADM

## 2024-08-14 RX ORDER — BISACODYL 10 MG
10 SUPPOSITORY, RECTAL RECTAL DAILY PRN
Status: DISCONTINUED | OUTPATIENT
Start: 2024-08-14 | End: 2024-08-18 | Stop reason: HOSPADM

## 2024-08-14 RX ORDER — LEVOTHYROXINE SODIUM 50 UG/1
50 TABLET ORAL
Status: DISCONTINUED | OUTPATIENT
Start: 2024-08-15 | End: 2024-08-18 | Stop reason: HOSPADM

## 2024-08-14 RX ORDER — HYDRALAZINE HYDROCHLORIDE 25 MG/1
25 TABLET, FILM COATED ORAL 3 TIMES DAILY PRN
COMMUNITY

## 2024-08-14 RX ORDER — HYDRALAZINE HYDROCHLORIDE 25 MG/1
25 TABLET, FILM COATED ORAL DAILY
COMMUNITY

## 2024-08-14 RX ORDER — AMOXICILLIN 250 MG
2 CAPSULE ORAL 2 TIMES DAILY PRN
Status: DISCONTINUED | OUTPATIENT
Start: 2024-08-14 | End: 2024-08-18 | Stop reason: HOSPADM

## 2024-08-14 RX ORDER — NITROGLYCERIN 0.4 MG/1
0.4 TABLET SUBLINGUAL
Status: DISCONTINUED | OUTPATIENT
Start: 2024-08-14 | End: 2024-08-18 | Stop reason: HOSPADM

## 2024-08-14 RX ORDER — SODIUM CHLORIDE 0.9 % (FLUSH) 0.9 %
10 SYRINGE (ML) INJECTION EVERY 12 HOURS SCHEDULED
Status: DISCONTINUED | OUTPATIENT
Start: 2024-08-14 | End: 2024-08-18 | Stop reason: HOSPADM

## 2024-08-14 RX ORDER — POLYETHYLENE GLYCOL 3350 17 G/17G
17 POWDER, FOR SOLUTION ORAL DAILY PRN
Status: DISCONTINUED | OUTPATIENT
Start: 2024-08-14 | End: 2024-08-18 | Stop reason: HOSPADM

## 2024-08-14 RX ORDER — MAGNESIUM HYDROXIDE/ALUMINUM HYDROXICE/SIMETHICONE 120; 1200; 1200 MG/30ML; MG/30ML; MG/30ML
30 SUSPENSION ORAL 3 TIMES DAILY PRN
COMMUNITY

## 2024-08-14 RX ORDER — BUTALBITAL, ACETAMINOPHEN AND CAFFEINE 50; 325; 40 MG/1; MG/1; MG/1
2 TABLET ORAL 3 TIMES DAILY PRN
COMMUNITY

## 2024-08-14 RX ORDER — ACETAMINOPHEN 500 MG
1000 TABLET ORAL ONCE
Status: COMPLETED | OUTPATIENT
Start: 2024-08-14 | End: 2024-08-14

## 2024-08-14 RX ORDER — CALCIUM CARBONATE 500 MG/1
1 TABLET, CHEWABLE ORAL 3 TIMES DAILY PRN
COMMUNITY

## 2024-08-14 RX ADMIN — HYDROMORPHONE HYDROCHLORIDE 0.5 MG: 1 INJECTION, SOLUTION INTRAMUSCULAR; INTRAVENOUS; SUBCUTANEOUS at 18:05

## 2024-08-14 RX ADMIN — ACETAMINOPHEN 1000 MG: 500 TABLET, FILM COATED ORAL at 14:41

## 2024-08-14 RX ADMIN — SODIUM CHLORIDE 500 ML: 9 INJECTION, SOLUTION INTRAVENOUS at 18:23

## 2024-08-14 RX ADMIN — HYDROMORPHONE HYDROCHLORIDE 0.5 MG: 1 INJECTION, SOLUTION INTRAMUSCULAR; INTRAVENOUS; SUBCUTANEOUS at 22:04

## 2024-08-14 RX ADMIN — Medication 1750 MG: at 19:56

## 2024-08-14 RX ADMIN — HYDROMORPHONE HYDROCHLORIDE 0.5 MG: 1 INJECTION, SOLUTION INTRAMUSCULAR; INTRAVENOUS; SUBCUTANEOUS at 15:16

## 2024-08-14 RX ADMIN — SODIUM CHLORIDE 1000 ML: 9 INJECTION, SOLUTION INTRAVENOUS at 21:02

## 2024-08-14 RX ADMIN — CEFEPIME 2000 MG: 2 INJECTION, POWDER, FOR SOLUTION INTRAVENOUS at 18:22

## 2024-08-14 RX ADMIN — SODIUM CHLORIDE 75 ML/HR: 9 INJECTION, SOLUTION INTRAVENOUS at 21:03

## 2024-08-14 RX ADMIN — POTASSIUM CHLORIDE 40 MEQ: 1500 TABLET, EXTENDED RELEASE ORAL at 22:21

## 2024-08-14 RX ADMIN — Medication 10 ML: at 21:03

## 2024-08-14 RX ADMIN — GADOTERIDOL 19 ML: 279.3 INJECTION, SOLUTION INTRAVENOUS at 17:01

## 2024-08-14 NOTE — ED PROVIDER NOTES
Subjective   History of Present Illness  Patient is a 78-year-old female complaint of headache and neck pain with low-grade fever that developed today.  She was recently discharged from the hospital and rehab for repeated cervical surgery 1 for meningioma and the second for pseudomeningocele.  Patient denies other complaints.      Review of Systems    Past Medical History:   Diagnosis Date    Allergic Penicillian    Asthma congestion    Breast cancer     Cataract     Cervical disc disorder     Cholelithiasis surgery 1996 removal    DDD (degenerative disc disease), lumbosacral     Diabetes mellitus     Diverticulitis     Diverticulosis     Headache     History of medical problems 54 inches left of small intesting    Hyperlipidemia     Hypertension     Hypothyroidism     IBS (irritable bowel syndrome)     Inflammatory bowel disease     Liver disease fatty liver    Low back pain Sciatic    Lumbosacral disc disease     Neuromuscular disorder neuropathy    Obesity     PONV (postoperative nausea and vomiting)     Seasonal allergies     Sleep apnea        Allergies   Allergen Reactions    Diphenhydramine Rash    Morphine Other (See Comments)     nightmares    Penicillins Other (See Comments)     told not to take any more    Latex Rash       Past Surgical History:   Procedure Laterality Date    BREAST BIOPSY      BREAST SURGERY Left 02/2012    x2     CERVICAL SPINAL CORD TUMOR REMOVAL N/A 7/16/2024    Procedure: Cervical 2-4 laminectomy for resection of intradural tumor;  Surgeon: Mario Urrutia IV, MD;  Location: [facility] MAIN OR;  Service: Neurosurgery;  Laterality: N/A;    CHOLECYSTECTOMY  1996    COLON RESECTION SMALL BOWEL  05/25/2014    COLON SURGERY  small intestine repair    HYSTERECTOMY      LYMPH NODE BIOPSY      MASTECTOMY      OOPHORECTOMY  1965    POSTERIOR CERVICAL FUSION N/A 7/31/2024    Procedure: LUMBAR DRAIN INSERTION REPAIR OF CERVICAL DURA;  Surgeon: Mario Urrutia IV, MD;  Location: [facility] MAIN OR;  Service:  Neurosurgery;  Laterality: N/A;    SIMPLE MASTECTOMY Right 02/26/2020    Procedure: Right simple mastectomy;  Surgeon: Mario Quezada DO;  Location: [facility] MAIN OR;  Service: General;  Laterality: Right;    SMALL INTESTINE SURGERY      SUBTOTAL HYSTERECTOMY  full hysterectomy       Family History   Problem Relation Age of Onset    Breast cancer Sister     Breast cancer Maternal Grandmother        Social History     Socioeconomic History    Marital status:    Tobacco Use    Smoking status: Never     Passive exposure: Never    Smokeless tobacco: Never   Vaping Use    Vaping status: Never Used   Substance and Sexual Activity    Alcohol use: Not Currently     Alcohol/week: 1.0 standard drink of alcohol     Types: 1 Glasses of wine per week     Comment: occ    Drug use: No    Sexual activity: Not Currently     Partners: Male     Birth control/protection: None           Objective   Physical Exam  Neurologic exam is nonfocal.  Neck has diffuse tenderness on palpation.  Lungs clear.  Chest nontender.  Ab soft.  Extremities Amcill's range of motion.  She has 2+ pulses throughout.  Procedures           ED Course  ED Course as of 08/14/24 1614   Wed Aug 14, 2024   1600 Assumed care from dr stafford pending mri and dispo [JW]      ED Course User Index  [JW] Isabel Barbour APRN                                             Medical Decision Making  Amount and/or Complexity of Data Reviewed  Labs: ordered.  Radiology: ordered.    Risk  OTC drugs.  Prescription drug management.        Final diagnoses:   None       ED Disposition  ED Disposition       None            No follow-up provider specified.       Medication List      No changes were made to your prescriptions during this visit.          Index  [JW] Isabel Barbour, DANNA                                             Medical Decision Making  Amount and/or Complexity of Data Reviewed  Labs: ordered.  Radiology: ordered.    Risk  OTC drugs.  Prescription drug management.        Final diagnoses:   None       ED Disposition  ED Disposition       None            No follow-up provider specified.       Medication List      No changes were made to your prescriptions during this visit.            Percy Cross MD  08/31/24 [id number]

## 2024-08-14 NOTE — Clinical Note
Level of Care: Telemetry [5]   Admitting Physician: JAY NIETO [[id number]]   Attending Physician: JAY NIETO [[id number]]

## 2024-08-14 NOTE — H&P
[facility] Medicine Services    Hospitalist History and Physical     Esperanza Smith : 1945 MRN:[id number] LOS:0 ROOM:      Chief Complaint: headache    Assessment / Plan     #Sepsis  #Pseudomeningocele    - presented with fever 101.4F, tachycardia, wbc 14.09, and suspected infection    - patient had c2-c4 laminectomy for resection of cervical meningioma of 2024 that was complicated by large pseudomeningiocele that underwent evacuation and dural repair with drain placement on 2024    - Concern the pseudomeningocele has become infected again and/or meningitis    - Vancomycin,  pharm to dose, monitor for toxicity    - Cefepime, pharm to dose, monitor for toxicity    - blood cultures    - wound culture    - BP stable and lactic 0.9 after 500ml bolus    - will give 1L IVF NS more to ensure perfusion    - Tylenol PRN    - Neurosurgery consulted    - NPO after midnight    - Due to recurrent infection, will consulted ID for abx management    - Tylenol PRN fever    - pain control    - patient is high risk    #Atelectasis  #Lung nodule    - CXR shows left basilar airspace disease may relate to atelectasis and/or pneumonia with small left pleural effusion complicated by nodular density overlying the right upper lung measuring 5mm    - as on room air, doubt pneumonia    - will need otpt follow up for lung nodule    - covid negative    #DM     - ISS    #Hyponatremia    - sodium 133 with Cl 95 on admission     - trace ketones on UA     - most likely 2/2 poor perfusion in setting of sepsis    - s/p bolus    - IVF NS @ 100/hr    - monitor labs    #Hypokalemia    - replace per protocol    #Obesity    - BMI 31.37, weight loss enocuraged    #Hypothyroid    - resume home synthroid when verified    #HTN    - monitor BP closely in setting of infection    #HLD    - reconcile homes meds            VTE Prophylaxis: SCDs  No VTE prophylaxis order currently exists.         History of Present illness     Esperanza  DAVID Smith is a 78 y.o. female with PMHx of HTN, HLD, hypothyroid, obesity, DM presented to [facility] for headache.  Of note, patient had c2-c4 laminectomy for resection of cervical meningioma of 7/16/2024 that was complicated by large pseudomeningiocele that underwent evacuation and dural repair with drain placement on 7/31/2024.  Admits to headache over the past 2 days complicated by neck pain, photophobia, and low grade fever that started today.  Denies vision change, chest pain,vomiting, diarrhea.  Due to worsening condition she presented to [facility] for evaluation.     ED course: In the ER, vitals 101.4F, , RR 16, /96, 96% RA.  Labs notable for sodium 133, K 3.4, Cl 95, glucose 149, lactic 0.9, wbc 14.09.  MRI cervical spine showed 5.4cm fluid collection within laminectomy bed extending from C1-C5 that has decreased in size which could represent pseudomeningocele, CSF leak, or post-op seroma along with stable mass effect on posterior thecal sac most prominent at C3-C4 where there is mod-severe spinal canal stenosis without evidence of cord compression.  Patient treated with IV abx, 500ml bolus, and pain meds.  She is to be admitted to hospitalist for abx and neurosurgery consult.      Patient was seen and examined on 08/14/24 at 18:51 EDT .    Subjective / Review of systems     Review of Systems   12 point ROS reviewed and negative except as mentioned above      Past Medical/Surgical/Social/Family History & Allergies     Past Medical History:   Diagnosis Date    Allergic Penicillian    Asthma congestion    Breast cancer     Cataract     Cervical disc disorder     Cholelithiasis surgery 1996 removal    DDD (degenerative disc disease), lumbosacral     Diabetes mellitus     Diverticulitis     Diverticulosis     Headache     History of medical problems 54 inches left of small intesting    Hyperlipidemia     Hypertension     Hypothyroidism     IBS (irritable bowel syndrome)     Inflammatory bowel disease     Liver  disease fatty liver    Low back pain Sciatic    Lumbosacral disc disease     Neuromuscular disorder neuropathy    Obesity     PONV (postoperative nausea and vomiting)     Seasonal allergies     Sleep apnea       Past Surgical History:   Procedure Laterality Date    BREAST BIOPSY      BREAST SURGERY Left 02/2012    x2     CERVICAL SPINAL CORD TUMOR REMOVAL N/A 7/16/2024    Procedure: Cervical 2-4 laminectomy for resection of intradural tumor;  Surgeon: Mario Urrutia IV, MD;  Location: [facility] MAIN OR;  Service: Neurosurgery;  Laterality: N/A;    CHOLECYSTECTOMY  1996    COLON RESECTION SMALL BOWEL  05/25/2014    COLON SURGERY  small intestine repair    HYSTERECTOMY      LYMPH NODE BIOPSY      MASTECTOMY      OOPHORECTOMY  1965    POSTERIOR CERVICAL FUSION N/A 7/31/2024    Procedure: LUMBAR DRAIN INSERTION REPAIR OF CERVICAL DURA;  Surgeon: Mario Urrutia IV, MD;  Location: [facility] MAIN OR;  Service: Neurosurgery;  Laterality: N/A;    SIMPLE MASTECTOMY Right 02/26/2020    Procedure: Right simple mastectomy;  Surgeon: Mario Quezada DO;  Location: [facility] MAIN OR;  Service: General;  Laterality: Right;    SMALL INTESTINE SURGERY      SUBTOTAL HYSTERECTOMY  full hysterectomy      Social History     Socioeconomic History    Marital status:    Tobacco Use    Smoking status: Never     Passive exposure: Never    Smokeless tobacco: Never   Vaping Use    Vaping status: Never Used   Substance and Sexual Activity    Alcohol use: Not Currently     Alcohol/week: 1.0 standard drink of alcohol     Types: 1 Glasses of wine per week     Comment: occ    Drug use: No    Sexual activity: Not Currently     Partners: Male     Birth control/protection: None      Family History   Problem Relation Age of Onset    Breast cancer Sister     Breast cancer Maternal Grandmother       Allergies   Allergen Reactions    Diphenhydramine Rash    Morphine Other (See Comments)     nightmares    Penicillins Other (See Comments)     told not to take any  more    Latex Rash      Social Determinants of Health     Tobacco Use: Low Risk  (7/31/2024)    Patient History     Smoking Tobacco Use: Never     Smokeless Tobacco Use: Never     Passive Exposure: Never   Alcohol Use: Not At Risk (7/30/2024)    AUDIT-C     Frequency of Alcohol Consumption: Never     Average Number of Drinks: Patient does not drink     Frequency of Binge Drinking: Never   Financial Resource Strain: Not on file   Food Insecurity: No Food Insecurity (7/30/2024)    Hunger Vital Sign     Worried About Running Out of Food in the Last Year: Never true     Ran Out of Food in the Last Year: Never true   Transportation Needs: No Transportation Needs (7/30/2024)    PRAPARE - Transportation     Lack of Transportation (Medical): No     Lack of Transportation (Non-Medical): No   Physical Activity: Inactive (7/17/2024)    Exercise Vital Sign     Days of Exercise per Week: 0 days     Minutes of Exercise per Session: 0 min   Stress: Not on file   Social Connections: Unknown (10/9/2023)    Family and Community Support     Help with Day-to-Day Activities: Not on file     Lonely or Isolated: Not on file   Interpersonal Safety: Not At Risk (8/14/2024)    Abuse Screen     Unsafe at Home or Work/School: no     Feels Threatened by Someone?: no     Does Anyone Keep You from Contacting Others or Doint Things Outside the Home?: no     Physical Sign of Abuse Present: no   Depression: At risk (12/3/2023)    PHQ-2     PHQ-2 Score: 3   Housing Stability: Not At Risk (7/30/2024)    Housing Stability     Current Living Arrangements: home     Potentially Unsafe Housing Conditions: none   Utilities: Not At Risk (7/30/2024)    [facility] Utilities     Threatened with loss of utilities: No   Health Literacy: Unknown (7/30/2024)    Education     Help with school or training?: Not on file     Preferred Language: English   Employment: Unknown (10/9/2023)    Employment     Do you want help finding or keeping work or a job?: Not on file    Disabilities: At Risk (7/30/2024)    Disabilities     Concentrating, Remembering, or Making Decisions Difficulty: no     Doing Errands Independently Difficulty: yes        Home Medications     Prior to Admission medications    Medication Sig Start Date End Date Taking? Authorizing Provider   acetaminophen (TYLENOL) 325 MG tablet Take 2 tablets by mouth Every 6 (Six) Hours As Needed for Mild Pain.    Zackery Mcbride MD   amLODIPine (NORVASC) 5 MG tablet TAKE 1 TABLET BY MOUTH DAILY 3/6/24   Dom Estrada MD   APPLE CIDER VINEGAR PO Take 1 tablet by mouth Daily.    Zackery Mcbride MD   Ascorbic Acid (VITAMIN C) 250 MG chewable tablet Chew 1 tablet Daily. 9/1/19   Zackery Mcbride MD   B Complex-Biotin-FA (SUPER B-COMPLEX) tablet Take 1 tablet by mouth Daily. 2/19/16   Zackery Mcbride MD   Cholecalciferol (VITAMIN D3) 2000 units tablet Take 1 tablet by mouth Daily. 2/19/16   Zackery Mcbride MD   cyclobenzaprine (FLEXERIL) 10 MG tablet Take 1 tablet by mouth 3 (Three) Times a Day As Needed for Muscle Spasms. 7/19/24   Mimi Vázquez APRN   fluticasone (FLONASE) 50 MCG/ACT nasal spray 2 sprays into the nostril(s) as directed by provider Daily As Needed for Rhinitis.    Zackery Mcbride MD   HYDROcodone-acetaminophen (NORCO) 5-325 MG per tablet Take 1 tablet by mouth Every 6 (Six) Hours As Needed for Moderate Pain. 7/29/24   Mario Urrutia IV, MD   Inositol 324 MG tablet Take 2 tablets by mouth Every Morning Before Breakfast. 2/19/16   Zackery Mcbride MD   levothyroxine (SYNTHROID, LEVOTHROID) 50 MCG tablet TAKE 1 TABLET BY MOUTH DAILY 5/14/24   Dom Estrada MD   metFORMIN (GLUCOPHAGE) 500 MG tablet TAKE 1 TABLET BY MOUTH TWICE DAILY WITH MEALS 6/3/24   Dom Estrada MD   nystatin-triamcinolone (MYCOLOG II) 493595-9.1 UNIT/GM-% cream 1 Application 2 (Two) Times a Day As Needed. 9/5/23   Zackery Mcbride MD   QUEtiapine (SEROquel) 25 MG tablet Take 1  tablet by mouth Every Night for 30 days. 8/7/24 9/6/24  Kumar Franco MD   tamsulosin (FLOMAX) 0.4 MG capsule 24 hr capsule Take 1 capsule by mouth Daily. 8/8/24   Kumar Franco MD   traMADol (ULTRAM) 50 MG tablet Take 1-2 tablets by mouth Every 6 (Six) Hours As Needed for Moderate Pain or Severe Pain (post op pain). 7/26/24   Dom Estrada MD   TURMERIC CURCUMIN PO Take 2 tablets by mouth Daily.    Provider, MD Zackery        Objective / Physical Exam     Vital signs:  Temp: 99.5 °F (37.5 °C)  BP: 138/96  Heart Rate: 90  Resp: 16  SpO2: 96 %  Weight: 85.5 kg (188 lb 7.9 oz)    Admission Weight: Weight: 85.5 kg (188 lb 7.9 oz)    Physical Exam  Constitutional:       General: She is not in acute distress.     Appearance: She is not toxic-appearing.   HENT:      Head: Normocephalic and atraumatic.      Nose: Nose normal. No congestion.      Mouth/Throat:      Pharynx: Oropharynx is clear. No oropharyngeal exudate.   Eyes:      General: No scleral icterus.  Cardiovascular:      Rate and Rhythm: Normal rate and regular rhythm.      Heart sounds: No murmur heard.     No friction rub. No gallop.   Pulmonary:      Effort: No respiratory distress.      Breath sounds: No wheezing or rales.   Abdominal:      General: There is no distension.      Tenderness: There is no abdominal tenderness. There is no guarding.   Musculoskeletal:         General: No swelling or deformity.      Cervical back: Normal range of motion. No rigidity.      Right lower leg: No edema.      Left lower leg: No edema.   Skin:     Coloration: Skin is not jaundiced.      Findings: No bruising or lesion.   Neurological:      General: No focal deficit present.      Mental Status: She is alert and oriented to person, place, and time.      Motor: Weakness present.            Labs     Results from last 7 days   Lab Units 08/14/24  1408 08/14/24  0453 08/08/24  0500   WBC 10*3/mm3 14.09* 13.08* 7.11   HEMOGLOBIN g/dL 12.9 12.4 11.7*    HEMATOCRIT % 39.4 37.9 35.6   PLATELETS 10*3/mm3 304 294 321      Results from last 7 days   Lab Units 08/08/24  0500   ALK PHOS U/L 59   AST (SGOT) U/L 22   ALT (SGPT) U/L 36*           Results from last 7 days   Lab Units 08/14/24  1408 08/14/24  0453 08/08/24  0500   SODIUM mmol/L 133* 135* 137   POTASSIUM mmol/L 3.4* 3.7 3.6   CHLORIDE mmol/L 95* 97* 100   CO2 mmol/L 24.4 26.0 28.3   BUN mg/dL 6* 6* 12   CREATININE mg/dL 0.48* 0.45* 0.49*   GLUCOSE mg/dL 149* 96 103*        Imaging     XR Chest 1 View    Result Date: 8/14/2024  XR CHEST 1 VW Date of Exam: 8/14/2024 6:05 PM EDT Indication: fever UO, recent spinal surgery Comparison: 1/5/2024 Findings: Chronic elevation right diaphragm unchanged. There is left basilar airspace disease which may relate to atelectasis and/or pneumonia. Small left pleural effusion suspected. No significant effusion on the right. Negative for pneumothorax. There is a nodular density overlying the right upper lung measuring 5 mm at the level of the right posterior fifth rib which could relate to pulmonary nodule or superimposition of structures.     Impression: 1. Left basilar airspace disease may relate to atelectasis and/or pneumonia with small left pleural effusion. 2. Nodular density overlying the right upper lung measuring 5 mm. This could relate to pulmonary nodule versus superimposed structures or area of parenchymal scarring, consider PA and lateral chest radiograph when patient clinically stable otherwise chest CT may be considered for further assessment. 3. Chronic elevation of the right hemidiaphragm unchanged. Electronically Signed: Roger Hollis MD  8/14/2024 6:30 PM EDT  Workstation ID: [id number]    MRI Cervical Spine With & Without Contrast    Result Date: 8/14/2024  MRI CERVICAL SPINE W WO CONTRAST Date of Exam: 8/14/2024 4:37 PM EDT Indication: Meningioma.  Comparison: July 29, 2024 Technique:  Routine multiplanar/multisequence sequence images of the cervical spine  were obtained before and after the uneventful administration of Prohance.  Findings: A 4.9 x 2.9 x 5.4 cm (TR x AP x CC) fluid collection within the laminectomy bed extending from C1-C5 has decreased in size, previously 5.5 x 4.9 x 8.1 cm when remeasured today. There remains mass effect on the posterior thecal sac resulting in up to moderate to severe spinal canal stenosis at C3-4 similar to prior exam. There is no evidence of cord compression on this exam. The alignment is anatomic. The craniocervical junction appears intact. The vertebral body heights appear normal. Degenerative change appear stable. No enhancing mass is identified. The prevertebral soft tissues are unremarkable.     Impression: 5.4 cm fluid collection within laminectomy bed extending from C1-C5 has decreased in size, which could represent pseudomeningocele, CSF leak, or post-operative seroma. Stable mass effect on posterior thecal sac most prominent at C3-4 where there is moderate to severe spinal canal stenosis. No evidence of cord compression. Electronically Signed: Ruperto Wakefield MD  8/14/2024 5:27 PM EDT  Workstation ID: [id number]      Chest X ray: CXR shows left basilar airspace disease may relate to atelectasis and/or pneumonia with small left pleural effusion complicated by nodular density overlying the right upper lung measuring 5mm        Current Medications     Scheduled Meds:  cefepime, 2,000 mg, Intravenous, Once  sodium chloride, 500 mL, Intravenous, Once  vancomycin, 20 mg/kg, Intravenous, Once         Continuous Infusions:          Colleen Hines DO   [facility]  08/14/24   18:51 EDT

## 2024-08-15 ENCOUNTER — APPOINTMENT (OUTPATIENT)
Dept: CT IMAGING | Facility: HOSPITAL | Age: 79
DRG: 871 | End: 2024-08-15
Payer: MEDICARE

## 2024-08-15 LAB
ANION GAP SERPL CALCULATED.3IONS-SCNC: 12.7 MMOL/L (ref 5–15)
BUN SERPL-MCNC: 4 MG/DL (ref 8–23)
BUN/CREAT SERPL: 11.1 (ref 7–25)
CALCIUM SPEC-SCNC: 9.2 MG/DL (ref 8.6–10.5)
CHLORIDE SERPL-SCNC: 95 MMOL/L (ref 98–107)
CO2 SERPL-SCNC: 24.3 MMOL/L (ref 22–29)
CREAT SERPL-MCNC: 0.36 MG/DL (ref 0.57–1)
DEPRECATED RDW RBC AUTO: 43.8 FL (ref 37–54)
EGFRCR SERPLBLD CKD-EPI 2021: 105.4 ML/MIN/1.73
ERYTHROCYTE [DISTWIDTH] IN BLOOD BY AUTOMATED COUNT: 12.7 % (ref 12.3–15.4)
GLUCOSE BLDC GLUCOMTR-MCNC: 129 MG/DL (ref 70–105)
GLUCOSE BLDC GLUCOMTR-MCNC: 137 MG/DL (ref 70–105)
GLUCOSE BLDC GLUCOMTR-MCNC: 139 MG/DL (ref 70–105)
GLUCOSE BLDC GLUCOMTR-MCNC: 145 MG/DL (ref 70–105)
GLUCOSE SERPL-MCNC: 153 MG/DL (ref 65–99)
HCT VFR BLD AUTO: 41.5 % (ref 34–46.6)
HGB BLD-MCNC: 13.8 G/DL (ref 12–15.9)
L PNEUMO1 AG UR QL IA: NEGATIVE
MCH RBC QN AUTO: 31.5 PG (ref 26.6–33)
MCHC RBC AUTO-ENTMCNC: 33.3 G/DL (ref 31.5–35.7)
MCV RBC AUTO: 94.7 FL (ref 79–97)
MRSA DNA SPEC QL NAA+PROBE: NORMAL
PLATELET # BLD AUTO: 279 10*3/MM3 (ref 140–450)
PMV BLD AUTO: 9.5 FL (ref 6–12)
POTASSIUM SERPL-SCNC: 3.3 MMOL/L (ref 3.5–5.2)
PROCALCITONIN SERPL-MCNC: 0.1 NG/ML (ref 0–0.25)
RBC # BLD AUTO: 4.38 10*6/MM3 (ref 3.77–5.28)
S PNEUM AG SPEC QL LA: NEGATIVE
SODIUM SERPL-SCNC: 132 MMOL/L (ref 136–145)
WBC NRBC COR # BLD AUTO: 12.73 10*3/MM3 (ref 3.4–10.8)

## 2024-08-15 PROCEDURE — 85027 COMPLETE CBC AUTOMATED: CPT | Performed by: STUDENT IN AN ORGANIZED HEALTH CARE EDUCATION/TRAINING PROGRAM

## 2024-08-15 PROCEDURE — 82948 REAGENT STRIP/BLOOD GLUCOSE: CPT | Performed by: STUDENT IN AN ORGANIZED HEALTH CARE EDUCATION/TRAINING PROGRAM

## 2024-08-15 PROCEDURE — 82948 REAGENT STRIP/BLOOD GLUCOSE: CPT

## 2024-08-15 PROCEDURE — 25010000002 HYDROMORPHONE 1 MG/ML SOLUTION: Performed by: STUDENT IN AN ORGANIZED HEALTH CARE EDUCATION/TRAINING PROGRAM

## 2024-08-15 PROCEDURE — 97535 SELF CARE MNGMENT TRAINING: CPT

## 2024-08-15 PROCEDURE — 87641 MR-STAPH DNA AMP PROBE: CPT | Performed by: INTERNAL MEDICINE

## 2024-08-15 PROCEDURE — 36415 COLL VENOUS BLD VENIPUNCTURE: CPT | Performed by: STUDENT IN AN ORGANIZED HEALTH CARE EDUCATION/TRAINING PROGRAM

## 2024-08-15 PROCEDURE — 80048 BASIC METABOLIC PNL TOTAL CA: CPT | Performed by: STUDENT IN AN ORGANIZED HEALTH CARE EDUCATION/TRAINING PROGRAM

## 2024-08-15 PROCEDURE — 97166 OT EVAL MOD COMPLEX 45 MIN: CPT

## 2024-08-15 PROCEDURE — 97162 PT EVAL MOD COMPLEX 30 MIN: CPT

## 2024-08-15 PROCEDURE — 25810000003 SODIUM CHLORIDE 0.9 % SOLUTION 250 ML FLEX CONT: Performed by: STUDENT IN AN ORGANIZED HEALTH CARE EDUCATION/TRAINING PROGRAM

## 2024-08-15 PROCEDURE — 25010000002 CEFEPIME PER 500 MG: Performed by: STUDENT IN AN ORGANIZED HEALTH CARE EDUCATION/TRAINING PROGRAM

## 2024-08-15 PROCEDURE — 25010000002 VANCOMYCIN HCL 1.25 G RECONSTITUTED SOLUTION 1 EACH VIAL: Performed by: STUDENT IN AN ORGANIZED HEALTH CARE EDUCATION/TRAINING PROGRAM

## 2024-08-15 PROCEDURE — 99024 POSTOP FOLLOW-UP VISIT: CPT

## 2024-08-15 RX ORDER — HYDRALAZINE HYDROCHLORIDE 25 MG/1
25 TABLET, FILM COATED ORAL EVERY 8 HOURS SCHEDULED
Status: DISCONTINUED | OUTPATIENT
Start: 2024-08-15 | End: 2024-08-18 | Stop reason: HOSPADM

## 2024-08-15 RX ORDER — POTASSIUM CHLORIDE 1500 MG/1
40 TABLET, EXTENDED RELEASE ORAL EVERY 4 HOURS
Status: COMPLETED | OUTPATIENT
Start: 2024-08-15 | End: 2024-08-15

## 2024-08-15 RX ORDER — AMLODIPINE BESYLATE 5 MG/1
5 TABLET ORAL
Status: DISCONTINUED | OUTPATIENT
Start: 2024-08-15 | End: 2024-08-18 | Stop reason: HOSPADM

## 2024-08-15 RX ADMIN — AMLODIPINE BESYLATE 5 MG: 5 TABLET ORAL at 16:48

## 2024-08-15 RX ADMIN — CEFEPIME 2000 MG: 2 INJECTION, POWDER, FOR SOLUTION INTRAVENOUS at 16:48

## 2024-08-15 RX ADMIN — LEVOTHYROXINE SODIUM 50 MCG: 0.05 TABLET ORAL at 06:43

## 2024-08-15 RX ADMIN — POTASSIUM CHLORIDE 40 MEQ: 1500 TABLET, EXTENDED RELEASE ORAL at 15:42

## 2024-08-15 RX ADMIN — POTASSIUM CHLORIDE 40 MEQ: 1500 TABLET, EXTENDED RELEASE ORAL at 12:39

## 2024-08-15 RX ADMIN — ACETAMINOPHEN 650 MG: 325 TABLET, FILM COATED ORAL at 18:03

## 2024-08-15 RX ADMIN — Medication 10 ML: at 08:24

## 2024-08-15 RX ADMIN — CEFEPIME 2000 MG: 2 INJECTION, POWDER, FOR SOLUTION INTRAVENOUS at 03:06

## 2024-08-15 RX ADMIN — HYDROMORPHONE HYDROCHLORIDE 0.5 MG: 1 INJECTION, SOLUTION INTRAMUSCULAR; INTRAVENOUS; SUBCUTANEOUS at 03:06

## 2024-08-15 RX ADMIN — HYDRALAZINE HYDROCHLORIDE 25 MG: 25 TABLET ORAL at 16:48

## 2024-08-15 RX ADMIN — POTASSIUM CHLORIDE 40 MEQ: 1500 TABLET, EXTENDED RELEASE ORAL at 03:06

## 2024-08-15 RX ADMIN — METOPROLOL TARTRATE 25 MG: 25 TABLET, FILM COATED ORAL at 08:24

## 2024-08-15 RX ADMIN — SODIUM CHLORIDE 1250 MG: 9 INJECTION, SOLUTION INTRAVENOUS at 10:25

## 2024-08-15 NOTE — PROGRESS NOTES
"Pharmacy Antimicrobial Dosing Service    Subjective:  Esperanza Smith is a 78 y.o.female admitted with sepsis. Pharmacy has been consulted to dose Vancomycin and Cefepime for possible sepsis.      Assessment/Plan    1. Day #1/5 Vancomycin: Goal -600 mcg*h/mL. Pt received 1750mg (~20mg/kg ABW) IV once followed by 1250mg (~15mg/kg ABW) IV q12h for a predicted AUC ~508mcg*h/mL. Will obtain peak on 8/16 at 0200 and trough at 0900 prior to fourth dose.     2. Day #1/5 Cefepime: 2g IV q8h for estCrCl > 60 mL/min.    Will continue to monitor drug levels, renal function, culture and sensitivities, and patient clinical status.       Objective:  Relevant clinical data and objective history reviewed:  165.1 cm (65\")   85.5 kg (188 lb 7.9 oz)   Ideal body weight: 57 kg (125 lb 10.6 oz)  Adjusted ideal body weight: 68.4 kg (150 lb 12.7 oz)  Body mass index is 31.37 kg/m².        Results from last 7 days   Lab Units 08/14/24  1408 08/14/24  0453 08/08/24  0500   CREATININE mg/dL 0.48* 0.45* 0.49*     Estimated Creatinine Clearance: 107.7 mL/min (A) (by C-G formula based on SCr of 0.48 mg/dL (L)).  No intake/output data recorded.    Results from last 7 days   Lab Units 08/14/24  1408 08/14/24  0453 08/08/24  0500   WBC 10*3/mm3 14.09* 13.08* 7.11     Temperature    08/14/24 1301 08/14/24 1320 08/14/24 1515   Temp: 98.7 °F (37.1 °C) (!) 101.4 °F (38.6 °C) 99.5 °F (37.5 °C)     Baseline culture/source/susceptibility:  Microbiology Results (last 10 days)       Procedure Component Value - Date/Time    COVID-19 and FLU A/B PCR, 1 HR TAT - Swab, Nasopharynx [[id number]]  (Normal) Collected: 08/14/24 1348    Lab Status: Final result Specimen: Swab from Nasopharynx Updated: 08/14/24 1436     COVID19 Not Detected     Influenza A PCR Not Detected     Influenza B PCR Not Detected    Narrative:      Fact sheet for providers: https://www.fda.gov/media/348422/download    Fact sheet for patients: " https://www.fda.gov/media/476476/download    Test performed by PCR.    Blood Culture - Blood, Blood, PICC Line [[id number]]  (Normal) Collected: 08/07/24 0637    Lab Status: Final result Specimen: Blood, PICC Line Updated: 08/12/24 0700     Blood Culture No growth at 5 days    Wound Culture - Wound, Abdominal Wall [[id number]]  (Abnormal)  (Susceptibility) Collected: 08/07/24 0637    Lab Status: Final result Specimen: Wound from Abdominal Wall Updated: 08/10/24 1027     Wound Culture Light growth (2+) Klebsiella pneumoniae ssp pneumoniae      Light growth (2+)     Comment: Mixed intra-abdominal dmitry. No further work-up.            Gram Stain Rare (1+) WBCs seen      Many (4+) Mixed bacterial morphotypes seen on Gram Stain    Susceptibility        Klebsiella pneumoniae ssp pneumoniae      HADLEY      Amoxicillin + Clavulanate 8 ug/ml Susceptible      Ampicillin >=32 ug/ml Resistant      Ampicillin + Sulbactam 16 ug/ml Intermediate      Cefepime <=1 ug/ml Susceptible      Ceftazidime <=1 ug/ml Susceptible      Ceftriaxone <=1 ug/ml Susceptible      Gentamicin <=1 ug/ml Susceptible      Levofloxacin 1 ug/ml Intermediate      Piperacillin + Tazobactam 16 ug/ml Susceptible dose dependent      Tetracycline >=16 ug/ml Resistant      Trimethoprim + Sulfamethoxazole <=20 ug/ml Susceptible                       Susceptibility Comments       Klebsiella pneumoniae ssp pneumoniae    Cefazolin sensitivity will not be reported for Enterobacteriaceae in non-urine isolates. If cefazolin is preferred, please call the microbiology lab to request an E-test.  With the exception of urinary-sourced infections, aminoglycosides should not be used as monotherapy.                       Orin Martin, PharmCONNER  08/14/24 22:10 EDT

## 2024-08-15 NOTE — SIGNIFICANT NOTE
Patient transferring to room 262, report called, belongings sent upon discharge. Spouse at bedside and aware of transfer. No PCU orders, not a nurse transport. Plan of care to continue.

## 2024-08-15 NOTE — THERAPY EVALUATION
Patient Name: Esperanza Smith  : 1945    MRN: [id number]                              Today's Date: 8/15/2024       Admit Date: 2024    Visit Dx:     ICD-10-CM ICD-9-CM   1. Fever, unspecified fever cause  R50.9 780.60   2. Nonintractable headache, unspecified chronicity pattern, unspecified headache type  R51.9 784.0   3. Neck pain  M54.2 723.1     Patient Active Problem List   Diagnosis    Breast neoplasm, Tis (DCIS), right    Dermatophytosis    Screening for breast cancer    Enterocutaneous fistula    Hematuria    Hyperlipidemia    Hypertension    Intertrigo    Overweight    Vasovagal attack    Vitamin D deficiency    Medicare annual wellness visit, subsequent    Breast cancer    Obstructive sleep apnea syndrome    Other fatigue    Allergies    Need for vaccination    Age-related osteoporosis without current pathological fracture     Postmenopause    Need for hepatitis C screening test    Cervical spondylosis with radiculopathy    Intradural extramedullary spinal tumor    Type 2 diabetes mellitus    Acquired hypothyroidism    Neck pain    Sepsis     Past Medical History:   Diagnosis Date    Allergic Penicillian    Asthma congestion    Breast cancer     Cataract     Cervical disc disorder     Cholelithiasis surgery  removal    DDD (degenerative disc disease), lumbosacral     Diabetes mellitus     Diverticulitis     Diverticulosis     Headache     History of medical problems 54 inches left of small intesting    Hyperlipidemia     Hypertension     Hypothyroidism     IBS (irritable bowel syndrome)     Inflammatory bowel disease     Liver disease fatty liver    Low back pain Sciatic    Lumbosacral disc disease     Neuromuscular disorder neuropathy    Obesity     PONV (postoperative nausea and vomiting)     Seasonal allergies     Sleep apnea      Past Surgical History:   Procedure Laterality Date    BREAST BIOPSY      BREAST SURGERY Left 02/2012    x2     CERVICAL SPINAL CORD TUMOR REMOVAL N/A  7/16/2024    Procedure: Cervical 2-4 laminectomy for resection of intradural tumor;  Surgeon: Mario Urrutia IV, MD;  Location: [facility] MAIN OR;  Service: Neurosurgery;  Laterality: N/A;    CHOLECYSTECTOMY  1996    COLON RESECTION SMALL BOWEL  05/25/2014    COLON SURGERY  small intestine repair    HYSTERECTOMY      LYMPH NODE BIOPSY      MASTECTOMY      OOPHORECTOMY  1965    POSTERIOR CERVICAL FUSION N/A 7/31/2024    Procedure: LUMBAR DRAIN INSERTION REPAIR OF CERVICAL DURA;  Surgeon: Mario Urrutia IV, MD;  Location: [facility] MAIN OR;  Service: Neurosurgery;  Laterality: N/A;    SIMPLE MASTECTOMY Right 02/26/2020    Procedure: Right simple mastectomy;  Surgeon: Mario Quezada DO;  Location: [facility] MAIN OR;  Service: General;  Laterality: Right;    SMALL INTESTINE SURGERY      SUBTOTAL HYSTERECTOMY  full hysterectomy      General Information       Row Name 08/15/24 1452          Physical Therapy Time and Intention    Document Type evaluation  -MB     Mode of Treatment physical therapy  -MB       Row Name 08/15/24 1452          General Information    Patient Profile Reviewed yes  -MB     Prior Level of Function independent:;all household mobility;community mobility;gait;transfer;ADL's;home management  -MB     Existing Precautions/Restrictions fall;spinal  -MB     Barriers to Rehab medically complex;previous functional deficit  -MB       Row Name 08/15/24 1452          Living Environment    People in Home spouse  -MB       Row Name 08/15/24 1452          Home Main Entrance    Number of Stairs, Main Entrance four  -MB     Stair Railings, Main Entrance railings safe and in good condition  -MB       Row Name 08/15/24 1452          Stairs Within Home, Primary    Number of Stairs, Within Home, Primary none  -MB       Row Name 08/15/24 1452          Cognition    Orientation Status (Cognition) oriented x 4  -MB       Row Name 08/15/24 1452          Safety Issues, Functional Mobility    Impairments Affecting Function (Mobility)  balance;endurance/activity tolerance;strength;postural/trunk control;shortness of breath  -MB               User Key  (r) = Recorded By, (t) = Taken By, (c) = Cosigned By      Initials Name Provider Type    Juan Antonio Pyle PT Physical Therapist                   Mobility       Row Name 08/15/24 1453          Bed Mobility    Bed Mobility bed mobility (all) activities  -MB     All Activities, [place] (Bed Mobility) minimum assist (75% patient effort)  -MB     Assistive Device (Bed Mobility) bed rails;head of bed elevated  -MB       Row Name 08/15/24 1453          Bed-Chair Transfer    Bed-Chair [place] (Transfers) moderate assist (50% patient effort)  -MB     Comment, (Bed-Chair Transfer) no AD  -MB       Row Name 08/15/24 1453          Sit-Stand Transfer    Sit-Stand [place] (Transfers) moderate assist (50% patient effort)  -MB     Comment, (Sit-Stand Transfer) no AD  -MB       Row Name 08/15/24 1453          Gait/Stairs (Locomotion)    [place] Level (Gait) moderate assist (50% patient effort)  -MB     Patient was able to Ambulate yes  -MB     Distance in Feet (Gait) 3  -MB     Comment, (Gait/Stairs) 3' mod A with  no AD to BSC  -MB               User Key  (r) = Recorded By, (t) = Taken By, (c) = Cosigned By      Initials Name Provider Type    Juan Antonio Pyle PT Physical Therapist                   Obj/Interventions       Row Name 08/15/24 1454          Range of Motion Comprehensive    General Range of Motion no range of motion deficits identified  -MB       Row Name 08/15/24 1454          Strength Comprehensive (MMT)    Comment, General Manual Muscle Testing (MMT) Assessment BLE Strength 3+/5 grossly  -MB       Row Name 08/15/24 1454          Balance    Balance Assessment sitting static balance;sitting dynamic balance;sit to stand dynamic balance;standing static balance  -MB     Static Sitting Balance standby assist  -MB     Dynamic Sitting Balance contact guard;minimal assist  -MB      Position, Sitting Balance unsupported;sitting edge of bed  -MB     Sit to Stand Dynamic Balance minimal assist  -MB     Static Standing Balance minimal assist  -MB       Row Name 08/15/24 1455          Sensory Assessment (Somatosensory)    Sensory Assessment (Somatosensory) sensation intact  -MB               User Key  (r) = Recorded By, (t) = Taken By, (c) = Cosigned By      Initials Name Provider Type    Juan Antonio Pyle, PT Physical Therapist                   Goals/Plan       Row Name 08/15/24 7594          Bed Mobility Goal 1 (PT)    Activity/Assistive Device (Bed Mobility Goal 1, PT) bed mobility activities, all  -MB     [place] Level/Cues Needed (Bed Mobility Goal 1, PT) independent  -MB     Time Frame (Bed Mobility Goal 1, PT) long term goal (LTG);2 weeks  -MB       Row Name 08/15/24 3181          Transfer Goal 1 (PT)    Activity/Assistive Device (Transfer Goal 1, PT) transfers, all;walker, rolling  -MB     [place] Level/Cues Needed (Transfer Goal 1, PT) standby assist  -MB     Time Frame (Transfer Goal 1, PT) long term goal (LTG);2 weeks  -MB       Row Name 08/15/24 8285          Gait Training Goal 1 (PT)    Activity/Assistive Device (Gait Training Goal 1, PT) gait (walking locomotion);walker, rolling  -MB     [place] Level (Gait Training Goal 1, PT) contact guard required  -MB     Distance (Gait Training Goal 1, PT) 50  -MB     Time Frame (Gait Training Goal 1, PT) long term goal (LTG);2 weeks  -MB       Row Name 08/15/24 8046          Therapy Assessment/Plan (PT)    Planned Therapy Interventions (PT) bed mobility training;gait training;home exercise program;balance training;neuromuscular re-education;patient/family education;strengthening;transfer training  -MB               User Key  (r) = Recorded By, (t) = Taken By, (c) = Cosigned By      Initials Name Provider Type    Juan Antonio Pyle PT Physical Therapist                   Clinical Impression       Row Name 08/15/24 4103          Pain     Pretreatment Pain Rating 4/10  -MB     Posttreatment Pain Rating 4/10  -MB     Pain Location - back;head  -MB     Pain Intervention(s) Repositioned;Emotional support;Nursing Notified  -MB       Row Name 08/15/24 1511 08/15/24 1454       Plan of Care Review    Plan of Care Reviewed With -- patient  -MB    Progress -- no change  -MB    Outcome Evaluation Pt is a 79 y/o F admitted to [facility] on 8/14/24 with complaints of severe headache. Of note, she had C2-C4 laminectomy for resection of cervical meningioma on 7/16/2024 that was complicated by large pseudomeningiocele that underwent evacuation and dural repair with drain placement on 7/31/2024. MRI Brain (+) for 5.4cm fluid collection within laminectomy bed extending from C1-C5 that has decreased in size. 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 Neurosurgery consulted, recommending so surgical intervention, along with continue antibiotics with infectious disease consult. She is currently living at home with spouse in [facility] with 4 steps to enter. Prior to surgery, she was IND with household mobility and ADLs with no AD. She was recently staying at [facility] where she reports ambulating in the hallway with no AD, this was one week ago. This date, she is AAOx4 with call light on requesting to use BSC. She completes bed mobility min A and transfers mod A over to BSC. She exhibits poor balance and BLE strength this date, poor standing tolerance. Posterior leaning noted when sitting on BSC, req min>mod A for pericare following. She repetitively mentions how quickly she has declined since being admitted to [facility]. Based on her great success and progress at [facility], PT is planning ot keep that recommendation to give her the best chance of reaching her baseline as quickly as possible. Not safe to d/c home at this time. PT will continue to follow.  -MB --      Row Name 08/15/24 1450          Therapy Assessment/Plan (PT)    Rehab  Potential (PT) fair, will monitor progress closely  -MB     Criteria for Skilled Interventions Met (PT) yes;meets criteria  -MB     Therapy Frequency (PT) 5 times/wk  -MB     Predicted Duration of Therapy Intervention (PT) until d/c  -MB       Row Name 08/15/24 1454          Vital Signs    Pre SpO2 (%) 96  -MB     O2 Delivery Pre Treatment supplemental O2  2L  -MB     Intra SpO2 (%) 91  -MB     O2 Delivery Intra Treatment room air  -MB     Post SpO2 (%) 94  -MB     O2 Delivery Post Treatment room air  -MB     Pre Patient Position Supine  -MB     Intra Patient Position Standing  -MB     Post Patient Position Supine  -MB       Row Name 08/15/24 1454          Positioning and Restraints    Pre-Treatment Position in bed  -MB     Post Treatment Position bed  -MB     In Bed notified nsg;supine;call light within reach;encouraged to call for assist;exit alarm on  -MB               User Key  (r) = Recorded By, (t) = Taken By, (c) = Cosigned By      Initials Name Provider Type    Juan Antonio Pyle, PT Physical Therapist                   Outcome Measures       Row Name 08/15/24 1455 08/15/24 1200       How much help from another person do you currently need...    Turning from your back to your side while in flat bed without using bedrails? 3  -MB 3  -LS    Moving from lying on back to sitting on the side of a flat bed without bedrails? 3  -MB 2  -LS    Moving to and from a bed to a chair (including a wheelchair)? 2  -MB 3  -LS    Standing up from a chair using your arms (e.g., wheelchair, bedside chair)? 2  -MB 3  -LS    Climbing 3-5 steps with a railing? 2  -MB 2  -LS    To walk in hospital room? 2  -MB 3  -LS    AM-PAC 6 Clicks Score (PT) 14  -MB 16  -LS    Highest Level of Mobility Goal 4 --> Transfer to chair/commode  -MB 5 --> Static standing  -LS      Row Name 08/15/24 1127          Functional Assessment    Outcome Measure Options AM-PAC 6 Clicks Daily Activity (OT)  -MM               User Key  (r) = Recorded By, (t) =  Taken By, (c) = Cosigned By      Initials Name Provider Type    Lissa Berkowitz, RN Registered Nurse    MM Beto Baron, OT Occupational Therapist    Juan Antonio Pyle, PT Physical Therapist                                 Physical Therapy Education       Title: PT OT SLP Therapies (In Progress)       Topic: Physical Therapy (Done)       Point: Mobility training (Done)       Learning Progress Summary             Patient Acceptance, E,TB, VU by MB at 8/15/2024 1456                         Point: Home exercise program (Done)       Learning Progress Summary             Patient Acceptance, E,TB, VU by MB at 8/15/2024 1456                         Point: Body mechanics (Done)       Learning Progress Summary             Patient Acceptance, E,TB, VU by MB at 8/15/2024 1456                         Point: Precautions (Done)       Learning Progress Summary             Patient Acceptance, E,TB, VU by MB at 8/15/2024 1456                                         User Key       Initials Effective Dates Name Provider Type Discipline    MB 06/06/23 -  Juan Antonio Mcgowan, PT Physical Therapist PT                  PT Recommendation and Plan  Planned Therapy Interventions (PT): bed mobility training, gait training, home exercise program, balance training, neuromuscular re-education, patient/family education, strengthening, transfer training  Plan of Care Reviewed With: patient  Progress: no change  Outcome Evaluation: Pt is a 77 y/o F admitted to [facility] on 8/14/24 with complaints of severe headache. Of note, she had C2-C4 laminectomy for resection of cervical meningioma on 7/16/2024 that was complicated by large pseudomeningiocele that underwent evacuation and dural repair with drain placement on 7/31/2024. MRI Brain (+) for 5.4cm fluid collection within laminectomy bed extending from C1-C5 that has decreased in size. 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 Neurosurgery consulted,  recommending so surgical intervention, along with continue antibiotics with infectious disease consult. She is currently living at home with spouse in [facility] with 4 steps to enter. Prior to surgery, she was IND with household mobility and ADLs with no AD. She was recently staying at [facility] where she reports ambulating in the hallway with no AD, this was one week ago. This date, she is AAOx4 with call light on requesting to use BSC. She completes bed mobility min A and transfers mod A over to BSC. She exhibits poor balance and BLE strength this date, poor standing tolerance. Posterior leaning noted when sitting on BSC, req min>mod A for pericare following. She repetitively mentions how quickly she has declined since being admitted to [facility]. Based on her great success and progress at [facility], PT is planning ot keep that recommendation to give her the best chance of reaching her baseline as quickly as possible. Not safe to d/c home at this time. PT will continue to follow.     Time Calculation:   PT Evaluation Complexity  History, PT Evaluation Complexity: 1-2 personal factors and/or comorbidities  Examination of Body Systems (PT Eval Complexity): total of 3 or more elements  Clinical Presentation (PT Evaluation Complexity): evolving  Clinical Decision Making (PT Evaluation Complexity): moderate complexity  Overall Complexity (PT Evaluation Complexity): moderate complexity     PT Charges       Row Name 08/15/24 1456             Time Calculation    Start Time 1340  -MB      Stop Time 1404  -MB      Time Calculation (min) 24 min  -MB      PT Received On 08/15/24  -MB      PT - Next Appointment 08/16/24  -MB      PT Goal Re-Cert Due Date 08/29/24  -MB         Time Calculation- PT    Total Timed Code Minutes- PT 0 minute(s)  -MB                User Key  (r) = Recorded By, (t) = Taken By, (c) = Cosigned By      Initials Name Provider Type    Juan Antonio Pyle, PT Physical Therapist                  Therapy Charges for Today       Code  Description Service Date Service Provider Modifiers Qty    50698374166 HC PT EVAL MOD COMPLEXITY 4 8/15/2024 Juan Antonio Mcgowan, PT GP 1            PT G-Codes  Outcome Measure Options: AM-PAC 6 Clicks Daily Activity (OT)  AM-PAC 6 Clicks Score (PT): 14  AM-PAC 6 Clicks Score (OT): 14  PT Discharge Summary  Anticipated Discharge Disposition (PT): inpatient rehabilitation facility    Juan Antonio Mcgowan, MYNOR  8/15/2024

## 2024-08-15 NOTE — SIGNIFICANT NOTE
08/15/24 0752   OTHER   Discipline occupational therapist   Rehab Time/Intention   Session Not Performed other (see comments)  (possible pseudomeningocele, CSF leak, or post-op seroma.  Will f/u after neurosx sees this pt.)   Therapy Assessment/Plan (PT)   Criteria for Skilled Interventions Met (PT) yes   Recommendation   OT - Next Appointment 08/16/24

## 2024-08-15 NOTE — CASE MANAGEMENT/SOCIAL WORK
Discharge Planning Assessment   Seth     Patient Name: Esperanza Smith  MRN: [id number]  Today's Date: 8/15/2024    Admit Date: 8/14/2024    Plan: From [facility]. Pending PT/OT gera.   Discharge Needs Assessment       Row Name 08/15/24 1119       Living Environment    People in Home spouse    Name(s) of People in Home Spouse-Jaren    Current Living Arrangements home    Potentially Unsafe Housing Conditions none    In the past 12 months has the electric, gas, oil, or water company threatened to shut off services in your home? No    Primary Care Provided by self    Provides Primary Care For no one    Family Caregiver if Needed spouse    Family Caregiver Names Spouse- Jaren    Quality of Family Relationships helpful;involved;supportive    Able to Return to Prior Arrangements yes       Resource/Environmental Concerns    Resource/Environmental Concerns none    Transportation Concerns none       Transportation Needs    In the past 12 months, has lack of transportation kept you from medical appointments or from getting medications? no    In the past 12 months, has lack of transportation kept you from meetings, work, or from getting things needed for daily living? No       Food Insecurity    Within the past 12 months, you worried that your food would run out before you got the money to buy more. Never true    Within the past 12 months, the food you bought just didn't last and you didn't have money to get more. Never true       Transition Planning    Patient/Family Anticipates Transition to inpatient rehabilitation facility    Patient/Family Anticipated Services at Transition none    Transportation Anticipated family or friend will provide       Discharge Needs Assessment    Readmission Within the Last 30 Days no previous admission in last 30 days    Equipment Currently Used at Home walker, rolling    Concerns to be Addressed discharge planning    Anticipated Changes Related to Illness none    Equipment Needed After Discharge  none                   Discharge Plan       Row Name 08/15/24 1120       Plan    Plan From [facility]. Pending PT/OT wilals.    Patient/Family in Agreement with Plan yes    Plan Comments Patient lives at home with her , Jaren, but had been staying at [facility] to get therapies. Patient does drive and her spouse will transport at discharge. Patient relies on her  lately for IADL. PCP and pharmacy confirmed. Declines MTB at this time. Denies financial assistance needs with medications and/or food. Patient and her  are questioning if she should go back to [facility] at this time. Discharge barriers: ID following, neurosurgery following, wound care consult, IV abx, cont fluids, 2L NC, abnormal labs, electrolyte replacement, cardiac montioring, wound cx pending, blood cx pending.                  Continued Care and Services - Admitted Since 8/14/2024       Destination       Service Provider Request Status Selected Services Address Phone Fax Patient Preferred    [facility] Pending - Request Sent N/A [address] [facility] [place] [address] [phone] --                         Demographic Summary       Row Name 08/15/24 1118       General Information    Admission Type inpatient    Arrived From emergency department    Required Notices Provided Important Message from Medicare    Referral Source admission list    Reason for Consult discharge planning    Preferred Language English       Contact Information    Permission Granted to Share Info With                    Functional Status       Row Name 08/15/24 1118       Functional Status    Usual Activity Tolerance moderate    Current Activity Tolerance moderate       Functional Status, IADL    Medications assistive person    Meal Preparation assistive person    Laundry assistive person    Shopping assistive person                Patient Forms       Row Name 08/15/24 1125       Patient Forms    Important Message from Medicare  (IMM) Delivered  IMM given by reg.                  Met with patient in room wearing PPE: mask.     Maintained distance greater than six feet and spent less than 15 minutes in the room.       Maryse Segovia RN

## 2024-08-15 NOTE — CONSULTS
[facility] NEUROSURGERY CONSULT NOTE    Patient name: Esperanza Smith  Referring Provider:     Isabel Barbour APRN     Reason for Consultation: Admission    Patient Care Team:  Dom Estrada MD as PCP - General  Jeannette Ralph RN as Ambulatory  ([facility])    Chief complaint: Headache and neck pain    Subjective .     History of present illness:    Patient is a 78 y.o. female who recently underwent surgery for repair of cervical dura with insertion of lumbar drain at the end of July. Patient has a history of headaches but states that they are worse than before. She states she started having a migraine two days ago behind her eyes and the back of her head with associated photophobia. She states that nothing has relieved her pain. Patient continues to have neck pain and states it is a 9/10. Patient has also had a fever for the past couple of days. Patient also admits to tingling/numbness in her upper and lower extremities in addition to pain and weakness in her lower extremities. She denies bowel/bladder incontinence and saddle anesthesia.     Review of Systems  Review of Systems   Constitutional:  Positive for activity change.   Eyes:  Positive for photophobia.   Musculoskeletal:  Positive for back pain and neck pain.   Neurological:  Positive for weakness, numbness and headaches.       History  PAST MEDICAL HISTORY  Past Medical History:   Diagnosis Date    Allergic Penicillian    Asthma congestion    Breast cancer     Cataract     Cervical disc disorder     Cholelithiasis surgery 1996 removal    DDD (degenerative disc disease), lumbosacral     Diabetes mellitus     Diverticulitis     Diverticulosis     Headache     History of medical problems 54 inches left of small intesting    Hyperlipidemia     Hypertension     Hypothyroidism     IBS (irritable bowel syndrome)     Inflammatory bowel disease     Liver disease fatty liver    Low back pain Sciatic    Lumbosacral disc disease      Neuromuscular disorder neuropathy    Obesity     PONV (postoperative nausea and vomiting)     Seasonal allergies     Sleep apnea        PAST SURGICAL HISTORY  Past Surgical History:   Procedure Laterality Date    BREAST BIOPSY      BREAST SURGERY Left 02/2012    x2     CERVICAL SPINAL CORD TUMOR REMOVAL N/A 7/16/2024    Procedure: Cervical 2-4 laminectomy for resection of intradural tumor;  Surgeon: Mario Urrutia IV, MD;  Location: [facility] MAIN OR;  Service: Neurosurgery;  Laterality: N/A;    CHOLECYSTECTOMY  1996    COLON RESECTION SMALL BOWEL  05/25/2014    COLON SURGERY  small intestine repair    HYSTERECTOMY      LYMPH NODE BIOPSY      MASTECTOMY      OOPHORECTOMY  1965    POSTERIOR CERVICAL FUSION N/A 7/31/2024    Procedure: LUMBAR DRAIN INSERTION REPAIR OF CERVICAL DURA;  Surgeon: Mario Urrutia IV, MD;  Location: [facility] MAIN OR;  Service: Neurosurgery;  Laterality: N/A;    SIMPLE MASTECTOMY Right 02/26/2020    Procedure: Right simple mastectomy;  Surgeon: Mario Quezada DO;  Location: [facility] MAIN OR;  Service: General;  Laterality: Right;    SMALL INTESTINE SURGERY      SUBTOTAL HYSTERECTOMY  full hysterectomy       FAMILY HISTORY  Family History   Problem Relation Age of Onset    Breast cancer Sister     Breast cancer Maternal Grandmother        SOCIAL HISTORY  Social History     Tobacco Use    Smoking status: Never     Passive exposure: Never    Smokeless tobacco: Never   Vaping Use    Vaping status: Never Used   Substance Use Topics    Alcohol use: Not Currently     Alcohol/week: 1.0 standard drink of alcohol     Types: 1 Glasses of wine per week     Comment: occ    Drug use: No       Allergies:  Diphenhydramine, Morphine, Penicillins, and Latex    MEDICATIONS:  (Not in a hospital admission)        Current Facility-Administered Medications:     acetaminophen (TYLENOL) tablet 650 mg, 650 mg, Oral, Q6H PRN, Colleen Hines DO    sennosides-docusate (PERICOLACE) 8.6-50 MG per tablet 2 tablet, 2 tablet, Oral, BID  PRN **AND** polyethylene glycol (MIRALAX) packet 17 g, 17 g, Oral, Daily PRN **AND** bisacodyl (DULCOLAX) EC tablet 5 mg, 5 mg, Oral, Daily PRN **AND** bisacodyl (DULCOLAX) suppository 10 mg, 10 mg, Rectal, Daily PRN, Colleen Hines DO    calcium carbonate (TUMS) chewable tablet 500 mg (200 mg elemental), 1 tablet, Oral, TID PRN, Colleen Hines DO    Calcium Replacement - Follow Nurse / BPA Driven Protocol, , Does not apply, PRN, Colleen Hines DO    cefepime 2000 mg IVPB in 100 mL NS (MBP), 2,000 mg, Intravenous, Q8H, Colleen Hines DO, 2,000 mg at 08/15/24 0306    cyclobenzaprine (FLEXERIL) tablet 10 mg, 10 mg, Oral, TID PRN, Colleen Hines DO    dextrose (D50W) (25 g/50 mL) IV injection 25 g, 25 g, Intravenous, Q15 Min PRN, Colleen Hines DO    dextrose (GLUTOSE) oral gel 15 g, 15 g, Oral, Q15 Min PRN, Colleen Hines DO    glucagon (GLUCAGEN) injection 1 mg, 1 mg, Intramuscular, Q15 Min PRN, Colleen Hines DO    HYDROmorphone (DILAUDID) injection 0.5 mg, 0.5 mg, Intravenous, Q2H PRN, Colleen Hines DO, 0.5 mg at 08/15/24 0306    insulin lispro (HUMALOG/ADMELOG) injection 2-7 Units, 2-7 Units, Subcutaneous, 4x Daily AC & at Bedtime, Colleen Hines DO    levothyroxine (SYNTHROID, LEVOTHROID) tablet 50 mcg, 50 mcg, Oral, Q AM, Colleen Hines DO, 50 mcg at 08/15/24 0643    Magnesium Standard Dose Replacement - Follow Nurse / BPA Driven Protocol, , Does not apply, PRN, Colleen Hines DO    metoprolol tartrate (LOPRESSOR) tablet 25 mg, 25 mg, Oral, Daily, Colleen Hines DO    nitroglycerin (NITROSTAT) SL tablet 0.4 mg, 0.4 mg, Sublingual, Q5 Min PRN, Colleen Hines, DO    ondansetron ODT (ZOFRAN-ODT) disintegrating tablet 4 mg, 4 mg, Oral, Q6H PRN **OR** ondansetron (ZOFRAN) injection 4 mg, 4 mg, Intravenous, Q6H PRN, Colleen Hines, DO    Pharmacy to Dose Cefepime, , Does not apply, Continuous PRN, Colleen Hines, DO    Pharmacy to dose vancomycin, , Does not apply, Continuous PRN, Flora,  DO Colleen    Phosphorus Replacement - Follow Nurse / BPA Driven Protocol, , Does not apply, PRN, Colleen Hines DO    Potassium Replacement - Follow Nurse / BPA Driven Protocol, , Does not apply, Flora YANEZ Waitman, DO    [COMPLETED] Insert Peripheral IV, , , Once **AND** sodium chloride 0.9 % flush 10 mL, 10 mL, Intravenous, PRN, Colleen Hines DO    sodium chloride 0.9 % flush 10 mL, 10 mL, Intravenous, Q12H, Colleen Hines DO, 10 mL at 08/14/24 2103    sodium chloride 0.9 % flush 10 mL, 10 mL, Intravenous, PRN, Colelen Hines DO    sodium chloride 0.9 % infusion 40 mL, 40 mL, Intravenous, PRN, Colleen Hines DO    sodium chloride 0.9 % infusion, 75 mL/hr, Intravenous, Continuous, Colleen Hines DO, Last Rate: 75 mL/hr at 08/15/24 0322, 75 mL/hr at 08/15/24 0322    Vancomycin HCl 1,250 mg in sodium chloride 0.9 % 250 mL VTB, 1,250 mg, Intravenous, Q12H, Colleen Hines DO    Current Outpatient Medications:     acetaminophen (TYLENOL) 325 MG tablet, Take 2 tablets by mouth Every 6 (Six) Hours As Needed for Mild Pain., Disp: , Rfl:     amLODIPine (NORVASC) 5 MG tablet, TAKE 1 TABLET BY MOUTH DAILY, Disp: 90 tablet, Rfl: 2    cyclobenzaprine (FLEXERIL) 10 MG tablet, Take 1 tablet by mouth every night at bedtime. Patient is on both prn and scheduled cyclobenzaprine-keep at least 6 hours between all doses, Disp: , Rfl:     hydrALAZINE (APRESOLINE) 25 MG tablet, Take 1 tablet by mouth Daily., Disp: , Rfl:     levothyroxine (SYNTHROID, LEVOTHROID) 50 MCG tablet, TAKE 1 TABLET BY MOUTH DAILY, Disp: 90 tablet, Rfl: 1    metFORMIN (GLUCOPHAGE) 500 MG tablet, TAKE 1 TABLET BY MOUTH TWICE DAILY WITH MEALS, Disp: 180 tablet, Rfl: 2    metoprolol tartrate (LOPRESSOR) 25 MG tablet, Take 1 tablet by mouth Daily., Disp: , Rfl:     tamsulosin (FLOMAX) 0.4 MG capsule 24 hr capsule, Take 1 capsule by mouth Daily., Disp: , Rfl:     vitamin C (ASCORBIC ACID) 250 MG tablet, Take 1 tablet by mouth Daily., Disp: , Rfl:      aluminum-magnesium hydroxide-simethicone (MAALOX/MYLANTA) 200-200-20 MG/5ML suspension, Take 30 mL by mouth 3 (Three) Times a Day As Needed for Indigestion or Heartburn., Disp: , Rfl:     bisacodyl (DULCOLAX) 5 MG EC tablet, Take 1 tablet by mouth Daily As Needed for Constipation., Disp: , Rfl:     butalbital-acetaminophen-caffeine (FIORICET, ESGIC) -40 MG per tablet, Take 2 tablets by mouth 3 (Three) Times a Day As Needed for Headache., Disp: , Rfl:     calcium carbonate (TUMS) 500 MG chewable tablet, Chew 1 tablet 3 (Three) Times a Day As Needed for Indigestion or Heartburn., Disp: , Rfl:     cyclobenzaprine (FLEXERIL) 10 MG tablet, Take 1 tablet by mouth 3 (Three) Times a Day As Needed for Muscle Spasms., Disp: 30 tablet, Rfl: 1    glucagon (GLUCAGEN) 1 MG injection, Inject 1 mg under the skin into the appropriate area as directed 1 (One) Time As Needed for Low Blood Sugar., Disp: , Rfl:     hydrALAZINE (APRESOLINE) 25 MG tablet, Take 1 tablet by mouth 3 (Three) Times a Day As Needed., Disp: , Rfl:     HYDROcodone-acetaminophen (NORCO) 5-325 MG per tablet, Take 1 tablet by mouth Every 6 (Six) Hours As Needed for Moderate Pain., Disp: 25 tablet, Rfl: 0    magnesium hydroxide (MILK OF MAGNESIA) 2400 MG/10ML suspension suspension, Take 30 mL by mouth Daily As Needed. Administer if no BM x 3 days and after prune juice is given and ineffective after 8 hours, Disp: , Rfl:     melatonin 5 MG tablet tablet, Take 1 tablet by mouth At Night As Needed., Disp: , Rfl:     ondansetron (ZOFRAN) 4 MG/2ML injection, Infuse 2 mL into a venous catheter Every 4 (Four) Hours As Needed for Nausea or Vomiting., Disp: , Rfl:     polyethylene glycol (MiraLax) 17 g packet, Take 17 g by mouth Daily As Needed., Disp: , Rfl:     simethicone (MYLICON) 80 MG chewable tablet, Chew 1 tablet 3 (Three) Times a Day As Needed for Flatulence., Disp: , Rfl:       Objective     Results Review:  LABS:  Results from last 7 days   Lab Units  08/15/24  0732 08/14/24  1408 08/14/24  0453   WBC 10*3/mm3 12.73* 14.09* 13.08*   HEMOGLOBIN g/dL 13.8 12.9 12.4   HEMATOCRIT % 41.5 39.4 37.9   PLATELETS 10*3/mm3 279 304 294     Results from last 7 days   Lab Units 08/14/24  1408 08/14/24  0453   SODIUM mmol/L 133* 135*   POTASSIUM mmol/L 3.4* 3.7   CHLORIDE mmol/L 95* 97*   CO2 mmol/L 24.4 26.0   BUN mg/dL 6* 6*   CREATININE mg/dL 0.48* 0.45*   CALCIUM mg/dL 9.6 9.7   GLUCOSE mg/dL 149* 96         DIAGNOSTICS:  MRI CERVICAL SPINE W WO CONTRAST     Date of Exam: 8/14/2024 4:37 PM EDT     Indication: Meningioma.     Comparison: July 29, 2024     Technique:  Routine multiplanar/multisequence sequence images of the cervical spine were obtained before and after the uneventful administration of Prohance.          Findings:  A 4.9 x 2.9 x 5.4 cm (TR x AP x CC) fluid collection within the laminectomy bed extending from C1-C5 has decreased in size, previously 5.5 x 4.9 x 8.1 cm when remeasured today. There remains mass effect on the posterior thecal sac resulting in up to   moderate to severe spinal canal stenosis at C3-4 similar to prior exam. There is no evidence of cord compression on this exam.     The alignment is anatomic. The craniocervical junction appears intact. The vertebral body heights appear normal. Degenerative change appear stable. No enhancing mass is identified. The prevertebral soft tissues are unremarkable.     IMPRESSION:  Impression:  5.4 cm fluid collection within laminectomy bed extending from C1-C5 has decreased in size, which could represent pseudomeningocele, CSF leak, or post-operative seroma. Stable mass effect on posterior thecal sac most prominent at C3-4 where there is   moderate to severe spinal canal stenosis. No evidence of cord compression.       Results Review:   I reviewed the patient's new clinical results.  I personally viewed patient's chart and imaging     Vital Signs   Temp:  [98 °F (36.7 °C)-101.4 °F (38.6 °C)] 98 °F (36.7  °C)  Heart Rate:  [] 86  Resp:  [16-36] 18  BP: (124-166)/(70-98) 150/88    Physical Exam:  Physical Exam  Vitals reviewed.   Eyes:      Extraocular Movements: Extraocular movements intact.      Conjunctiva/sclera: Conjunctivae normal.      Pupils: Pupils are equal, round, and reactive to light.   Musculoskeletal:         General: Normal range of motion.      Cervical back: Normal range of motion.   Skin:     General: Skin is warm and dry.   Neurological:      General: No focal deficit present.      Mental Status: She is alert and oriented to person, place, and time.   Psychiatric:         Mood and Affect: Mood normal.         Speech: Speech normal.       Neurologic Exam     Mental Status   Oriented to person, place, and time.   Speech: speech is normal   Level of consciousness: alert    Cranial Nerves     CN III, IV, VI   Pupils are equal, round, and reactive to light.    Motor Exam   Right arm tone: normal  Left arm tone: normal  Right leg tone: normal  Left leg tone: normal    Strength   Right deltoid: 5/5  Left deltoid: 5/5  Right biceps: 5/5  Left biceps: 5/5  Right triceps: 5/5  Left triceps: 5/5  Right iliopsoas: 5/5  Left iliopsoas: 5/5  Right quadriceps: 5/5  Left quadriceps: 5/5  Right hamstrin/5  Left hamstrin/5  Right anterior tibial: 5/5  Left anterior tibial: 5/5  Right posterior tibial: 5/5  Left posterior tibial: 5/5    Sensory Exam   Patient states that she has some numbness in both her arms, but more so in her left arm. Normal sensation in her lower extremities      Gait, Coordination, and Reflexes Not tested        Assessment & Plan       Sepsis      Problem List Items Addressed This Visit          Musculoskeletal and Injuries    Neck pain     Other Visit Diagnoses       Fever, unspecified fever cause    -  Primary    Nonintractable headache, unspecified chronicity pattern, unspecified headache type                 COMORBID CONDITIONS:  Diabetes Type 2, Hypertension, and breast  cancer     Patient is a 78 year old female who recently underwent surgery for repair of the cervical dura. She returns to the hospital for headaches, neck pain and a fever for the past two days. Patient states that she has been experiencing a migraine behind her eyes and the back of her head. She also admits to associated photophobia. Patient continues to have neck pain, but admits it is the same neck pain as after her surgery.     Patient had an MRI of her cervical spine showing a 5.4 cm fluid collection within laminectomy bed that has decreased in size from her previous MRI. She also has a stable mass effect on posterior thecal sac most prominent at C3-4. Patient's wound looks good at this time and is healing well. There is no drainage present on her bandage. Also, her incision where her lumbar drain was placed is healing well too. Patient continues to have neck pain, but is the same pain as before. Patient reports no stiff neck nor did she have difficulty moving her neck this morning. I do not believe she has meningitis at this time.    Patient states that she has radicular symptoms in her lower extremities, but has great strength on physical exam. She has no clonus present and has full sensation in her lower extremities. Patient has great strength in her upper extremities as well, but does have some decreased sensation at focal points. She has no Abraham's on examination.     At this time, based on her MRI and her physical exam there is no surgical intervention indicated at this time. I recommend patient continue antibiotics and infectious disease has been consulted for further workup. Please reach out with any questions or concerns.     PLAN:   Neck pain  - No surgical intervention indicated at this time  - Recommend infectious disease consult   - Continue antibiotics     I discussed the patient's findings and my recommendations with patient and Dr. Urrutia    During patient visit, I utilized appropriate personal  "protective equipment including gloves and mask.  Mask used was standard procedure mask. Appropriate PPE was worn during the entire visit.  Hand hygiene was completed before and after.     Ely Carrera PA-C  08/15/24  07:49 EDT    \"Dictated utilizing Dragon dictation\".    "

## 2024-08-15 NOTE — NURSING NOTE
Ostomy appliance over wound on abdomen draining light brown liquid. Wound care consulted.   Added dressing to secure present dressing.

## 2024-08-15 NOTE — DISCHARGE PLACEMENT REQUEST
"Esperanza Moore (78 y.o. Female)       Date of Birth   1945    Social Security Number       Address   [place] [address]    Home Phone   [phone]    MRN   [id number]       Zoroastrian   Quaker    Marital Status                               Admission Date   8/14/24    Admission Type   Emergency    Admitting Provider   Colleen Hines DO    Attending Provider   Gaetano Friedman MD    Department, Room/Bed   [facility] EMERGENCY DEPARTMENT, 31/31       Discharge Date       Discharge Disposition       Discharge Destination                                 Attending Provider: Gaetano Friedman MD    Allergies: Diphenhydramine, Morphine, Penicillins, Latex    Isolation: None   Infection: None   Code Status: CPR    Ht: 165.1 cm (65\")   Wt: 85.5 kg (188 lb 7.9 oz)    Admission Cmt: None   Principal Problem: Sepsis [A41.9]                   Active Insurance as of 8/14/2024       Primary Coverage       Payor Plan Insurance Group Employer/Plan Group    MEDICARE MEDICARE A & B        Payor Plan Address Payor Plan Phone Number Payor Plan Fax Number Effective Dates    [address] [phone]  9/1/2012 - None Entered    [facility] [id number]         Subscriber Name Subscriber Birth Date Member ID       ESPERANZA MOORE 1945 [id number]               Secondary Coverage       Payor Plan Insurance Group Employer/Plan Group     FOR LIFE  FOR LIFE  SUP  NGN       Payor Plan Address Payor Plan Phone Number Payor Plan Fax Number Effective Dates    [address] [phone]  4/7/2020 - None Entered    [facility] [address]         Subscriber Name Subscriber Birth Date Member ID       ESPERANZA MOORE 1945 [id number]                     Emergency Contacts        (Rel.) Home Phone Work Phone Mobile Phone    JOEL MOORE (ANNMARIE) (Spouse) [phone] -- [phone]              "

## 2024-08-15 NOTE — THERAPY EVALUATION
Patient Name: Esperanza Smith  : 1945    MRN: [id number]                              Today's Date: 8/15/2024       Admit Date: 2024    Visit Dx:     ICD-10-CM ICD-9-CM   1. Fever, unspecified fever cause  R50.9 780.60   2. Nonintractable headache, unspecified chronicity pattern, unspecified headache type  R51.9 784.0   3. Neck pain  M54.2 723.1     Patient Active Problem List   Diagnosis    Breast neoplasm, Tis (DCIS), right    Dermatophytosis    Screening for breast cancer    Enterocutaneous fistula    Hematuria    Hyperlipidemia    Hypertension    Intertrigo    Overweight    Vasovagal attack    Vitamin D deficiency    Medicare annual wellness visit, subsequent    Breast cancer    Obstructive sleep apnea syndrome    Other fatigue    Allergies    Need for vaccination    Age-related osteoporosis without current pathological fracture     Postmenopause    Need for hepatitis C screening test    Cervical spondylosis with radiculopathy    Intradural extramedullary spinal tumor    Type 2 diabetes mellitus    Acquired hypothyroidism    Neck pain    Sepsis     Past Medical History:   Diagnosis Date    Allergic Penicillian    Asthma congestion    Breast cancer     Cataract     Cervical disc disorder     Cholelithiasis surgery  removal    DDD (degenerative disc disease), lumbosacral     Diabetes mellitus     Diverticulitis     Diverticulosis     Headache     History of medical problems 54 inches left of small intesting    Hyperlipidemia     Hypertension     Hypothyroidism     IBS (irritable bowel syndrome)     Inflammatory bowel disease     Liver disease fatty liver    Low back pain Sciatic    Lumbosacral disc disease     Neuromuscular disorder neuropathy    Obesity     PONV (postoperative nausea and vomiting)     Seasonal allergies     Sleep apnea      Past Surgical History:   Procedure Laterality Date    BREAST BIOPSY      BREAST SURGERY Left 02/2012    x2     CERVICAL SPINAL CORD TUMOR REMOVAL N/A  7/16/2024    Procedure: Cervical 2-4 laminectomy for resection of intradural tumor;  Surgeon: Mario Urrutia IV, MD;  Location: [facility] MAIN OR;  Service: Neurosurgery;  Laterality: N/A;    CHOLECYSTECTOMY  1996    COLON RESECTION SMALL BOWEL  05/25/2014    COLON SURGERY  small intestine repair    HYSTERECTOMY      LYMPH NODE BIOPSY      MASTECTOMY      OOPHORECTOMY  1965    POSTERIOR CERVICAL FUSION N/A 7/31/2024    Procedure: LUMBAR DRAIN INSERTION REPAIR OF CERVICAL DURA;  Surgeon: Mario Urrutia IV, MD;  Location: [facility] MAIN OR;  Service: Neurosurgery;  Laterality: N/A;    SIMPLE MASTECTOMY Right 02/26/2020    Procedure: Right simple mastectomy;  Surgeon: Mario Quezada DO;  Location: [facility] MAIN OR;  Service: General;  Laterality: Right;    SMALL INTESTINE SURGERY      SUBTOTAL HYSTERECTOMY  full hysterectomy      General Information       Row Name 08/15/24 1121          OT Time and Intention    Document Type evaluation  -MM     Mode of Treatment occupational therapy  -MM       Row Name 08/15/24 1121          General Information    Prior Level of Function independent:;ADL's;home management;all household mobility;community mobility  Prior to sx in July.  Pt has been at [facility] since first of august.  -MM     Existing Precautions/Restrictions fall;spinal  -MM     Barriers to Rehab medically complex;previous functional deficit  -MM       Row Name 08/15/24 1121          Living Environment    People in Home spouse  -MM       Row Name 08/15/24 1121          Home Main Entrance    Number of Stairs, Main Entrance four  -MM     Stair Railings, Main Entrance railings safe and in good condition  -MM       Row Name 08/15/24 1121          Stairs Within Home, Primary    Number of Stairs, Within Home, Primary none  -MM       Row Name 08/15/24 1121          Cognition    Orientation Status (Cognition) oriented x 4  -MM       Row Name 08/15/24 1121          Safety Issues, Functional Mobility    Impairments Affecting Function (Mobility)  balance;endurance/activity tolerance;strength;pain  -MM               User Key  (r) = Recorded By, (t) = Taken By, (c) = Cosigned By      Initials Name Provider Type    Beto Gonzales OT Occupational Therapist                     Mobility/ADL's       Row Name 08/15/24 1122          Bed Mobility    Bed Mobility bed mobility (all) activities  -MM     All Activities, [place] (Bed Mobility) minimum assist (75% patient effort);moderate assist (50% patient effort)  -MM       Row Name 08/15/24 1122          Transfers    Transfers toilet transfer;sit-stand transfer;bed-chair transfer  -MM       Row Name 08/15/24 1122          Bed-Chair Transfer    Bed-Chair [place] (Transfers) minimum assist (75% patient effort);moderate assist (50% patient effort)  -MM       Row Name 08/15/24 1122          Sit-Stand Transfer    Sit-Stand [place] (Transfers) minimum assist (75% patient effort)  -MM       Row Name 08/15/24 1122          Toilet Transfer    Type (Toilet Transfer) sit-stand  -MM     [place] Level (Toilet Transfer) minimum assist (75% patient effort);moderate assist (50% patient effort)  -MM     Assistive Device (Toilet Transfer) commode, 3-in-1  -MM       Row Name 08/15/24 1122          Activities of Daily Living    BADL Assessment/Intervention lower body dressing  -MM       Row Name 08/15/24 1122          Lower Body Dressing Assessment/Training    [place] Level (Lower Body Dressing) lower body dressing skills;dependent (less than 25% patient effort)  -MM     Position (Lower Body Dressing) edge of bed sitting  -MM               User Key  (r) = Recorded By, (t) = Taken By, (c) = Cosigned By      Initials Name Provider Type    Beto Gonzales OT Occupational Therapist                   Obj/Interventions       Row Name 08/15/24 1123          Range of Motion Comprehensive    General Range of Motion bilateral upper extremity ROM WFL  -MM       Row Name 08/15/24 1123          Strength Comprehensive  (MMT)    General Manual Muscle Testing (MMT) Assessment upper extremity strength deficits identified  -MM     Comment, General Manual Muscle Testing (MMT) Assessment BUE strength grossly 3+/5  -MM               User Key  (r) = Recorded By, (t) = Taken By, (c) = Cosigned By      Initials Name Provider Type    Beto Gonzales OT Occupational Therapist                   Goals/Plan       Row Name 08/15/24 1127          Bed Mobility Goal 1 (OT)    Activity/Assistive Device (Bed Mobility Goal 1, OT) bed mobility activities, all  -MM     [place] Level/Cues Needed (Bed Mobility Goal 1, OT) supervision required  -MM     Time Frame (Bed Mobility Goal 1, OT) 2 weeks  -MM       Row Name 08/15/24 1127          Transfer Goal 1 (OT)    Activity/Assistive Device (Transfer Goal 1, OT) transfers, all  -MM     [place] Level/Cues Needed (Transfer Goal 1, OT) supervision required  -MM     Time Frame (Transfer Goal 1, OT) 2 weeks  -MM       Row Name 08/15/24 1127          Toileting Goal 1 (OT)    Activity/Device (Toileting Goal 1, OT) toileting skills, all  -MM     [place] Level/Cues Needed (Toileting Goal 1, OT) standby assist  -MM     Time Frame (Toileting Goal 1, OT) 2 weeks  -MM       Row Name 08/15/24 1127          Therapy Assessment/Plan (OT)    Planned Therapy Interventions (OT) activity tolerance training;functional balance retraining;BADL retraining;neuromuscular control/coordination retraining;patient/caregiver education/training;transfer/mobility retraining;strengthening exercise;ROM/therapeutic exercise  -MM               User Key  (r) = Recorded By, (t) = Taken By, (c) = Cosigned By      Initials Name Provider Type    Beto Gonzales OT Occupational Therapist                   Clinical Impression       Row Name 08/15/24 1123          Pain Assessment    Pretreatment Pain Rating 6/10  -MM     Posttreatment Pain Rating 6/10  -MM     Pain Location - back;head;neck  -MM     Pain Intervention(s)  Repositioned;Therapeutic presence;Ambulation/increased activity  -MM       Row Name 08/15/24 1123          Plan of Care Review    Plan of Care Reviewed With patient  -MM     Outcome Evaluation Pt is a 79 y/o F admitted for headache, neck pain, photophobia, and low grade fever.  Pt had c2-c4 laminectomy for resection of cervical meningioma of 7/16/2024 that was complicated by large pseudomeningiocele that underwent evacuation and dural repair with drain placement on 7/31/2024.  MRI cervical spine shows 5.4cm fluid collection within laminectomy bed extending from C1-C5 that has decreased in size.  Neurosx saw pt, recommending no surgical intervention at this time.  Recommending continue antibiotics and infectious disease consult.    PMHx: HTN, HLD, hypothyroid, obesity, DM.  Pt is A & O X 4.  Pt reports living in [facility] with 4 steps with HR to enter with .  Pt reports prior to original surgery in beginning of July she was independent with ADLs, IADLs, mobility, transfers, and driving.  Pt used no AD.  Pt does have a walker and a cane but did not use prior to sx.  Pt has been at [facility] since first of august, but reports she would like to go home following hospitalization.  Pt t/f from supine to sitting on EOB with MOD A.  Pt completed sit to stand with MIN A.  Pt t/f to BSC with MIN-MOD A, posterior lean noted.  Pt completed toileting with MIN-MOD A.  Pt t/f to chair with MOD A.  Pt presents with deficits in self care, transfers, functional mobility, balance, strength, and increased falls risk indicating need for skilled OT services.  OT recommending return to IRF as pt is not safe to return home at this time.  -MM       Row Name 08/15/24 1123          Therapy Assessment/Plan (OT)    Criteria for Skilled Therapeutic Interventions Met (OT) yes;meets criteria  -MM     Therapy Frequency (OT) 5 times/wk  -MM     Predicted Duration of Therapy Intervention (OT) until DC  -MM       Row Name 08/15/24 1123          Therapy  Plan Review/Discharge Plan (OT)    Anticipated Discharge Disposition (OT) inpatient rehabilitation facility  -MM       Row Name 08/15/24 1123          Vital Signs    Pre Systolic BP Rehab 167  -MM     Pre Treatment Diastolic   -MM       Row Name 08/15/24 1123          Positioning and Restraints    Pre-Treatment Position in bed  -MM     Post Treatment Position chair  -MM     In Chair notified nsg;reclined;call light within reach;encouraged to call for assist  -MM               User Key  (r) = Recorded By, (t) = Taken By, (c) = Cosigned By      Initials Name Provider Type    Beto Gonzales OT Occupational Therapist                   Outcome Measures       Row Name 08/15/24 1127          How much help from another is currently needed...    Putting on and taking off regular lower body clothing? 1  -MM     Bathing (including washing, rinsing, and drying) 1  -MM     Toileting (which includes using toilet bed pan or urinal) 2  -MM     Putting on and taking off regular upper body clothing 3  -MM     Taking care of personal grooming (such as brushing teeth) 3  -MM     Eating meals 4  -MM     AM-PAC 6 Clicks Score (OT) 14  -MM       Row Name 08/15/24 1127          Functional Assessment    Outcome Measure Options AM-PAC 6 Clicks Daily Activity (OT)  -MM               User Key  (r) = Recorded By, (t) = Taken By, (c) = Cosigned By      Initials Name Provider Type    Beto Gonzales OT Occupational Therapist                    Occupational Therapy Education       Title: PT OT SLP Therapies (In Progress)       Topic: Occupational Therapy (In Progress)       Point: ADL training (Done)       Description:   Instruct learner(s) on proper safety adaptation and remediation techniques during self care or transfers.   Instruct in proper use of assistive devices.                  Learning Progress Summary             Patient Acceptance, E, VU by ANGEL at 8/15/2024 1128                         Point: Home exercise program (Not  Started)       Description:   Instruct learner(s) on appropriate technique for monitoring, assisting and/or progressing therapeutic exercises/activities.                  Learner Progress:  Not documented in this visit.              Point: Precautions (Not Started)       Description:   Instruct learner(s) on prescribed precautions during self-care and functional transfers.                  Learner Progress:  Not documented in this visit.              Point: Body mechanics (Not Started)       Description:   Instruct learner(s) on proper positioning and spine alignment during self-care, functional mobility activities and/or exercises.                  Learner Progress:  Not documented in this visit.                              User Key       Initials Effective Dates Name Provider Type Discipline     12/21/23 -  Beto Baron OT Occupational Therapist OT                  OT Recommendation and Plan  Planned Therapy Interventions (OT): activity tolerance training, functional balance retraining, BADL retraining, neuromuscular control/coordination retraining, patient/caregiver education/training, transfer/mobility retraining, strengthening exercise, ROM/therapeutic exercise  Therapy Frequency (OT): 5 times/wk  Plan of Care Review  Plan of Care Reviewed With: patient  Outcome Evaluation: Pt is a 79 y/o F admitted for headache, neck pain, photophobia, and low grade fever.  Pt had c2-c4 laminectomy for resection of cervical meningioma of 7/16/2024 that was complicated by large pseudomeningiocele that underwent evacuation and dural repair with drain placement on 7/31/2024.  MRI cervical spine shows 5.4cm fluid collection within laminectomy bed extending from C1-C5 that has decreased in size.  Neurosx saw pt, recommending no surgical intervention at this time.  Recommending continue antibiotics and infectious disease consult.    PMHx: HTN, HLD, hypothyroid, obesity, DM.  Pt is A & O X 4.  Pt reports living in [facility] with 4  steps with HR to enter with .  Pt reports prior to original surgery in beginning of July she was independent with ADLs, IADLs, mobility, transfers, and driving.  Pt used no AD.  Pt does have a walker and a cane but did not use prior to sx.  Pt has been at [facility] since first of august, but reports she would like to go home following hospitalization.  Pt t/f from supine to sitting on EOB with MOD A.  Pt completed sit to stand with MIN A.  Pt t/f to BSC with MIN-MOD A, posterior lean noted.  Pt completed toileting with MIN-MOD A.  Pt t/f to chair with MOD A.  Pt presents with deficits in self care, transfers, functional mobility, balance, strength, and increased falls risk indicating need for skilled OT services.  OT recommending return to IRF as pt is not safe to return home at this time.     Time Calculation:         Time Calculation- OT       Row Name 08/15/24 1128 08/15/24 0752          Time Calculation- OT    OT Start Time 0835  -MM --     OT Stop Time 0913  -MM --     OT Time Calculation (min) 38 min  -MM --     Total Timed Code Minutes- OT 13 minute(s)  -MM --     OT Received On 08/15/24  -MM --     OT - Next Appointment 08/16/24  -MM 08/16/24  -MM     OT Goal Re-Cert Due Date 08/29/24  -MM --               User Key  (r) = Recorded By, (t) = Taken By, (c) = Cosigned By      Initials Name Provider Type    MM Beto Baron OT Occupational Therapist                  Therapy Charges for Today       Code Description Service Date Service Provider Modifiers Qty    50087642897 HC OT EVAL MOD COMPLEXITY 4 8/15/2024 Beto Baron OT GO 1    62771615191 HC OT SELF CARE/MGMT/TRAIN EA 15 MIN 8/15/2024 Beto Baron OT GO 1                 Beto Baron OT  8/15/2024

## 2024-08-15 NOTE — PROGRESS NOTES
[facility] MEDICINE SERVICE  DAILY PROGRESS NOTE    NAME: Esperanza Smith  : 1945  MRN: [id number]      LOS: 1 day     PROVIDER OF SERVICE: Gaetano Friedman MD    Chief Complaint: Sepsis    Subjective:     Interval History: Patient states that her neck and back pain are better today although still somewhat present.  She denies any difficulty breathing.  She denies any increased output of her abdominal enterocutaneous fistula.    Review of Systems:   Review of Systems    Objective:     Vital Signs  Temp:  [98 °F (36.7 °C)-99.5 °F (37.5 °C)] 98.1 °F (36.7 °C)  Heart Rate:  [] 86  Resp:  [16-33] 33  BP: (124-166)/() 161/91  Flow (L/min):  [2] 2   Body mass index is 31.37 kg/m².    Physical Exam  Physical Exam  General Appearance:  Alert, cooperative, no distress, appears stated age  Head:  Normocephalic, without obvious abnormality, atraumatic  Eyes:  PERRL, conjunctiva/corneas clear, EOM's intact, fundi benign, both eyes  Ears:  Normal TM's and external ear canals, both ears  Nose: Nares normal, septum midline, mucosa normal, no drainage or sinus tenderness  Throat: Lips, mucosa, and tongue normal; teeth and gums normal  Neck: Supple, symmetrical, trachea midline, no adenopathy, thyroid: not enlarged, symmetric, no tenderness/mass/nodules, no carotid bruit or JVD  Lungs:   Clear to auscultation bilaterally, respirations unlabored  Heart:  Regular rate and rhythm, S1, S2 normal, no murmur, rub or gallop  Abdomen:  Soft, non-tender, bowel sounds active all four quadrants,  no masses, no organomegaly, enterocutaneous fistula with drainage  Extremities: Extremities normal, atraumatic, no cyanosis or edema  Pulses: 2+ and symmetric  Skin: Cervical spine surgical incision intact  Neurologic: Normal      Scheduled Meds   cefepime, 2,000 mg, Intravenous, Q8H  insulin lispro, 2-7 Units, Subcutaneous, 4x Daily AC & at Bedtime  levothyroxine, 50 mcg, Oral, Q AM  metoprolol tartrate, 25 mg, Oral,  Daily  potassium chloride ER, 40 mEq, Oral, Q4H  sodium chloride, 10 mL, Intravenous, Q12H  vancomycin, 1,250 mg, Intravenous, Q12H       PRN Meds     acetaminophen    senna-docusate sodium **AND** polyethylene glycol **AND** bisacodyl **AND** bisacodyl    calcium carbonate    Calcium Replacement - Follow Nurse / BPA Driven Protocol    cyclobenzaprine    dextrose    dextrose    glucagon (human recombinant)    HYDROmorphone    Magnesium Standard Dose Replacement - Follow Nurse / BPA Driven Protocol    nitroglycerin    ondansetron ODT **OR** ondansetron    Pharmacy to Dose Cefepime    Pharmacy to dose vancomycin    Phosphorus Replacement - Follow Nurse / BPA Driven Protocol    Potassium Replacement - Follow Nurse / BPA Driven Protocol    [COMPLETED] Insert Peripheral IV **AND** sodium chloride    sodium chloride    sodium chloride   Infusions  Pharmacy to Dose Cefepime,   Pharmacy to dose vancomycin,   sodium chloride, 75 mL/hr, Last Rate: 75 mL/hr (08/15/24 0322)          Diagnostic Data    Results from last 7 days   Lab Units 08/15/24  0732   WBC 10*3/mm3 12.73*   HEMOGLOBIN g/dL 13.8   HEMATOCRIT % 41.5   PLATELETS 10*3/mm3 279   GLUCOSE mg/dL 153*   CREATININE mg/dL 0.36*   BUN mg/dL 4*   SODIUM mmol/L 132*   POTASSIUM mmol/L 3.3*   ANION GAP mmol/L 12.7       XR Chest 1 View    Result Date: 8/14/2024  Impression: 1. Left basilar airspace disease may relate to atelectasis and/or pneumonia with small left pleural effusion. 2. Nodular density overlying the right upper lung measuring 5 mm. This could relate to pulmonary nodule versus superimposed structures or area of parenchymal scarring, consider PA and lateral chest radiograph when patient clinically stable otherwise chest CT may be considered for further assessment. 3. Chronic elevation of the right hemidiaphragm unchanged. Electronically Signed: Roger Hollis MD  8/14/2024 6:30 PM EDT  Workstation ID: [id number]    MRI Cervical Spine With & Without  Contrast    Result Date: 8/14/2024  Impression: 5.4 cm fluid collection within laminectomy bed extending from C1-C5 has decreased in size, which could represent pseudomeningocele, CSF leak, or post-operative seroma. Stable mass effect on posterior thecal sac most prominent at C3-4 where there is moderate to severe spinal canal stenosis. No evidence of cord compression. Electronically Signed: Ruperto Wakefield MD  8/14/2024 5:27 PM EDT  Workstation ID: [id number]       I reviewed the patient's new clinical results.    Assessment/Plan:     Active and Resolved Problems  Active Hospital Problems    Diagnosis  POA    **Sepsis [A41.9]  Yes      Resolved Hospital Problems   No resolved problems to display.       Sepsis, unknown organism  -Patient presents with fever, leukocytosis  -Has recent cervical spine surgery with complication of meningocele that required drainage   -Imaging of the cervical spine here shows reduced in size meningocele  -Neurosurgery evaluation; they do not feel that this is infected  -Chest x-ray concerning for possible infiltrate  Check CT of the chest  -Patient also has known enterocutaneous fistula; plan for CT angiogram of the abdomen  -ID consult appreciated  -Initiated on IV vancomycin, cefepime    DM type II, controlled  -Continue sliding scale insulin and Lantus    Hypothyroidism  -Continue Synthroid    Hypertension  -Continue home medications for BP control    Acute hypokalemia  -Repleted    Nonsignificant hyponatremia  -Likely secondary to mild volume depletion  -Continue IV hydration    Obesity  -Counseled patient on diet and exercise to improve weight loss and comorbid conditions    VTE Prophylaxis:  Mechanical VTE prophylaxis orders are present.         Code status is   Code Status and Medical Interventions: CPR (Attempt to Resuscitate); Full Support   Ordered at: 08/14/24 2006     Code Status (Patient has no pulse and is not breathing):    CPR (Attempt to Resuscitate)     Medical  Interventions (Patient has pulse or is breathing):    Full Support       Plan for disposition: Possible DC on 8/19    Time: 30 minutes    Signature: Electronically signed by Gaetano Friedman MD, 08/15/24, 13:43 EDT.  [facility]

## 2024-08-15 NOTE — PLAN OF CARE
Goal Outcome Evaluation:  Plan of Care Reviewed With: patient        Progress: no change   Pt is a 79 y/o F admitted to [facility] on 8/14/24 with complaints of severe headache. Of note, she had C2-C4 laminectomy for resection of cervical meningioma on 7/16/2024 that was complicated by large pseudomeningiocele that underwent evacuation and dural repair with drain placement on 7/31/2024. MRI Brain (+) for 5.4cm fluid collection within laminectomy bed extending from C1-C5 that has decreased in size. 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 Neurosurgery consulted, recommending so surgical intervention, along with continue antibiotics with infectious disease consult. She is currently living at home with spouse in [facility] with 4 steps to enter. Prior to surgery, she was IND with household mobility and ADLs with no AD. She was recently staying at [facility] where she reports ambulating in the hallway with no AD, this was one week ago. This date, she is AAOx4 with call light on requesting to use BSC. She completes bed mobility min A and transfers mod A over to BSC. She exhibits poor balance and BLE strength this date, poor standing tolerance. Posterior leaning noted when sitting on BSC, req min>mod A for pericare following. She repetitively mentions how quickly she has declined since being admitted to [facility]. Based on her great success and progress at [facility], PT is planning ot keep that recommendation to give her the best chance of reaching her baseline as quickly as possible. Not safe to d/c home at this time. PT will continue to follow.     Anticipated Discharge Disposition (PT): inpatient rehabilitation facility

## 2024-08-15 NOTE — CONSULTS
Infectious Diseases Consult Note    Referring Provider: Gaetano Friedman MD    Reason for Consultation: Fever    Patient Care Team:  Dom Estrada MD as PCP - General  Jeannette Ralph RN as Ambulatory  ([facility] [place])    Chief complaint fever    Subjective     History of present illness:      This is 78-year-old female who was hospitalized [facility] on August 14, 2024 after she was sent from rehab facility.  Patient was released from the hospital recently after neurosurgery procedure.  The patient initially required to have C2-C3 laminectomy and excision of intradural tumor/meningioma on July 16, 2024.  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  Fluid culture from the drain was negative.  The patient was sent to the rehab facility and came back this time to the hospital for evaluation for fever.  The patient is awake and alert oriented x 3.  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  According to the  the fistula closed for 4 years and was reopened 14 days ago.    Review of Systems   Review of Systems   Constitutional:  Positive for fever.   HENT: Negative.     Eyes: Negative.    Respiratory: Negative.     Cardiovascular: Negative.    Gastrointestinal: Negative.    Genitourinary: Negative.    Musculoskeletal: Negative.    Skin:  Positive for wound.   Neurological: Negative.    Hematological: Negative.    Psychiatric/Behavioral: Negative.         Medications  (Not in a hospital admission)      History  Past Medical History:   Diagnosis Date    Allergic Penicillian    Asthma congestion    Breast cancer     Cataract     Cervical disc disorder     Cholelithiasis surgery 1996 removal    DDD (degenerative disc disease), lumbosacral     Diabetes mellitus     Diverticulitis     Diverticulosis     Headache     History of medical problems 54 inches left of small  intesting    Hyperlipidemia     Hypertension     Hypothyroidism     IBS (irritable bowel syndrome)     Inflammatory bowel disease     Liver disease fatty liver    Low back pain Sciatic    Lumbosacral disc disease     Neuromuscular disorder neuropathy    Obesity     PONV (postoperative nausea and vomiting)     Seasonal allergies     Sleep apnea      Past Surgical History:   Procedure Laterality Date    BREAST BIOPSY      BREAST SURGERY Left 02/2012    x2     CERVICAL SPINAL CORD TUMOR REMOVAL N/A 7/16/2024    Procedure: Cervical 2-4 laminectomy for resection of intradural tumor;  Surgeon: Mario Urrutia IV, MD;  Location: [facility] MAIN OR;  Service: Neurosurgery;  Laterality: N/A;    CHOLECYSTECTOMY  1996    COLON RESECTION SMALL BOWEL  05/25/2014    COLON SURGERY  small intestine repair    HYSTERECTOMY      LYMPH NODE BIOPSY      MASTECTOMY      OOPHORECTOMY  1965    POSTERIOR CERVICAL FUSION N/A 7/31/2024    Procedure: LUMBAR DRAIN INSERTION REPAIR OF CERVICAL DURA;  Surgeon: Mario Urrutia IV, MD;  Location: [facility] MAIN OR;  Service: Neurosurgery;  Laterality: N/A;    SIMPLE MASTECTOMY Right 02/26/2020    Procedure: Right simple mastectomy;  Surgeon: Mario Quezada DO;  Location: [facility] MAIN OR;  Service: General;  Laterality: Right;    SMALL INTESTINE SURGERY      SUBTOTAL HYSTERECTOMY  full hysterectomy       Family History  Family History   Problem Relation Age of Onset    Breast cancer Sister     Breast cancer Maternal Grandmother        Social History   reports that she has never smoked. She has never been exposed to tobacco smoke. She has never used smokeless tobacco. She reports that she does not currently use alcohol after a past usage of about 1.0 standard drink of alcohol per week. She reports that she does not use drugs.    Allergies  Diphenhydramine, Morphine, Penicillins, and Latex    Objective     Vital Signs   Vital Signs (last 24 hours)         08/14 0700  08/15 0659 08/15 0700  08/15 1241   Most Recent       Temp (°F) 98 -  101.4    98.1 -  98.8     98.1 (36.7) 08/15 1126    Heart Rate 79 -  122       86 08/14 2249    Resp 16 -  36    26 -  33     33 08/15 1126    /82 -  166/96    160/100 -  161/91     161/91 08/15 1126    SpO2 (%) 91 -  98       98 08/15 0324    Flow (L/min)   2      2     2 08/15 1238            Physical Exam:  Physical Exam  Vitals and nursing note reviewed.   Constitutional:       Appearance: She is well-developed.   HENT:      Head: Normocephalic and atraumatic.   Eyes:      Pupils: Pupils are equal, round, and reactive to light.   Cardiovascular:      Rate and Rhythm: Normal rate and regular rhythm.      Heart sounds: Normal heart sounds.   Pulmonary:      Effort: Pulmonary effort is normal. No respiratory distress.      Breath sounds: Rales present. No wheezing.   Abdominal:      General: Bowel sounds are normal. There is no distension.      Palpations: Abdomen is soft. There is no mass.      Tenderness: There is no abdominal tenderness. There is no guarding or rebound.      Comments: Sense of enteric cutaneous fistula with stool output some irritation of the abdomen wall   Musculoskeletal:         General: No deformity. Normal range of motion.      Cervical back: Normal range of motion and neck supple.   Skin:     General: Skin is warm.      Findings: No erythema or rash.   Neurological:      Mental Status: She is alert and oriented to person, place, and time.      Cranial Nerves: No cranial nerve deficit.         Microbiology  Microbiology Results (last 10 days)       Procedure Component Value - Date/Time    COVID-19 and FLU A/B PCR, 1 HR TAT - Swab, Nasopharynx [[id number]]  (Normal) Collected: 08/14/24 1348    Lab Status: Final result Specimen: Swab from Nasopharynx Updated: 08/14/24 1436     COVID19 Not Detected     Influenza A PCR Not Detected     Influenza B PCR Not Detected    Narrative:      Fact sheet for providers: https://www.fda.gov/media/028998/download    Fact sheet for  patients: https://www.fda.gov/media/810182/download    Test performed by PCR.    Blood Culture - Blood, Blood, PICC Line [[id number]]  (Normal) Collected: 08/07/24 0637    Lab Status: Final result Specimen: Blood, PICC Line Updated: 08/12/24 0700     Blood Culture No growth at 5 days    Wound Culture - Wound, Abdominal Wall [[id number]]  (Abnormal)  (Susceptibility) Collected: 08/07/24 0637    Lab Status: Final result Specimen: Wound from Abdominal Wall Updated: 08/10/24 1027     Wound Culture Light growth (2+) Klebsiella pneumoniae ssp pneumoniae      Light growth (2+)     Comment: Mixed intra-abdominal dmitry. No further work-up.            Gram Stain Rare (1+) WBCs seen      Many (4+) Mixed bacterial morphotypes seen on Gram Stain    Susceptibility        Klebsiella pneumoniae ssp pneumoniae      HADLEY      Amoxicillin + Clavulanate Susceptible      Ampicillin Resistant      Ampicillin + Sulbactam Intermediate      Cefepime Susceptible      Ceftazidime Susceptible      Ceftriaxone Susceptible      Gentamicin Susceptible      Levofloxacin Intermediate      Piperacillin + Tazobactam Susceptible dose dependent      Tetracycline Resistant      Trimethoprim + Sulfamethoxazole Susceptible                       Susceptibility Comments       Klebsiella pneumoniae ssp pneumoniae    Cefazolin sensitivity will not be reported for Enterobacteriaceae in non-urine isolates. If cefazolin is preferred, please call the microbiology lab to request an E-test.  With the exception of urinary-sourced infections, aminoglycosides should not be used as monotherapy.                       Laboratory  Results from last 7 days   Lab Units 08/15/24  0732   WBC 10*3/mm3 12.73*   HEMOGLOBIN g/dL 13.8   HEMATOCRIT % 41.5   PLATELETS 10*3/mm3 279     Results from last 7 days   Lab Units 08/15/24  0732   SODIUM mmol/L 132*   POTASSIUM mmol/L 3.3*   CHLORIDE mmol/L 95*   CO2 mmol/L 24.3   BUN mg/dL 4*   CREATININE mg/dL 0.36*   GLUCOSE mg/dL 153*    CALCIUM mg/dL 9.2     Results from last 7 days   Lab Units 08/15/24  0732   SODIUM mmol/L 132*   POTASSIUM mmol/L 3.3*   CHLORIDE mmol/L 95*   CO2 mmol/L 24.3   BUN mg/dL 4*   CREATININE mg/dL 0.36*   GLUCOSE mg/dL 153*   CALCIUM mg/dL 9.2                   Radiology  Imaging Results (Last 72 Hours)       Procedure Component Value Units Date/Time    XR Chest 1 View [[id number]] Collected: 08/14/24 1828     Updated: 08/14/24 1832    Narrative:      XR CHEST 1 VW    Date of Exam: 8/14/2024 6:05 PM EDT    Indication: fever UO, recent spinal surgery    Comparison: 1/5/2024    Findings:  Chronic elevation right diaphragm unchanged. There is left basilar airspace disease which may relate to atelectasis and/or pneumonia. Small left pleural effusion suspected. No significant effusion on the right. Negative for pneumothorax. There is a   nodular density overlying the right upper lung measuring 5 mm at the level of the right posterior fifth rib which could relate to pulmonary nodule or superimposition of structures.      Impression:      Impression:  1. Left basilar airspace disease may relate to atelectasis and/or pneumonia with small left pleural effusion.  2. Nodular density overlying the right upper lung measuring 5 mm. This could relate to pulmonary nodule versus superimposed structures or area of parenchymal scarring, consider PA and lateral chest radiograph when patient clinically stable otherwise   chest CT may be considered for further assessment.  3. Chronic elevation of the right hemidiaphragm unchanged.      Electronically Signed: Roger Hollis MD    8/14/2024 6:30 PM EDT    Workstation ID: [id number]    MRI Cervical Spine With & Without Contrast [[id number]] Collected: 08/14/24 1714     Updated: 08/14/24 1729    Narrative:      MRI CERVICAL SPINE W WO CONTRAST    Date of Exam: 8/14/2024 4:37 PM EDT    Indication: Meningioma.     Comparison: July 29, 2024    Technique:  Routine multiplanar/multisequence sequence  images of the cervical spine were obtained before and after the uneventful administration of Prohance.        Findings:  A 4.9 x 2.9 x 5.4 cm (TR x AP x CC) fluid collection within the laminectomy bed extending from C1-C5 has decreased in size, previously 5.5 x 4.9 x 8.1 cm when remeasured today. There remains mass effect on the posterior thecal sac resulting in up to   moderate to severe spinal canal stenosis at C3-4 similar to prior exam. There is no evidence of cord compression on this exam.    The alignment is anatomic. The craniocervical junction appears intact. The vertebral body heights appear normal. Degenerative change appear stable. No enhancing mass is identified. The prevertebral soft tissues are unremarkable.      Impression:      Impression:  5.4 cm fluid collection within laminectomy bed extending from C1-C5 has decreased in size, which could represent pseudomeningocele, CSF leak, or post-operative seroma. Stable mass effect on posterior thecal sac most prominent at C3-4 where there is   moderate to severe spinal canal stenosis. No evidence of cord compression.        Electronically Signed: Ruperto Wakefield MD    8/14/2024 5:27 PM EDT    Workstation ID: [id number]            Cardiology      Results Review:  I have reviewed all clinical data, test, lab, and imaging results.       Schedule Meds  cefepime, 2,000 mg, Intravenous, Q8H  insulin lispro, 2-7 Units, Subcutaneous, 4x Daily AC & at Bedtime  levothyroxine, 50 mcg, Oral, Q AM  metoprolol tartrate, 25 mg, Oral, Daily  potassium chloride ER, 40 mEq, Oral, Q4H  sodium chloride, 10 mL, Intravenous, Q12H  vancomycin, 1,250 mg, Intravenous, Q12H        Infusion Meds  Pharmacy to Dose Cefepime,   Pharmacy to dose vancomycin,   sodium chloride, 75 mL/hr, Last Rate: 75 mL/hr (08/15/24 0322)        PRN Meds    acetaminophen    senna-docusate sodium **AND** polyethylene glycol **AND** bisacodyl **AND** bisacodyl    calcium carbonate    Calcium Replacement -  Follow Nurse / BPA Driven Protocol    cyclobenzaprine    dextrose    dextrose    glucagon (human recombinant)    HYDROmorphone    Magnesium Standard Dose Replacement - Follow Nurse / BPA Driven Protocol    nitroglycerin    ondansetron ODT **OR** ondansetron    Pharmacy to Dose Cefepime    Pharmacy to dose vancomycin    Phosphorus Replacement - Follow Nurse / BPA Driven Protocol    Potassium Replacement - Follow Nurse / BPA Driven Protocol    [COMPLETED] Insert Peripheral IV **AND** sodium chloride    sodium chloride    sodium chloride      Assessment & Plan       Assessment    Fever.  I am suspecting hospital-acquired pneumonia based on chest x-ray and physical examination.  COVID-19 screen as well as influenza A and B screen are negative.  Blood cultures x 2 sets are pending    Enterocutaneous fistula with the stool output.  We need to rule out peritonitis and spillage in the abdomen.  The patient had fistula since 2014 but it closed 4 years ago and reopened on July 31, 2024    S/p C2-C3 laminectomy and excision of intradural tumor/meningioma on July 16, 2024.  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  Fluid cultures were negative.  Patient currently oriented x 3 with no obvious signs of CNS infection.  MRI of the cervical spine showed 5.4 fluid collection in the laminectomy bed.  Neurosurgery service seen the patient and does not suspect active infection and signed off    Plan    Continue IV vancomycin, currently pharmacy following dose  Continue IV cefepime 2 g every 8 hours and monitor patient's mental status carefully since cefepime can cause neurotoxicity  CT scan of the chest without contrast was ordered  Request CT scan of the abdomen and pelvis with IV and oral contrast  MRSA screen  Urine for Legionella pneumococcal and    Supportive care  A.m. labs      Asa Ortiz MD  08/15/24  12:41 EDT    Note is dictated utilizing voice recognition  software/Delphine

## 2024-08-15 NOTE — PLAN OF CARE
Goal Outcome Evaluation:  Plan of Care Reviewed With: patient           Outcome Evaluation: Pt is a 79 y/o F admitted for headache, neck pain, photophobia, and low grade fever.  Pt had c2-c4 laminectomy for resection of cervical meningioma of 7/16/2024 that was complicated by large pseudomeningiocele that underwent evacuation and dural repair with drain placement on 7/31/2024.  MRI cervical spine shows 5.4cm fluid collection within laminectomy bed extending from C1-C5 that has decreased in size.  Neurosx saw pt, recommending no surgical intervention at this time.  Recommending continue antibiotics and infectious disease consult.    PMHx: HTN, HLD, hypothyroid, obesity, DM.  Pt is A & O X 4.  Pt reports living in [place] with 4 steps with HR to enter with .  Pt reports prior to original surgery in beginning of July she was independent with ADLs, IADLs, mobility, transfers, and driving.  Pt used no AD.  Pt does have a walker and a cane but did not use prior to sx.  Pt has been at [facility] since first of august, but reports she would like to go home following hospitalization.  Pt t/f from supine to sitting on EOB with MOD A.  Pt completed sit to stand with MIN A.  Pt t/f to BSC with MIN-MOD A, posterior lean noted.  Pt completed toileting with MIN-MOD A.  Pt t/f to chair with MOD A.  Pt presents with deficits in self care, transfers, functional mobility, balance, strength, and increased falls risk indicating need for skilled OT services.  OT recommending return to IRF as pt is not safe to return home at this time.      Anticipated Discharge Disposition (OT): inpatient rehabilitation facility

## 2024-08-15 NOTE — NURSING NOTE
WOCN note:    78 yr old female admitted from her rehab facility on 8/14/24 with reports of headache, neck and back pain, photophobia and low grade fever. WOCN consult received for fistula management.     Patient presents with an enterocutaneous fistula that first appeared after a hysterectomy in 2014. The fistula finally healed about 4 yrs ago and re-opened again several weeks ago following an exacerbation of her diverticulitis. Her spouse who is at the bedside manages the fistula with fistula management pouches and skin care.   The pouch was emptied of approximately 50 cc liquid brown feculent effluent.      The fistula is located within scar tissue and deep folds at the umbilicus. There is denuded skin extending about 3-4cm around the opening. The skin was cleansed and dried. The reyna-stomal skin was treated with stoma powder and No-Sting barrier spray. Stoma paste was applied to the deep folds and creases and an [employer] barrier ring to the reyna stoma. A one piece Corry pouch was applied with small darts cut into the opening to mold to the deep fold. The spouse reported the stoma opening is decreasing in size. We will continue to follow but the spouse is independent with the care.

## 2024-08-15 NOTE — SIGNIFICANT NOTE
08/15/24 1112   Readmission Indications   Is the patient and/or family able to complete the readmission assessment questions? Yes   Is this hospitalization related to the prior hospital diagnosis? Yes   Recommendation for rehospitalization   Did you speak with your physician prior to coming to the hospital Yes   If yes, what physician did you speak with? CHRISTINE murdock   Follow-up Appointments   Do you have a PCP? Yes   Did you have an appointment with PCP after your hospitalization?   (Patient saw [facility] provider.)   Did you have an appointment with a Specialist? No   Are you current with the Pulmonary Clinic? No   Are you current with the CHF Clinic? No   Medications   Did you have newly prescribed medications at discharge? Yes   Did you understand the reasons for your medications at discharge and how to take them? Yes   Are you taking all of you prescribed medications? Yes   What pharmacy was used to fill prescription(s)? [facility]   Were medications picked up? Yes   Discharge Instructions   Did you understand your discharge instructions?   (Patient and  state that DI were never went over.)   Did your family/caregiver hear your instructions?   (Patient and  state that DI were never went over.)   Were you told to eat a special diet? Yes  (Diabetic diet)   Did you adhere to the diet? No   Were you given a number of someone to call if you had questions or concerns?   (Patient and  state that DI were never went over.)   Index discharge location/services   Where did you go upon discharge? Acute Rehabilitation   Do you have supportive family or friends in the home? Yes   Was the provider seen at the facility? Yes   What actions were taken to avoid a readmit? Automatic call out to 911   Which Acute Rehabiliation Facility were your admitted from? [facility]   Discharge Readiness   On a scale of 1-5 (5 being well prepared), how ready were you for discharge 2  (Per  and patient wishes  the patient would have stayed longer to see if pseudomeningoele grew.)   Recommendation based on interview Education on diagnosis/self management;Goals of care discussion/advanced care planning   Palliative Care/Hospice   Are you current with Palliative Care? No   Are you current with Hospice Care? No   Advance Directives (For Healthcare)   Pre-existing AND/MOST/POLST Order No   Advance Directive Status Patient has advance directive, copy requested   Type of Advance Directive Health care directive/Living will;Durable power of  for health care   Have you reviewed your Advance Directive and is it valid for this stay? No   Literature Provided on Advance Directives No   Patient Requests Assistance on Advance Directives Patient Declined   Readmission Assessment Final Comments   Final Comments Medical hx: Breast cancer, uterine cancer, hyperlipidemia, hypertension, obesity, osteoporosis, spinal cord tumor, DM type 2, hypothyroidism, fistula repair.  Previous: 7/29/24-8/7/24 Neck Pain  Presented to [facility] ED with neck pain after a recent neck surgery for meningioma of spinal cord. MRI of the cervical spine show concerning seroma. Neurosurgery consulted and dx with pseudomeningocle. Patient taken to OR for evacuation of pseudomeningocle and repair of dura as well as placement of lumbar drain. Patient placed on bedrest until 8/5. Lumbar drain removed 8/5. 8/5 surgery consulted due to drainage around the pt's umbilicus, found to be related due to a recent diverticulitis flare, started on IV abx. PT/OT recommended IRF, patient dc to [facility].   New: 8/14/24-Now Fever, unspecified cause, headache, neck pain.  Presented to [facility] ED with fever, tachycardia, high wbc. Sepsis protocol started.  Concern pseudomeningocele has become infected again and/or meningitis. Neurosurgery consulted. Per neuro meningitis ruled out and no signs of infection post op, MRI showed smaller psudomeningocele was smaller than last MRI. ID consulted.  Wound care consulted ostomy appliance over wound on abdomen draining light brown liquid. CXR shows shows nodular density overlying the right upper lung.

## 2024-08-16 ENCOUNTER — APPOINTMENT (OUTPATIENT)
Dept: CT IMAGING | Facility: HOSPITAL | Age: 79
DRG: 871 | End: 2024-08-16
Payer: MEDICARE

## 2024-08-16 LAB
ALBUMIN SERPL-MCNC: 3.8 G/DL (ref 3.5–5.2)
ALBUMIN/GLOB SERPL: 1.3 G/DL
ALP SERPL-CCNC: 63 U/L (ref 39–117)
ALT SERPL W P-5'-P-CCNC: 20 U/L (ref 1–33)
ANION GAP SERPL CALCULATED.3IONS-SCNC: 14.2 MMOL/L (ref 5–15)
AST SERPL-CCNC: 20 U/L (ref 1–32)
BASOPHILS # BLD AUTO: 0.03 10*3/MM3 (ref 0–0.2)
BASOPHILS NFR BLD AUTO: 0.3 % (ref 0–1.5)
BILIRUB SERPL-MCNC: 0.5 MG/DL (ref 0–1.2)
BUN SERPL-MCNC: 5 MG/DL (ref 8–23)
BUN/CREAT SERPL: 14.3 (ref 7–25)
CALCIUM SPEC-SCNC: 9.4 MG/DL (ref 8.6–10.5)
CHLORIDE SERPL-SCNC: 94 MMOL/L (ref 98–107)
CO2 SERPL-SCNC: 22.8 MMOL/L (ref 22–29)
CREAT SERPL-MCNC: 0.35 MG/DL (ref 0.57–1)
DEPRECATED RDW RBC AUTO: 43.3 FL (ref 37–54)
EGFRCR SERPLBLD CKD-EPI 2021: 106.1 ML/MIN/1.73
EOSINOPHIL # BLD AUTO: 0.07 10*3/MM3 (ref 0–0.4)
EOSINOPHIL NFR BLD AUTO: 0.6 % (ref 0.3–6.2)
ERYTHROCYTE [DISTWIDTH] IN BLOOD BY AUTOMATED COUNT: 12.7 % (ref 12.3–15.4)
GLOBULIN UR ELPH-MCNC: 2.9 GM/DL
GLUCOSE BLDC GLUCOMTR-MCNC: 111 MG/DL (ref 70–105)
GLUCOSE BLDC GLUCOMTR-MCNC: 143 MG/DL (ref 70–105)
GLUCOSE BLDC GLUCOMTR-MCNC: 144 MG/DL (ref 70–105)
GLUCOSE BLDC GLUCOMTR-MCNC: 156 MG/DL (ref 70–105)
GLUCOSE SERPL-MCNC: 122 MG/DL (ref 65–99)
HCT VFR BLD AUTO: 39.2 % (ref 34–46.6)
HGB BLD-MCNC: 13.4 G/DL (ref 12–15.9)
IMM GRANULOCYTES # BLD AUTO: 0.1 10*3/MM3 (ref 0–0.05)
IMM GRANULOCYTES NFR BLD AUTO: 0.8 % (ref 0–0.5)
LYMPHOCYTES # BLD AUTO: 1.37 10*3/MM3 (ref 0.7–3.1)
LYMPHOCYTES NFR BLD AUTO: 11.5 % (ref 19.6–45.3)
MCH RBC QN AUTO: 31.8 PG (ref 26.6–33)
MCHC RBC AUTO-ENTMCNC: 34.2 G/DL (ref 31.5–35.7)
MCV RBC AUTO: 93.1 FL (ref 79–97)
MONOCYTES # BLD AUTO: 1.26 10*3/MM3 (ref 0.1–0.9)
MONOCYTES NFR BLD AUTO: 10.6 % (ref 5–12)
NEUTROPHILS NFR BLD AUTO: 76.2 % (ref 42.7–76)
NEUTROPHILS NFR BLD AUTO: 9.09 10*3/MM3 (ref 1.7–7)
NRBC BLD AUTO-RTO: 0 /100 WBC (ref 0–0.2)
PLATELET # BLD AUTO: 290 10*3/MM3 (ref 140–450)
PMV BLD AUTO: 10.2 FL (ref 6–12)
POTASSIUM SERPL-SCNC: 3.4 MMOL/L (ref 3.5–5.2)
PROT SERPL-MCNC: 6.7 G/DL (ref 6–8.5)
RBC # BLD AUTO: 4.21 10*6/MM3 (ref 3.77–5.28)
SODIUM SERPL-SCNC: 131 MMOL/L (ref 136–145)
VANCOMYCIN PEAK SERPL-MCNC: 22 MCG/ML (ref 20–40)
WBC NRBC COR # BLD AUTO: 11.92 10*3/MM3 (ref 3.4–10.8)

## 2024-08-16 PROCEDURE — 80053 COMPREHEN METABOLIC PANEL: CPT | Performed by: INTERNAL MEDICINE

## 2024-08-16 PROCEDURE — 97110 THERAPEUTIC EXERCISES: CPT

## 2024-08-16 PROCEDURE — 25010000002 CEFEPIME PER 500 MG: Performed by: STUDENT IN AN ORGANIZED HEALTH CARE EDUCATION/TRAINING PROGRAM

## 2024-08-16 PROCEDURE — 85025 COMPLETE CBC W/AUTO DIFF WBC: CPT | Performed by: INTERNAL MEDICINE

## 2024-08-16 PROCEDURE — 36415 COLL VENOUS BLD VENIPUNCTURE: CPT | Performed by: INTERNAL MEDICINE

## 2024-08-16 PROCEDURE — 71260 CT THORAX DX C+: CPT

## 2024-08-16 PROCEDURE — 82948 REAGENT STRIP/BLOOD GLUCOSE: CPT

## 2024-08-16 PROCEDURE — 74177 CT ABD & PELVIS W/CONTRAST: CPT

## 2024-08-16 PROCEDURE — 82948 REAGENT STRIP/BLOOD GLUCOSE: CPT | Performed by: STUDENT IN AN ORGANIZED HEALTH CARE EDUCATION/TRAINING PROGRAM

## 2024-08-16 PROCEDURE — 25010000002 CEFEPIME PER 500 MG: Performed by: INTERNAL MEDICINE

## 2024-08-16 PROCEDURE — 25810000003 SODIUM CHLORIDE 0.9 % SOLUTION 250 ML FLEX CONT: Performed by: STUDENT IN AN ORGANIZED HEALTH CARE EDUCATION/TRAINING PROGRAM

## 2024-08-16 PROCEDURE — 25510000001 IOPAMIDOL PER 1 ML: Performed by: INTERNAL MEDICINE

## 2024-08-16 PROCEDURE — 80202 ASSAY OF VANCOMYCIN: CPT | Performed by: INTERNAL MEDICINE

## 2024-08-16 PROCEDURE — 25010000002 VANCOMYCIN HCL 1.25 G RECONSTITUTED SOLUTION 1 EACH VIAL: Performed by: STUDENT IN AN ORGANIZED HEALTH CARE EDUCATION/TRAINING PROGRAM

## 2024-08-16 RX ORDER — POTASSIUM CHLORIDE 1500 MG/1
40 TABLET, EXTENDED RELEASE ORAL EVERY 4 HOURS
Status: COMPLETED | OUTPATIENT
Start: 2024-08-16 | End: 2024-08-16

## 2024-08-16 RX ORDER — IOPAMIDOL 755 MG/ML
100 INJECTION, SOLUTION INTRAVASCULAR
Status: COMPLETED | OUTPATIENT
Start: 2024-08-16 | End: 2024-08-16

## 2024-08-16 RX ADMIN — HYDRALAZINE HYDROCHLORIDE 25 MG: 25 TABLET ORAL at 21:27

## 2024-08-16 RX ADMIN — CEFEPIME 2000 MG: 2 INJECTION, POWDER, FOR SOLUTION INTRAVENOUS at 00:16

## 2024-08-16 RX ADMIN — IOPAMIDOL 100 ML: 755 INJECTION, SOLUTION INTRAVENOUS at 12:47

## 2024-08-16 RX ADMIN — SODIUM CHLORIDE 1250 MG: 9 INJECTION, SOLUTION INTRAVENOUS at 01:32

## 2024-08-16 RX ADMIN — LEVOTHYROXINE SODIUM 50 MCG: 0.05 TABLET ORAL at 05:56

## 2024-08-16 RX ADMIN — HYDRALAZINE HYDROCHLORIDE 25 MG: 25 TABLET ORAL at 14:01

## 2024-08-16 RX ADMIN — METOPROLOL TARTRATE 25 MG: 25 TABLET, FILM COATED ORAL at 09:10

## 2024-08-16 RX ADMIN — CEFEPIME 2000 MG: 2 INJECTION, POWDER, FOR SOLUTION INTRAVENOUS at 09:10

## 2024-08-16 RX ADMIN — POTASSIUM CHLORIDE 40 MEQ: 1500 TABLET, EXTENDED RELEASE ORAL at 14:00

## 2024-08-16 RX ADMIN — CEFEPIME 2000 MG: 2 INJECTION, POWDER, FOR SOLUTION INTRAVENOUS at 17:04

## 2024-08-16 RX ADMIN — Medication 10 ML: at 09:09

## 2024-08-16 RX ADMIN — POTASSIUM CHLORIDE 40 MEQ: 1500 TABLET, EXTENDED RELEASE ORAL at 09:10

## 2024-08-16 RX ADMIN — HYDRALAZINE HYDROCHLORIDE 25 MG: 25 TABLET ORAL at 05:56

## 2024-08-16 RX ADMIN — AMLODIPINE BESYLATE 5 MG: 5 TABLET ORAL at 09:10

## 2024-08-16 RX ADMIN — Medication 10 ML: at 00:18

## 2024-08-16 RX ADMIN — HYDRALAZINE HYDROCHLORIDE 25 MG: 25 TABLET ORAL at 00:17

## 2024-08-16 NOTE — PROGRESS NOTES
Infectious Diseases Progress Note      LOS: 2 days   Patient Care Team:  Dom Estrada MD as PCP - General  Jeannette Ralph, RN as Ambulatory  ([facility])    Chief Complaint:  Fever, shortness of breath    Subjective       The patient has been afebrile for the last 24 hours.  The patient is on 2 L of oxygen by nasal cannula hemodynamically stable, and is tolerating antimicrobial therapy.  Having headaches today      Review of Systems:   Review of Systems   Constitutional:  Positive for fatigue and fever.   Eyes: Negative.    Respiratory:  Positive for shortness of breath.    Cardiovascular: Negative.    Gastrointestinal: Negative.    Endocrine: Negative.    Genitourinary: Negative.    Musculoskeletal: Negative.    Skin:  Positive for wound.   Neurological:  Positive for headaches.   Psychiatric/Behavioral: Negative.     All other systems reviewed and are negative.       Objective     Vital Signs  Temp:  [97.9 °F (36.6 °C)-99.4 °F (37.4 °C)] 98 °F (36.7 °C)  Heart Rate:  [] 98  Resp:  [24-32] 32  BP: (144-160)/() 149/95    Physical Exam:  Physical Exam  Vitals and nursing note reviewed.   Constitutional:       General: She is not in acute distress.     Appearance: She is well-developed and normal weight. She is ill-appearing. She is not diaphoretic.   HENT:      Head: Normocephalic and atraumatic.   Eyes:      General: No scleral icterus.     Extraocular Movements: Extraocular movements intact.      Conjunctiva/sclera: Conjunctivae normal.      Pupils: Pupils are equal, round, and reactive to light.   Cardiovascular:      Rate and Rhythm: Normal rate and regular rhythm.      Heart sounds: Normal heart sounds, S1 normal and S2 normal. No murmur heard.  Pulmonary:      Effort: Pulmonary effort is normal. No respiratory distress.      Breath sounds: No stridor. Rales present. No wheezing.   Chest:      Chest wall: No tenderness.   Abdominal:      General: Bowel sounds are normal.       Palpations: Abdomen is soft. There is no mass.      Tenderness: There is abdominal tenderness. There is no guarding.      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      Cervical back: Neck supple.   Skin:     General: Skin is warm and dry.      Coloration: Skin is not pale.      Findings: No bruising, erythema or rash.   Neurological:      Mental Status: She is alert and oriented to person, place, and time.          Results Review:    I have reviewed all clinical data, test, lab, and imaging results.     Radiology  CT Abdomen Pelvis With Contrast    Result Date: 8/16/2024  CT ABDOMEN PELVIS W CONTRAST, CT CHEST W CONTRAST DIAGNOSTIC Date of Exam: 8/16/2024 12:38 PM EDT Indication: Fever and persistent enterocutaneous fistula.  Rule out intra-abdominal infection. Comparison: CT abdomen/pelvis 8/6/2024. No prior CT chest for comparison. Technique: Axial CT images were obtained of the abdomen and pelvis following the uneventful intravenous administration of iodinated contrast. Sagittal and coronal reconstructions were performed.  Automated exposure control and iterative reconstruction methods were used. Findings: CHEST: There is low volume inspiration. Thick bandlike atelectatic changes are present within the left lower lobe, lingula, right middle lobe. There is only minor atelectasis posteriorly in the right lower lobe. The heart size is normal. Coronary calcifications are present. No pericardial effusion or pleural effusion is evident. There are are no pathologically enlarged lymph nodes. Thoracic aortic caliber is within normal limits. No acute or suspicious osseous abnormalities are identified. ABDOMEN AND PELVIS: There is an open midline wound at the level of the umbilicus, with a track of soft tissue thickening and small focus of air extending from the skin surface to the midline rectus  musculature. Small bowel loops are closely applied to the abdominal wall at this level and appears adhesed. High density material suggestive of enteric contrast is seen along the superficial margin of the umbilicus, suggesting a patent cutaneous fistula. Similar findings can be seen on the 8/6/2024 examination. There are surgical changes of midline small bowel. Mild to moderate colonic stool burden is present. Mild uncomplicated diverticular changes are present within the colon. The appendix is not visualized. Cholecystectomy. Liver, spleen, pancreas, adrenals, and left kidney are normal. Small right renal cyst is present. Urinary bladder and rectum are normal. Hysterectomy changes are present. No adenopathy is identified. No acute or suspicious osseous abnormalities are identified.     1. Findings suggest the presence of a patent enterocutaneous fistula at the level of the umbilicus, with enteric contrast extending to the superficial margin of the abdominal wound.. 2. Thick bandlike atelectatic changes within the lingula, right middle lobe, left lower lobe. 3. Additional ancillary findings as described above. Electronically Signed: Marlee Romano MD  8/16/2024 1:02 PM EDT  Workstation ID: [id number]    CT Chest With Contrast Diagnostic    Result Date: 8/16/2024  CT ABDOMEN PELVIS W CONTRAST, CT CHEST W CONTRAST DIAGNOSTIC Date of Exam: 8/16/2024 12:38 PM EDT Indication: Fever and persistent enterocutaneous fistula.  Rule out intra-abdominal infection. Comparison: CT abdomen/pelvis 8/6/2024. No prior CT chest for comparison. Technique: Axial CT images were obtained of the abdomen and pelvis following the uneventful intravenous administration of iodinated contrast. Sagittal and coronal reconstructions were performed.  Automated exposure control and iterative reconstruction methods were used. Findings: CHEST: There is low volume inspiration. Thick bandlike atelectatic changes are present within the left lower lobe,  lingula, right middle lobe. There is only minor atelectasis posteriorly in the right lower lobe. The heart size is normal. Coronary calcifications are present. No pericardial effusion or pleural effusion is evident. There are are no pathologically enlarged lymph nodes. Thoracic aortic caliber is within normal limits. No acute or suspicious osseous abnormalities are identified. ABDOMEN AND PELVIS: There is an open midline wound at the level of the umbilicus, with a track of soft tissue thickening and small focus of air extending from the skin surface to the midline rectus musculature. Small bowel loops are closely applied to the abdominal wall at this level and appears adhesed. High density material suggestive of enteric contrast is seen along the superficial margin of the umbilicus, suggesting a patent cutaneous fistula. Similar findings can be seen on the 8/6/2024 examination. There are surgical changes of midline small bowel. Mild to moderate colonic stool burden is present. Mild uncomplicated diverticular changes are present within the colon. The appendix is not visualized. Cholecystectomy. Liver, spleen, pancreas, adrenals, and left kidney are normal. Small right renal cyst is present. Urinary bladder and rectum are normal. Hysterectomy changes are present. No adenopathy is identified. No acute or suspicious osseous abnormalities are identified.     1. Findings suggest the presence of a patent enterocutaneous fistula at the level of the umbilicus, with enteric contrast extending to the superficial margin of the abdominal wound.. 2. Thick bandlike atelectatic changes within the lingula, right middle lobe, left lower lobe. 3. Additional ancillary findings as described above. Electronically Signed: Marlee Romano MD  8/16/2024 1:02 PM EDT  Workstation ID: [id number]     Cardiology    Laboratory    Results from last 7 days   Lab Units 08/16/24  0523 08/15/24  0732 08/14/24  1408 08/14/24  0453   WBC 10*3/mm3 11.92*  12.73* 14.09* 13.08*   HEMOGLOBIN g/dL 13.4 13.8 12.9 12.4   HEMATOCRIT % 39.2 41.5 39.4 37.9   PLATELETS 10*3/mm3 290 279 304 294     Results from last 7 days   Lab Units 08/16/24  0523 08/15/24  0732 08/14/24  1408 08/14/24  0453   SODIUM mmol/L 131* 132* 133* 135*   POTASSIUM mmol/L 3.4* 3.3* 3.4* 3.7   CHLORIDE mmol/L 94* 95* 95* 97*   CO2 mmol/L 22.8 24.3 24.4 26.0   BUN mg/dL 5* 4* 6* 6*   CREATININE mg/dL 0.35* 0.36* 0.48* 0.45*   GLUCOSE mg/dL 122* 153* 149* 96   ALBUMIN g/dL 3.8  --   --   --    BILIRUBIN mg/dL 0.5  --   --   --    ALK PHOS U/L 63  --   --   --    AST (SGOT) U/L 20  --   --   --    ALT (SGPT) U/L 20  --   --   --    CALCIUM mg/dL 9.4 9.2 9.6 9.7                 Microbiology   Microbiology Results (last 10 days)       Procedure Component Value - Date/Time    MRSA Screen, PCR (Inpatient) - Swab, Nares [[id number]]  (Normal) Collected: 08/15/24 1418    Lab Status: Final result Specimen: Swab from Nares Updated: 08/15/24 1551     MRSA PCR No MRSA Detected    Narrative:      The negative predictive value of this diagnostic test is high and should only be used to consider de-escalating anti-MRSA therapy. A positive result may indicate colonization with MRSA and must be correlated clinically.    Blood Culture - Blood, Arm, Left [[id number]]  (Normal) Collected: 08/14/24 1756    Lab Status: Preliminary result Specimen: Blood from Arm, Left Updated: 08/15/24 1800     Blood Culture No growth at 24 hours    Blood Culture - Blood, Arm, Right [[id number]]  (Normal) Collected: 08/14/24 1756    Lab Status: Preliminary result Specimen: Blood from Arm, Right Updated: 08/15/24 1800     Blood Culture No growth at 24 hours    COVID-19 and FLU A/B PCR, 1 HR TAT - Swab, Nasopharynx [[id number]]  (Normal) Collected: 08/14/24 1348    Lab Status: Final result Specimen: Swab from Nasopharynx Updated: 08/14/24 1436     COVID19 Not Detected     Influenza A PCR Not Detected     Influenza B PCR Not Detected    Narrative:       Fact sheet for providers: https://www.fda.gov/media/223331/download    Fact sheet for patients: https://www.fda.gov/media/465973/download    Test performed by PCR.    S. Pneumo Ag Urine or CSF - Urine, Urine, Clean Catch [[id number]]  (Normal) Collected: 08/14/24 1348    Lab Status: Final result Specimen: Urine, Clean Catch Updated: 08/15/24 1415     Strep Pneumo Ag Negative    Legionella Antigen, Urine - Urine, Urine, Clean Catch [[id number]]  (Normal) Collected: 08/14/24 1348    Lab Status: Final result Specimen: Urine, Clean Catch Updated: 08/15/24 1415     LEGIONELLA ANTIGEN, URINE Negative    Blood Culture - Blood, Blood, PICC Line [[id number]]  (Normal) Collected: 08/07/24 0637    Lab Status: Final result Specimen: Blood, PICC Line Updated: 08/12/24 0700     Blood Culture No growth at 5 days    Wound Culture - Wound, Abdominal Wall [[id number]]  (Abnormal)  (Susceptibility) Collected: 08/07/24 0637    Lab Status: Final result Specimen: Wound from Abdominal Wall Updated: 08/10/24 1027     Wound Culture Light growth (2+) Klebsiella pneumoniae ssp pneumoniae      Light growth (2+)     Comment: Mixed intra-abdominal dmitry. No further work-up.            Gram Stain Rare (1+) WBCs seen      Many (4+) Mixed bacterial morphotypes seen on Gram Stain    Susceptibility        Klebsiella pneumoniae ssp pneumoniae      HADLEY      Amoxicillin + Clavulanate Susceptible      Ampicillin Resistant      Ampicillin + Sulbactam Intermediate      Cefepime Susceptible      Ceftazidime Susceptible      Ceftriaxone Susceptible      Gentamicin Susceptible      Levofloxacin Intermediate      Piperacillin + Tazobactam Susceptible dose dependent      Tetracycline Resistant      Trimethoprim + Sulfamethoxazole Susceptible                       Susceptibility Comments       Klebsiella pneumoniae ssp pneumoniae    Cefazolin sensitivity will not be reported for Enterobacteriaceae in non-urine isolates. If cefazolin is preferred, please call the  microbiology lab to request an E-test.  With the exception of urinary-sourced infections, aminoglycosides should not be used as monotherapy.                       Medication Review:       Schedule Meds  amLODIPine, 5 mg, Oral, Q24H  cefepime, 2,000 mg, Intravenous, Q8H  hydrALAZINE, 25 mg, Oral, Q8H  insulin lispro, 2-7 Units, Subcutaneous, 4x Daily AC & at Bedtime  levothyroxine, 50 mcg, Oral, Q AM  metoprolol tartrate, 25 mg, Oral, Daily  sodium chloride, 10 mL, Intravenous, Q12H        Infusion Meds  Pharmacy to Dose Cefepime,   sodium chloride, 75 mL/hr, Last Rate: 75 mL/hr (08/15/24 1825)        PRN Meds    acetaminophen    senna-docusate sodium **AND** polyethylene glycol **AND** bisacodyl **AND** bisacodyl    calcium carbonate    Calcium Replacement - Follow Nurse / BPA Driven Protocol    cyclobenzaprine    dextrose    dextrose    glucagon (human recombinant)    HYDROmorphone    Magnesium Standard Dose Replacement - Follow Nurse / BPA Driven Protocol    nitroglycerin    ondansetron ODT **OR** ondansetron    Pharmacy to Dose Cefepime    Phosphorus Replacement - Follow Nurse / BPA Driven Protocol    Potassium Replacement - Follow Nurse / BPA Driven Protocol    [COMPLETED] Insert Peripheral IV **AND** sodium chloride    sodium chloride    sodium chloride        Assessment & Plan       Antimicrobial Therapy   1.  IV vancomycin        2.  IV cefepime        3.        4.        5.            Assessment    Left lower lobe pneumonia.  MRSA screen was negative.  Urine was negative for pneumococcal and Legionella antigen.  The patient is currently on 2 L of oxygen    Fever on admission.  Most likely secondary to above.  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  Fluid cultures were negative.  Patient currently oriented x 3 with no obvious signs of CNS infection.  MRI of the cervical spine showed 5.4 fluid collection in the laminectomy bed.  Neurosurgery service seen the patient and does not suspect active infection and signed off     Plan     Discontinue IV vancomycin  Continue IV cefepime 2 g every 8 hours and monitor patient's mental status carefully since cefepime can cause neurotoxicity  Can switch to p.o. antibiotics in 1 to 2 days  Continue supportive care  A.mNikki labs    Alexa Mckenzie, APRN  08/16/24  15:13 EDT    Note is dictated utilizing voice recognition software/Dragon

## 2024-08-16 NOTE — PLAN OF CARE
Problem: Adult Inpatient Plan of Care  Goal: Plan of Care Review  Outcome: Ongoing, Progressing  Goal: Patient-Specific Goal (Individualized)  Outcome: Ongoing, Progressing  Goal: Absence of Hospital-Acquired Illness or Injury  Outcome: Ongoing, Progressing  Intervention: Identify and Manage Fall Risk  Recent Flowsheet Documentation  Taken 8/16/2024 1012 by Angelika Parry LPN  Safety Promotion/Fall Prevention:   activity supervised   assistive device/personal items within reach   clutter free environment maintained   lighting adjusted   room organization consistent   safety round/check completed  Taken 8/16/2024 0840 by Angelika Parry LPN  Safety Promotion/Fall Prevention:   activity supervised   assistive device/personal items within reach   clutter free environment maintained   lighting adjusted   room organization consistent   safety round/check completed  Intervention: Prevent Skin Injury  Recent Flowsheet Documentation  Taken 8/16/2024 0840 by Angelika Parry LPN  Skin Protection:   adhesive use limited   incontinence pads utilized   tubing/devices free from skin contact  Intervention: Prevent and Manage VTE (Venous Thromboembolism) Risk  Recent Flowsheet Documentation  Taken 8/16/2024 0840 by Angelika Parry LPN  VTE Prevention/Management:   bilateral   sequential compression devices off   patient refused intervention  Intervention: Prevent Infection  Recent Flowsheet Documentation  Taken 8/16/2024 1012 by Angelika Parry LPN  Infection Prevention:   cohorting utilized   environmental surveillance performed   hand hygiene promoted   personal protective equipment utilized   rest/sleep promoted   single patient room provided   visitors restricted/screened  Taken 8/16/2024 0840 by Angelika Parry LPN  Infection Prevention:   cohorting utilized   environmental surveillance performed   hand hygiene promoted   personal protective equipment utilized   rest/sleep promoted   single patient room provided    visitors restricted/screened  Goal: Optimal Comfort and Wellbeing  Outcome: Ongoing, Progressing  Intervention: Monitor Pain and Promote Comfort  Recent Flowsheet Documentation  Taken 8/16/2024 0840 by Angelika Parry LPN  Pain Management Interventions:   care clustered   diversional activity provided   pillow support provided   position adjusted  Intervention: Provide Person-Centered Care  Recent Flowsheet Documentation  Taken 8/16/2024 0840 by Angelika Parry LPN  Trust Relationship/Rapport:   care explained   choices provided  Goal: Readiness for Transition of Care  Outcome: Ongoing, Progressing   Goal Outcome Evaluation:                   Patient went for CT this shift. Patient has no c/o pain or discomfort. Unable to get labs with MD made aware with no new orders at this time. Patient has O2 in place per nasal cannula with no resp distress noted. Patient has Iv fluids per order with IV antibiotics without adverse effects noted. Call light within reach.

## 2024-08-16 NOTE — CASE MANAGEMENT/SOCIAL WORK
Continued Stay Note   Seth     Patient Name: Esperanza Smith  MRN: [id number]  Today's Date: 8/16/2024    Admit Date: 8/14/2024    Plan: Return to [facility].  No precert or PASRR needed. Watch for IV abx needs.   Discharge Plan       Row Name 08/16/24 1341       Plan    Plan Return to [facility].  No precert or PASRR needed. Watch for IV abx needs.    Patient/Family in Agreement with Plan yes    Plan Comments Barrier to discharge: IV abx, IV fluids, electrolyte replacement, pending wound and blood cultures.  CT scan abd/ chest today.  ID, Neurosurgery, Wound care following             Expected Discharge Date and Time       Expected Discharge Date Expected Discharge Time    Aug 17, 2024           Bette Pemberton RN    Office Phone [phone]  Office Cell [phone]

## 2024-08-16 NOTE — PLAN OF CARE
Goal Outcome Evaluation:                        Esperanza Smith presents with functional mobility impairments which indicate the need for skilled intervention. Pt displayed tachycardic upon PT entry with HR reading 118-120 supine in bed. This PTA modified tx session due to pt displaying tachycardic. Pt performed supine ther ex requiring verbal and tactile cues for proper technique. Pt unsafe for transfer this morning due to increased HR. Tolerating session today without incident. Will continue to follow and progress as tolerated.     Plan/Recommendations:   If medically appropriate, High Intensity Therapy recommended post-acute care. This is recommended as therapy feels the patient would require 5-6 days per week, 2-3 hours per day. At this time, inpatient rehabilitation (acute rehab) would be the first choice and SNF would be second. Pt requires no DME at discharge.

## 2024-08-16 NOTE — PROGRESS NOTES
[facility] MEDICINE SERVICE  DAILY PROGRESS NOTE    NAME: Esperanza Smith  : 1945  MRN: [id number]      LOS: 2 days     PROVIDER OF SERVICE: Shadi Newby MD    Chief Complaint: Sepsis    Subjective:     Esperanza Smith is a 78 y.o. female with PMHx of HTN, HLD, hypothyroid, obesity, DM presented to [facility] for headache.  Of note, patient had c2-c4 laminectomy for resection of cervical meningioma of 2024 that was complicated by large pseudomeningiocele that underwent evacuation and dural repair with drain placement on 2024.  Admits to headache over the past 2 days complicated by neck pain, photophobia, and low grade fever that started today.  Denies vision change, chest pain,vomiting, diarrhea.  Due to worsening condition she presented to [facility] for evaluation.      ED course: In the ER, vitals 101.4F, , RR 16, /96, 96% RA.  Labs notable for sodium 133, K 3.4, Cl 95, glucose 149, lactic 0.9, wbc 14.09.  MRI cervical spine showed 5.4cm fluid collection within laminectomy bed extending from C1-C5 that has decreased in size which could represent pseudomeningocele, CSF leak, or post-op seroma along with stable mass effect on posterior thecal sac most prominent at C3-C4 where there is mod-severe spinal canal stenosis without evidence of cord compression.  Patient treated with IV abx, 500ml bolus, and pain meds.  She is to be admitted to hospitalist for abx and neurosurgery consult.       Patient was seen and examined on 24 at 18:51 EDT .     Subjective / Review of systems      Interval History:   8/15/24-Patient states that her neck and back pain are better today although still somewhat present.  She denies any difficulty breathing.  She denies any increased output of her abdominal enterocutaneous fistula.  A 1624 patient seen and examined in bed no acute distress, vital signs stable, tolerating antibiotics, to go home will discharge.    Review of Systems  12 point ROS reviewed  and negative except as mentioned above  Objective:     Vital Signs  Temp:  [97.9 °F (36.6 °C)-99.4 °F (37.4 °C)] 98 °F (36.7 °C)  Heart Rate:  [] 97  Resp:  [24-32] 32  BP: (144-160)/() 147/96  Flow (L/min):  [2] 2   Body mass index is 31.37 kg/m².      Physical Exam  General Appearance:  Alert, cooperative, no distress, appears stated age  Head:  Normocephalic, without obvious abnormality, atraumatic  Eyes:  PERRL, conjunctiva/corneas clear, EOM's intact, fundi benign, both eyes  Ears:  Normal TM's and external ear canals, both ears  Nose: Nares normal, septum midline, mucosa normal, no drainage or sinus tenderness  Throat: Lips, mucosa, and tongue normal; teeth and gums normal  Neck: Supple, symmetrical, trachea midline, no adenopathy, thyroid: not enlarged, symmetric, no tenderness/mass/nodules, no carotid bruit or JVD  Lungs:   Clear to auscultation bilaterally, respirations unlabored  Heart:  Regular rate and rhythm, S1, S2 normal, no murmur, rub or gallop  Abdomen:  Soft, non-tender, bowel sounds active all four quadrants,  no masses, no organomegaly, enterocutaneous fistula with drainage  Extremities: Extremities normal, atraumatic, no cyanosis or edema  Pulses: 2+ and symmetric  Skin: Cervical spine surgical incision intact  Neurologic: Normal      Scheduled Meds   amLODIPine, 5 mg, Oral, Q24H  cefepime, 2,000 mg, Intravenous, Q8H  hydrALAZINE, 25 mg, Oral, Q8H  insulin lispro, 2-7 Units, Subcutaneous, 4x Daily AC & at Bedtime  levothyroxine, 50 mcg, Oral, Q AM  metoprolol tartrate, 25 mg, Oral, Daily  potassium chloride ER, 40 mEq, Oral, Q4H  sodium chloride, 10 mL, Intravenous, Q12H  vancomycin, 1,250 mg, Intravenous, Q12H       PRN Meds     acetaminophen    senna-docusate sodium **AND** polyethylene glycol **AND** bisacodyl **AND** bisacodyl    calcium carbonate    Calcium Replacement - Follow Nurse / BPA Driven Protocol    cyclobenzaprine    dextrose    dextrose    glucagon (human  recombinant)    HYDROmorphone    Magnesium Standard Dose Replacement - Follow Nurse / BPA Driven Protocol    nitroglycerin    ondansetron ODT **OR** ondansetron    Pharmacy to Dose Cefepime    Pharmacy to dose vancomycin    Phosphorus Replacement - Follow Nurse / BPA Driven Protocol    Potassium Replacement - Follow Nurse / BPA Driven Protocol    [COMPLETED] Insert Peripheral IV **AND** sodium chloride    sodium chloride    sodium chloride   Infusions  Pharmacy to Dose Cefepime,   Pharmacy to dose vancomycin,   sodium chloride, 75 mL/hr, Last Rate: 75 mL/hr (08/15/24 1649)          Diagnostic Data    Results from last 7 days   Lab Units 08/16/24  0523   WBC 10*3/mm3 11.92*   HEMOGLOBIN g/dL 13.4   HEMATOCRIT % 39.2   PLATELETS 10*3/mm3 290   GLUCOSE mg/dL 122*   CREATININE mg/dL 0.35*   BUN mg/dL 5*   SODIUM mmol/L 131*   POTASSIUM mmol/L 3.4*   AST (SGOT) U/L 20   ALT (SGPT) U/L 20   ALK PHOS U/L 63   BILIRUBIN mg/dL 0.5   ANION GAP mmol/L 14.2       XR Chest 1 View    Result Date: 8/14/2024  Impression: 1. Left basilar airspace disease may relate to atelectasis and/or pneumonia with small left pleural effusion. 2. Nodular density overlying the right upper lung measuring 5 mm. This could relate to pulmonary nodule versus superimposed structures or area of parenchymal scarring, consider PA and lateral chest radiograph when patient clinically stable otherwise chest CT may be considered for further assessment. 3. Chronic elevation of the right hemidiaphragm unchanged. Electronically Signed: Roger Hollis MD  8/14/2024 6:30 PM EDT  Workstation ID: [id number]    MRI Cervical Spine With & Without Contrast    Result Date: 8/14/2024  Impression: 5.4 cm fluid collection within laminectomy bed extending from C1-C5 has decreased in size, which could represent pseudomeningocele, CSF leak, or post-operative seroma. Stable mass effect on posterior thecal sac most prominent at C3-4 where there is moderate to severe spinal canal  stenosis. No evidence of cord compression. Electronically Signed: Ruperto Wakefield MD  8/14/2024 5:27 PM EDT  Workstation ID: [id number]       I reviewed the patient's new clinical results.    Assessment/Plan:     Active and Resolved Problems  Active Hospital Problems    Diagnosis  POA    **Sepsis [A41.9]  Yes      Resolved Hospital Problems   No resolved problems to display.       Sepsis, unknown organism  -Patient presents with fever, leukocytosis  -Has recent cervical spine surgery with complication of meningocele that required drainage   -Imaging of the cervical spine here shows reduced in size meningocele  -Neurosurgery evaluation; they do not feel that this is infected  -Chest x-ray concerning for possible infiltrate  Check CT of the chest  -Patient also has known enterocutaneous fistula; plan for CT angiogram of the abdomen  -ID consult appreciated  -Initiated on IV vancomycin, cefepime    DM type II, controlled  -Continue sliding scale insulin and Lantus    Hypothyroidism  -Continue Synthroid    Hypertension  -Continue home medications for BP control    Acute hypokalemia  -Repleted    Nonsignificant hyponatremia  -Likely secondary to mild volume depletion  -Continue IV hydration    Obesity  -Counseled patient on diet and exercise to improve weight loss and comorbid conditions    VTE Prophylaxis:  Mechanical VTE prophylaxis orders are present.         Code status is   Code Status and Medical Interventions: CPR (Attempt to Resuscitate); Full Support   Ordered at: 08/14/24 2006     Code Status (Patient has no pulse and is not breathing):    CPR (Attempt to Resuscitate)     Medical Interventions (Patient has pulse or is breathing):    Full Support       Plan for disposition: Possible DC on 8/19    Time: 30 minutes    Signature: Electronically signed by Shadi Newby MD, 08/16/24, 11:49 EDT.  [facility], [place], operated by [facility] Hospitalist Team

## 2024-08-16 NOTE — THERAPY TREATMENT NOTE
Subjective: Pt agreeable to therapeutic plan of care.    Objective:     Bed mobility - N/A or Not attempted.  Transfers - N/A or Not attempted.  Ambulation -  N/A or Not attempted.    Therapeutic Exercise - 10 Reps- 15 reps B LE AROM lying supine    Vitals: Tachycardic; pt's heart rate read 120 supine in bed upon entry and increased to 134 during supine ther ex    Pain: Pt did not rate pain  Intervention for pain: N/A    Education: Provided education on the importance of mobility in the acute care setting, Verbal/Tactile Cues, Transfer Training, and Energy conservation strategies    Assessment: Esperanza HERNANDEZ Walker presents with functional mobility impairments which indicate the need for skilled intervention. Pt displayed tachycardic upon PT entry with HR reading 118-120 supine in bed. This PTA modified tx session due to pt displaying tachycardic. Pt performed supine ther ex requiring verbal and tactile cues for proper technique. Pt unsafe for transfer this morning due to increased HR. Tolerating session today without incident. Will continue to follow and progress as tolerated.     Plan/Recommendations:   If medically appropriate, High Intensity Therapy recommended post-acute care. This is recommended as therapy feels the patient would require 5-6 days per week, 2-3 hours per day. At this time, inpatient rehabilitation (acute rehab) would be the first choice and SNF would be second. Pt requires no DME at discharge.     Pt desires Inpatient Rehabilitation placement at discharge. Pt cooperative; agreeable to therapeutic recommendations and plan of care.         Modified [place]: N/A = No pre-op stroke/TIA    Post-Tx Position: Supine with HOB Elevated  PPE: gloves and surgical mask

## 2024-08-16 NOTE — PLAN OF CARE
Goal Outcome Evaluation:  Plan of Care Reviewed With: patient     Problem: Adult Inpatient Plan of Care  Goal: Plan of Care Review  Outcome: Ongoing, Progressing  Flowsheets (Taken 8/16/2024 7499)  Progress: no change  Plan of Care Reviewed With: patient        Progress: no change

## 2024-08-17 LAB
ANION GAP SERPL CALCULATED.3IONS-SCNC: 11.3 MMOL/L (ref 5–15)
BASOPHILS # BLD AUTO: 0.04 10*3/MM3 (ref 0–0.2)
BASOPHILS NFR BLD AUTO: 0.4 % (ref 0–1.5)
BUN SERPL-MCNC: 8 MG/DL (ref 8–23)
BUN/CREAT SERPL: 22.2 (ref 7–25)
CALCIUM SPEC-SCNC: 9.2 MG/DL (ref 8.6–10.5)
CHLORIDE SERPL-SCNC: 95 MMOL/L (ref 98–107)
CO2 SERPL-SCNC: 20.7 MMOL/L (ref 22–29)
CREAT SERPL-MCNC: 0.36 MG/DL (ref 0.57–1)
DEPRECATED RDW RBC AUTO: 44.6 FL (ref 37–54)
EGFRCR SERPLBLD CKD-EPI 2021: 105.4 ML/MIN/1.73
EOSINOPHIL # BLD AUTO: 0.19 10*3/MM3 (ref 0–0.4)
EOSINOPHIL NFR BLD AUTO: 1.7 % (ref 0.3–6.2)
ERYTHROCYTE [DISTWIDTH] IN BLOOD BY AUTOMATED COUNT: 12.9 % (ref 12.3–15.4)
GLUCOSE BLDC GLUCOMTR-MCNC: 125 MG/DL (ref 70–105)
GLUCOSE BLDC GLUCOMTR-MCNC: 145 MG/DL (ref 70–105)
GLUCOSE BLDC GLUCOMTR-MCNC: 165 MG/DL (ref 70–105)
GLUCOSE BLDC GLUCOMTR-MCNC: 176 MG/DL (ref 70–105)
GLUCOSE BLDC GLUCOMTR-MCNC: 202 MG/DL (ref 70–105)
GLUCOSE SERPL-MCNC: 120 MG/DL (ref 65–99)
HCT VFR BLD AUTO: 38.4 % (ref 34–46.6)
HGB BLD-MCNC: 12.7 G/DL (ref 12–15.9)
IMM GRANULOCYTES # BLD AUTO: 0.07 10*3/MM3 (ref 0–0.05)
IMM GRANULOCYTES NFR BLD AUTO: 0.6 % (ref 0–0.5)
LYMPHOCYTES # BLD AUTO: 2.07 10*3/MM3 (ref 0.7–3.1)
LYMPHOCYTES NFR BLD AUTO: 18.1 % (ref 19.6–45.3)
MCH RBC QN AUTO: 31.4 PG (ref 26.6–33)
MCHC RBC AUTO-ENTMCNC: 33.1 G/DL (ref 31.5–35.7)
MCV RBC AUTO: 95 FL (ref 79–97)
MONOCYTES # BLD AUTO: 1.46 10*3/MM3 (ref 0.1–0.9)
MONOCYTES NFR BLD AUTO: 12.8 % (ref 5–12)
NEUTROPHILS NFR BLD AUTO: 66.4 % (ref 42.7–76)
NEUTROPHILS NFR BLD AUTO: 7.58 10*3/MM3 (ref 1.7–7)
NRBC BLD AUTO-RTO: 0 /100 WBC (ref 0–0.2)
PLATELET # BLD AUTO: 323 10*3/MM3 (ref 140–450)
PMV BLD AUTO: 9.7 FL (ref 6–12)
POTASSIUM SERPL-SCNC: 4 MMOL/L (ref 3.5–5.2)
RBC # BLD AUTO: 4.04 10*6/MM3 (ref 3.77–5.28)
SODIUM SERPL-SCNC: 127 MMOL/L (ref 136–145)
WBC NRBC COR # BLD AUTO: 11.41 10*3/MM3 (ref 3.4–10.8)

## 2024-08-17 PROCEDURE — 25810000003 SODIUM CHLORIDE 0.9 % SOLUTION: Performed by: STUDENT IN AN ORGANIZED HEALTH CARE EDUCATION/TRAINING PROGRAM

## 2024-08-17 PROCEDURE — 80048 BASIC METABOLIC PNL TOTAL CA: CPT | Performed by: STUDENT IN AN ORGANIZED HEALTH CARE EDUCATION/TRAINING PROGRAM

## 2024-08-17 PROCEDURE — 82948 REAGENT STRIP/BLOOD GLUCOSE: CPT

## 2024-08-17 PROCEDURE — 82948 REAGENT STRIP/BLOOD GLUCOSE: CPT | Performed by: STUDENT IN AN ORGANIZED HEALTH CARE EDUCATION/TRAINING PROGRAM

## 2024-08-17 PROCEDURE — 25010000002 CEFEPIME PER 500 MG: Performed by: NURSE PRACTITIONER

## 2024-08-17 PROCEDURE — 63710000001 INSULIN LISPRO (HUMAN) PER 5 UNITS: Performed by: STUDENT IN AN ORGANIZED HEALTH CARE EDUCATION/TRAINING PROGRAM

## 2024-08-17 PROCEDURE — 85025 COMPLETE CBC W/AUTO DIFF WBC: CPT | Performed by: NURSE PRACTITIONER

## 2024-08-17 PROCEDURE — 25010000002 CEFEPIME PER 500 MG: Performed by: INTERNAL MEDICINE

## 2024-08-17 RX ORDER — CEFDINIR 300 MG/1
300 CAPSULE ORAL EVERY 12 HOURS SCHEDULED
Status: DISCONTINUED | OUTPATIENT
Start: 2024-08-18 | End: 2024-08-18 | Stop reason: HOSPADM

## 2024-08-17 RX ADMIN — CEFEPIME 2000 MG: 2 INJECTION, POWDER, FOR SOLUTION INTRAVENOUS at 00:17

## 2024-08-17 RX ADMIN — ACETAMINOPHEN 650 MG: 325 TABLET, FILM COATED ORAL at 16:19

## 2024-08-17 RX ADMIN — Medication 10 ML: at 09:22

## 2024-08-17 RX ADMIN — CEFEPIME 2000 MG: 2 INJECTION, POWDER, FOR SOLUTION INTRAVENOUS at 16:19

## 2024-08-17 RX ADMIN — HYDRALAZINE HYDROCHLORIDE 25 MG: 25 TABLET ORAL at 05:52

## 2024-08-17 RX ADMIN — SENNOSIDES AND DOCUSATE SODIUM 2 TABLET: 50; 8.6 TABLET ORAL at 16:38

## 2024-08-17 RX ADMIN — HYDRALAZINE HYDROCHLORIDE 25 MG: 25 TABLET ORAL at 21:57

## 2024-08-17 RX ADMIN — INSULIN LISPRO 2 UNITS: 100 INJECTION, SOLUTION INTRAVENOUS; SUBCUTANEOUS at 17:02

## 2024-08-17 RX ADMIN — CYCLOBENZAPRINE 10 MG: 10 TABLET, FILM COATED ORAL at 00:17

## 2024-08-17 RX ADMIN — AMLODIPINE BESYLATE 5 MG: 5 TABLET ORAL at 09:22

## 2024-08-17 RX ADMIN — HYDRALAZINE HYDROCHLORIDE 25 MG: 25 TABLET ORAL at 13:37

## 2024-08-17 RX ADMIN — CEFEPIME 2000 MG: 2 INJECTION, POWDER, FOR SOLUTION INTRAVENOUS at 09:22

## 2024-08-17 RX ADMIN — LEVOTHYROXINE SODIUM 50 MCG: 0.05 TABLET ORAL at 05:52

## 2024-08-17 RX ADMIN — METOPROLOL TARTRATE 25 MG: 25 TABLET, FILM COATED ORAL at 09:22

## 2024-08-17 RX ADMIN — SODIUM CHLORIDE 75 ML/HR: 9 INJECTION, SOLUTION INTRAVENOUS at 17:02

## 2024-08-17 RX ADMIN — INSULIN LISPRO 3 UNITS: 100 INJECTION, SOLUTION INTRAVENOUS; SUBCUTANEOUS at 12:21

## 2024-08-17 NOTE — PROGRESS NOTES
[facility] MEDICINE SERVICE  DAILY PROGRESS NOTE    NAME: Esperanza Smith  : 1945  MRN: [id number]      LOS: 3 days     PROVIDER OF SERVICE: Shadi Newby MD    Chief Complaint: Sepsis    Subjective:     Esperanza Smith is a 78 y.o. female with PMHx of HTN, HLD, hypothyroid, obesity, DM presented to [facility] for headache.  Of note, patient had c2-c4 laminectomy for resection of cervical meningioma of 2024 that was complicated by large pseudomeningiocele that underwent evacuation and dural repair with drain placement on 2024.  Admits to headache over the past 2 days complicated by neck pain, photophobia, and low grade fever that started today.  Denies vision change, chest pain,vomiting, diarrhea.  Due to worsening condition she presented to [facility] for evaluation.      ED course: In the ER, vitals 101.4F, , RR 16, /96, 96% RA.  Labs notable for sodium 133, K 3.4, Cl 95, glucose 149, lactic 0.9, wbc 14.09.  MRI cervical spine showed 5.4cm fluid collection within laminectomy bed extending from C1-C5 that has decreased in size which could represent pseudomeningocele, CSF leak, or post-op seroma along with stable mass effect on posterior thecal sac most prominent at C3-C4 where there is mod-severe spinal canal stenosis without evidence of cord compression.  Patient treated with IV abx, 500ml bolus, and pain meds.  She is to be admitted to hospitalist for abx and neurosurgery consult.       Patient was seen and examined on 24 at 18:51 EDT .     Interval History:   8/15/24-Patient states that her neck and back pain are better today although still somewhat present.  She denies any difficulty breathing.  She denies any increased output of her abdominal enterocutaneous fistula.  24 patient seen and examined in bed no acute distress, vital signs stable, tolerating antibiotics, to go home will discharge.  24 seen In bed NAD, no new complaints, [email or web address].[email or web address]    Review of  Systems  12 point ROS reviewed and negative except as mentioned above  Objective:     Vital Signs  Temp:  [97.2 °F (36.2 °C)-98.7 °F (37.1 °C)] 98.7 °F (37.1 °C)  Heart Rate:  [] 88  Resp:  [22-26] 24  BP: (126-144)/(87-97) 126/87  Flow (L/min):  [2] 2   Body mass index is 29.95 kg/m².      Physical Exam  General Appearance:  Alert, cooperative, no distress, appears stated age  Head:  Normocephalic, without obvious abnormality, atraumatic  Eyes:  PERRL, conjunctiva/corneas clear, EOM's intact, fundi benign, both eyes  Ears:  Normal TM's and external ear canals, both ears  Nose: Nares normal, septum midline, mucosa normal, no drainage or sinus tenderness  Throat: Lips, mucosa, and tongue normal; teeth and gums normal  Neck: Supple, symmetrical, trachea midline, no adenopathy, thyroid: not enlarged, symmetric, no tenderness/mass/nodules, no carotid bruit or JVD  Lungs:   Clear to auscultation bilaterally, respirations unlabored  Heart:  Regular rate and rhythm, S1, S2 normal, no murmur, rub or gallop  Abdomen:  Soft, non-tender, bowel sounds active all four quadrants,  no masses, no organomegaly, enterocutaneous fistula with drainage  Extremities: Extremities normal, atraumatic, no cyanosis or edema  Pulses: 2+ and symmetric  Skin: Cervical spine surgical incision intact  Neurologic: Normal      Scheduled Meds   amLODIPine, 5 mg, Oral, Q24H  [START ON 8/18/2024] cefdinir, 300 mg, Oral, Q12H  cefepime, 2,000 mg, Intravenous, Q8H  hydrALAZINE, 25 mg, Oral, Q8H  insulin lispro, 2-7 Units, Subcutaneous, 4x Daily AC & at Bedtime  levothyroxine, 50 mcg, Oral, Q AM  metoprolol tartrate, 25 mg, Oral, Daily  sodium chloride, 10 mL, Intravenous, Q12H       PRN Meds     acetaminophen    senna-docusate sodium **AND** polyethylene glycol **AND** bisacodyl **AND** bisacodyl    calcium carbonate    Calcium Replacement - Follow Nurse / BPA Driven Protocol    cyclobenzaprine    dextrose    dextrose    glucagon (human  recombinant)    HYDROmorphone    Magnesium Standard Dose Replacement - Follow Nurse / BPA Driven Protocol    nitroglycerin    ondansetron ODT **OR** ondansetron    Pharmacy to Dose Cefepime    Phosphorus Replacement - Follow Nurse / BPA Driven Protocol    Potassium Replacement - Follow Nurse / BPA Driven Protocol    [COMPLETED] Insert Peripheral IV **AND** sodium chloride    sodium chloride    sodium chloride   Infusions  Pharmacy to Dose Cefepime,   sodium chloride, 75 mL/hr, Last Rate: 75 mL/hr (08/16/24 1410)          Diagnostic Data    Results from last 7 days   Lab Units 08/17/24  0029 08/16/24  0523   WBC 10*3/mm3 11.41* 11.92*   HEMOGLOBIN g/dL 12.7 13.4   HEMATOCRIT % 38.4 39.2   PLATELETS 10*3/mm3 323 290   GLUCOSE mg/dL 120* 122*   CREATININE mg/dL 0.36* 0.35*   BUN mg/dL 8 5*   SODIUM mmol/L 127* 131*   POTASSIUM mmol/L 4.0 3.4*   AST (SGOT) U/L  --  20   ALT (SGPT) U/L  --  20   ALK PHOS U/L  --  63   BILIRUBIN mg/dL  --  0.5   ANION GAP mmol/L 11.3 14.2       CT Abdomen Pelvis With Contrast    Result Date: 8/16/2024  1. Findings suggest the presence of a patent enterocutaneous fistula at the level of the umbilicus, with enteric contrast extending to the superficial margin of the abdominal wound.. 2. Thick bandlike atelectatic changes within the lingula, right middle lobe, left lower lobe. 3. Additional ancillary findings as described above. Electronically Signed: Marlee Romano MD  8/16/2024 1:02 PM EDT  Workstation ID: [id number]    CT Chest With Contrast Diagnostic    Result Date: 8/16/2024  1. Findings suggest the presence of a patent enterocutaneous fistula at the level of the umbilicus, with enteric contrast extending to the superficial margin of the abdominal wound.. 2. Thick bandlike atelectatic changes within the lingula, right middle lobe, left lower lobe. 3. Additional ancillary findings as described above. Electronically Signed: Marlee Romano MD  8/16/2024 1:02 PM EDT  Workstation ID: [id number]        I reviewed the patient's new clinical results.    Assessment/Plan:     Active and Resolved Problems  Active Hospital Problems    Diagnosis  POA    **Sepsis [A41.9]  Yes      Resolved Hospital Problems   No resolved problems to display.       Sepsis, unknown organism  -Patient presents with fever, leukocytosis  -Has recent cervical spine surgery with complication of meningocele that required drainage   -Imaging of the cervical spine here shows reduced in size meningocele  -Neurosurgery evaluation; they do not feel that this is infected  -Chest x-ray concerning for possible infiltrate  Check CT of the chest  -Patient also has known enterocutaneous fistula; plan for CT angiogram of the abdomen  -ID consult appreciated  -Initiated on IV vancomycin, cefepime    DM type II, controlled  -Continue sliding scale insulin and Lantus    Hypothyroidism  -Continue Synthroid    Hypertension  -Continue home medications for BP control    Acute hypokalemia  -Repleted    Nonsignificant hyponatremia  -Likely secondary to mild volume depletion  -Continue IV hydration    Obesity  -Counseled patient on diet and exercise to improve weight loss and comorbid conditions    VTE Prophylaxis:  Mechanical VTE prophylaxis orders are present.         Code status is   Code Status and Medical Interventions: CPR (Attempt to Resuscitate); Full Support   Ordered at: 08/14/24 2006     Code Status (Patient has no pulse and is not breathing):    CPR (Attempt to Resuscitate)     Medical Interventions (Patient has pulse or is breathing):    Full Support       Plan for disposition: Possible DC on 8/19    Time: 30 minutes    Signature: Electronically signed by Shadi Newby MD, 08/17/24, 15:00 EDT.  [facility], [place], operated by [facility] Hospitalist Team

## 2024-08-17 NOTE — PLAN OF CARE
Goal Outcome Evaluation:  Plan of Care Reviewed With: patient        Progress: improving  Outcome Evaluation: Patient admitted with sepsis. Alert and oriented. IV abx given, IVF infusing. LUQ fistula draining to colostomy bag with little output. Oxygen on at 2L.

## 2024-08-17 NOTE — PROGRESS NOTES
Infectious Diseases Progress Note      LOS: 3 days   Patient Care Team:  Dom Estrada MD as PCP - General  Jeannette Ralph, RN as Ambulatory  ([facility])    Chief Complaint:  Fever, shortness of breath    Subjective       The patient has been afebrile for the last 24 hours.  The patient is on 2 L of oxygen by nasal cannula hemodynamically stable, and is tolerating antimicrobial therapy.  Patient is feeling much better today and is currently in the chair      Review of Systems:   Review of Systems   Eyes: Negative.    Respiratory:  Positive for shortness of breath.    Cardiovascular: Negative.    Gastrointestinal: Negative.    Endocrine: Negative.    Genitourinary: Negative.    Musculoskeletal: Negative.    Skin:  Positive for wound.   Psychiatric/Behavioral: Negative.     All other systems reviewed and are negative.       Objective     Vital Signs  Temp:  [97.2 °F (36.2 °C)-98.7 °F (37.1 °C)] 98.7 °F (37.1 °C)  Heart Rate:  [] 88  Resp:  [22-26] 24  BP: (126-149)/(87-97) 126/87    Physical Exam:  Physical Exam  Vitals and nursing note reviewed.   Constitutional:       General: She is not in acute distress.     Appearance: She is well-developed and normal weight. She is ill-appearing. She is not diaphoretic.   HENT:      Head: Normocephalic and atraumatic.   Eyes:      General: No scleral icterus.     Extraocular Movements: Extraocular movements intact.      Conjunctiva/sclera: Conjunctivae normal.      Pupils: Pupils are equal, round, and reactive to light.   Cardiovascular:      Rate and Rhythm: Normal rate and regular rhythm.      Heart sounds: Normal heart sounds, S1 normal and S2 normal. No murmur heard.  Pulmonary:      Effort: Pulmonary effort is normal. No respiratory distress.      Breath sounds: No stridor. Rales present. No wheezing.   Chest:      Chest wall: No tenderness.   Abdominal:      General: Bowel sounds are normal.      Palpations: Abdomen is soft. There is no  mass.      Tenderness: There is abdominal tenderness. There is no guarding.      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      Cervical back: Neck supple.   Skin:     General: Skin is warm and dry.      Coloration: Skin is not pale.      Findings: No bruising, erythema or rash.   Neurological:      Mental Status: She is alert and oriented to person, place, and time.          Results Review:    I have reviewed all clinical data, test, lab, and imaging results.     Radiology  No Radiology Exams Resulted Within Past 24 Hours    Cardiology    Laboratory    Results from last 7 days   Lab Units 08/17/24  0029 08/16/24  0523 08/15/24  0732 08/14/24  1408 08/14/24  0453   WBC 10*3/mm3 11.41* 11.92* 12.73* 14.09* 13.08*   HEMOGLOBIN g/dL 12.7 13.4 13.8 12.9 12.4   HEMATOCRIT % 38.4 39.2 41.5 39.4 37.9   PLATELETS 10*3/mm3 323 290 279 304 294     Results from last 7 days   Lab Units 08/17/24  0029 08/16/24  0523 08/15/24  0732 08/14/24  1408 08/14/24  0453   SODIUM mmol/L 127* 131* 132* 133* 135*   POTASSIUM mmol/L 4.0 3.4* 3.3* 3.4* 3.7   CHLORIDE mmol/L 95* 94* 95* 95* 97*   CO2 mmol/L 20.7* 22.8 24.3 24.4 26.0   BUN mg/dL 8 5* 4* 6* 6*   CREATININE mg/dL 0.36* 0.35* 0.36* 0.48* 0.45*   GLUCOSE mg/dL 120* 122* 153* 149* 96   ALBUMIN g/dL  --  3.8  --   --   --    BILIRUBIN mg/dL  --  0.5  --   --   --    ALK PHOS U/L  --  63  --   --   --    AST (SGOT) U/L  --  20  --   --   --    ALT (SGPT) U/L  --  20  --   --   --    CALCIUM mg/dL 9.2 9.4 9.2 9.6 9.7                 Microbiology   Microbiology Results (last 10 days)       Procedure Component Value - Date/Time    MRSA Screen, PCR (Inpatient) - Swab, Nares [[id number]]  (Normal) Collected: 08/15/24 1418    Lab Status: Final result Specimen: Swab from Nares Updated: 08/15/24 1551     MRSA PCR No MRSA Detected    Narrative:      The negative predictive  value of this diagnostic test is high and should only be used to consider de-escalating anti-MRSA therapy. A positive result may indicate colonization with MRSA and must be correlated clinically.    Blood Culture - Blood, Arm, Left [[id number]]  (Normal) Collected: 08/14/24 1756    Lab Status: Preliminary result Specimen: Blood from Arm, Left Updated: 08/16/24 1800     Blood Culture No growth at 2 days    Blood Culture - Blood, Arm, Right [[id number]]  (Normal) Collected: 08/14/24 1756    Lab Status: Preliminary result Specimen: Blood from Arm, Right Updated: 08/16/24 1800     Blood Culture No growth at 2 days    COVID-19 and FLU A/B PCR, 1 HR TAT - Swab, Nasopharynx [[id number]]  (Normal) Collected: 08/14/24 1348    Lab Status: Final result Specimen: Swab from Nasopharynx Updated: 08/14/24 1436     COVID19 Not Detected     Influenza A PCR Not Detected     Influenza B PCR Not Detected    Narrative:      Fact sheet for providers: https://www.fda.gov/media/178298/download    Fact sheet for patients: https://www.fda.gov/media/514255/download    Test performed by PCR.    S. Pneumo Ag Urine or CSF - Urine, Urine, Clean Catch [[id number]]  (Normal) Collected: 08/14/24 1348    Lab Status: Final result Specimen: Urine, Clean Catch Updated: 08/15/24 1415     Strep Pneumo Ag Negative    Legionella Antigen, Urine - Urine, Urine, Clean Catch [[id number]]  (Normal) Collected: 08/14/24 1348    Lab Status: Final result Specimen: Urine, Clean Catch Updated: 08/15/24 1415     LEGIONELLA ANTIGEN, URINE Negative            Medication Review:       Schedule Meds  amLODIPine, 5 mg, Oral, Q24H  cefepime, 2,000 mg, Intravenous, Q8H  hydrALAZINE, 25 mg, Oral, Q8H  insulin lispro, 2-7 Units, Subcutaneous, 4x Daily AC & at Bedtime  levothyroxine, 50 mcg, Oral, Q AM  metoprolol tartrate, 25 mg, Oral, Daily  sodium chloride, 10 mL, Intravenous, Q12H        Infusion Meds  Pharmacy to Dose Cefepime,   sodium chloride, 75 mL/hr, Last Rate: 75 mL/hr  (08/16/24 1410)        PRN Meds    acetaminophen    senna-docusate sodium **AND** polyethylene glycol **AND** bisacodyl **AND** bisacodyl    calcium carbonate    Calcium Replacement - Follow Nurse / BPA Driven Protocol    cyclobenzaprine    dextrose    dextrose    glucagon (human recombinant)    HYDROmorphone    Magnesium Standard Dose Replacement - Follow Nurse / BPA Driven Protocol    nitroglycerin    ondansetron ODT **OR** ondansetron    Pharmacy to Dose Cefepime    Phosphorus Replacement - Follow Nurse / BPA Driven Protocol    Potassium Replacement - Follow Nurse / BPA Driven Protocol    [COMPLETED] Insert Peripheral IV **AND** sodium chloride    sodium chloride    sodium chloride        Assessment & Plan       Antimicrobial Therapy   1.  IV cefepime        2.          3.        4.        5.            Assessment    Left lower lobe pneumonia.  MRSA screen was negative.  Urine was negative for pneumococcal and Legionella antigen.  The patient is currently on 2 L of oxygen    Fever on admission.  Most likely secondary to above.  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  Fluid cultures were negative.  Patient currently oriented x 3 with no obvious signs of CNS infection.  MRI of the cervical spine showed 5.4 fluid collection in the laminectomy bed.  Neurosurgery service seen the patient and does not suspect active infection and signed off     Plan     Discontinue IV cefepime   Tomorrow, start p.o. Omnicef 300 mg twice daily for 3 days for 7 days of treatment  Continue supportive care  Not much more to add from infectious  disease standpoint-we will sign off at this time-please call with any questions.      Alexa Mckenzie, DANNA  08/17/24  12:53 EDT    Note is dictated utilizing voice recognition software/Dragon

## 2024-08-18 VITALS
DIASTOLIC BLOOD PRESSURE: 86 MMHG | RESPIRATION RATE: 17 BRPM | HEIGHT: 65 IN | SYSTOLIC BLOOD PRESSURE: 137 MMHG | BODY MASS INDEX: 29.99 KG/M2 | TEMPERATURE: 97.8 F | WEIGHT: 180 LBS | HEART RATE: 103 BPM | OXYGEN SATURATION: 94 %

## 2024-08-18 LAB
ANION GAP SERPL CALCULATED.3IONS-SCNC: 11.3 MMOL/L (ref 5–15)
BUN SERPL-MCNC: 8 MG/DL (ref 8–23)
BUN/CREAT SERPL: 17.8 (ref 7–25)
CALCIUM SPEC-SCNC: 9.9 MG/DL (ref 8.6–10.5)
CHLORIDE SERPL-SCNC: 99 MMOL/L (ref 98–107)
CO2 SERPL-SCNC: 22.7 MMOL/L (ref 22–29)
CREAT SERPL-MCNC: 0.45 MG/DL (ref 0.57–1)
DEPRECATED RDW RBC AUTO: 45 FL (ref 37–54)
EGFRCR SERPLBLD CKD-EPI 2021: 99.8 ML/MIN/1.73
ERYTHROCYTE [DISTWIDTH] IN BLOOD BY AUTOMATED COUNT: 13 % (ref 12.3–15.4)
GLUCOSE BLDC GLUCOMTR-MCNC: 124 MG/DL (ref 70–105)
GLUCOSE BLDC GLUCOMTR-MCNC: 140 MG/DL (ref 70–105)
GLUCOSE BLDC GLUCOMTR-MCNC: 150 MG/DL (ref 70–105)
GLUCOSE BLDC GLUCOMTR-MCNC: 183 MG/DL (ref 70–105)
GLUCOSE SERPL-MCNC: 99 MG/DL (ref 65–99)
HCT VFR BLD AUTO: 37.3 % (ref 34–46.6)
HGB BLD-MCNC: 12.6 G/DL (ref 12–15.9)
MCH RBC QN AUTO: 32 PG (ref 26.6–33)
MCHC RBC AUTO-ENTMCNC: 33.8 G/DL (ref 31.5–35.7)
MCV RBC AUTO: 94.7 FL (ref 79–97)
PLATELET # BLD AUTO: 307 10*3/MM3 (ref 140–450)
PMV BLD AUTO: 10 FL (ref 6–12)
POTASSIUM SERPL-SCNC: 4 MMOL/L (ref 3.5–5.2)
RBC # BLD AUTO: 3.94 10*6/MM3 (ref 3.77–5.28)
SODIUM SERPL-SCNC: 133 MMOL/L (ref 136–145)
WBC NRBC COR # BLD AUTO: 9.29 10*3/MM3 (ref 3.4–10.8)

## 2024-08-18 PROCEDURE — 63710000001 INSULIN LISPRO (HUMAN) PER 5 UNITS: Performed by: STUDENT IN AN ORGANIZED HEALTH CARE EDUCATION/TRAINING PROGRAM

## 2024-08-18 PROCEDURE — 25810000003 SODIUM CHLORIDE 0.9 % SOLUTION: Performed by: STUDENT IN AN ORGANIZED HEALTH CARE EDUCATION/TRAINING PROGRAM

## 2024-08-18 PROCEDURE — 82948 REAGENT STRIP/BLOOD GLUCOSE: CPT | Performed by: STUDENT IN AN ORGANIZED HEALTH CARE EDUCATION/TRAINING PROGRAM

## 2024-08-18 PROCEDURE — 80048 BASIC METABOLIC PNL TOTAL CA: CPT | Performed by: INTERNAL MEDICINE

## 2024-08-18 PROCEDURE — 82948 REAGENT STRIP/BLOOD GLUCOSE: CPT

## 2024-08-18 PROCEDURE — 85027 COMPLETE CBC AUTOMATED: CPT | Performed by: STUDENT IN AN ORGANIZED HEALTH CARE EDUCATION/TRAINING PROGRAM

## 2024-08-18 RX ORDER — CEFDINIR 300 MG/1
300 CAPSULE ORAL EVERY 12 HOURS SCHEDULED
Qty: 5 CAPSULE | Refills: 0 | Status: SHIPPED | OUTPATIENT
Start: 2024-08-18 | End: 2024-08-21

## 2024-08-18 RX ADMIN — INSULIN LISPRO 2 UNITS: 100 INJECTION, SOLUTION INTRAVENOUS; SUBCUTANEOUS at 11:23

## 2024-08-18 RX ADMIN — LEVOTHYROXINE SODIUM 50 MCG: 0.05 TABLET ORAL at 05:01

## 2024-08-18 RX ADMIN — CEFDINIR 300 MG: 300 CAPSULE ORAL at 08:59

## 2024-08-18 RX ADMIN — SODIUM CHLORIDE 75 ML/HR: 9 INJECTION, SOLUTION INTRAVENOUS at 06:13

## 2024-08-18 RX ADMIN — AMLODIPINE BESYLATE 5 MG: 5 TABLET ORAL at 09:00

## 2024-08-18 RX ADMIN — HYDRALAZINE HYDROCHLORIDE 25 MG: 25 TABLET ORAL at 14:18

## 2024-08-18 RX ADMIN — ACETAMINOPHEN 650 MG: 325 TABLET, FILM COATED ORAL at 11:20

## 2024-08-18 RX ADMIN — HYDRALAZINE HYDROCHLORIDE 25 MG: 25 TABLET ORAL at 05:01

## 2024-08-18 RX ADMIN — ACETAMINOPHEN 650 MG: 325 TABLET, FILM COATED ORAL at 05:01

## 2024-08-18 RX ADMIN — METOPROLOL TARTRATE 25 MG: 25 TABLET, FILM COATED ORAL at 08:59

## 2024-08-18 RX ADMIN — Medication 10 ML: at 09:01

## 2024-08-18 NOTE — CASE MANAGEMENT/SOCIAL WORK
Case Management Discharge Note      Final Note: [facility]         Selected Continued Care - Discharged on 8/18/2024 Admission date: 8/14/2024 - Discharge disposition: Rehab Facility or Unit (DC - External)      Destination Coordination complete.      Service Provider Selected Services Address Phone Fax Patient Preferred    [facility] [address] [place] [address] [place] [address] [phone] --               Transportation Services  Private: Car    Final Discharge Disposition Code: 62 - inpatient rehab facility

## 2024-08-18 NOTE — CASE MANAGEMENT/SOCIAL WORK
Continued Stay Note  [facility]     Patient Name: Esperanza Smith  MRN: [id number]  Today's Date: 8/18/2024    Admit Date: 8/14/2024    Plan: Return to [facility]. No precert or PASRR required.   Discharge Plan       Row Name 08/18/24 1441       Plan    Plan Return to [facility]. No precert or PASRR required.    Patient/Family in Agreement with Plan yes    Plan Comments CM notified by Dr. Newby that pt now on PO abx and can return to [facility]. CM verified with facility liaison, Spring, that bed would be available after 5pm. Dr. Newby and LIS Farfan made aware. D/C orders placed by Dr. Newby. Family to transport at d/c.             Expected Discharge Date and Time       Expected Discharge Date Expected Discharge Time    Aug 18, 2024           Jyoti Chen RN     Office Phone: [phone]  Office Cell: [phone]

## 2024-08-18 NOTE — DISCHARGE SUMMARY
[facility] Medicine Services  Discharge Summary    Date of Service: 24  Patient Name: Esperanza Smith  : 1945  MRN: [id number]    Date of Admission: 2024  Discharge Diagnosis: Sepsis,  Date of Discharge:  24  Primary Care Physician: Dom Estrada MD    Presenting Problem:   Neck pain [M54.2]  Sepsis [A41.9]  Fever, unspecified fever cause [R50.9]  Nonintractable headache, unspecified chronicity pattern, unspecified headache type [R51.9]    Active and Resolved Hospital Problems:  Active Hospital Problems    Diagnosis POA    **Sepsis [A41.9] Yes    Obstructive sleep apnea syndrome [G47.33] Yes    Other fatigue [R53.83] Yes    Vitamin D deficiency [E55.9] Yes    Hyperlipidemia [E78.5] Yes    Hypertension [I10] Yes    Overweight [E66.3] Yes      Resolved Hospital Problems   No resolved problems to display.       Sepsis, Left lower lobe pneumonia.  MRSA screen was negative.  Urine was negative for pneumococcal and Legionella antigen.  The patient is currently on 1 L of oxygen  -Patient presents with fever, leukocytosis  -Has recent cervical spine surgery with complication of meningocele that required drainage   -Imaging of the cervical spine here shows reduced in size meningocele  -Neurosurgery evaluation; they do not feel that this is infected  -Chest x-ray concerning for possible infiltrate  -CT of the chest-Findings suggest the presence of a patent enterocutaneous fistula at the level of the umbilicus, with enteric contrast extending to the superficial margin of the abdominal wound..   2. Thick bandlike atelectatic changes within the lingula, right middle lobe, left lower lobe.   -Patient also has known enterocutaneous fistula; plan for CT angiogram of the abdomen  -ID consult appreciated  -Initiated on IV vancomycin, cefepime> to p.o. antibiotic     DM type II, controlled  -Continue sliding scale insulin and Lantus     Hypothyroidism  -Continue Synthroid      Hypertension  -Continue home medications for BP control     Acute hypokalemia  -Repleted     Nonsignificant hyponatremia  -Likely secondary to mild volume depletion  -Continue IV hydration     Obesity  -Counseled patient on diet and exercise to improve weight loss and comorbid conditions    Hospital Course     HPI-Esperanza Smith is a 78 y.o. female with PMHx of HTN, HLD, hypothyroid, obesity, DM presented to [facility] for headache.  Of note, patient had c2-c4 laminectomy for resection of cervical meningioma of 7/16/2024 that was complicated by large pseudomeningiocele that underwent evacuation and dural repair with drain placement on 7/31/2024.  Admits to headache over the past 2 days complicated by neck pain, photophobia, and low grade fever that started today.  Denies vision change, chest pain,vomiting, diarrhea.  Due to worsening condition she presented to [facility] for evaluation.      ED course: In the ER, vitals 101.4F, , RR 16, /96, 96% RA.  Labs notable for sodium 133, K 3.4, Cl 95, glucose 149, lactic 0.9, wbc 14.09.  MRI cervical spine showed 5.4cm fluid collection within laminectomy bed extending from C1-C5 that has decreased in size which could represent pseudomeningocele, CSF leak, or post-op seroma along with stable mass effect on posterior thecal sac most prominent at C3-C4 where there is mod-severe spinal canal stenosis without evidence of cord compression.  Patient treated with IV abx, 500ml bolus, and pain meds.  She is to be admitted to hospitalist for abx and neurosurgery consult.       Patient was seen and examined on 08/14/24 at 18:51 EDT .     Interval History:   8/15/24-Patient states that her neck and back pain are better today although still somewhat present.  She denies any difficulty breathing.  She denies any increased output of her abdominal enterocutaneous fistula.  8/16/24 patient seen and examined in bed no acute distress, vital signs stable, tolerating antibiotics, to go home  will discharge.  8/17/24 seen In bed NAD, no new complaints, CHAI RN.vss  8/18/24 patient seen and examined in bed no acute distress, no complaints, will discharge patient to rehab.  Condition at discharge stable.    DISCHARGE Follow Up Recommendations for labs and diagnostics: Follow-up with PCP in a week.    Reasons For Change In Medications and Indications for New Medications:      Day of Discharge     Vital Signs:  Temp:  [97.7 °F (36.5 °C)-98.9 °F (37.2 °C)] 98 °F (36.7 °C)  Heart Rate:  [] 84  Resp:  [16-33] 16  BP: (122-149)/() 122/74  Flow (L/min):  [1-1.5] 1    Physical Exam General Appearance:  Alert, cooperative, no distress, appears stated age  Head:   Normocephalic, without obvious abnormality, atraumatic  Eyes:   PERRL, conjunctiva/corneas clear, EOM's intact, fundi benign, both eyes  Ears:    Normal TM's and external ear canals, both ears  Nose:Nares normal, septum midline, mucosa normal, no drainage or sinus tenderness  Throat:Lips, mucosa, and tongue normal; teeth and gums normal  Neck:Supple, symmetrical, trachea midline, no adenopathy, thyroid: not enlarged, symmetric, no tenderness/mass/nodules, no carotid bruit or JVD  Lungs:             Clear to auscultation bilaterally, respirations unlabored  Heart:  Regular rate and rhythm, S1, S2 normal, no murmur, rub or gallop  Abdomen:  Soft, non-tender, bowel sounds active all four quadrants,  no masses, no organomegaly, enterocutaneous fistula with drainage  Extremities:Extremities normal, atraumatic, no cyanosis or edema  Pulses:2+ and symmetric  Skin:Cervical spine surgical incision intact  Neurologic: Normal    Pertinent  and/or Most Recent Results     LAB RESULTS:      Lab 08/18/24  0500 08/17/24  0029 08/16/24  0523 08/15/24  0732 08/14/24  1818 08/14/24  1408 08/14/24  0453   WBC 9.29 11.41* 11.92* 12.73*  --  14.09* 13.08*   HEMOGLOBIN 12.6 12.7 13.4 13.8  --  12.9 12.4   HEMATOCRIT 37.3 38.4 39.2 41.5  --  39.4 37.9   PLATELETS 307  323 290 279  --  304 294   NEUTROS ABS  --  7.58* 9.09*  --   --  11.56* 9.10*   IMMATURE GRANS (ABS)  --  0.07* 0.10*  --   --  0.10* 0.10*   LYMPHS ABS  --  2.07 1.37  --   --  1.08 1.93   MONOS ABS  --  1.46* 1.26*  --   --  1.29* 1.69*   EOS ABS  --  0.19 0.07  --   --  0.03 0.21   MCV 94.7 95.0 93.1 94.7  --  96.8 97.2*   PROCALCITONIN  --   --   --   --   --  0.10  --    LACTATE  --   --   --   --  0.9  --   --          Lab 08/17/24  0029 08/16/24  0523 08/15/24  0732 08/14/24  1408 08/14/24  0453   SODIUM 127* 131* 132* 133* 135*   POTASSIUM 4.0 3.4* 3.3* 3.4* 3.7   CHLORIDE 95* 94* 95* 95* 97*   CO2 20.7* 22.8 24.3 24.4 26.0   ANION GAP 11.3 14.2 12.7 13.6 12.0   BUN 8 5* 4* 6* 6*   CREATININE 0.36* 0.35* 0.36* 0.48* 0.45*   EGFR 105.4 106.1 105.4 98.3 99.8   GLUCOSE 120* 122* 153* 149* 96   CALCIUM 9.2 9.4 9.2 9.6 9.7         Lab 08/16/24  0523   TOTAL PROTEIN 6.7   ALBUMIN 3.8   GLOBULIN 2.9   ALT (SGPT) 20   AST (SGOT) 20   BILIRUBIN 0.5   ALK PHOS 63                     Brief Urine Lab Results  (Last result in the past 365 days)        Color   Clarity   Blood   Leuk Est   Nitrite   Protein   CREAT   Urine HCG        08/14/24 1348 Yellow   Clear   Negative   Trace   Negative   Negative                 Microbiology Results (last 10 days)       Procedure Component Value - Date/Time    MRSA Screen, PCR (Inpatient) - Swab, Nares [[id number]]  (Normal) Collected: 08/15/24 1418    Lab Status: Final result Specimen: Swab from Nares Updated: 08/15/24 1551     MRSA PCR No MRSA Detected    Narrative:      The negative predictive value of this diagnostic test is high and should only be used to consider de-escalating anti-MRSA therapy. A positive result may indicate colonization with MRSA and must be correlated clinically.    Blood Culture - Blood, Arm, Left [[id number]]  (Normal) Collected: 08/14/24 1756    Lab Status: Preliminary result Specimen: Blood from Arm, Left Updated: 08/17/24 1800     Blood Culture No growth  at 3 days    Blood Culture - Blood, Arm, Right [[id number]]  (Normal) Collected: 08/14/24 1756    Lab Status: Preliminary result Specimen: Blood from Arm, Right Updated: 08/17/24 1800     Blood Culture No growth at 3 days    COVID-19 and FLU A/B PCR, 1 HR TAT - Swab, Nasopharynx [[id number]]  (Normal) Collected: 08/14/24 1348    Lab Status: Final result Specimen: Swab from Nasopharynx Updated: 08/14/24 1436     COVID19 Not Detected     Influenza A PCR Not Detected     Influenza B PCR Not Detected    Narrative:      Fact sheet for providers: https://www.fda.gov/media/682387/download    Fact sheet for patients: https://www.fda.gov/media/115037/download    Test performed by PCR.    S. Pneumo Ag Urine or CSF - Urine, Urine, Clean Catch [[id number]]  (Normal) Collected: 08/14/24 1348    Lab Status: Final result Specimen: Urine, Clean Catch Updated: 08/15/24 1415     Strep Pneumo Ag Negative    Legionella Antigen, Urine - Urine, Urine, Clean Catch [[id number]]  (Normal) Collected: 08/14/24 1348    Lab Status: Final result Specimen: Urine, Clean Catch Updated: 08/15/24 1415     LEGIONELLA ANTIGEN, URINE Negative            CT Abdomen Pelvis With Contrast    Result Date: 8/16/2024  Impression: 1. Findings suggest the presence of a patent enterocutaneous fistula at the level of the umbilicus, with enteric contrast extending to the superficial margin of the abdominal wound.. 2. Thick bandlike atelectatic changes within the lingula, right middle lobe, left lower lobe. 3. Additional ancillary findings as described above. Electronically Signed: Marlee Romano MD  8/16/2024 1:02 PM EDT  Workstation ID: [id number]    CT Chest With Contrast Diagnostic    Result Date: 8/16/2024  Impression: 1. Findings suggest the presence of a patent enterocutaneous fistula at the level of the umbilicus, with enteric contrast extending to the superficial margin of the abdominal wound.. 2. Thick bandlike atelectatic changes within the lingula, right  middle lobe, left lower lobe. 3. Additional ancillary findings as described above. Electronically Signed: Marlee Romano MD  8/16/2024 1:02 PM EDT  Workstation ID: [id number]    XR Chest 1 View    Result Date: 8/14/2024  Impression: Impression: 1. Left basilar airspace disease may relate to atelectasis and/or pneumonia with small left pleural effusion. 2. Nodular density overlying the right upper lung measuring 5 mm. This could relate to pulmonary nodule versus superimposed structures or area of parenchymal scarring, consider PA and lateral chest radiograph when patient clinically stable otherwise chest CT may be considered for further assessment. 3. Chronic elevation of the right hemidiaphragm unchanged. Electronically Signed: Roger Hollis MD  8/14/2024 6:30 PM EDT  Workstation ID: [id number]    MRI Cervical Spine With & Without Contrast    Result Date: 8/14/2024  Impression: Impression: 5.4 cm fluid collection within laminectomy bed extending from C1-C5 has decreased in size, which could represent pseudomeningocele, CSF leak, or post-operative seroma. Stable mass effect on posterior thecal sac most prominent at C3-4 where there is moderate to severe spinal canal stenosis. No evidence of cord compression. Electronically Signed: Ruperto Wakefield MD  8/14/2024 5:27 PM EDT  Workstation ID: [id number]    CT Abdomen Pelvis With Contrast    Result Date: 8/6/2024  Impression: 1.Postoperative changes are again noted. A residual wound is noted within the midline in the anterior abdominal wall within the umbilical region. There is fluid within the wound. Multiple bowel loops are again seen within the anterior aspect of the intraperitoneal cavity underlying the region of the anterior abdominal wall wound. Surgical suture material is noted along the base of the wound and along the anterior margin of the underlying bowel within this region. There is no definitive extravasation of contrast at this time. No well-defined perforation is  seen. Residual fluid is seen within the wound. 2.There is no evidence for significant free fluid or abnormal fluid collection within the intraperitoneal space. There is no abnormal extravasation of oral contrast into the intraperitoneal space. No free air is observed. Electronically Signed: Moe Weston MD  8/6/2024 11:56 AM EDT  Workstation ID: [id number]    CT Head Without Contrast    Result Date: 8/3/2024  Impression: Impression: No evidence of hemorrhage, mass effect or midline shift. No acute intracranial process identified. Electronically Signed: Jeni Patterson MD  8/3/2024 4:04 AM EDT  Workstation ID: [id number]    MRI Cervical Spine With & Without Contrast    Result Date: 7/29/2024  Impression: Impression: Large fluid collection noted within the posterior aspect of the neck at the site of C1, C2, C3 and C4 laminectomy, has increased in size since prior study, now measuring up to 8.1 cm in the largest dimension. While this may represent a seroma, given its increase in size, this is highly concerning for a pseudomeningocele or CSF leak. Given the level of peripheral enhancement, this is less likely to represent an abscess, although it is not completely excluded. The expanding fluid collection in the posterior neck causes mild mass effect on the posterior aspect of the spinal cord with now moderate to severe spinal canal stenosis and possible mild cord compression noted at the level of C3-C4. This has mildly worsened since prior study. Additional findings as characterized above, not significantly changed since prior study Electronically Signed: Uri Birch DO  7/29/2024 7:40 PM EDT  Workstation ID: [id number]                 Labs Pending at Discharge:  Pending Labs       Order Current Status    Blood Culture - Blood, Arm, Left Preliminary result    Blood Culture - Blood, Arm, Right Preliminary result            Procedures Performed           Consults:   Consults       Date and Time Order Name Status  Description    8/14/2024  8:06 PM Inpatient Infectious Diseases Consult Completed     8/14/2024  5:50 PM Hospitalist (on-call MD unless specified)      8/14/2024  5:36 PM Neurosurgery (on-call MD unless specified) Completed     8/5/2024  1:49 PM Inpatient Gastroenterology Consult Completed     8/5/2024 11:11 AM Inpatient General Surgery Consult Completed     8/5/2024 10:07 AM Inpatient Hospitalist Consult Completed     8/3/2024  9:01 AM Inpatient Psychiatrist Consult Completed     7/31/2024  1:06 PM Inpatient Intensivist Consult Completed     7/29/2024  8:27 PM Neurosurgery (on-call MD unless specified) Completed     7/16/2024  2:02 PM Inpatient Hospitalist Consult Completed           Assessment     Left lower lobe pneumonia.  MRSA screen was negative.  Urine was negative for pneumococcal and Legionella antigen.  The patient is currently on 2 L of oxygen     Fever on admission.  Most likely secondary to above.  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  Fluid cultures were negative.  Patient currently oriented x 3 with no obvious signs of CNS infection.  MRI of the cervical spine showed 5.4 fluid collection in the laminectomy bed.  Neurosurgery service seen the patient and does not suspect active infection and signed off     Plan     Discontinue IV cefepime   Tomorrow, start p.o. Omnicef 300 mg twice daily for 3 days for 7 days of treatment  Continue supportive care  Not much more to add from infectious disease standpoint-we will sign off at this time-please call with any questions.        Alexa Mckenzie,  APRN  08/17/24      Discharge Details        Discharge Medications        Changes to Medications        Instructions Start Date   cefdinir 300 MG capsule  Commonly known as: OMNICEF  What changed: when to take this   300 mg, Oral, Every 12 Hours Scheduled             Continue These Medications        Instructions Start Date   acetaminophen 325 MG tablet  Commonly known as: TYLENOL   650 mg, Oral, Every 6 Hours PRN      aluminum-magnesium hydroxide-simethicone 200-200-20 MG/5ML suspension  Commonly known as: MAALOX/MYLANTA   30 mL, Oral, 3 Times Daily PRN      amLODIPine 5 MG tablet  Commonly known as: NORVASC   5 mg, Oral, Daily      bisacodyl 5 MG EC tablet  Commonly known as: DULCOLAX   5 mg, Oral, Daily PRN      butalbital-acetaminophen-caffeine -40 MG per tablet  Commonly known as: FIORICET, ESGIC   2 tablets, Oral, 3 Times Daily PRN      calcium carbonate 500 MG chewable tablet  Commonly known as: TUMS   1 tablet, Oral, 3 Times Daily PRN      cyclobenzaprine 10 MG tablet  Commonly known as: FLEXERIL   10 mg, Oral, 3 Times Daily PRN      cyclobenzaprine 10 MG tablet  Commonly known as: FLEXERIL   10 mg, Oral, Every Night at Bedtime, Patient is on both prn and scheduled cyclobenzaprine-keep at least 6 hours between all doses      glucagon 1 MG injection  Commonly known as: GLUCAGEN   1 mg, Subcutaneous, Once As Needed      hydrALAZINE 25 MG tablet  Commonly known as: APRESOLINE   25 mg, Oral, Daily      hydrALAZINE 25 MG tablet  Commonly known as: APRESOLINE   25 mg, Oral, 3 Times Daily PRN      HYDROcodone-acetaminophen 5-325 MG per tablet  Commonly known as: NORCO   1 tablet, Oral, Every 6 Hours PRN      levothyroxine 50 MCG tablet  Commonly known as: SYNTHROID, LEVOTHROID   50 mcg, Oral, Daily      magnesium hydroxide 2400 MG/10ML suspension suspension  Commonly known as: MILK OF MAGNESIA   30 mL, Oral, Daily PRN, Administer if no BM x 3 days and after prune juice is given and ineffective after 8 hours       melatonin 5 MG tablet tablet   5 mg, Oral, Nightly PRN      metFORMIN 500 MG tablet  Commonly known as: GLUCOPHAGE   500 mg, Oral, 2 Times Daily With Meals      metoprolol tartrate 25 MG tablet  Commonly known as: LOPRESSOR   25 mg, Oral, Daily      MiraLax 17 g packet  Generic drug: polyethylene glycol   17 g, Oral, Daily PRN      ondansetron 4 MG/2ML injection  Commonly known as: ZOFRAN   4 mg, Intravenous, Every 4 Hours PRN      simethicone 80 MG chewable tablet  Commonly known as: MYLICON   80 mg, Oral, 3 Times Daily PRN      tamsulosin 0.4 MG capsule 24 hr capsule  Commonly known as: FLOMAX   0.4 mg, Oral, Daily      vitamin C 250 MG tablet  Commonly known as: ASCORBIC ACID   250 mg, Oral, Daily               Allergies   Allergen Reactions    Diphenhydramine Rash    Morphine Other (See Comments)     nightmares    Penicillins Other (See Comments)     told not to take any more    Latex Rash         Discharge Disposition:   Rehab Facility or Unit (DC - External)    Diet:  Hospital:  Diet Order   Procedures    Diet: Diabetic; Consistent Carbohydrate; Fluid Consistency: Thin (IDDSI 0)         Discharge Activity:   Activity Instructions       Gradually Increase Activity Until at Pre-Hospitalization Level                CODE STATUS:  Code Status and Medical Interventions: CPR (Attempt to Resuscitate); Full Support   Ordered at: 08/14/24 2006     Code Status (Patient has no pulse and is not breathing):    CPR (Attempt to Resuscitate)     Medical Interventions (Patient has pulse or is breathing):    Full Support         Future Appointments   Date Time Provider Department Center   12/2/2024  9:50 AM LAB [facility]   12/9/2024  1:30 PM Dom Estrada MD [facility]       Additional Instructions for the Follow-ups that You Need to Schedule       Discharge Follow-up with PCP   As directed       Currently Documented PCP:    Dom Estrada MD    PCP Phone Number:    [phone]      Follow Up Details: 1 week                Time spent on Discharge including face to face service:  >30 minutes    Signature: Electronically signed by Shadi Newby MD, 08/18/24, 14:28 EDT.  [facility] Hospitalist Team

## 2024-08-19 LAB
BACTERIA SPEC AEROBE CULT: NORMAL
BACTERIA SPEC AEROBE CULT: NORMAL

## 2024-08-24 NOTE — PROGRESS NOTES
"Enter Query Response Below      Query Response: Bacterial pneumonia unspecified Electronically signed by Shadi Newby MD, 24, 4:12 PM EDT.              If applicable, please update the problem list.       Patient: Esperanza Smith        : 1945  Account: [id number]           Admit Date: 2024        How to Respond to this query:       a. Click New Note     b. Answer query within the yellow box.                c. Update the Problem List, if applicable.      If you have any questions about this query contact me at: [email or web address]    Dr. Newby,     77 y/o Female admitted on 2024 for Sepsis and pneumonia.  Patient with noted history of DM and Asthma.  Per H&P \"patient had c2-c4 laminectomy for resection of cervical meningioma of 2024 that was complicated by large pseudomeningiocele that underwent evacuation and dural repair with drain placement on 2024\".  Per discharge summary dated 2024 \"Sepsis, Left lower lobe pneumonia.\"  Chest Xray resulted 2024 \"Left basilar airspace disease may relate to atelectasis and/or pneumonia with small left pleural effusion\".  Treatment included IV Vancomycin 2024-2024, IV Cefepime 2024-2024.  Patient discharged home on Cefdinir to complete 7 days of treatment.     Please clarify the type of pneumonia the patient was treated/monitored for:    -Gram negative pneumonia (excluding Haemophilus influenzae)  -Bacterial pneumonia unspecified  -Other- specify______  -Unable to determine    By submitting this query, we are merely seeking further clarification of documentation to accurately reflect all conditions that you are monitoring, evaluating, treating or that extend the hospitalization or utilize additional resources of care. Please utilize your independent clinical judgment when addressing the question(s) above.     This query and your response, once completed, will be entered into the legal medical " record.    Sincerely,  Lala Díaz  Clinical Documentation Integrity Program

## 2024-08-29 ENCOUNTER — TELEPHONE (OUTPATIENT)
Dept: CASE MANAGEMENT | Facility: CLINIC | Age: 79
End: 2024-08-29
Payer: MEDICARE

## 2024-08-29 NOTE — TELEPHONE ENCOUNTER
Attempted to contact [facility] to determine if patient is still present and anticipated D/C date. No answer, LMTCB.  Last requisition from [facility] was 8/27/24.

## 2024-09-05 ENCOUNTER — PATIENT OUTREACH (OUTPATIENT)
Dept: CASE MANAGEMENT | Facility: CLINIC | Age: 79
End: 2024-09-05
Payer: MEDICARE

## 2024-09-05 NOTE — OUTREACH NOTE
AMBULATORY CASE MANAGEMENT NOTE    Names and Relationships of Patient/Support Persons: Contact: [facility] - Lola; Relationship:  -     Care Coordination    Spoke with Lola at [facility] at this time, she verified patient was no longer admitted with their facility.  Lola transferred ACM to medical records to see when patient was discharged and where to.  Spoke with medical records at [facility], they states patient was discharged 8/29/24 to home with [facility].  Will f/u with patient.    Patient Outreach    Spoke with patient and patient's  Antelmo at this time, identified self and role.  Patient states she is not doing well since being discharged from [facility].  She states she has fallen twice since being home and her  has had to help her back up and pulled his back the last time.   reports that she is very weak from her hospitalizations and rehab stays.  She denies any injuries to her surgical site or any other injuries.  She reports that [facility] has been out already and is supposed to call her to schedule their next visit.  Discussed fall prevention and when to seek immediate medical attention if another fall occurs.   states he does not believe patient was ready to be discharged from [facility], especially considering the 2 falls she has had.      Discussed returning to rehab,  states patient is adamantly against returning to rehab right now.  He reports he is ensuring she does the exercises she is supposed to and hoping that she starts regaining strength through therapy.  Advise that he reach back out to ACM/PCP office if patient decides she would like to go back to rehab.  Offered hospital f/u appt with PCP, patient and  decline at this time, states the patient is so weak she would not be able to come in for the appt.  Offered telehealth appt,  declines at this time.  Advised that he contact PCP office if he would like to schedule an appt with PCP.   denied any needs at home with  assistance with patient at this time.  No further needs noted.    PLAN:  30D CR  Doing better?  New hospitalizations/ER visit/Rehab stay?  Seen PCP?    Education Documentation  Unresolved/Worsening Symptoms, taught by Jeannette Ralph RN at 9/5/2024 10:50 AM.  Learner: Significant Other, Patient  Readiness: Acceptance  Method: Explanation  Response: Verbalizes Understanding    Safety, taught by Jeannette Ralph RN at 9/5/2024 10:50 AM.  Learner: Significant Other, Patient  Readiness: Acceptance  Method: Explanation  Response: Verbalizes Understanding    Energy Conservation, taught by Jeannette Ralph RN at 9/5/2024 10:50 AM.  Learner: Significant Other, Patient  Readiness: Acceptance  Method: Explanation  Response: Verbalizes Understanding    Risk Factors, taught by Jeannette Ralph RN at 9/5/2024 10:50 AM.  Learner: Significant Other, Patient  Readiness: Acceptance  Method: Explanation  Response: Verbalizes Understanding        Jeannette KAUR  Ambulatory Case Management    9/5/2024, 10:32 EDT

## 2024-09-10 ENCOUNTER — TELEPHONE (OUTPATIENT)
Dept: FAMILY MEDICINE CLINIC | Facility: CLINIC | Age: 79
End: 2024-09-10
Payer: MEDICARE

## 2024-09-23 RX ORDER — HYDRALAZINE HYDROCHLORIDE 25 MG/1
25 TABLET, FILM COATED ORAL 3 TIMES DAILY PRN
Qty: 90 TABLET | Refills: 6 | Status: SHIPPED | OUTPATIENT
Start: 2024-09-23 | End: 2024-09-24

## 2024-09-23 RX ORDER — AMLODIPINE BESYLATE 5 MG/1
5 TABLET ORAL DAILY
Qty: 90 TABLET | Refills: 2 | Status: SHIPPED | OUTPATIENT
Start: 2024-09-23

## 2024-09-24 RX ORDER — HYDRALAZINE HYDROCHLORIDE 25 MG/1
TABLET, FILM COATED ORAL
Qty: 270 TABLET | Refills: 1 | Status: SHIPPED | OUTPATIENT
Start: 2024-09-24

## 2024-10-02 ENCOUNTER — TELEPHONE (OUTPATIENT)
Dept: FAMILY MEDICINE CLINIC | Facility: CLINIC | Age: 79
End: 2024-10-02
Payer: MEDICARE

## 2024-10-02 RX ORDER — METOPROLOL SUCCINATE 50 MG/1
50 TABLET, EXTENDED RELEASE ORAL DAILY
Qty: 90 TABLET | Refills: 1 | Status: SHIPPED | OUTPATIENT
Start: 2024-10-02 | End: 2025-03-31

## 2024-10-02 NOTE — TELEPHONE ENCOUNTER
[facility] ADVISED HEART RATE OUT OF LIMIT: 110 AT REST. PT IS OUT OF METOPROLOL, REQUESTS REFILL.

## 2024-10-03 ENCOUNTER — TELEPHONE (OUTPATIENT)
Dept: FAMILY MEDICINE CLINIC | Facility: CLINIC | Age: 79
End: 2024-10-03
Payer: MEDICARE

## 2024-10-03 NOTE — TELEPHONE ENCOUNTER
RELAY    I attempted to return their call and was not able to speak with anyone. This is a scam company and we will not sign or send anything back to them.

## 2024-10-03 NOTE — TELEPHONE ENCOUNTER
Caller: ALEX GAITAN    Relationship: CAMILLE    Wale call back number [phone]     What was the call regarding:   FAXED OVER AN ORDER FOR A COMPHRENSIVE NEUROLOGICAL SCREENING.  PLEASE ADVISE

## 2024-10-03 NOTE — TELEPHONE ENCOUNTER
I attempted to return their call and was not able to speak with anyone. This is a scam company and we will not sign or send anything back to them. Thank you.

## 2024-10-07 RX ORDER — NYSTATIN AND TRIAMCINOLONE ACETONIDE 100000; 1 [USP'U]/G; MG/G
1 CREAM TOPICAL 2 TIMES DAILY
Qty: 30 G | Refills: 2 | Status: SHIPPED | OUTPATIENT
Start: 2024-10-07 | End: 2024-10-07 | Stop reason: SDUPTHER

## 2024-10-07 RX ORDER — NYSTATIN AND TRIAMCINOLONE ACETONIDE 100000; 1 [USP'U]/G; MG/G
1 CREAM TOPICAL 2 TIMES DAILY
Qty: 30 G | Refills: 2 | Status: SHIPPED | OUTPATIENT
Start: 2024-10-07

## 2024-10-16 ENCOUNTER — TELEPHONE (OUTPATIENT)
Dept: FAMILY MEDICINE CLINIC | Facility: CLINIC | Age: 79
End: 2024-10-16
Payer: MEDICARE

## 2024-10-16 NOTE — TELEPHONE ENCOUNTER
Piter with [facility] called to let you know that he is discharging patient from home health. Her fistula is all healed up and is no longer home bound. Just wanted you to know.

## 2024-10-17 NOTE — TELEPHONE ENCOUNTER
Patient called about if she can take vitamins. I let her know that we tell people no due to the surgery. She stated she would like me to ask the provider due to her stomach issues    
129

## 2024-11-22 NOTE — TELEPHONE ENCOUNTER
GAVE VERBAL ORDERS FOR WOUND CARE. [facility] ADVISED HOME HEALTH IS ORDERING A WOUND PATCH FOR A FISTULA.  
Vaccine status unknown

## 2024-12-02 ENCOUNTER — LAB (OUTPATIENT)
Dept: FAMILY MEDICINE CLINIC | Facility: CLINIC | Age: 79
End: 2024-12-02
Payer: MEDICARE

## 2024-12-02 DIAGNOSIS — E53.8 VITAMIN B12 DEFICIENCY: ICD-10-CM

## 2024-12-02 DIAGNOSIS — E55.9 VITAMIN D DEFICIENCY: ICD-10-CM

## 2024-12-02 DIAGNOSIS — R73.09 ELEVATED GLUCOSE: ICD-10-CM

## 2024-12-02 DIAGNOSIS — I10 PRIMARY HYPERTENSION: ICD-10-CM

## 2024-12-02 DIAGNOSIS — E78.2 MIXED HYPERLIPIDEMIA: Primary | ICD-10-CM

## 2024-12-02 DIAGNOSIS — R30.0 DYSURIA: ICD-10-CM

## 2024-12-02 LAB
25(OH)D3 SERPL-MCNC: 58.9 NG/ML (ref 30–100)
ALBUMIN SERPL-MCNC: 4.5 G/DL (ref 3.5–5.2)
ALBUMIN/GLOB SERPL: 1.5 G/DL
ALP SERPL-CCNC: 98 U/L (ref 39–117)
ALT SERPL W P-5'-P-CCNC: 33 U/L (ref 1–33)
ANION GAP SERPL CALCULATED.3IONS-SCNC: 14.6 MMOL/L (ref 5–15)
AST SERPL-CCNC: 31 U/L (ref 1–32)
BASOPHILS # BLD AUTO: 0.04 10*3/MM3 (ref 0–0.2)
BASOPHILS NFR BLD AUTO: 0.6 % (ref 0–1.5)
BILIRUB SERPL-MCNC: 0.4 MG/DL (ref 0–1.2)
BILIRUB UR QL STRIP: NEGATIVE
BUN SERPL-MCNC: 10 MG/DL (ref 8–23)
BUN/CREAT SERPL: 15.2 (ref 7–25)
CALCIUM SPEC-SCNC: 10.2 MG/DL (ref 8.6–10.5)
CHLORIDE SERPL-SCNC: 98 MMOL/L (ref 98–107)
CHOLEST SERPL-MCNC: 182 MG/DL (ref 0–200)
CLARITY UR: CLEAR
CO2 SERPL-SCNC: 25.4 MMOL/L (ref 22–29)
COLOR UR: YELLOW
CREAT SERPL-MCNC: 0.66 MG/DL (ref 0.57–1)
DEPRECATED RDW RBC AUTO: 40 FL (ref 37–54)
EGFRCR SERPLBLD CKD-EPI 2021: 90.5 ML/MIN/1.73
EOSINOPHIL # BLD AUTO: 0.15 10*3/MM3 (ref 0–0.4)
EOSINOPHIL NFR BLD AUTO: 2.3 % (ref 0.3–6.2)
ERYTHROCYTE [DISTWIDTH] IN BLOOD BY AUTOMATED COUNT: 12 % (ref 12.3–15.4)
GLOBULIN UR ELPH-MCNC: 3.1 GM/DL
GLUCOSE SERPL-MCNC: 95 MG/DL (ref 65–99)
GLUCOSE UR STRIP-MCNC: NEGATIVE MG/DL
HBA1C MFR BLD: 6.1 % (ref 4.8–5.6)
HCT VFR BLD AUTO: 47.4 % (ref 34–46.6)
HDLC SERPL-MCNC: 58 MG/DL (ref 40–60)
HGB BLD-MCNC: 15.5 G/DL (ref 12–15.9)
HGB UR QL STRIP.AUTO: NEGATIVE
HOLD SPECIMEN: NORMAL
IMM GRANULOCYTES # BLD AUTO: 0.02 10*3/MM3 (ref 0–0.05)
IMM GRANULOCYTES NFR BLD AUTO: 0.3 % (ref 0–0.5)
KETONES UR QL STRIP: NEGATIVE
LDLC SERPL CALC-MCNC: 107 MG/DL (ref 0–100)
LDLC/HDLC SERPL: 1.81 {RATIO}
LEUKOCYTE ESTERASE UR QL STRIP.AUTO: NEGATIVE
LYMPHOCYTES # BLD AUTO: 1.82 10*3/MM3 (ref 0.7–3.1)
LYMPHOCYTES NFR BLD AUTO: 28.2 % (ref 19.6–45.3)
MCH RBC QN AUTO: 29.9 PG (ref 26.6–33)
MCHC RBC AUTO-ENTMCNC: 32.7 G/DL (ref 31.5–35.7)
MCV RBC AUTO: 91.3 FL (ref 79–97)
MONOCYTES # BLD AUTO: 0.54 10*3/MM3 (ref 0.1–0.9)
MONOCYTES NFR BLD AUTO: 8.4 % (ref 5–12)
NEUTROPHILS NFR BLD AUTO: 3.88 10*3/MM3 (ref 1.7–7)
NEUTROPHILS NFR BLD AUTO: 60.2 % (ref 42.7–76)
NITRITE UR QL STRIP: NEGATIVE
NRBC BLD AUTO-RTO: 0 /100 WBC (ref 0–0.2)
PH UR STRIP.AUTO: 8 [PH] (ref 5–8)
PLATELET # BLD AUTO: 248 10*3/MM3 (ref 140–450)
PMV BLD AUTO: 11.3 FL (ref 6–12)
POTASSIUM SERPL-SCNC: 4 MMOL/L (ref 3.5–5.2)
PROT SERPL-MCNC: 7.6 G/DL (ref 6–8.5)
PROT UR QL STRIP: NEGATIVE
RBC # BLD AUTO: 5.19 10*6/MM3 (ref 3.77–5.28)
SODIUM SERPL-SCNC: 138 MMOL/L (ref 136–145)
SP GR UR STRIP: 1.01 (ref 1–1.03)
TRIGL SERPL-MCNC: 95 MG/DL (ref 0–150)
TSH SERPL DL<=0.05 MIU/L-ACNC: 2.06 UIU/ML (ref 0.27–4.2)
UROBILINOGEN UR QL STRIP: NORMAL
VIT B12 BLD-MCNC: 379 PG/ML (ref 211–946)
VLDLC SERPL-MCNC: 17 MG/DL (ref 5–40)
WBC NRBC COR # BLD AUTO: 6.45 10*3/MM3 (ref 3.4–10.8)

## 2024-12-02 PROCEDURE — 82306 VITAMIN D 25 HYDROXY: CPT | Performed by: FAMILY MEDICINE

## 2024-12-02 PROCEDURE — 36415 COLL VENOUS BLD VENIPUNCTURE: CPT

## 2024-12-02 PROCEDURE — 80053 COMPREHEN METABOLIC PANEL: CPT | Performed by: FAMILY MEDICINE

## 2024-12-02 PROCEDURE — 80061 LIPID PANEL: CPT | Performed by: FAMILY MEDICINE

## 2024-12-02 PROCEDURE — 82607 VITAMIN B-12: CPT | Performed by: FAMILY MEDICINE

## 2024-12-02 PROCEDURE — 85025 COMPLETE CBC W/AUTO DIFF WBC: CPT | Performed by: FAMILY MEDICINE

## 2024-12-02 PROCEDURE — 84443 ASSAY THYROID STIM HORMONE: CPT | Performed by: FAMILY MEDICINE

## 2024-12-02 PROCEDURE — 83036 HEMOGLOBIN GLYCOSYLATED A1C: CPT | Performed by: FAMILY MEDICINE

## 2024-12-02 PROCEDURE — 81003 URINALYSIS AUTO W/O SCOPE: CPT | Performed by: FAMILY MEDICINE

## 2024-12-09 ENCOUNTER — OFFICE VISIT (OUTPATIENT)
Dept: FAMILY MEDICINE CLINIC | Facility: CLINIC | Age: 79
End: 2024-12-09
Payer: MEDICARE

## 2024-12-09 VITALS
BODY MASS INDEX: 29.16 KG/M2 | SYSTOLIC BLOOD PRESSURE: 124 MMHG | DIASTOLIC BLOOD PRESSURE: 84 MMHG | WEIGHT: 175 LBS | HEART RATE: 84 BPM | RESPIRATION RATE: 16 BRPM | OXYGEN SATURATION: 95 % | HEIGHT: 65 IN

## 2024-12-09 DIAGNOSIS — M47.22 CERVICAL SPONDYLOSIS WITH RADICULOPATHY: ICD-10-CM

## 2024-12-09 DIAGNOSIS — C50.911 MALIGNANT NEOPLASM OF RIGHT FEMALE BREAST, UNSPECIFIED ESTROGEN RECEPTOR STATUS, UNSPECIFIED SITE OF BREAST: ICD-10-CM

## 2024-12-09 DIAGNOSIS — E11.65 TYPE 2 DIABETES MELLITUS WITH HYPERGLYCEMIA, WITH LONG-TERM CURRENT USE OF INSULIN: ICD-10-CM

## 2024-12-09 DIAGNOSIS — Z79.4 TYPE 2 DIABETES MELLITUS WITH HYPERGLYCEMIA, WITH LONG-TERM CURRENT USE OF INSULIN: ICD-10-CM

## 2024-12-09 DIAGNOSIS — E66.3 OVERWEIGHT: ICD-10-CM

## 2024-12-09 DIAGNOSIS — D49.7 INTRADURAL EXTRAMEDULLARY SPINAL TUMOR: ICD-10-CM

## 2024-12-09 DIAGNOSIS — I10 PRIMARY HYPERTENSION: ICD-10-CM

## 2024-12-09 DIAGNOSIS — E03.9 ACQUIRED HYPOTHYROIDISM: ICD-10-CM

## 2024-12-09 DIAGNOSIS — E78.2 MIXED HYPERLIPIDEMIA: ICD-10-CM

## 2024-12-09 DIAGNOSIS — Z00.00 MEDICARE ANNUAL WELLNESS VISIT, SUBSEQUENT: Primary | ICD-10-CM

## 2024-12-09 DIAGNOSIS — T78.40XA ALLERGY, INITIAL ENCOUNTER: ICD-10-CM

## 2024-12-09 PROCEDURE — 3074F SYST BP LT 130 MM HG: CPT | Performed by: FAMILY MEDICINE

## 2024-12-09 PROCEDURE — 1170F FXNL STATUS ASSESSED: CPT | Performed by: FAMILY MEDICINE

## 2024-12-09 PROCEDURE — 1125F AMNT PAIN NOTED PAIN PRSNT: CPT | Performed by: FAMILY MEDICINE

## 2024-12-09 PROCEDURE — 1160F RVW MEDS BY RX/DR IN RCRD: CPT | Performed by: FAMILY MEDICINE

## 2024-12-09 PROCEDURE — G0439 PPPS, SUBSEQ VISIT: HCPCS | Performed by: FAMILY MEDICINE

## 2024-12-09 PROCEDURE — 99214 OFFICE O/P EST MOD 30 MIN: CPT | Performed by: FAMILY MEDICINE

## 2024-12-09 PROCEDURE — 3079F DIAST BP 80-89 MM HG: CPT | Performed by: FAMILY MEDICINE

## 2024-12-09 PROCEDURE — 1159F MED LIST DOCD IN RCRD: CPT | Performed by: FAMILY MEDICINE

## 2024-12-09 RX ORDER — DEXAMETHASONE 4 MG/1
TABLET ORAL
Qty: 10 TABLET | Refills: 0 | Status: SHIPPED | OUTPATIENT
Start: 2024-12-09 | End: 2024-12-17

## 2024-12-09 NOTE — PROGRESS NOTES
Subjective   The ABCs of the Annual Wellness Visit  Medicare Wellness Visit      Esperanza Smith is a 79 y.o. patient who presents for a Medicare Wellness Visit.    The following portions of the patient's history were reviewed and   updated as appropriate: allergies, current medications, past family history, past medical history, past social history, past surgical history, and problem list.    Compared to one year ago, the patient's physical   health is worse.  Compared to one year ago, the patient's mental   health is the same.    Recent Hospitalizations:  This patient has had a [facility] admission record on file within the last 365 days.  Current Medical Providers:  Patient Care Team:  Dom Estrada MD as PCP - General  Dr. Drake (Dental General Practice)  Dr. Pacheco (Optometry)    Outpatient Medications Prior to Visit   Medication Sig Dispense Refill    acetaminophen (TYLENOL) 325 MG tablet Take 2 tablets by mouth Every 6 (Six) Hours As Needed for Mild Pain.      amLODIPine (NORVASC) 5 MG tablet Take 1 tablet by mouth Daily. 90 tablet 2    hydrALAZINE (APRESOLINE) 25 MG tablet TAKE 1 TABLET BY MOUTH THREE TIMES DAILY AS NEEDED FOR HIGH BLOOD PRESSURE 270 tablet 1    levothyroxine (SYNTHROID, LEVOTHROID) 50 MCG tablet TAKE 1 TABLET BY MOUTH DAILY 90 tablet 1    Magnesium Oxide (MAG-200 PO) Take  by mouth.      metFORMIN (GLUCOPHAGE) 500 MG tablet TAKE 1 TABLET BY MOUTH TWICE DAILY WITH MEALS 180 tablet 2    metoprolol succinate XL (Toprol XL) 50 MG 24 hr tablet Take 1 tablet by mouth Daily for 180 days. 90 tablet 1    nystatin-triamcinolone (MYCOLOG II) 160800-4.1 UNIT/GM-% cream Apply 1 Application topically to the appropriate area as directed 2 (Two) Times a Day. 30 g 2    Turmeric (QC TUMERIC COMPLEX PO) Take  by mouth.      vitamin C (ASCORBIC ACID) 250 MG tablet Take 1 tablet by mouth Daily.      vitamin D3 125 MCG (5000 UT) capsule capsule Take 1 capsule by mouth Daily.       aluminum-magnesium hydroxide-simethicone (MAALOX/MYLANTA) 200-200-20 MG/5ML suspension Take 30 mL by mouth 3 (Three) Times a Day As Needed for Indigestion or Heartburn.      bisacodyl (DULCOLAX) 5 MG EC tablet Take 1 tablet by mouth Daily As Needed for Constipation.      butalbital-acetaminophen-caffeine (FIORICET, ESGIC) -40 MG per tablet Take 2 tablets by mouth 3 (Three) Times a Day As Needed for Headache.      calcium carbonate (TUMS) 500 MG chewable tablet Chew 1 tablet 3 (Three) Times a Day As Needed for Indigestion or Heartburn.      cyclobenzaprine (FLEXERIL) 10 MG tablet Take 1 tablet by mouth 3 (Three) Times a Day As Needed for Muscle Spasms. 30 tablet 1    cyclobenzaprine (FLEXERIL) 10 MG tablet Take 1 tablet by mouth every night at bedtime. Patient is on both prn and scheduled cyclobenzaprine-keep at least 6 hours between all doses      glucagon (GLUCAGEN) 1 MG injection Inject 1 mg under the skin into the appropriate area as directed 1 (One) Time As Needed for Low Blood Sugar.      HYDROcodone-acetaminophen (NORCO) 5-325 MG per tablet Take 1 tablet by mouth Every 6 (Six) Hours As Needed for Moderate Pain. 25 tablet 0    magnesium hydroxide (MILK OF MAGNESIA) 2400 MG/10ML suspension suspension Take 30 mL by mouth Daily As Needed. Administer if no BM x 3 days and after prune juice is given and ineffective after 8 hours      melatonin 5 MG tablet tablet Take 1 tablet by mouth At Night As Needed.      ondansetron (ZOFRAN) 4 MG/2ML injection Infuse 2 mL into a venous catheter Every 4 (Four) Hours As Needed for Nausea or Vomiting.      polyethylene glycol (MiraLax) 17 g packet Take 17 g by mouth Daily As Needed.      simethicone (MYLICON) 80 MG chewable tablet Chew 1 tablet 3 (Three) Times a Day As Needed for Flatulence.      tamsulosin (FLOMAX) 0.4 MG capsule 24 hr capsule Take 1 capsule by mouth Daily.       No facility-administered medications prior to visit.     No opioid medication identified on  "active medication list. I have reviewed chart for other potential  high risk medication/s and harmful drug interactions in the elderly.      Aspirin is not on active medication list.  Aspirin use is not indicated based on review of current medical condition/s. Risk of harm outweighs potential benefits.  .    Patient Active Problem List   Diagnosis    Breast neoplasm, Tis (DCIS), right    Dermatophytosis    Screening for breast cancer    Enterocutaneous fistula    Hematuria    Hyperlipidemia    Hypertension    Intertrigo    Overweight    Vasovagal attack    Vitamin D deficiency    Medicare annual wellness visit, subsequent    Breast cancer    Obstructive sleep apnea syndrome    Other fatigue    Allergies    Need for vaccination    Age-related osteoporosis without current pathological fracture     Postmenopause    Need for hepatitis C screening test    Cervical spondylosis with radiculopathy    Intradural extramedullary spinal tumor    Type 2 diabetes mellitus    Acquired hypothyroidism    Neck pain    Sepsis     Advance Care Planning Advance Directive is on file.  ACP discussion was held with the patient during this visit. Patient has an advance directive in EMR which is still valid.             Objective   Vitals:    12/09/24 1328   BP: 124/84   Pulse: 84   Resp: 16   SpO2: 95%   Weight: 79.4 kg (175 lb)   Height: 165.1 cm (65\")   PainSc:   6   PainLoc: Neck       Estimated body mass index is 29.12 kg/m² as calculated from the following:    Height as of this encounter: 165.1 cm (65\").    Weight as of this encounter: 79.4 kg (175 lb).            Does the patient have evidence of cognitive impairment? No  Lab Results   Component Value Date    TRIG 95 12/02/2024    HDL 58 12/02/2024     (H) 12/02/2024    VLDL 17 12/02/2024    HGBA1C 6.10 (H) 12/02/2024                                                                                                Health  Risk Assessment    Smoking Status:  Social History "     Tobacco Use   Smoking Status Never    Passive exposure: Never   Smokeless Tobacco Never     Alcohol Consumption:  Social History     Substance and Sexual Activity   Alcohol Use Not Currently    Alcohol/week: 1.0 standard drink of alcohol    Types: 1 Glasses of wine per week    Comment: occ       Fall Risk Screen  STEADI Fall Risk Assessment was completed, and patient is at HIGH risk for falls. Assessment completed on:2024    Depression Screening   Little interest or pleasure in doing things? Not at all   Feeling down, depressed, or hopeless? Not at all   PHQ-2 Total Score 0      Health Habits and Functional and Cognitive Screenin/9/2024     1:26 PM   Functional & Cognitive Status   Do you have difficulty preparing food and eating? No   Do you have difficulty bathing yourself, getting dressed or grooming yourself? No   Do you have difficulty using the toilet? No   Do you have difficulty moving around from place to place? No   Do you have trouble with steps or getting out of a bed or a chair? No   Current Diet Well Balanced Diet   Dental Exam Up to date   Eye Exam Up to date   Exercise (times per week) 0 times per week   Current Exercises Include No Regular Exercise   Do you need help using the phone?  No   Are you deaf or do you have serious difficulty hearing?  No   Do you need help to go to places out of walking distance? No   Do you need help shopping? No   Do you need help preparing meals?  No   Do you need help with housework?  No   Do you need help with laundry? No   Do you need help taking your medications? No   Do you need help managing money? No   Do you ever drive or ride in a car without wearing a seat belt? No   Have you felt unusual stress, anger or loneliness in the last month? No   Who do you live with? Spouse   If you need help, do you have trouble finding someone available to you? No   Have you been bothered in the last four weeks by sexual problems? No   Do you have difficulty  concentrating, remembering or making decisions? No           Age-appropriate Screening Schedule:  Refer to the list below for future screening recommendations based on patient's age, sex and/or medical conditions. Orders for these recommended tests are listed in the plan section. The patient has been provided with a written plan.    Health Maintenance List  Health Maintenance   Topic Date Due    ZOSTER VACCINE (1 of 2) Never done    RSV Vaccine - Adults (1 - 1-dose 75+ series) Never done    DIABETIC EYE EXAM  09/23/2023    COVID-19 Vaccine (3 - 2024-25 season) 09/01/2024    URINE MICROALBUMIN  10/16/2024    ANNUAL WELLNESS VISIT  12/07/2024    TDAP/TD VACCINES (1 - Tdap) 12/19/2024 (Originally 9/18/1964)    INFLUENZA VACCINE  03/31/2025 (Originally 7/1/2024)    BMI FOLLOWUP  05/14/2025    HEMOGLOBIN A1C  06/02/2025    LIPID PANEL  12/02/2025    DXA SCAN  04/17/2026    HEPATITIS C SCREENING  Completed    Pneumococcal Vaccine 65+  Completed    MAMMOGRAM  Discontinued    COLORECTAL CANCER SCREENING  Discontinued                                                                                                                                                CMS Preventative Services Quick Reference  Risk Factors Identified During Encounter  None Identified    The above risks/problems have been discussed with the patient.  Pertinent information has been shared with the patient in the After Visit Summary.  An After Visit Summary and PPPS were made available to the patient.    Follow Up:   Next Medicare Wellness visit to be scheduled in 1 year.         Additional E&M Note during same encounter follows:  Patient has additional, significant, and separately identifiable condition(s)/problem(s) that require work above and beyond the Medicare Wellness Visit     Chief Complaint  Medicare Wellness-subsequent    Subjective    HPI         The patient is a 79-year-old female who presents for a Medicare annual wellness visit.    She  reports an improvement in her condition post-surgery, which was performed in 07/2024 by Dr. Rebollar, during which a bone was removed. However, she continues to experience pain in her shoulder and nerve, accompanied by stiffness and numbness. The pain extends to her back and neck, and she describes the sensation as a constant tightness, which disrupts her sleep. Her left arm, initially the site of pain, has shown improvement. She is not currently on any pain medication but has tramadol available if needed. Occasionally, she takes ibuprofen or Tylenol Arthritis for severe pain. She has not been prescribed any steroids. She uses a massage chair for her back and an adjustable bed, both of which provide some relief.    She has noticed a change in her sense of taste since the surgery and reports significant sinus drainage. She has lost 20 pounds since her hospital stay due to poor appetite but has regained 4 pounds. She is attempting to maintain a normal diet but finds it difficult due to her altered taste perception. She has a history of diverticulitis, which restricts her diet to exclude nuts and popcorn. She experienced hallucinations while on hydrocodone cortisone and has declined a colonoscopy due to a previous adverse reaction.    She has undergone a DEXA scan and has had her uterus and ovaries removed. She has neuropathy but does not see a podiatrist. She does not regularly visit a dentist or dermatologist. She has not seen a cardiologist, urologist, pulmonologist, or allergist. She has not received the influenza vaccine this year, despite having received it in previous years. She has not received the shingles vaccine but has received two COVID-19 vaccines. She has a small cavity in her tooth, which is not causing pain. She has not traveled this year.    She experiences difficulty breathing, which she attributes to her neck condition. She has had issues with her fistula, which has stopped leaking, but she now has  "intestinal problems. She wakes up every 2 hours at night to urinate and walk around due to neck pain. She has started taking magnesium pills due to low magnesium levels. She is interested in discontinuing metformin. She takes B complex and thyroid pills. She has a history of fatty liver. She experiences toe cramps and takes chewable vitamin C 500 mg and zinc chewable. She has had issues with zinc since her surgeries. She has wax in her right ear and experiences constant pain, which she is unsure if it is related to her ear or surgery. She had to relearn how to walk post-surgery.    IMMUNIZATIONS  She had Prevnar 20. She had tetanus booster about 8 or 9 years ago.          Objective   Vital Signs:  /84   Pulse 84   Resp 16   Ht 165.1 cm (65\")   Wt 79.4 kg (175 lb)   SpO2 95%   BMI 29.12 kg/m²   Physical Exam  Constitutional:       Appearance: Normal appearance. She is well-developed and normal weight.   HENT:      Head: Normocephalic and atraumatic.      Right Ear: Tympanic membrane, ear canal and external ear normal.      Left Ear: Tympanic membrane, ear canal and external ear normal.      Nose: Nose normal.      Mouth/Throat:      Mouth: Mucous membranes are moist.      Pharynx: Oropharynx is clear. No oropharyngeal exudate.   Eyes:      Extraocular Movements: Extraocular movements intact.      Conjunctiva/sclera: Conjunctivae normal.      Pupils: Pupils are equal, round, and reactive to light.   Cardiovascular:      Rate and Rhythm: Normal rate and regular rhythm.      Pulses: Normal pulses.      Heart sounds: Normal heart sounds.   Pulmonary:      Effort: Pulmonary effort is normal.      Breath sounds: Normal breath sounds.   Abdominal:      General: Bowel sounds are normal.      Palpations: Abdomen is soft.   Musculoskeletal:         General: Normal range of motion.      Cervical back: Normal range of motion and neck supple.   Skin:     General: Skin is warm and dry.   Neurological:      General: No " focal deficit present.      Mental Status: She is alert and oriented to person, place, and time. Mental status is at baseline.   Psychiatric:         Mood and Affect: Mood normal.         Behavior: Behavior normal.         Thought Content: Thought content normal.         Judgment: Judgment normal.           Right ear has some wax, left ear does not. Eustachian tube is not functioning optimally.  Feet have good pulses.    Vital Signs  Blood pressure is 124/84. BMI is 29.      Lab Results   Component Value Date    TRIG 95 12/02/2024    HDL 58 12/02/2024     (H) 12/02/2024    VLDL 17 12/02/2024    HGBA1C 6.10 (H) 12/02/2024            Assessment and Plan      1. Medicare annual wellness visit subsequent year.  She is here for her Medicare wellness visit.   Upon arrival to the room the patient underwent the Medicare health risk assessment.  Neither the questions themselves or the answers that were given prompted any major concern on the part of the patient or by the medical staff that gave the assessment.  As far as the preventative care examinations and the preventative care immunizations that this patient requires they are as listed below.   Screening tests recommended:    Colonoscopy -patient is not interested  Mammogram- not needed  DEXA-utd  PAP/ Pelvic-not needed  Diabetic eye exam- utd  Diabetic foot exam- utd  Dentist-utd  Derm- prn  Immunization:  Influenza- refuses  Prevnar- utd  Pneumovax-utd  Tetanus- will get at pharm  Shingles vaccine- at pharm  Hepatitis -utd  Covid - no boosters  RSV  - will get sometime                 2. Environmental allergies.  She has environmental allergies and currently experiences eustachian tube dysfunction, causing her ear to feel full, achy, and occasionally pop. She will continue using over-the-counter Zyrtec or other medicines as needed. Intermittent use of decongestants and allergy medicines is recommended.    3. Primary hypertension.  Her blood pressure today was  124/84. Continue current medications.    4. Mixed hyperlipidemia.  Her recent lipid panel showed excellent total cholesterol, slightly elevated LDL at 107, and good HDL. Monitoring will continue.    5. Type 2 diabetes with hyperglycemia.  Her A1c is 6.1%, indicating good control. Continue current medications.    6. Overweight.  Her BMI is 29. She has lost significant weight with a low-carb diet. Neck surgery has limited her activity, but as she becomes more active, she is seeing better weight loss. Continue the current regimen indefinitely.    7. Hypothyroidism.  Her thyroid function testing was perfect. Continue her current replacement level.    8. Malignant neoplasia of the right female breast.  She has had both breasts removed due to breast cancer and is now a breast cancer survivor with no evidence of recurrent disease.    9. Intradural extra medullary spinal tumor.  She had a large neck surgery in July 2024 to remove a non-malignant tumor pushing on the spine. She continues to have pain in the neck and shoulder area, although the pressure and pain down her left arm are gone. She will be prescribed dexamethasone for a week to see if it improves the pain. If it helps, a gradual weaning off will be considered.    10. Cervical spondylosis with radiculopathy.  This was treated with surgery, and the tumor was removed. She currently has no pain down her arm.    11. Dysgeusia.  She reports that food does not taste right since her surgery. This could be related to a previous Covid infection. She will try taking zinc and vitamin C tablets to help improve her taste.    12. Neuropathy.  She has neuropathy but does not see a podiatrist. She is advised to check her feet regularly for any cuts or tears.    13. Magnesium deficiency.  Her magnesium levels were low in August 2024 but normalized later that month. She may take magnesium tablets at night to help with toe cramps.    14. Health Maintenance.  She is up to date with  her eye exams and had a DEXA scan in December 2023. She is advised to get her next DEXA scan in December 2025. She should consider getting a tetanus booster and the RSV vaccine at the pharmacy. She is also advised to think about getting the shingles vaccine.    Follow-up  Return in 6 months for a follow-up visit.    No orders of the defined types were placed in this encounter.            Follow Up   Return in about 1 year (around 12/9/2025), or if symptoms worsen or fail to improve, for Medicare Wellness-1 year                  , Recheck-6 months.  Patient was given instructions and counseling regarding her condition or for health maintenance advice. Please see specific information pulled into the AVS if appropriate.  Patient or patient representative verbalized consent for the use of Ambient Listening during the visit with  Dom Estrada MD for chart documentation. 12/9/2024  14:00 EST

## 2024-12-16 RX ORDER — DEXAMETHASONE 4 MG/1
TABLET ORAL
Qty: 10 TABLET | Refills: 0 | OUTPATIENT
Start: 2024-12-16 | End: 2024-12-24

## 2025-01-13 RX ORDER — LEVOTHYROXINE SODIUM 50 UG/1
50 TABLET ORAL DAILY
Qty: 90 TABLET | Refills: 1 | Status: SHIPPED | OUTPATIENT
Start: 2025-01-13

## 2025-04-09 NOTE — ADDENDUM NOTE
Addended by: LAN COWART on: 3/13/2020 09:42 AM     Modules accepted: Franky     Detail Level: Detailed Accession #: [id number] Size Of Lesion In Cm: 3 Size Of Lesion After Curettage: 3.3 Anesthesia Type: 1% lidocaine with epinephrine Anesthesia Volume In Cc: 9 Cautery Type: electrodesiccation Number Of Curettages: 1 What Was Performed First?: Curettage Additional Information: (Optional): The wound was cleaned, and a pressure dressing was applied.  The patient received detailed post-op instructions. Lab: [id number] Lab Facility: [id number] Histology Text: Following the procedure a portion of the curetted material was sent for histologic evaluation. Biopsy Type: H and E Render Path Notes In Note?: No Consent was obtained from the patient. The risks, benefits and alternatives to therapy were discussed in detail. Specifically, the risks of infection, scarring, bleeding, prolonged wound healing, nerve injury, incomplete removal, allergy to anesthesia and recurrence were addressed. Alternatives to ED&C, such as: surgical removal and XRT were also discussed.  Prior to the procedure, the treatment site was clearly identified and confirmed by the patient. All components of Universal Protocol/PAUSE Rule completed. Post-Care Instructions: I reviewed with the patient in detail post-care instructions. Patient is to keep the area dry for 48 hours, and not to engage in any swimming until the area is healed. Should the patient develop any fevers, chills, bleeding, severe pain patient will contact the office immediately. Billing Type: Third-Party Bill Bill As A Line Item Or As Units: Line Item

## 2025-04-17 RX ORDER — METOPROLOL SUCCINATE 50 MG/1
50 TABLET, EXTENDED RELEASE ORAL DAILY
Qty: 90 TABLET | Refills: 1 | Status: SHIPPED | OUTPATIENT
Start: 2025-04-17 | End: 2025-10-14

## 2025-06-11 ENCOUNTER — LAB (OUTPATIENT)
Dept: FAMILY MEDICINE CLINIC | Facility: CLINIC | Age: 80
End: 2025-06-11
Payer: MEDICARE

## 2025-06-11 ENCOUNTER — OFFICE VISIT (OUTPATIENT)
Dept: FAMILY MEDICINE CLINIC | Facility: CLINIC | Age: 80
End: 2025-06-11
Payer: MEDICARE

## 2025-06-11 VITALS
BODY MASS INDEX: 30.66 KG/M2 | OXYGEN SATURATION: 94 % | HEART RATE: 75 BPM | RESPIRATION RATE: 16 BRPM | DIASTOLIC BLOOD PRESSURE: 78 MMHG | SYSTOLIC BLOOD PRESSURE: 132 MMHG | WEIGHT: 184 LBS | HEIGHT: 65 IN

## 2025-06-11 DIAGNOSIS — E11.65 TYPE 2 DIABETES MELLITUS WITH HYPERGLYCEMIA, WITH LONG-TERM CURRENT USE OF INSULIN: ICD-10-CM

## 2025-06-11 DIAGNOSIS — Z79.4 TYPE 2 DIABETES MELLITUS WITH HYPERGLYCEMIA, WITH LONG-TERM CURRENT USE OF INSULIN: ICD-10-CM

## 2025-06-11 DIAGNOSIS — E55.9 VITAMIN D DEFICIENCY, UNSPECIFIED: ICD-10-CM

## 2025-06-11 DIAGNOSIS — D49.7 INTRADURAL EXTRAMEDULLARY SPINAL TUMOR: ICD-10-CM

## 2025-06-11 DIAGNOSIS — R06.09 DYSPNEA ON EXERTION: Primary | ICD-10-CM

## 2025-06-11 DIAGNOSIS — M47.22 CERVICAL SPONDYLOSIS WITH RADICULOPATHY: ICD-10-CM

## 2025-06-11 DIAGNOSIS — I10 PRIMARY HYPERTENSION: ICD-10-CM

## 2025-06-11 LAB
25(OH)D3 SERPL-MCNC: 85.6 NG/ML (ref 30–100)
ALBUMIN SERPL-MCNC: 4.6 G/DL (ref 3.5–5.2)
ALBUMIN/GLOB SERPL: 1.4 G/DL
ALP SERPL-CCNC: 100 U/L (ref 39–117)
ALT SERPL W P-5'-P-CCNC: 29 U/L (ref 1–33)
ANION GAP SERPL CALCULATED.3IONS-SCNC: 11.5 MMOL/L (ref 5–15)
AST SERPL-CCNC: 30 U/L (ref 1–32)
BASOPHILS # BLD AUTO: 0.03 10*3/MM3 (ref 0–0.2)
BASOPHILS NFR BLD AUTO: 0.4 % (ref 0–1.5)
BILIRUB SERPL-MCNC: 0.5 MG/DL (ref 0–1.2)
BILIRUB UR QL STRIP: NEGATIVE
BUN SERPL-MCNC: 10 MG/DL (ref 8–23)
BUN/CREAT SERPL: 16.9 (ref 7–25)
CALCIUM SPEC-SCNC: 10.6 MG/DL (ref 8.6–10.5)
CHLORIDE SERPL-SCNC: 99 MMOL/L (ref 98–107)
CHOLEST SERPL-MCNC: 177 MG/DL (ref 0–200)
CLARITY UR: CLEAR
CO2 SERPL-SCNC: 29.5 MMOL/L (ref 22–29)
COLOR UR: ABNORMAL
CREAT SERPL-MCNC: 0.59 MG/DL (ref 0.57–1)
DEPRECATED RDW RBC AUTO: 43.7 FL (ref 37–54)
EGFRCR SERPLBLD CKD-EPI 2021: 93 ML/MIN/1.73
EOSINOPHIL # BLD AUTO: 0.09 10*3/MM3 (ref 0–0.4)
EOSINOPHIL NFR BLD AUTO: 1.2 % (ref 0.3–6.2)
ERYTHROCYTE [DISTWIDTH] IN BLOOD BY AUTOMATED COUNT: 12.9 % (ref 12.3–15.4)
GLOBULIN UR ELPH-MCNC: 3.3 GM/DL
GLUCOSE SERPL-MCNC: 95 MG/DL (ref 65–99)
GLUCOSE UR STRIP-MCNC: NEGATIVE MG/DL
HBA1C MFR BLD: 5.9 % (ref 4.8–5.6)
HCT VFR BLD AUTO: 48.2 % (ref 34–46.6)
HDLC SERPL-MCNC: 60 MG/DL (ref 40–60)
HGB BLD-MCNC: 16.4 G/DL (ref 12–15.9)
HGB UR QL STRIP.AUTO: NEGATIVE
HOLD SPECIMEN: NORMAL
IMM GRANULOCYTES # BLD AUTO: 0.02 10*3/MM3 (ref 0–0.05)
IMM GRANULOCYTES NFR BLD AUTO: 0.3 % (ref 0–0.5)
KETONES UR QL STRIP: NEGATIVE
LDLC SERPL CALC-MCNC: 98 MG/DL (ref 0–100)
LDLC/HDLC SERPL: 1.59 {RATIO}
LEUKOCYTE ESTERASE UR QL STRIP.AUTO: NEGATIVE
LYMPHOCYTES # BLD AUTO: 1.86 10*3/MM3 (ref 0.7–3.1)
LYMPHOCYTES NFR BLD AUTO: 24.9 % (ref 19.6–45.3)
MCH RBC QN AUTO: 31.6 PG (ref 26.6–33)
MCHC RBC AUTO-ENTMCNC: 34 G/DL (ref 31.5–35.7)
MCV RBC AUTO: 92.9 FL (ref 79–97)
MONOCYTES # BLD AUTO: 0.74 10*3/MM3 (ref 0.1–0.9)
MONOCYTES NFR BLD AUTO: 9.9 % (ref 5–12)
NEUTROPHILS NFR BLD AUTO: 4.72 10*3/MM3 (ref 1.7–7)
NEUTROPHILS NFR BLD AUTO: 63.3 % (ref 42.7–76)
NITRITE UR QL STRIP: NEGATIVE
NRBC BLD AUTO-RTO: 0 /100 WBC (ref 0–0.2)
PH UR STRIP.AUTO: 7.5 [PH] (ref 5–8)
PLATELET # BLD AUTO: 213 10*3/MM3 (ref 140–450)
PMV BLD AUTO: 11.6 FL (ref 6–12)
POTASSIUM SERPL-SCNC: 4.5 MMOL/L (ref 3.5–5.2)
PROT SERPL-MCNC: 7.9 G/DL (ref 6–8.5)
PROT UR QL STRIP: NEGATIVE
RBC # BLD AUTO: 5.19 10*6/MM3 (ref 3.77–5.28)
SODIUM SERPL-SCNC: 140 MMOL/L (ref 136–145)
SP GR UR STRIP: 1.01 (ref 1–1.03)
T4 FREE SERPL-MCNC: 1.27 NG/DL (ref 0.92–1.68)
TRIGL SERPL-MCNC: 107 MG/DL (ref 0–150)
TSH SERPL DL<=0.05 MIU/L-ACNC: 2.28 UIU/ML (ref 0.27–4.2)
UROBILINOGEN UR QL STRIP: ABNORMAL
VIT B12 BLD-MCNC: 704 PG/ML (ref 211–946)
VLDLC SERPL-MCNC: 19 MG/DL (ref 5–40)
WBC NRBC COR # BLD AUTO: 7.46 10*3/MM3 (ref 3.4–10.8)

## 2025-06-11 PROCEDURE — 83036 HEMOGLOBIN GLYCOSYLATED A1C: CPT | Performed by: FAMILY MEDICINE

## 2025-06-11 PROCEDURE — 84443 ASSAY THYROID STIM HORMONE: CPT | Performed by: FAMILY MEDICINE

## 2025-06-11 PROCEDURE — 85025 COMPLETE CBC W/AUTO DIFF WBC: CPT | Performed by: FAMILY MEDICINE

## 2025-06-11 PROCEDURE — 82607 VITAMIN B-12: CPT | Performed by: FAMILY MEDICINE

## 2025-06-11 PROCEDURE — 84439 ASSAY OF FREE THYROXINE: CPT | Performed by: FAMILY MEDICINE

## 2025-06-11 PROCEDURE — 36415 COLL VENOUS BLD VENIPUNCTURE: CPT | Performed by: FAMILY MEDICINE

## 2025-06-11 PROCEDURE — 80061 LIPID PANEL: CPT | Performed by: FAMILY MEDICINE

## 2025-06-11 PROCEDURE — 81003 URINALYSIS AUTO W/O SCOPE: CPT | Performed by: FAMILY MEDICINE

## 2025-06-11 PROCEDURE — 82306 VITAMIN D 25 HYDROXY: CPT | Performed by: FAMILY MEDICINE

## 2025-06-11 PROCEDURE — 80053 COMPREHEN METABOLIC PANEL: CPT | Performed by: FAMILY MEDICINE

## 2025-06-11 NOTE — PROGRESS NOTES
Chief Complaint  Hyperlipidemia, Hypertension, and Shortness of Breath (Having difficulty breathing after exertion)    Subjective        Esperanza Smith presents to [facility]  History of Present Illness  The patient presents for follow-up on her blood pressure and diabetes management.    She reports experiencing respiratory distress for the past 2 to 3 weeks, which worsens with physical exertion such as climbing stairs or bending over. An incident occurred yesterday where she nearly lost consciousness due to breathlessness while doing laundry. These symptoms also intensify after meals, accompanied by a sensation of chest tightness. She does not have a cardiologist and is currently taking metoprolol once daily.    She has discontinued metformin and has started taking berberine instead. She has been monitoring her fasting blood glucose levels at home and has recorded them in a chart. She does not use a continuous glucose monitor.    In 06/2024, she underwent spinal surgery to remove a bone from her spine due to a meningioma encircling it. Postoperatively, she developed a cerebrospinal fluid leak, necessitating another surgery. She was hospitalized until the end of 07/2024 and subsequently underwent rehabilitation. Despite nearly a year since her surgery, she continues to experience numbness and pain in her arm, limiting her ability to raise it. She also reports soreness in her back and shoulders.    She has been experiencing excessive flatulence recently and is concerned about potential underlying causes.    Her blood pressure readings have been within the normal range. She is currently on a regimen of one tablet of hydrochlorothiazide in the morning.    She has been experiencing sinus drainage issues and occasional difficulty swallowing.    PAST SURGICAL HISTORY:  - Spinal surgery in 06/2024 for removal of a meningioma, followed by a second surgery due to cerebrospinal fluid leak.    "    Objective   Vital Signs:  /78   Pulse 75   Resp 16   Ht 165.1 cm (65\")   Wt 83.5 kg (184 lb)   SpO2 94%   BMI 30.62 kg/m²   Estimated body mass index is 30.62 kg/m² as calculated from the following:    Height as of this encounter: 165.1 cm (65\").    Weight as of this encounter: 83.5 kg (184 lb).             Physical Exam  Constitutional:       Appearance: Normal appearance. She is well-developed and normal weight.   HENT:      Head: Normocephalic and atraumatic.      Right Ear: Tympanic membrane, ear canal and external ear normal.      Left Ear: Tympanic membrane, ear canal and external ear normal.      Nose: Nose normal.      Mouth/Throat:      Mouth: Mucous membranes are moist.      Pharynx: Oropharynx is clear. No oropharyngeal exudate.   Eyes:      Extraocular Movements: Extraocular movements intact.      Conjunctiva/sclera: Conjunctivae normal.      Pupils: Pupils are equal, round, and reactive to light.   Cardiovascular:      Rate and Rhythm: Normal rate and regular rhythm.      Pulses: Normal pulses.      Heart sounds: Normal heart sounds.   Pulmonary:      Effort: Pulmonary effort is normal.      Breath sounds: Normal breath sounds.   Abdominal:      General: Bowel sounds are normal.      Palpations: Abdomen is soft.   Musculoskeletal:         General: Normal range of motion.      Cervical back: Normal range of motion and neck supple.   Skin:     General: Skin is warm and dry.   Neurological:      General: No focal deficit present.      Mental Status: She is alert and oriented to person, place, and time. Mental status is at baseline.   Psychiatric:         Mood and Affect: Mood normal.         Behavior: Behavior normal.         Thought Content: Thought content normal.         Judgment: Judgment normal.        Result Review :          Results           Assessment & Plan  1. Dyspnea on exertion.  - Reports unusual dyspnea on exertion, particularly after eating and during physical activities " such as climbing stairs or bending over.  - Describes it as a pressure sensation in her chest accompanied by shortness of breath. Heart rate remains normal during these episodes.  - Examination reveals depressed breath sounds in the left base of her lung.  - A chest x-ray and a stress Myoview test will be ordered to rule out a cardiac event. If normal, further evaluation of her esophagus will be considered.    2. 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  - Approximately one year post-cervical spine surgery for meningioma removal.  - Continues to experience some neck pain, but the worst of the pain is gone, and she has nearly full range of motion.  - No new masses felt, and supraclavicular nodes are normal. No radicular pain.  - Possible esophageal dysmotility related to neck surgery, but primary concern is to rule out a cardiac source for her symptoms.    3. Cervical spondylosis with radiculopathy.  - Condition initiated neck evaluation over a year ago, initially thought to be radicular pain from discogenic causes.  - Later found to be a meningioma wrapping around the nerve roots.    4. Type 2 diabetes with hyperglycemia and long-term use of insulin.  - Stopped taking metformin but continues to use insulin.  - A1c will be rechecked today to ensure it is within the normal range.    5. Primary hypertension.  - Blood pressure readings today were excellent at 132/78, and home readings were also very good.  - Current medication regimen will be continued.    6. Vitamin D deficiency.  - Vitamin D level will be rechecked today to determine if replacement therapy is still needed.    Follow-up  - Follow-up appointment scheduled in 4 weeks.            Follow Up     Return in about 4 weeks (around 7/9/2025), or if symptoms worsen or fail to improve, for Recheck-in 4 weeks after tests we have ordered today are completed.  Patient was given instructions and counseling regarding her condition or for health maintenance  advice. Please see specific information pulled into the AVS if appropriate.     Patient or patient representative verbalized consent for the use of Ambient Listening during the visit with  Dom Estrada MD for chart documentation. 6/11/2025  11:02 EDT  Answers submitted by the patient for this visit:  Shortness of Breath Questionnaire (Submitted on 6/4/2025)  Chief Complaint: Shortness of breath  Chronicity: new  Onset: 1 to 4 weeks ago  Frequency: 2 to 4 times per day  Progression since onset: coming and going  Fever: no fever  chest congestion: Yes  chest pain/tightness: No  hemoptysis: No  sputum production: No  PND: Yes  syncope: No  fever: No  headaches: Yes  dry cough: No  leg pain: No  leg swelling: No  orthopnea: Yes  sore throat: No  nasal congestion: Yes  swollen glands: No  vomiting: No  wheezing: No  Aggravating factors: any activity, eating  asthma: No  Recent oxygen %: 91  Additional Information: Worse after I eat something..chest tightness.

## 2025-06-12 ENCOUNTER — HOSPITAL ENCOUNTER (OUTPATIENT)
Dept: GENERAL RADIOLOGY | Facility: HOSPITAL | Age: 80
Discharge: HOME | End: 2025-06-12
Admitting: FAMILY MEDICINE
Payer: MEDICARE

## 2025-06-12 DIAGNOSIS — D49.7 INTRADURAL EXTRAMEDULLARY SPINAL TUMOR: ICD-10-CM

## 2025-06-12 DIAGNOSIS — E11.65 TYPE 2 DIABETES MELLITUS WITH HYPERGLYCEMIA, WITH LONG-TERM CURRENT USE OF INSULIN: ICD-10-CM

## 2025-06-12 DIAGNOSIS — Z79.4 TYPE 2 DIABETES MELLITUS WITH HYPERGLYCEMIA, WITH LONG-TERM CURRENT USE OF INSULIN: ICD-10-CM

## 2025-06-12 DIAGNOSIS — I10 PRIMARY HYPERTENSION: ICD-10-CM

## 2025-06-12 DIAGNOSIS — R06.09 DYSPNEA ON EXERTION: ICD-10-CM

## 2025-06-12 DIAGNOSIS — M47.22 CERVICAL SPONDYLOSIS WITH RADICULOPATHY: ICD-10-CM

## 2025-06-12 PROCEDURE — 71046 X-RAY EXAM CHEST 2 VIEWS: CPT

## 2025-06-13 ENCOUNTER — RESULTS FOLLOW-UP (OUTPATIENT)
Dept: FAMILY MEDICINE CLINIC | Facility: CLINIC | Age: 80
End: 2025-06-13
Payer: MEDICARE

## 2025-06-13 NOTE — PROGRESS NOTES
Tell Esperanza she needs to make a habit of taking deep breaths multiple times during the day.  She needs to make a habit of coughing occasionally to try to open up the air sacs in the base of her lungs.  The x-ray  showed patches of atelectasis and the best way to open those air sacs up is to cough and take deep breaths.

## 2025-07-01 ENCOUNTER — HOSPITAL ENCOUNTER (OUTPATIENT)
Dept: NUCLEAR MEDICINE | Facility: HOSPITAL | Age: 80
Discharge: HOME | End: 2025-07-01
Payer: MEDICARE

## 2025-07-01 DIAGNOSIS — E11.65 TYPE 2 DIABETES MELLITUS WITH HYPERGLYCEMIA, WITH LONG-TERM CURRENT USE OF INSULIN: ICD-10-CM

## 2025-07-01 DIAGNOSIS — D49.7 INTRADURAL EXTRAMEDULLARY SPINAL TUMOR: ICD-10-CM

## 2025-07-01 DIAGNOSIS — R06.09 DYSPNEA ON EXERTION: ICD-10-CM

## 2025-07-01 DIAGNOSIS — M47.22 CERVICAL SPONDYLOSIS WITH RADICULOPATHY: ICD-10-CM

## 2025-07-01 DIAGNOSIS — I10 PRIMARY HYPERTENSION: ICD-10-CM

## 2025-07-01 DIAGNOSIS — Z79.4 TYPE 2 DIABETES MELLITUS WITH HYPERGLYCEMIA, WITH LONG-TERM CURRENT USE OF INSULIN: ICD-10-CM

## 2025-07-01 LAB
BH CV REST NUCLEAR ISOTOPE DOSE: 11 MCI
BH CV STRESS BP STAGE 1: NORMAL
BH CV STRESS DURATION MIN STAGE 1: 6
BH CV STRESS DURATION SEC STAGE 1: 0
BH CV STRESS GRADE STAGE 1: 10
BH CV STRESS HR STAGE 1: 143
BH CV STRESS METS STAGE 1: 5
BH CV STRESS NUCLEAR ISOTOPE DOSE: 33 MCI
BH CV STRESS PROTOCOL 1: NORMAL
BH CV STRESS RECOVERY BP: NORMAL MMHG
BH CV STRESS RECOVERY HR: 90 BPM
BH CV STRESS SPEED STAGE 1: 1.7
BH CV STRESS STAGE 1: 1
MAXIMAL PREDICTED HEART RATE: 143 BPM
PERCENT MAX PREDICTED HR: 100 %
SPECT HRT GATED+EF W RNC IV: 63 %
STRESS BASELINE BP: NORMAL MMHG
STRESS BASELINE HR: 67 BPM
STRESS PERCENT HR: 118 %
STRESS POST ESTIMATED WORKLOAD: 4.6 METS
STRESS POST EXERCISE DUR MIN: 6 MIN
STRESS POST PEAK BP: NORMAL MMHG
STRESS POST PEAK HR: 143 BPM
STRESS TARGET HR: 122 BPM

## 2025-07-01 PROCEDURE — 93017 CV STRESS TEST TRACING ONLY: CPT

## 2025-07-01 PROCEDURE — 78452 HT MUSCLE IMAGE SPECT MULT: CPT

## 2025-07-01 PROCEDURE — 34310000005 TECHNETIUM TETROFOSMIN KIT: Performed by: FAMILY MEDICINE

## 2025-07-01 PROCEDURE — A9502 TC99M TETROFOSMIN: HCPCS | Performed by: FAMILY MEDICINE

## 2025-07-01 RX ORDER — AMLODIPINE BESYLATE 5 MG/1
5 TABLET ORAL DAILY
Qty: 90 TABLET | Refills: 1 | Status: SHIPPED | OUTPATIENT
Start: 2025-07-01

## 2025-07-01 RX ADMIN — TETROFOSMIN 1 DOSE: 1.38 INJECTION, POWDER, LYOPHILIZED, FOR SOLUTION INTRAVENOUS at 10:05

## 2025-07-01 RX ADMIN — TETROFOSMIN 1 DOSE: 1.38 INJECTION, POWDER, LYOPHILIZED, FOR SOLUTION INTRAVENOUS at 08:55

## 2025-07-10 ENCOUNTER — OFFICE VISIT (OUTPATIENT)
Dept: FAMILY MEDICINE CLINIC | Facility: CLINIC | Age: 80
End: 2025-07-10
Payer: MEDICARE

## 2025-07-10 VITALS
WEIGHT: 184 LBS | HEIGHT: 65 IN | DIASTOLIC BLOOD PRESSURE: 80 MMHG | OXYGEN SATURATION: 99 % | SYSTOLIC BLOOD PRESSURE: 130 MMHG | BODY MASS INDEX: 30.66 KG/M2 | RESPIRATION RATE: 14 BRPM | HEART RATE: 74 BPM

## 2025-07-10 DIAGNOSIS — R05.3 CHRONIC COUGH: Primary | ICD-10-CM

## 2025-07-10 DIAGNOSIS — R06.09 DYSPNEA ON EXERTION: ICD-10-CM

## 2025-07-10 DIAGNOSIS — K21.9 GASTROESOPHAGEAL REFLUX DISEASE, UNSPECIFIED WHETHER ESOPHAGITIS PRESENT: ICD-10-CM

## 2025-07-10 DIAGNOSIS — G47.33 OBSTRUCTIVE SLEEP APNEA SYNDROME: ICD-10-CM

## 2025-07-10 PROCEDURE — 1160F RVW MEDS BY RX/DR IN RCRD: CPT | Performed by: PHYSICIAN ASSISTANT

## 2025-07-10 PROCEDURE — 1125F AMNT PAIN NOTED PAIN PRSNT: CPT | Performed by: PHYSICIAN ASSISTANT

## 2025-07-10 PROCEDURE — 3079F DIAST BP 80-89 MM HG: CPT | Performed by: PHYSICIAN ASSISTANT

## 2025-07-10 PROCEDURE — 3075F SYST BP GE 130 - 139MM HG: CPT | Performed by: PHYSICIAN ASSISTANT

## 2025-07-10 PROCEDURE — 99214 OFFICE O/P EST MOD 30 MIN: CPT | Performed by: PHYSICIAN ASSISTANT

## 2025-07-10 PROCEDURE — 1159F MED LIST DOCD IN RCRD: CPT | Performed by: PHYSICIAN ASSISTANT

## 2025-07-10 PROCEDURE — G2211 COMPLEX E/M VISIT ADD ON: HCPCS | Performed by: PHYSICIAN ASSISTANT

## 2025-07-10 RX ORDER — PANTOPRAZOLE SODIUM 40 MG/1
40 TABLET, DELAYED RELEASE ORAL DAILY
Qty: 30 TABLET | Refills: 1 | Status: SHIPPED | OUTPATIENT
Start: 2025-07-10

## 2025-07-10 NOTE — PROGRESS NOTES
"Subjective   Esperanza Smith is a 79 y.o. female.     Chief Complaint   Patient presents with    Shortness of Breath     4 month f/u. Breathing deep makes her cough       /80   Pulse 74   Resp 14   Ht 165.1 cm (65\")   Wt 83.5 kg (184 lb)   SpO2 99%   BMI 30.62 kg/m²     BP Readings from Last 3 Encounters:   07/10/25 130/80   06/11/25 132/78   12/09/24 124/84       Wt Readings from Last 3 Encounters:   07/10/25 83.5 kg (184 lb)   06/11/25 83.5 kg (184 lb)   12/09/24 79.4 kg (175 lb)       HPI Presents to the clinic for complaints of chronic cough and shortness of breath that has been present over the past 4 months.  She is a patient of Dr. Estrada and this is my first time seeing her.  She states that she would just be setting at times and will have this shortness of breath and coughing.  This is worse in the morning at times when she wakes up.  She will not have any associated chest pain but does have some more shortness of breath on exertion.  She had a stress test done just recently that was low risk and did not show any signs of acute infarction.  She has a history of obstructive sleep apnea.    The following portions of the patient's history were reviewed and updated as appropriate: allergies, current medications, past family history, past medical history, past social history, past surgical history, and problem list.    Review of Systems    Objective   Physical Exam  Constitutional:       Appearance: She is well-developed.   HENT:      Head: Normocephalic and atraumatic.      Right Ear: Tympanic membrane normal.      Left Ear: Tympanic membrane normal.      Nose: No congestion.      Mouth/Throat:      Pharynx: No oropharyngeal exudate.   Eyes:      Conjunctiva/sclera: Conjunctivae normal.      Pupils: Pupils are equal, round, and reactive to light.   Cardiovascular:      Rate and Rhythm: Normal rate and regular rhythm.      Heart sounds: No murmur heard.  Pulmonary:      Effort: Pulmonary effort is " normal.      Breath sounds: Normal breath sounds.   Abdominal:      General: Bowel sounds are normal.      Palpations: Abdomen is soft.      Tenderness: There is no abdominal tenderness.   Musculoskeletal:         General: No deformity. Normal range of motion.      Cervical back: Normal range of motion and neck supple.   Lymphadenopathy:      Cervical: No cervical adenopathy.   Skin:     General: Skin is warm and dry.      Capillary Refill: Capillary refill takes less than 2 seconds.      Findings: No rash.   Neurological:      Mental Status: She is alert and oriented to person, place, and time.      Cranial Nerves: No cranial nerve deficit.      Coordination: Coordination normal.      Gait: Gait normal.   Psychiatric:         Behavior: Behavior normal.         Thought Content: Thought content normal.         Judgment: Judgment normal.           Diagnoses and all orders for this visit:    1. Chronic cough (Primary)    2. Obstructive sleep apnea syndrome    3. Dyspnea on exertion    4. Gastroesophageal reflux disease, unspecified whether esophagitis present    Other orders  -     pantoprazole (PROTONIX) 40 MG EC tablet; Take 1 tablet by mouth Daily.  Dispense: 30 tablet; Refill: 1    I want Esperanza to try 30 days of Protonix as it seems like she is likely having gastroesophageal reflux disease causing some of her cough.  If this does not improve her cough then we will need to consider doing further workup including imaging and possibly PFTs.  She recently had cardiac testing done that came back normal.  There is potentially multiple comorbidities associated with the shortness of breath including the obstructive sleep apnea and weight but we will try to work on this first and see if she has any improvement.  Avoid eating late at night and allow 1 to 2 hours before eating prior to bed.  We discussed avoiding carbonated beverages and things that may be hard on reflux.    >30 min spent with review, history, physical,  education, ordering, charting.      Return in about 4 months (around 11/10/2025), or if symptoms worsen or fail to improve, for Recheck.

## 2025-07-11 RX ORDER — LEVOTHYROXINE SODIUM 50 UG/1
50 TABLET ORAL DAILY
Qty: 90 TABLET | Refills: 1 | Status: SHIPPED | OUTPATIENT
Start: 2025-07-11

## (undated) DEVICE — FRCP SPEC BIP BAYO NONSTK W/CORD 8IN 1MM DISP

## (undated) DEVICE — Device

## (undated) DEVICE — SOL IRRIG NACL 1000ML

## (undated) DEVICE — TUBING, SUCTION, 1/4" X 12', STRAIGHT: Brand: MEDLINE

## (undated) DEVICE — DECANTER: Brand: UNBRANDED

## (undated) DEVICE — 3M™ STERI-STRIP™ REINFORCED ADHESIVE SKIN CLOSURES, R1547, 1/2 IN X 4 IN (12 MM X 100 MM), 6 STRIPS/ENVELOPE: Brand: 3M™ STERI-STRIP™

## (undated) DEVICE — 3M™ IOBAN™ 2 ANTIMICROBIAL INCISE DRAPE 6650EZ: Brand: IOBAN™ 2

## (undated) DEVICE — DRSNG WND BORDR/ADHS NONADHR/GZ LF 4X4IN STRL

## (undated) DEVICE — 3.0MM PRECISION NEURO (MATCH HEAD)

## (undated) DEVICE — ADHS SKIN PREMIERPRO EXOFIN TOPICAL HI/VISC .5ML

## (undated) DEVICE — DRN WND EVAC BULB 100CC

## (undated) DEVICE — ANTIBACTERIAL UNDYED BRAIDED (POLYGLACTIN 910), SYNTHETIC ABSORBABLE SUTURE: Brand: COATED VICRYL

## (undated) DEVICE — SMOKE EVACUATION TUBING WITH 7/8 IN TO 1/4 IN REDUCER: Brand: BUFFALO FILTER

## (undated) DEVICE — BLAKE SILICONE DRAIN, 15 FR ROUND, HUBLESS: Brand: BLAKE

## (undated) DEVICE — SPONGE,NEURO,1"X1",XR,STRL,LF,10/PK: Brand: MEDLINE

## (undated) DEVICE — CUSA® CLARITY QUICK CONNECT CARTRIDGE AND TUBING SET: Brand: CUSA® CLARITY

## (undated) DEVICE — GLV SURG BIOGEL LTX PF 8

## (undated) DEVICE — PK PROC TURNOVER

## (undated) DEVICE — PAD, GROUNDING, UNIVERSAL, SPLIT, 9': Brand: MEDLINE

## (undated) DEVICE — SPNG GZ WOVN 4X4IN 12PLY 10/BX STRL

## (undated) DEVICE — KT SURG TURNOVER 050

## (undated) DEVICE — SPONGE,NEURO,0.5"X3",XR,STRL,LF,10/PK: Brand: MEDLINE

## (undated) DEVICE — CUFF SCD HEMOFORCE SEQ CALF STD MD

## (undated) DEVICE — GARMENT COMPR 16V 1ST STAGE VEST XL 40IN BGE

## (undated) DEVICE — ACCUDRAIN® EXTERNAL CSF DRAINAGE SYSTEM WITH ANTI-REFLUX VALVE: Brand: ACCUDRAIN®

## (undated) DEVICE — GLV SURG BIOGEL SENSR LTX PF SZ7.5

## (undated) DEVICE — PK BASIC SPINE 50

## (undated) DEVICE — PK MINOR LAPAROTOMY 50

## (undated) DEVICE — SUT NUROLON 4/0 TF18 CR8 I8IN C584D

## (undated) DEVICE — SUT VIC 3/0 SH 27IN J416H

## (undated) DEVICE — KENDALL SCD EXPRESS FOOT CUFF, MEDIUM: Brand: KENDALL SCD

## (undated) DEVICE — BIPOLAR SEALER 23-112-1 AQM 6.0: Brand: AQUAMANTYS™

## (undated) DEVICE — DRP MICROSCP SURG SMARTDRAPE VISIONGUARD

## (undated) DEVICE — DRP C/ARMOR

## (undated) DEVICE — COVADERM: Brand: DEROYAL

## (undated) DEVICE — SPNG LAP PREWSH SFTPK 18X18IN STRL PK/5

## (undated) DEVICE — SOLUTION,WATER,IRRIGATION,1000ML,STERILE: Brand: MEDLINE

## (undated) DEVICE — PROXIMATE RH ROTATING HEAD SKIN STAPLERS (35 WIDE) CONTAINS 35 STAINLESS STEEL STAPLES: Brand: PROXIMATE

## (undated) DEVICE — PAD,ABDOMINAL,8"X10",ST,LF: Brand: MEDLINE

## (undated) DEVICE — SHEET, DRAPE, SPLIT, STERILE: Brand: MEDLINE

## (undated) DEVICE — DRSNG WND GZ PAD BORDERED 4X8IN STRL

## (undated) DEVICE — UNDERGLV SURG BIOGEL/PI PF SYNTH SURG SZ8.5 BLU 50/BX

## (undated) DEVICE — DRSNG WND BORDR/ADHS NONADHR/GZ LF 4X10IN STRL

## (undated) DEVICE — UNDYED BRAIDED (POLYGLACTIN 910), SYNTHETIC ABSORBABLE SUTURE: Brand: COATED VICRYL

## (undated) DEVICE — DRSNG SURESITE123 6X8IN

## (undated) DEVICE — CUSA® CLARITY 36KHZ TORQUE WRENCH: Brand: CUSA® CLARITY

## (undated) DEVICE — SLV SCD CALF HEMOFORCE DVT THERP REPROC MD

## (undated) DEVICE — CUSA® CLARITY 36KHZ CURVED EXTENDED SHEARTIP™: Brand: CUSA® CLARITY

## (undated) DEVICE — ELECTRD BLD EZ CLN MOD 4IN

## (undated) DEVICE — SUT VIC 2/0 SH 27IN

## (undated) DEVICE — SUT ETHLN 2/0 PS 18IN 585H

## (undated) DEVICE — 450 ML BOTTLE OF 0.05% CHLORHEXIDINE GLUCONATE IN 99.95% STERILE WATER FOR IRRIGATION, USP AND APPLICATOR.: Brand: IRRISEPT ANTIMICROBIAL WOUND LAVAGE

## (undated) DEVICE — SPONGE,NEURO,0.5"X0.5",XR,STRL,10/PK: Brand: MEDLINE

## (undated) DEVICE — SOL IRRIG H2O 1000ML STRL